# Patient Record
Sex: FEMALE | Race: WHITE | NOT HISPANIC OR LATINO | Employment: OTHER | URBAN - METROPOLITAN AREA
[De-identification: names, ages, dates, MRNs, and addresses within clinical notes are randomized per-mention and may not be internally consistent; named-entity substitution may affect disease eponyms.]

---

## 2017-01-16 ENCOUNTER — GENERIC CONVERSION - ENCOUNTER (OUTPATIENT)
Dept: OTHER | Facility: OTHER | Age: 77
End: 2017-01-16

## 2017-01-30 ENCOUNTER — TRANSCRIBE ORDERS (OUTPATIENT)
Dept: ADMINISTRATIVE | Facility: HOSPITAL | Age: 77
End: 2017-01-30

## 2017-01-30 ENCOUNTER — ALLSCRIPTS OFFICE VISIT (OUTPATIENT)
Dept: OTHER | Facility: OTHER | Age: 77
End: 2017-01-30

## 2017-01-30 DIAGNOSIS — I49.1 SUPRAVENTRICULAR PREMATURE BEATS: Primary | ICD-10-CM

## 2017-01-30 DIAGNOSIS — M25.519 PAIN IN SHOULDER: ICD-10-CM

## 2017-01-30 DIAGNOSIS — I49.1 ATRIAL PREMATURE DEPOLARIZATION: ICD-10-CM

## 2017-02-01 ENCOUNTER — APPOINTMENT (OUTPATIENT)
Dept: LAB | Facility: MEDICAL CENTER | Age: 77
End: 2017-02-01
Payer: MEDICARE

## 2017-02-01 DIAGNOSIS — I49.1 ATRIAL PREMATURE DEPOLARIZATION: ICD-10-CM

## 2017-02-01 LAB
ALBUMIN SERPL BCP-MCNC: 4 G/DL (ref 3.5–5)
ALP SERPL-CCNC: 79 U/L (ref 46–116)
ALT SERPL W P-5'-P-CCNC: 23 U/L (ref 12–78)
ANION GAP SERPL CALCULATED.3IONS-SCNC: 6 MMOL/L (ref 4–13)
AST SERPL W P-5'-P-CCNC: 19 U/L (ref 5–45)
BILIRUB SERPL-MCNC: 0.36 MG/DL (ref 0.2–1)
BUN SERPL-MCNC: 10 MG/DL (ref 5–25)
CALCIUM ALBUM COR SERPL-MCNC: 10.3 MG/DL (ref 8.3–10.1)
CALCIUM SERPL-MCNC: 10.3 MG/DL (ref 8.3–10.1)
CHLORIDE SERPL-SCNC: 105 MMOL/L (ref 100–108)
CO2 SERPL-SCNC: 30 MMOL/L (ref 21–32)
CREAT SERPL-MCNC: 0.66 MG/DL (ref 0.6–1.3)
GFR SERPL CREATININE-BSD FRML MDRD: >60 ML/MIN/1.73SQ M
GLUCOSE SERPL-MCNC: 98 MG/DL (ref 65–140)
POTASSIUM SERPL-SCNC: 4.1 MMOL/L (ref 3.5–5.3)
PROT SERPL-MCNC: 7.4 G/DL (ref 6.4–8.2)
SODIUM SERPL-SCNC: 141 MMOL/L (ref 136–145)
TSH SERPL DL<=0.05 MIU/L-ACNC: 2.81 UIU/ML (ref 0.36–3.74)

## 2017-02-01 PROCEDURE — 84443 ASSAY THYROID STIM HORMONE: CPT

## 2017-02-01 PROCEDURE — 36415 COLL VENOUS BLD VENIPUNCTURE: CPT

## 2017-02-01 PROCEDURE — 80053 COMPREHEN METABOLIC PANEL: CPT

## 2017-02-03 ENCOUNTER — GENERIC CONVERSION - ENCOUNTER (OUTPATIENT)
Dept: OTHER | Facility: OTHER | Age: 77
End: 2017-02-03

## 2017-02-07 ENCOUNTER — ALLSCRIPTS OFFICE VISIT (OUTPATIENT)
Dept: OTHER | Facility: OTHER | Age: 77
End: 2017-02-07

## 2017-02-07 ENCOUNTER — HOSPITAL ENCOUNTER (OUTPATIENT)
Dept: RADIOLOGY | Facility: MEDICAL CENTER | Age: 77
Discharge: HOME | End: 2017-02-07
Payer: MEDICARE

## 2017-02-07 DIAGNOSIS — M25.519 PAIN IN SHOULDER: ICD-10-CM

## 2017-02-07 PROCEDURE — 73030 X-RAY EXAM OF SHOULDER: CPT

## 2017-03-02 ENCOUNTER — APPOINTMENT (OUTPATIENT)
Dept: PHYSICAL THERAPY | Facility: MEDICAL CENTER | Age: 77
End: 2017-03-02
Payer: MEDICARE

## 2017-03-02 ENCOUNTER — GENERIC CONVERSION - ENCOUNTER (OUTPATIENT)
Dept: OTHER | Facility: OTHER | Age: 77
End: 2017-03-02

## 2017-03-02 DIAGNOSIS — M75.81 OTHER SHOULDER LESIONS, RIGHT SHOULDER: ICD-10-CM

## 2017-03-02 PROCEDURE — G8990 OTHER PT/OT CURRENT STATUS: HCPCS

## 2017-03-02 PROCEDURE — 97162 PT EVAL MOD COMPLEX 30 MIN: CPT

## 2017-03-02 PROCEDURE — G8991 OTHER PT/OT GOAL STATUS: HCPCS

## 2017-03-06 ENCOUNTER — APPOINTMENT (OUTPATIENT)
Dept: PHYSICAL THERAPY | Facility: MEDICAL CENTER | Age: 77
End: 2017-03-06
Payer: MEDICARE

## 2017-03-06 ENCOUNTER — HOSPITAL ENCOUNTER (OUTPATIENT)
Dept: NON INVASIVE DIAGNOSTICS | Facility: MEDICAL CENTER | Age: 77
Discharge: HOME | End: 2017-03-06
Payer: MEDICARE

## 2017-03-06 DIAGNOSIS — I49.1 SUPRAVENTRICULAR PREMATURE BEATS: ICD-10-CM

## 2017-03-06 PROCEDURE — 97140 MANUAL THERAPY 1/> REGIONS: CPT

## 2017-03-06 PROCEDURE — 93306 TTE W/DOPPLER COMPLETE: CPT

## 2017-03-06 PROCEDURE — 97110 THERAPEUTIC EXERCISES: CPT

## 2017-03-09 ENCOUNTER — APPOINTMENT (OUTPATIENT)
Dept: PHYSICAL THERAPY | Facility: MEDICAL CENTER | Age: 77
End: 2017-03-09
Payer: MEDICARE

## 2017-03-09 PROCEDURE — 97110 THERAPEUTIC EXERCISES: CPT

## 2017-03-13 ENCOUNTER — APPOINTMENT (OUTPATIENT)
Dept: PHYSICAL THERAPY | Facility: MEDICAL CENTER | Age: 77
End: 2017-03-13
Payer: MEDICARE

## 2017-03-13 PROCEDURE — 97140 MANUAL THERAPY 1/> REGIONS: CPT

## 2017-03-13 PROCEDURE — 97112 NEUROMUSCULAR REEDUCATION: CPT

## 2017-03-16 ENCOUNTER — APPOINTMENT (OUTPATIENT)
Dept: PHYSICAL THERAPY | Facility: MEDICAL CENTER | Age: 77
End: 2017-03-16
Payer: MEDICARE

## 2017-03-16 PROCEDURE — 97140 MANUAL THERAPY 1/> REGIONS: CPT

## 2017-03-16 PROCEDURE — 97150 GROUP THERAPEUTIC PROCEDURES: CPT

## 2017-03-20 ENCOUNTER — APPOINTMENT (OUTPATIENT)
Dept: PHYSICAL THERAPY | Facility: MEDICAL CENTER | Age: 77
End: 2017-03-20
Payer: MEDICARE

## 2017-03-20 PROCEDURE — 97150 GROUP THERAPEUTIC PROCEDURES: CPT

## 2017-03-20 PROCEDURE — 97140 MANUAL THERAPY 1/> REGIONS: CPT

## 2017-03-21 ENCOUNTER — ALLSCRIPTS OFFICE VISIT (OUTPATIENT)
Dept: OTHER | Facility: OTHER | Age: 77
End: 2017-03-21

## 2017-03-21 ENCOUNTER — APPOINTMENT (OUTPATIENT)
Dept: PHYSICAL THERAPY | Facility: MEDICAL CENTER | Age: 77
End: 2017-03-21
Payer: MEDICARE

## 2017-03-23 ENCOUNTER — APPOINTMENT (OUTPATIENT)
Dept: PHYSICAL THERAPY | Facility: MEDICAL CENTER | Age: 77
End: 2017-03-23
Payer: MEDICARE

## 2017-03-23 PROCEDURE — 97110 THERAPEUTIC EXERCISES: CPT

## 2017-03-23 PROCEDURE — 97140 MANUAL THERAPY 1/> REGIONS: CPT

## 2017-03-28 ENCOUNTER — APPOINTMENT (OUTPATIENT)
Dept: PHYSICAL THERAPY | Facility: MEDICAL CENTER | Age: 77
End: 2017-03-28
Payer: MEDICARE

## 2017-03-28 PROCEDURE — 97110 THERAPEUTIC EXERCISES: CPT

## 2017-03-28 PROCEDURE — 97140 MANUAL THERAPY 1/> REGIONS: CPT

## 2017-03-30 ENCOUNTER — APPOINTMENT (OUTPATIENT)
Dept: PHYSICAL THERAPY | Facility: MEDICAL CENTER | Age: 77
End: 2017-03-30
Payer: MEDICARE

## 2017-03-30 ENCOUNTER — GENERIC CONVERSION - ENCOUNTER (OUTPATIENT)
Dept: OTHER | Facility: OTHER | Age: 77
End: 2017-03-30

## 2017-03-30 PROCEDURE — G8990 OTHER PT/OT CURRENT STATUS: HCPCS

## 2017-03-30 PROCEDURE — G8991 OTHER PT/OT GOAL STATUS: HCPCS

## 2017-03-30 PROCEDURE — 97140 MANUAL THERAPY 1/> REGIONS: CPT

## 2017-03-30 PROCEDURE — 97110 THERAPEUTIC EXERCISES: CPT

## 2017-03-30 PROCEDURE — 97112 NEUROMUSCULAR REEDUCATION: CPT

## 2017-04-04 ENCOUNTER — APPOINTMENT (OUTPATIENT)
Dept: PHYSICAL THERAPY | Facility: MEDICAL CENTER | Age: 77
End: 2017-04-04
Payer: MEDICARE

## 2017-04-04 PROCEDURE — 97140 MANUAL THERAPY 1/> REGIONS: CPT

## 2017-04-04 PROCEDURE — 97112 NEUROMUSCULAR REEDUCATION: CPT

## 2017-04-06 ENCOUNTER — APPOINTMENT (OUTPATIENT)
Dept: PHYSICAL THERAPY | Facility: MEDICAL CENTER | Age: 77
End: 2017-04-06
Payer: MEDICARE

## 2017-04-06 PROCEDURE — 97140 MANUAL THERAPY 1/> REGIONS: CPT

## 2017-04-06 PROCEDURE — 97110 THERAPEUTIC EXERCISES: CPT

## 2017-04-10 ENCOUNTER — APPOINTMENT (OUTPATIENT)
Dept: PHYSICAL THERAPY | Facility: MEDICAL CENTER | Age: 77
End: 2017-04-10
Payer: MEDICARE

## 2017-04-10 PROCEDURE — 97150 GROUP THERAPEUTIC PROCEDURES: CPT

## 2017-04-10 PROCEDURE — 97110 THERAPEUTIC EXERCISES: CPT

## 2017-04-10 PROCEDURE — 97140 MANUAL THERAPY 1/> REGIONS: CPT

## 2017-04-13 ENCOUNTER — APPOINTMENT (OUTPATIENT)
Dept: PHYSICAL THERAPY | Facility: MEDICAL CENTER | Age: 77
End: 2017-04-13
Payer: MEDICARE

## 2017-04-13 PROCEDURE — 97140 MANUAL THERAPY 1/> REGIONS: CPT

## 2017-04-13 PROCEDURE — 97110 THERAPEUTIC EXERCISES: CPT

## 2017-04-26 ENCOUNTER — HOSPITAL ENCOUNTER (EMERGENCY)
Facility: HOSPITAL | Age: 77
Discharge: HOME | End: 2017-04-26
Attending: EMERGENCY MEDICINE | Admitting: EMERGENCY MEDICINE
Payer: MEDICARE

## 2017-04-26 ENCOUNTER — APPOINTMENT (EMERGENCY)
Dept: ULTRASOUND IMAGING | Facility: HOSPITAL | Age: 77
End: 2017-04-26
Payer: MEDICARE

## 2017-04-26 VITALS
RESPIRATION RATE: 18 BRPM | OXYGEN SATURATION: 98 % | WEIGHT: 141.09 LBS | HEART RATE: 65 BPM | SYSTOLIC BLOOD PRESSURE: 181 MMHG | DIASTOLIC BLOOD PRESSURE: 89 MMHG

## 2017-04-26 DIAGNOSIS — I80.9 SUPERFICIAL THROMBOPHLEBITIS: ICD-10-CM

## 2017-04-26 DIAGNOSIS — B02.9 SHINGLES: Primary | ICD-10-CM

## 2017-04-26 PROCEDURE — 99283 EMERGENCY DEPT VISIT LOW MDM: CPT

## 2017-04-26 PROCEDURE — 93970 EXTREMITY STUDY: CPT

## 2017-04-26 RX ORDER — FAMCICLOVIR 500 MG/1
500 TABLET, FILM COATED ORAL 3 TIMES DAILY
Qty: 21 TABLET | Refills: 0 | Status: SHIPPED | OUTPATIENT
Start: 2017-04-26 | End: 2018-02-01 | Stop reason: ALTCHOICE

## 2017-04-26 RX ORDER — FOLIC ACID/MULTIVIT,IRON,MINER .4-18-35
1 TABLET,CHEWABLE ORAL DAILY
COMMUNITY
End: 2018-05-04

## 2017-04-26 RX ORDER — MULTIVIT-MIN/IRON/FOLIC ACID/K 18-600-40
CAPSULE ORAL
COMMUNITY
End: 2018-05-04

## 2017-05-06 ENCOUNTER — HOSPITAL ENCOUNTER (EMERGENCY)
Facility: HOSPITAL | Age: 77
Discharge: HOME | End: 2017-05-06
Attending: EMERGENCY MEDICINE | Admitting: EMERGENCY MEDICINE
Payer: MEDICARE

## 2017-05-06 VITALS
TEMPERATURE: 97.6 F | HEIGHT: 63 IN | RESPIRATION RATE: 18 BRPM | WEIGHT: 139.77 LBS | OXYGEN SATURATION: 98 % | DIASTOLIC BLOOD PRESSURE: 101 MMHG | HEART RATE: 90 BPM | SYSTOLIC BLOOD PRESSURE: 140 MMHG | BODY MASS INDEX: 24.77 KG/M2

## 2017-05-06 DIAGNOSIS — B02.9 SHINGLES: Primary | ICD-10-CM

## 2017-05-06 DIAGNOSIS — M79.2 NERVE PAIN: ICD-10-CM

## 2017-05-06 LAB
ANION GAP SERPL CALCULATED.3IONS-SCNC: 8 MMOL/L (ref 4–13)
APTT PPP: 29 SECONDS (ref 23–35)
BASOPHILS # BLD AUTO: 0.02 THOUSANDS/ΜL (ref 0–0.1)
BASOPHILS NFR BLD AUTO: 0 % (ref 0–1)
BUN SERPL-MCNC: 9 MG/DL (ref 5–25)
CALCIUM SERPL-MCNC: 10.2 MG/DL (ref 8.3–10.1)
CHLORIDE SERPL-SCNC: 102 MMOL/L (ref 100–108)
CLARITY, POC: CLEAR
CO2 SERPL-SCNC: 30 MMOL/L (ref 21–32)
COLOR, POC: YELLOW
CREAT SERPL-MCNC: 0.58 MG/DL (ref 0.6–1.3)
CRP SERPL QL: <3 MG/L
EOSINOPHIL # BLD AUTO: 0.03 THOUSAND/ΜL (ref 0–0.61)
EOSINOPHIL NFR BLD AUTO: 1 % (ref 0–6)
ERYTHROCYTE [DISTWIDTH] IN BLOOD BY AUTOMATED COUNT: 12.6 % (ref 11.6–15.1)
ERYTHROCYTE [SEDIMENTATION RATE] IN BLOOD: 5 MM/HOUR (ref 0–20)
EXT BILIRUBIN, UA: NEGATIVE
EXT BLOOD URINE: NEGATIVE
EXT GLUCOSE, UA: NEGATIVE
EXT KETONES: NORMAL
EXT NITRITE, UA: NEGATIVE
EXT PH, UA: 6.5
EXT PROTEIN, UA: NEGATIVE
EXT SPECIFIC GRAVITY, UA: 1
EXT UROBILINOGEN: NEGATIVE
GFR SERPL CREATININE-BSD FRML MDRD: >60 ML/MIN/1.73SQ M
GLUCOSE SERPL-MCNC: 108 MG/DL (ref 65–140)
HCT VFR BLD AUTO: 39.4 % (ref 34.8–46.1)
HGB BLD-MCNC: 13.4 G/DL (ref 11.5–15.4)
INR PPP: 1.04 (ref 0.86–1.16)
LYMPHOCYTES # BLD AUTO: 1.51 THOUSANDS/ΜL (ref 0.6–4.47)
LYMPHOCYTES NFR BLD AUTO: 28 % (ref 14–44)
MCH RBC QN AUTO: 29.4 PG (ref 26.8–34.3)
MCHC RBC AUTO-ENTMCNC: 34 G/DL (ref 31.4–37.4)
MCV RBC AUTO: 86 FL (ref 82–98)
MONOCYTES # BLD AUTO: 0.32 THOUSAND/ΜL (ref 0.17–1.22)
MONOCYTES NFR BLD AUTO: 6 % (ref 4–12)
NEUTROPHILS # BLD AUTO: 3.46 THOUSANDS/ΜL (ref 1.85–7.62)
NEUTS SEG NFR BLD AUTO: 65 % (ref 43–75)
PLATELET # BLD AUTO: 296 THOUSANDS/UL (ref 149–390)
PMV BLD AUTO: 9.9 FL (ref 8.9–12.7)
POTASSIUM SERPL-SCNC: 4.2 MMOL/L (ref 3.5–5.3)
PROTHROMBIN TIME: 13.9 SECONDS (ref 12.1–14.4)
RBC # BLD AUTO: 4.56 MILLION/UL (ref 3.81–5.12)
SODIUM SERPL-SCNC: 140 MMOL/L (ref 136–145)
WBC # BLD AUTO: 5.34 THOUSAND/UL (ref 4.31–10.16)
WBC # BLD EST: NEGATIVE 10*3/UL

## 2017-05-06 PROCEDURE — 87798 DETECT AGENT NOS DNA AMP: CPT | Performed by: PHYSICIAN ASSISTANT

## 2017-05-06 PROCEDURE — 36415 COLL VENOUS BLD VENIPUNCTURE: CPT | Performed by: PHYSICIAN ASSISTANT

## 2017-05-06 PROCEDURE — 80048 BASIC METABOLIC PNL TOTAL CA: CPT | Performed by: PHYSICIAN ASSISTANT

## 2017-05-06 PROCEDURE — 85025 COMPLETE CBC W/AUTO DIFF WBC: CPT | Performed by: PHYSICIAN ASSISTANT

## 2017-05-06 PROCEDURE — 85610 PROTHROMBIN TIME: CPT | Performed by: PHYSICIAN ASSISTANT

## 2017-05-06 PROCEDURE — 99283 EMERGENCY DEPT VISIT LOW MDM: CPT

## 2017-05-06 PROCEDURE — 81002 URINALYSIS NONAUTO W/O SCOPE: CPT | Performed by: PHYSICIAN ASSISTANT

## 2017-05-06 PROCEDURE — 85730 THROMBOPLASTIN TIME PARTIAL: CPT | Performed by: PHYSICIAN ASSISTANT

## 2017-05-06 PROCEDURE — 86140 C-REACTIVE PROTEIN: CPT | Performed by: PHYSICIAN ASSISTANT

## 2017-05-06 PROCEDURE — 85652 RBC SED RATE AUTOMATED: CPT | Performed by: PHYSICIAN ASSISTANT

## 2017-05-06 RX ORDER — GABAPENTIN 300 MG/1
300 CAPSULE ORAL 3 TIMES DAILY
Qty: 15 CAPSULE | Refills: 0 | Status: SHIPPED | OUTPATIENT
Start: 2017-05-06 | End: 2018-02-01 | Stop reason: ALTCHOICE

## 2017-05-06 RX ORDER — TRAMADOL HYDROCHLORIDE 50 MG/1
50 TABLET ORAL EVERY 6 HOURS PRN
COMMUNITY
End: 2018-02-01 | Stop reason: ALTCHOICE

## 2017-05-11 LAB — VZV DNA SPEC QL NAA+PROBE: POSITIVE

## 2017-05-12 ENCOUNTER — GENERIC CONVERSION - ENCOUNTER (OUTPATIENT)
Dept: OTHER | Facility: OTHER | Age: 77
End: 2017-05-12

## 2017-05-25 ENCOUNTER — ALLSCRIPTS OFFICE VISIT (OUTPATIENT)
Dept: OTHER | Facility: OTHER | Age: 77
End: 2017-05-25

## 2017-06-13 ENCOUNTER — TRANSCRIBE ORDERS (OUTPATIENT)
Dept: ADMINISTRATIVE | Facility: HOSPITAL | Age: 77
End: 2017-06-13

## 2017-06-13 ENCOUNTER — ALLSCRIPTS OFFICE VISIT (OUTPATIENT)
Dept: OTHER | Facility: OTHER | Age: 77
End: 2017-06-13

## 2017-06-13 DIAGNOSIS — M21.372 LEFT FOOT DROP: ICD-10-CM

## 2017-06-13 DIAGNOSIS — M21.372 LEFT FOOT DROP: Primary | ICD-10-CM

## 2017-06-14 ENCOUNTER — HOSPITAL ENCOUNTER (OUTPATIENT)
Dept: NEUROLOGY | Facility: CLINIC | Age: 77
Discharge: HOME | End: 2017-06-14
Payer: MEDICARE

## 2017-06-14 ENCOUNTER — HOSPITAL ENCOUNTER (OUTPATIENT)
Dept: RADIOLOGY | Age: 77
Discharge: HOME | End: 2017-06-14
Payer: MEDICARE

## 2017-06-14 DIAGNOSIS — M21.372 LEFT FOOT DROP: ICD-10-CM

## 2017-06-14 PROCEDURE — 72158 MRI LUMBAR SPINE W/O & W/DYE: CPT

## 2017-06-14 PROCEDURE — 95910 NRV CNDJ TEST 7-8 STUDIES: CPT

## 2017-06-14 PROCEDURE — 95886 MUSC TEST DONE W/N TEST COMP: CPT

## 2017-06-14 PROCEDURE — A9585 GADOBUTROL INJECTION: HCPCS | Performed by: PSYCHIATRY & NEUROLOGY

## 2017-06-14 RX ADMIN — GADOBUTROL 6.17 ML: 604.72 INJECTION INTRAVENOUS at 09:32

## 2017-06-16 ENCOUNTER — GENERIC CONVERSION - ENCOUNTER (OUTPATIENT)
Dept: OTHER | Facility: OTHER | Age: 77
End: 2017-06-16

## 2017-06-20 ENCOUNTER — GENERIC CONVERSION - ENCOUNTER (OUTPATIENT)
Dept: OTHER | Facility: OTHER | Age: 77
End: 2017-06-20

## 2017-06-20 ENCOUNTER — APPOINTMENT (OUTPATIENT)
Dept: PHYSICAL THERAPY | Facility: MEDICAL CENTER | Age: 77
End: 2017-06-20
Payer: MEDICARE

## 2017-06-20 PROCEDURE — 97014 ELECTRIC STIMULATION THERAPY: CPT

## 2017-06-20 PROCEDURE — G8979 MOBILITY GOAL STATUS: HCPCS

## 2017-06-20 PROCEDURE — G8978 MOBILITY CURRENT STATUS: HCPCS

## 2017-06-20 PROCEDURE — 97162 PT EVAL MOD COMPLEX 30 MIN: CPT

## 2017-06-22 ENCOUNTER — APPOINTMENT (OUTPATIENT)
Dept: PHYSICAL THERAPY | Facility: MEDICAL CENTER | Age: 77
End: 2017-06-22
Payer: MEDICARE

## 2017-06-22 PROCEDURE — 97014 ELECTRIC STIMULATION THERAPY: CPT

## 2017-06-22 PROCEDURE — 97116 GAIT TRAINING THERAPY: CPT

## 2017-06-22 PROCEDURE — 97112 NEUROMUSCULAR REEDUCATION: CPT

## 2017-06-22 PROCEDURE — 97110 THERAPEUTIC EXERCISES: CPT

## 2017-06-23 ENCOUNTER — ALLSCRIPTS OFFICE VISIT (OUTPATIENT)
Dept: OTHER | Facility: OTHER | Age: 77
End: 2017-06-23

## 2017-06-26 ENCOUNTER — APPOINTMENT (OUTPATIENT)
Dept: PHYSICAL THERAPY | Facility: MEDICAL CENTER | Age: 77
End: 2017-06-26
Payer: MEDICARE

## 2017-06-26 PROCEDURE — 97112 NEUROMUSCULAR REEDUCATION: CPT

## 2017-06-26 PROCEDURE — 97014 ELECTRIC STIMULATION THERAPY: CPT

## 2017-06-26 PROCEDURE — 97110 THERAPEUTIC EXERCISES: CPT

## 2017-06-29 ENCOUNTER — APPOINTMENT (OUTPATIENT)
Dept: PHYSICAL THERAPY | Facility: MEDICAL CENTER | Age: 77
End: 2017-06-29
Payer: MEDICARE

## 2017-06-29 PROCEDURE — 97112 NEUROMUSCULAR REEDUCATION: CPT

## 2017-06-29 PROCEDURE — 97110 THERAPEUTIC EXERCISES: CPT

## 2017-06-29 PROCEDURE — 97014 ELECTRIC STIMULATION THERAPY: CPT

## 2017-06-29 PROCEDURE — 97116 GAIT TRAINING THERAPY: CPT

## 2017-06-30 ENCOUNTER — GENERIC CONVERSION - ENCOUNTER (OUTPATIENT)
Dept: OTHER | Facility: OTHER | Age: 77
End: 2017-06-30

## 2017-07-03 ENCOUNTER — APPOINTMENT (OUTPATIENT)
Dept: PHYSICAL THERAPY | Facility: MEDICAL CENTER | Age: 77
End: 2017-07-03
Payer: MEDICARE

## 2017-07-03 PROCEDURE — 97112 NEUROMUSCULAR REEDUCATION: CPT

## 2017-07-03 PROCEDURE — 97110 THERAPEUTIC EXERCISES: CPT

## 2017-07-03 PROCEDURE — 97014 ELECTRIC STIMULATION THERAPY: CPT

## 2017-07-05 ENCOUNTER — GENERIC CONVERSION - ENCOUNTER (OUTPATIENT)
Dept: OTHER | Facility: OTHER | Age: 77
End: 2017-07-05

## 2017-07-06 ENCOUNTER — APPOINTMENT (OUTPATIENT)
Dept: PHYSICAL THERAPY | Facility: MEDICAL CENTER | Age: 77
End: 2017-07-06
Payer: MEDICARE

## 2017-07-06 PROCEDURE — 97110 THERAPEUTIC EXERCISES: CPT

## 2017-07-06 PROCEDURE — 97014 ELECTRIC STIMULATION THERAPY: CPT

## 2017-07-10 ENCOUNTER — APPOINTMENT (OUTPATIENT)
Dept: PHYSICAL THERAPY | Facility: MEDICAL CENTER | Age: 77
End: 2017-07-10
Payer: MEDICARE

## 2017-07-10 PROCEDURE — 97112 NEUROMUSCULAR REEDUCATION: CPT

## 2017-07-10 PROCEDURE — 97110 THERAPEUTIC EXERCISES: CPT

## 2017-07-10 PROCEDURE — 97014 ELECTRIC STIMULATION THERAPY: CPT

## 2017-07-13 ENCOUNTER — ALLSCRIPTS OFFICE VISIT (OUTPATIENT)
Dept: OTHER | Facility: OTHER | Age: 77
End: 2017-07-13

## 2017-07-14 ENCOUNTER — APPOINTMENT (OUTPATIENT)
Dept: PHYSICAL THERAPY | Facility: MEDICAL CENTER | Age: 77
End: 2017-07-14
Payer: MEDICARE

## 2017-07-14 PROCEDURE — 97110 THERAPEUTIC EXERCISES: CPT

## 2017-07-14 PROCEDURE — 97112 NEUROMUSCULAR REEDUCATION: CPT

## 2017-07-14 PROCEDURE — 97014 ELECTRIC STIMULATION THERAPY: CPT

## 2017-07-18 ENCOUNTER — GENERIC CONVERSION - ENCOUNTER (OUTPATIENT)
Dept: OTHER | Facility: OTHER | Age: 77
End: 2017-07-18

## 2017-07-18 ENCOUNTER — APPOINTMENT (OUTPATIENT)
Dept: PHYSICAL THERAPY | Facility: MEDICAL CENTER | Age: 77
End: 2017-07-18
Payer: MEDICARE

## 2017-07-18 PROCEDURE — G8979 MOBILITY GOAL STATUS: HCPCS

## 2017-07-18 PROCEDURE — 97112 NEUROMUSCULAR REEDUCATION: CPT

## 2017-07-18 PROCEDURE — G8978 MOBILITY CURRENT STATUS: HCPCS

## 2017-07-18 PROCEDURE — 97110 THERAPEUTIC EXERCISES: CPT

## 2017-07-18 PROCEDURE — 97014 ELECTRIC STIMULATION THERAPY: CPT

## 2017-07-20 ENCOUNTER — APPOINTMENT (OUTPATIENT)
Dept: PHYSICAL THERAPY | Facility: MEDICAL CENTER | Age: 77
End: 2017-07-20
Payer: MEDICARE

## 2017-07-20 PROCEDURE — 97110 THERAPEUTIC EXERCISES: CPT

## 2017-07-20 PROCEDURE — 97112 NEUROMUSCULAR REEDUCATION: CPT

## 2017-07-20 PROCEDURE — 97014 ELECTRIC STIMULATION THERAPY: CPT

## 2017-07-24 ENCOUNTER — APPOINTMENT (OUTPATIENT)
Dept: PHYSICAL THERAPY | Facility: MEDICAL CENTER | Age: 77
End: 2017-07-24
Payer: MEDICARE

## 2017-07-24 PROCEDURE — 97112 NEUROMUSCULAR REEDUCATION: CPT

## 2017-07-24 PROCEDURE — 97014 ELECTRIC STIMULATION THERAPY: CPT

## 2017-07-24 PROCEDURE — 97110 THERAPEUTIC EXERCISES: CPT

## 2017-07-27 ENCOUNTER — APPOINTMENT (OUTPATIENT)
Dept: PHYSICAL THERAPY | Facility: MEDICAL CENTER | Age: 77
End: 2017-07-27
Payer: MEDICARE

## 2017-07-27 PROCEDURE — 97112 NEUROMUSCULAR REEDUCATION: CPT

## 2017-07-27 PROCEDURE — 97110 THERAPEUTIC EXERCISES: CPT

## 2017-07-27 PROCEDURE — 97014 ELECTRIC STIMULATION THERAPY: CPT

## 2017-08-01 ENCOUNTER — APPOINTMENT (OUTPATIENT)
Dept: PHYSICAL THERAPY | Facility: MEDICAL CENTER | Age: 77
End: 2017-08-01
Payer: MEDICARE

## 2017-08-01 PROCEDURE — 97110 THERAPEUTIC EXERCISES: CPT

## 2017-08-01 PROCEDURE — 97014 ELECTRIC STIMULATION THERAPY: CPT

## 2017-08-01 PROCEDURE — 97112 NEUROMUSCULAR REEDUCATION: CPT

## 2017-08-03 ENCOUNTER — APPOINTMENT (OUTPATIENT)
Dept: PHYSICAL THERAPY | Facility: MEDICAL CENTER | Age: 77
End: 2017-08-03
Payer: MEDICARE

## 2017-08-03 PROCEDURE — 97014 ELECTRIC STIMULATION THERAPY: CPT

## 2017-08-03 PROCEDURE — 97112 NEUROMUSCULAR REEDUCATION: CPT

## 2017-08-03 PROCEDURE — 97110 THERAPEUTIC EXERCISES: CPT

## 2017-08-08 ENCOUNTER — APPOINTMENT (OUTPATIENT)
Dept: PHYSICAL THERAPY | Facility: MEDICAL CENTER | Age: 77
End: 2017-08-08
Payer: MEDICARE

## 2017-08-08 PROCEDURE — 97014 ELECTRIC STIMULATION THERAPY: CPT

## 2017-08-08 PROCEDURE — 97110 THERAPEUTIC EXERCISES: CPT

## 2017-08-08 PROCEDURE — 97112 NEUROMUSCULAR REEDUCATION: CPT

## 2017-08-10 ENCOUNTER — GENERIC CONVERSION - ENCOUNTER (OUTPATIENT)
Dept: OTHER | Facility: OTHER | Age: 77
End: 2017-08-10

## 2017-08-10 ENCOUNTER — APPOINTMENT (OUTPATIENT)
Dept: PHYSICAL THERAPY | Facility: MEDICAL CENTER | Age: 77
End: 2017-08-10
Payer: MEDICARE

## 2017-08-10 PROCEDURE — G8979 MOBILITY GOAL STATUS: HCPCS

## 2017-08-10 PROCEDURE — 97014 ELECTRIC STIMULATION THERAPY: CPT

## 2017-08-10 PROCEDURE — G8978 MOBILITY CURRENT STATUS: HCPCS

## 2017-08-10 PROCEDURE — 97112 NEUROMUSCULAR REEDUCATION: CPT

## 2017-08-10 PROCEDURE — 97110 THERAPEUTIC EXERCISES: CPT

## 2017-08-15 ENCOUNTER — APPOINTMENT (OUTPATIENT)
Dept: PHYSICAL THERAPY | Facility: MEDICAL CENTER | Age: 77
End: 2017-08-15
Payer: MEDICARE

## 2017-08-15 PROCEDURE — 97014 ELECTRIC STIMULATION THERAPY: CPT

## 2017-08-15 PROCEDURE — 97112 NEUROMUSCULAR REEDUCATION: CPT

## 2017-08-17 ENCOUNTER — APPOINTMENT (OUTPATIENT)
Dept: PHYSICAL THERAPY | Facility: MEDICAL CENTER | Age: 77
End: 2017-08-17
Payer: MEDICARE

## 2017-08-17 PROCEDURE — 97014 ELECTRIC STIMULATION THERAPY: CPT

## 2017-08-17 PROCEDURE — 97110 THERAPEUTIC EXERCISES: CPT

## 2017-08-17 PROCEDURE — 97112 NEUROMUSCULAR REEDUCATION: CPT

## 2017-08-22 ENCOUNTER — APPOINTMENT (OUTPATIENT)
Dept: PHYSICAL THERAPY | Facility: MEDICAL CENTER | Age: 77
End: 2017-08-22
Payer: MEDICARE

## 2017-08-22 PROCEDURE — 97112 NEUROMUSCULAR REEDUCATION: CPT

## 2017-08-22 PROCEDURE — 97150 GROUP THERAPEUTIC PROCEDURES: CPT

## 2017-08-22 PROCEDURE — 97014 ELECTRIC STIMULATION THERAPY: CPT

## 2017-08-24 ENCOUNTER — APPOINTMENT (OUTPATIENT)
Dept: PHYSICAL THERAPY | Facility: MEDICAL CENTER | Age: 77
End: 2017-08-24
Payer: MEDICARE

## 2017-08-24 PROCEDURE — 97014 ELECTRIC STIMULATION THERAPY: CPT

## 2017-08-24 PROCEDURE — 97112 NEUROMUSCULAR REEDUCATION: CPT

## 2017-08-24 PROCEDURE — 97110 THERAPEUTIC EXERCISES: CPT

## 2017-08-28 ENCOUNTER — APPOINTMENT (OUTPATIENT)
Dept: PHYSICAL THERAPY | Facility: MEDICAL CENTER | Age: 77
End: 2017-08-28
Payer: MEDICARE

## 2017-08-28 PROCEDURE — 97014 ELECTRIC STIMULATION THERAPY: CPT

## 2017-08-28 PROCEDURE — 97110 THERAPEUTIC EXERCISES: CPT

## 2017-08-28 PROCEDURE — 97112 NEUROMUSCULAR REEDUCATION: CPT

## 2017-08-29 ENCOUNTER — APPOINTMENT (OUTPATIENT)
Dept: PHYSICAL THERAPY | Facility: MEDICAL CENTER | Age: 77
End: 2017-08-29
Payer: MEDICARE

## 2017-08-31 ENCOUNTER — APPOINTMENT (OUTPATIENT)
Dept: PHYSICAL THERAPY | Facility: MEDICAL CENTER | Age: 77
End: 2017-08-31
Payer: MEDICARE

## 2017-08-31 PROCEDURE — 97014 ELECTRIC STIMULATION THERAPY: CPT

## 2017-08-31 PROCEDURE — 97110 THERAPEUTIC EXERCISES: CPT

## 2017-11-22 ENCOUNTER — OFFICE VISIT (OUTPATIENT)
Dept: URGENT CARE | Facility: MEDICAL CENTER | Age: 77
End: 2017-11-22
Payer: MEDICARE

## 2017-11-22 PROCEDURE — 99213 OFFICE O/P EST LOW 20 MIN: CPT

## 2017-11-22 PROCEDURE — G0463 HOSPITAL OUTPT CLINIC VISIT: HCPCS

## 2017-11-23 NOTE — PROGRESS NOTES
Assessment    1  Skin rash (782 1) (R21)    Plan  Skin rash    · ValACYclovir HCl - 1 GM Oral Tablet (Valtrex); TAKE 1 TABLET 3 TIMES DAILYFOR 7 DAYS   Bump on skin is most likely an insect bite but due to the patient's history of severe herpes zoster and the upcoming holiday we will provide a prescription for Valtrex  Use over the counter hydrocortisone ointment for relief of itching  If you start to have pain, burning, or increased bumps with blistering, please fill the Valtrex prescription and take 1000 mg three times daily for 7 days  Keep the area covered with a band-aid and clothing when around others  Chief Complaint    1  Rash  Chief Complaint Free Text Note Form: yesterday noticed blister that is itchy to left back  spot is red in color  History of Present Illness  HPI: This is a 65year old female with a history of severe shingles 6 months ago on her left foot and lower leg which has left her with a foot drop, presenting for a single bump on her left lateral back  The bump is itching, but not painful  She reports her previous shingles were burning and painful, but she is concerned because she has young children family members coming to visit tomorrow  [facility] Based Practices Required Assessment:  Pain Assessment  the patient states they do not have pain  Abuse And Domestic Violence Screen   Yes, the patient is safe at home  -- The patient states no one is hurting them  Depression And Suicide Screen  No, the patient has not had thoughts of hurting themself  No, the patient has not felt depressed in the past 7 days  Primary Language is  English  Readiness To Learn: Receptive  Barriers To Learning: none  Preferred Learning: verbal  Education Completed: disease/condition-- and-- treatment/procedure  Teaching Method: verbal  Person Taught: patient  Evaluation Of Learning: verbalized/demonstrated understanding      Active Problems  1  Degenerative lumbar spinal stenosis (564 02) (V79 972)   2  Irregular heartbeat (427 9) (I49 9)   3  Left foot drop (736 79) (M21 372)   4  Premature atrial contraction (427 61) (I49 1)   5  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)   6  Skin rash (782 1) (R21)   7  Superficial phlebitis of right leg (451 0) (I80 01)   8  Tendinitis of right rotator cuff (726 10) (M75 81)   9  Varicose veins of bilateral lower extremities with other complications (688 4) (Q11 074)    Past Medical History  1  No pertinent past medical history    Family History  Mother    1  Family history of osteoporosis (V17 81) (Z82 62)    Social History     · Never a smoker    Current Meds   1  Centrum Silver Oral Tablet; TAKE 1 TABLET DAILY; Therapy: (Recorded:28Npt7916) to Recorded   2  [employer] Vitamin D 2000 UNIT CAPS; take 1 capsule daily; Therapy: (Recorded:92Eem2992) to Recorded   3  Motrin TABS; TAKE 1 TABLET 3 TIMES DAILY AS NEEDED; Therapy: (Recorded:28Hia3530) to Recorded   4  Alesia 128 2 % Ophthalmic Solution; APPLY 1 DROP Every 8 hours; Therapy: (Recorded:33Tnp9703) to Recorded    Allergies    1  predniSONE    Vitals  Signs   Recorded: 22Nov2017 11:45AM   Temperature: 97 7 F  Heart Rate: 85  Respiration: 20  Systolic: 310  Diastolic: 80  Height: 5 ft 3 in  Weight: 134 lb 9 6 oz  BMI Calculated: 23 84  BSA Calculated: 1 63  Pain Scale: 0    Physical Exam   Constitutional  General appearance: No acute distress, well appearing and well nourished  Eyes  Conjunctiva and lids: No swelling, erythema or discharge  Ears, Nose, Mouth, and Throat  External inspection of ears and nose: Normal    Otoscopic examination: Tympanic membranes translucent with normal light reflex  Canals patent without erythema  Nasal mucosa, septum, and turbinates: Normal without edema or erythema  Oropharynx: Normal with no erythema, edema, exudate or lesions  Pulmonary  Respiratory effort: No increased work of breathing or signs of respiratory distress  Auscultation of lungs: Clear to auscultation  Cardiovascular  Auscultation of heart: Normal rate and rhythm, normal S1 and S2, without murmurs  Abdomen  Abdomen: Non-tender, no masses  Musculoskeletal  Gait and station: Abnormal  -- Foot drop  Skin  Skin and subcutaneous tissue: Abnormal    Examination of the skin for lesions: Abnormal  -- There is 1 2x2 mm erythematous, non-blistering raised bump on the left lateral aspect of the patients back around mid-thoracic level  There is no surrounding erythema, the bump is non-tender to palpation  No tenderness in the surrounding area  Neurologic  Sensation: No sensory loss     Psychiatric  Orientation to person, place, and time: Normal    Mood and affect: Normal        Future Appointments    Date/Time Provider Specialty Site   01/30/2018 08:30 AM Ricke Boxer, MD Neurology [facility] [address]       Signatures   Electronically signed by : Judith Gosselin, [facility]; Nov 22 2017 12:37PM EST                       (Author)    Electronically signed by : WILVER Cohen ; Nov 22 2017  1:22PM EST                       (Co-author)

## 2017-12-13 ENCOUNTER — ALLSCRIPTS OFFICE VISIT (OUTPATIENT)
Dept: OTHER | Facility: OTHER | Age: 77
End: 2017-12-13

## 2017-12-13 DIAGNOSIS — E83.52 HYPERCALCEMIA: ICD-10-CM

## 2017-12-13 DIAGNOSIS — R03.0 ELEVATED BLOOD PRESSURE READING WITHOUT DIAGNOSIS OF HYPERTENSION: ICD-10-CM

## 2017-12-15 NOTE — PROGRESS NOTES
Assessment  1  Elevated BP without diagnosis of hypertension (796 2) (R03 0)   2  Hypercalcemia (275 42) (E83 52)   3  Left foot drop (736 79) (M21 372)    Plan  Elevated BP without diagnosis of hypertension, Hypercalcemia    · (1) COMPREHENSIVE METABOLIC PANEL; Status:Active; Requested for:98Xuw5084;    · (1) LIPID PANEL, FASTING; Status:Active; Requested for:97Dsv0284;    · (1) PTH N-TERMINAL (INTACT); Status:Active; Requested for:47Zyr7040;    · (1) PTH-RELATED PEPTIDE; Status:Active; Requested for:61Izj0372;    · (1) VITAMIN D 25-HYDROXY; Status:Active; Requested for:67Qmd0159; Health Maintenance    · Follow-up visit in 6 months Evaluation and Treatment  Follow-up  Status: Hold For -Scheduling  Requested for: 05Kmj0919    Discussion/Summary  Discussion Summary:   Elevated blood pressure: Blood pressure elevated at exam today  Patient does not have hypertension  Blood pressure normal on repeat testing  No medication at this time  Check labs  Elevated calcium: Present on previous labs  Patient has a strong family history of hyperparathyroidism  I suspect patient may have this as well  Will check additional labs including parathyroid level as well as vitamin D as vitamin-D deficiency can cause secondary hyperparathyroidism thus leading to the elevated calcium  Left footdrop: It is present on ambulation however patient can lift her foot in tired off the floor  I see no reason why she cannot use the treadmill encouraged her to do so  I did ask that she walks slowly on the treadmill at 1st and  speed as tolerated  Screening tests recommended such as colonoscopy, mammogram and DEXA scan  Patient declined screening at this time  She did state she had the pneumonia vaccine about 4 years ago  Will get records from her previous PCP to see which pneumonia shot she received and then we can give her the other 1  She did declined flu vaccine today  Follow-up in 6 months or sooner if needed     Medication SE Review and Pt Understands Tx: The treatment plan was reviewed with the patient/guardian  The patient/guardian understands and agrees with the treatment plan      History of Present Illness  HPI: Patient presents to establish care  Has left footdrop secondary to shingles outbreak back in May  Has been to physical therapy as well as Neurology for the footdrop  It is getting progressively better and patient was told it will likely resolve 1 year from the date of start  Does have follow-up appointment with Neurology in January  No acute concerns today  Would like to use the treadmill again  Was told by physical therapy not to use the treadmill due to the footdrop  Review of Systems  Complete-Female:  Constitutional: no fever  Cardiovascular: no chest pain  Respiratory: no shortness of breath  Active Problems  1  Degenerative lumbar spinal stenosis (724 02) (M48 061)   2  Irregular heartbeat (427 9) (I49 9)   3  Left foot drop (736 79) (M21 372)   4  Premature atrial contraction (427 61) (I49 1)   5  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)   6  Skin rash (782 1) (R21)   7  Superficial phlebitis of right leg (451 0) (I80 01)   8  Tendinitis of right rotator cuff (726 10) (M75 81)   9  Varicose veins of bilateral lower extremities with other complications (405 1) (V14 509)    Past Medical History  1  No pertinent past medical history  Active Problems And Past Medical History Reviewed: The active problems and past medical history were reviewed and updated today  Surgical History  Surgical History Reviewed: The surgical history was reviewed and updated today  Family History  Mother    1  Family history of osteoporosis (V17 81) (Z82 62)   2  Family history of Parathyroid disease  Father    3  Family history of cardiac disorder (V17 49) (Z82 49)   4  Family history of hypertension (V17 49) (Z82 49)   5  Family history of osteoporosis (V17 81) (Z82 62)   6   Family history of Parathyroid disease  Brother    7  Family history of Parathyroid disease  Family History Reviewed: The family history was reviewed and updated today  Social History   · Denied: History of Alcohol use   · Caffeine use (V49 89) (F15 90)   · Denied: History of Drug use   · Never a smoker    Current Meds   1  Centrum Silver Oral Tablet; TAKE 1 TABLET DAILY; Therapy: (Recorded:87Pbo0079) to Recorded   2  CVS Vitamin D 2000 UNIT CAPS; take 1 capsule daily; Therapy: (Recorded:34Jmj9001) to Recorded   3  Alseia 128 2 % Ophthalmic Solution; APPLY 1 DROP Every 8 hours; Therapy: (Recorded:86Run0544) to Recorded    Allergies  1  predniSONE    Vitals  Vital Signs    Recorded: 80Ltw3943 02:47PM Recorded: 42RAF2600 02:18PM   Heart Rate  74   Respiration  20   Systolic 755, LUE, Sitting 005   Diastolic 86, LUE, Sitting 88   Height  5 ft 3 in   Weight  137 lb 6 oz   BMI Calculated  24 33   BSA Calculated  1 65     Physical Exam   Constitutional  General appearance: No acute distress, well appearing and well nourished  Eyes  Conjunctiva and lids: No swelling, erythema or discharge  Pupils and irises: Equal, round and reactive to light  Ears, Nose, Mouth, and Throat  External inspection of ears and nose: Normal    Otoscopic examination: Tympanic membranes translucent with normal light reflex  Canals patent without erythema  Nasal mucosa, septum, and turbinates: Normal without edema or erythema  Oropharynx: Normal with no erythema, edema, exudate or lesions  Pulmonary  Respiratory effort: No increased work of breathing or signs of respiratory distress  Auscultation of lungs: Clear to auscultation  Cardiovascular  Auscultation of heart: Normal rate and rhythm, normal S1 and S2, without murmurs  Examination of extremities for edema and/or varicosities: Normal    Abdomen  Abdomen: Non-tender, no masses  Liver and spleen: No hepatomegaly or splenomegaly  Lymphatic  Palpation of lymph nodes in neck: No lymphadenopathy  Musculoskeletal  Gait and station: Abnormal  -- Left footdrop otherwise gait is normal  Left foot however does not drag and patient is able to lift it completely off the ground  Neurologic  Cranial nerves: Cranial nerves 2-12 intact     Psychiatric  Orientation to person, place, and time: Normal    Mood and affect: Normal          Future Appointments    Date/Time Provider Specialty Site   01/30/2018 08:30 AM Lobo Orourke MD Neurology [place] [address]     Signatures   Electronically signed by : Riccardo Jansen DO; Dec 13 2017  3:03PM EST                       (Author)

## 2018-01-11 NOTE — RESULT NOTES
Verified Results  (1) COMPREHENSIVE METABOLIC PANEL 16MYX7821 98:29PJ Maranda Ibrahim Order Number: [id number]     Test Name Result Flag Reference   GLUCOSE,RANDM 98 mg/dL     If the patient is fasting, the ADA then defines impaired fasting glucose as > 100 mg/dL and diabetes as > or equal to 123 mg/dL  SODIUM 141 mmol/L  136-145   POTASSIUM 4 1 mmol/L  3 5-5 3   CHLORIDE 105 mmol/L  100-108   CARBON DIOXIDE 30 mmol/L  21-32   ANION GAP (CALC) 6 mmol/L  4-13   BLOOD UREA NITROGEN 10 mg/dL  5-25   CREATININE 0 66 mg/dL  0 60-1 30   Standardized to IDMS reference method   CALCIUM 10 3 mg/dL H 8 3-10 1   BILI, TOTAL 0 36 mg/dL  0 20-1 00   ALK PHOSPHATAS 79 U/L     ALT (SGPT) 23 U/L  12-78   AST(SGOT) 19 U/L  5-45   ALBUMIN 4 0 g/dL  3 5-5 0   TOTAL PROTEIN 7 4 g/dL  6 4-8 2   eGFR Non-African American      >60 0 ml/min/1 73sq m   - Patient Instructions: This bloodwork is non-fasting  Please drink two glasses of water morning of bloodwork  National Kidney Disease Education Program recommendations are as follows:  GFR calculation is accurate only with a steady state creatinine  Chronic Kidney disease less than 60 ml/min/1 73 sq  meters  Kidney failure less than 15 ml/min/1 73 sq  meters     CORRECTED CALCIUM 10 3 mg/dL H 8 3-10 1       Signatures   Electronically signed by : MIGDALIA Dhaliwal ; Feb 7 2017  2:39PM EST                       (Author)

## 2018-01-13 VITALS
WEIGHT: 137 LBS | HEIGHT: 63 IN | HEART RATE: 70 BPM | BODY MASS INDEX: 24.27 KG/M2 | SYSTOLIC BLOOD PRESSURE: 144 MMHG | DIASTOLIC BLOOD PRESSURE: 80 MMHG

## 2018-01-13 VITALS
BODY MASS INDEX: 24.1 KG/M2 | HEIGHT: 63 IN | WEIGHT: 136 LBS | HEART RATE: 96 BPM | DIASTOLIC BLOOD PRESSURE: 72 MMHG | SYSTOLIC BLOOD PRESSURE: 159 MMHG | RESPIRATION RATE: 16 BRPM

## 2018-01-13 VITALS
SYSTOLIC BLOOD PRESSURE: 150 MMHG | BODY MASS INDEX: 25.16 KG/M2 | HEIGHT: 63 IN | DIASTOLIC BLOOD PRESSURE: 80 MMHG | HEART RATE: 64 BPM | WEIGHT: 142 LBS

## 2018-01-14 VITALS
HEART RATE: 77 BPM | DIASTOLIC BLOOD PRESSURE: 78 MMHG | SYSTOLIC BLOOD PRESSURE: 153 MMHG | HEIGHT: 63 IN | RESPIRATION RATE: 16 BRPM | BODY MASS INDEX: 23.92 KG/M2 | WEIGHT: 135 LBS

## 2018-01-14 VITALS
HEART RATE: 98 BPM | BODY MASS INDEX: 24.89 KG/M2 | SYSTOLIC BLOOD PRESSURE: 167 MMHG | DIASTOLIC BLOOD PRESSURE: 93 MMHG | WEIGHT: 140.5 LBS

## 2018-01-15 VITALS
WEIGHT: 136 LBS | BODY MASS INDEX: 24.1 KG/M2 | TEMPERATURE: 98.5 F | DIASTOLIC BLOOD PRESSURE: 78 MMHG | HEIGHT: 63 IN | SYSTOLIC BLOOD PRESSURE: 158 MMHG | HEART RATE: 49 BPM | RESPIRATION RATE: 16 BRPM

## 2018-01-16 NOTE — PROGRESS NOTES
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)    Plan    1  Gradient compression stocking, below knee, 20-30mm Hg, each; Status:Complete;     Done: 08ZZI1877   2  Apply warm moist compresses to the affected area 3 times a day for 5 minutes ;   Status:Complete;   Done: 15JPA8792   3  Keep your leg elevated whenever you can to decrease swelling and increase circulation ;   Status:Complete;   Done: 65OAA6224   4  Support stockings can help keep the blood from pooling in the small veins in your feet   and legs ; Status:Complete;   Done: 75EAN2603   5  Follow-up PRN Evaluation and Treatment  Follow-up  Status: Complete  Done:   72GXQ9796    Discussion/Summary  Discussion Summary:   27-year-old female with long-standing bilateral lower extremity varicosities now with acute thrombophlebitis of the right calf varicose vein  Unfortunately she's dealing with shingles of the left foot and now foot drop on that side    -Pain of the right calf has subsided time  She has been applying warm compresses with relief  She may continue warm compresses  -Recommended compression  Rx 20-30mmHg compression given  Hold off on left leg stocking until all lesions clear up  She can continue with warm compresses for the next week  -She also periodically elevate her legs to help manage lower extremity swelling   -I will see her back in the office on an as-needed basis any concentrical the office  Counseling Documentation With Imm: The patient was counseled regarding diagnostic results, instructions for management, risk factor reductions, patient and family education, impressions, importance of compliance with treatment        Chief Complaint  Chief Complaint Free Text Note Form: "I have a right leg vein " LEVD 4/26/2017 SLT  New patient, here for a follow up for painful varicosities in her right leg  She did have an LEVD done on 4/26/2017 at [facility] ED after she went for an evaluation of pain and swelling in both lower legs  She did recently have shingles to the top of her left foot, no active oozing now but has a lot of pain  She complains of an area to the back of the right calf that is easily felt and more pronounced since April  She did do warm compresses to the area  She reports having bulging varicosities to her legs for several years, has not had pain and is active daily  She denies h/o DVT but did have an episode of phlebitis on the left leg several years ago, wore compression at that time but does not wear currently  History of Present Illness  Varicose Veins Jody Carranza Vascular: The patient is being seen for an initial evaluation of varicose veins  Referred by: Dr Sydney King  Symptoms:  bilateral bulging veins, bilateral discolored veins, bilateral leg swelling and bilateral spider veins, but no muscle cramps, no stasis dermatitis, no cellulitis, no hyperpigmentation, no venous ulcers, no dry skin, no itching, no leg pain and no bleeding  Pertinent medical history:  right superficial venous thrombosis, but no history of DVT, no history of hypercoagulable state and no history of pulmonary embolism  Evaluation and Treatment History: She was previously evaluated by a primary physician  This patient has had no prior surgical treatment of the venous system  This patient is not currently utilizing any conservative management strategies to manage the current symptoms  Free Text HPI: 54-year-old female who comes in for evaluation of varicosities  She has been dealing with shingles of the left foot for the past 2 weeks and with in that timeframe she developed lower extremity swelling along with tenderness of the right calf  A venous duplex was done 4/26/17 which noted acute superficial venous thrombosis of the right calf varicosities   She has been applying warm compresses to the calf and notices her pain has really subsided  She has also long history of bilateral lower extremity varicosities And has worn compression stockings many years ago  She denies any history of deep or superficial venous thrombosis prior to this episode  She thinks she may have bumped the right leg while carrying her groceries in from the car prior to developing phlebitis  Review of Systems  Complete Female - Vasc:   Constitutional: No fever or chills, feels well, no tiredness, no recent weight gain or weight loss  Eyes: No sudden vision loss, no blurred vision, no double vision  ENT: no loss of hearing, no nosebleeds, no hoarseness  Cardiovascular: no chest pain, regular heart rate  Respiratory: No sob, no wheezing, no cough, no sob with exertion, no orthopnea  Gastrointestinal: No nausea, No vomiting, no diarrhea, no blood in stool  Genitourinary: no dysuria, no Hematuria,no urinary incontinence  Musculoskeletal: no limb pain, no limb swelling  Integumentary: no rash, no lesions, no wounds, no ulcer  Neurological: no dementia, limb weakness and difficulty walking, but no headache, no numbness, no confusion, no dizziness, no convulsions and no fainting  Psychiatric: no depression, no mood disorders, no anxiety  Hematologic/Lymphatic: no bleeding disorder, no easy bruising  ROS Reviewed:   ROS reviewed  Active Problems    1  Irregular heartbeat (427 9) (I49 9)   2  Premature atrial contraction (427 61) (I49 1)   3  Tendinitis of right rotator cuff (726 10) (M99 81)    Past Medical History    1  No pertinent past medical history  Active Problems And Past Medical History Reviewed: The active problems and past medical history were reviewed and updated today  Surgical History  Surgical History Reviewed: The surgical history was reviewed and updated today  Family History  Mother    1   Family history of osteoporosis (V17 81) (Z82 62)  Family History Reviewed: The family history was reviewed and updated today  Social History    · Never a smoker  Social History Reviewed: The social history was reviewed and updated today  Current Meds   1  Centrum Silver Oral Tablet; Therapy: (Recorded:30Jan2017) to Recorded   2  CVS Vitamin D 2000 UNIT CAPS; Therapy: (Recorded:30Jan2017) to Recorded   3  Gabapentin 300 MG Oral Capsule; TAKE 1 CAPSULE 3 TIMES DAILY; Therapy: (Recorded:25May2017) to Recorded   4  Motrin TABS; Therapy: (Recorded:25May2017) to Recorded   5  Alesia 128 2 % Ophthalmic Solution; Therapy: (Recorded:25May2017) to Recorded  Medication List Reviewed: The medication list was reviewed and updated today  Allergies    1  predniSONE    Vitals  Vital Signs    Recorded: 46IAF3141 01:20PM   Heart Rate 76, L Radial   Pulse Quality Normal, L Radial   Respiration Quality Normal   Respiration 16   Systolic 865, LUE, Sitting   Diastolic 76, LUE, Sitting   Height 5 ft 3 in   Weight 138 lb    BMI Calculated 24 45   BSA Calculated 1 65     Results/Data  Diagnostic Studies Reviewed Vasc:   Vascular Studies Reviewed: JEFFRY 4/26/17 as note in HPI  Physical Exam    Carotid: no bruit heard on the right and no bruit on the left  Posterior tibialis: right 2+ and left 2+  Dorsalis pedis: right 2+ and left 2+  Distal Pulse Exam: Normal Capillary Refill  Extremities: bilateral ankle 1+ pitting edema  LE Varicose Veins: right leg truncal veins, left leg truncal veins, right leg spider veins and left leg spider veins  Induration right medial calf to the area of varicose vein consistent with phlebitis The heart rate was normal  The rhythm was regular  Heart sounds: normal S1 and normal S2    Murmurs: No murmurs were heard  Pulmonary   Respiratory effort: No increased work of breathing or signs of respiratory distress  Auscultation of lungs: Clear to auscultation  No wheezing, no rales, no rhonchi     Abdomen   Abdomen: Abdomen soft, non-tender, no masses, non distended, no rebound tenderness  Psychiatric   Orientation to person, place and time: Normal    Mood and affect: Normal    Neurologic Sensory exam normal   Motor skills intact  Musculoskeletal   Gait and station: Normal    Skin   Skin and subcutaneous tissue: Normal without rashes or lesions  A large truncal varicosities bilateral lower extremities        Future Appointments    Date/Time Provider Specialty Site   06/13/2017 08:00 AM Eva Hill MD Neurology [place] [address]     Signatures   Electronically signed by : Rosy Cilne; May 25 2017  5:30PM EST                       (Author)    Electronically signed by : Cheryle Cortez DO; May 26 2017  7:05AM EST                       (Author)

## 2018-01-22 VITALS
HEIGHT: 63 IN | SYSTOLIC BLOOD PRESSURE: 136 MMHG | HEART RATE: 74 BPM | RESPIRATION RATE: 20 BRPM | DIASTOLIC BLOOD PRESSURE: 86 MMHG | BODY MASS INDEX: 24.34 KG/M2 | WEIGHT: 137.38 LBS

## 2018-01-22 VITALS
BODY MASS INDEX: 24.45 KG/M2 | SYSTOLIC BLOOD PRESSURE: 122 MMHG | HEIGHT: 63 IN | WEIGHT: 138 LBS | DIASTOLIC BLOOD PRESSURE: 76 MMHG | HEART RATE: 76 BPM | RESPIRATION RATE: 16 BRPM

## 2018-01-25 ENCOUNTER — APPOINTMENT (OUTPATIENT)
Dept: LAB | Facility: MEDICAL CENTER | Age: 78
End: 2018-01-25
Payer: MEDICARE

## 2018-01-25 DIAGNOSIS — R03.0 ELEVATED BLOOD PRESSURE READING WITHOUT DIAGNOSIS OF HYPERTENSION: ICD-10-CM

## 2018-01-25 DIAGNOSIS — E83.52 HYPERCALCEMIA: ICD-10-CM

## 2018-01-25 LAB
25(OH)D3 SERPL-MCNC: 28.9 NG/ML (ref 30–100)
ALBUMIN SERPL BCP-MCNC: 4.3 G/DL (ref 3.5–5)
ALP SERPL-CCNC: 82 U/L (ref 46–116)
ALT SERPL W P-5'-P-CCNC: 22 U/L (ref 12–78)
ANION GAP SERPL CALCULATED.3IONS-SCNC: 5 MMOL/L (ref 4–13)
AST SERPL W P-5'-P-CCNC: 23 U/L (ref 5–45)
BILIRUB SERPL-MCNC: 0.67 MG/DL (ref 0.2–1)
BUN SERPL-MCNC: 15 MG/DL (ref 5–25)
CALCIUM SERPL-MCNC: 9.9 MG/DL (ref 8.3–10.1)
CHLORIDE SERPL-SCNC: 108 MMOL/L (ref 100–108)
CHOLEST SERPL-MCNC: 235 MG/DL (ref 50–200)
CO2 SERPL-SCNC: 28 MMOL/L (ref 21–32)
CREAT SERPL-MCNC: 0.56 MG/DL (ref 0.6–1.3)
GFR SERPL CREATININE-BSD FRML MDRD: 90 ML/MIN/1.73SQ M
GLUCOSE P FAST SERPL-MCNC: 77 MG/DL (ref 65–99)
HDLC SERPL-MCNC: 74 MG/DL (ref 40–60)
LDLC SERPL CALC-MCNC: 148 MG/DL (ref 0–100)
POTASSIUM SERPL-SCNC: 3.8 MMOL/L (ref 3.5–5.3)
PROT SERPL-MCNC: 7.8 G/DL (ref 6.4–8.2)
PTH-INTACT SERPL-MCNC: 88.9 PG/ML (ref 14–72)
SODIUM SERPL-SCNC: 141 MMOL/L (ref 136–145)
TRIGL SERPL-MCNC: 67 MG/DL

## 2018-01-25 PROCEDURE — 36415 COLL VENOUS BLD VENIPUNCTURE: CPT

## 2018-01-25 PROCEDURE — 80061 LIPID PANEL: CPT

## 2018-01-25 PROCEDURE — 80053 COMPREHEN METABOLIC PANEL: CPT

## 2018-01-25 PROCEDURE — 82306 VITAMIN D 25 HYDROXY: CPT

## 2018-01-25 PROCEDURE — 82397 CHEMILUMINESCENT ASSAY: CPT

## 2018-01-25 PROCEDURE — 83970 ASSAY OF PARATHORMONE: CPT

## 2018-02-01 ENCOUNTER — OFFICE VISIT (OUTPATIENT)
Dept: NEUROLOGY | Facility: CLINIC | Age: 78
End: 2018-02-01
Payer: MEDICARE

## 2018-02-01 VITALS
SYSTOLIC BLOOD PRESSURE: 153 MMHG | WEIGHT: 140 LBS | HEIGHT: 63 IN | BODY MASS INDEX: 24.8 KG/M2 | DIASTOLIC BLOOD PRESSURE: 70 MMHG | RESPIRATION RATE: 14 BRPM | HEART RATE: 93 BPM

## 2018-02-01 DIAGNOSIS — Z86.19 HISTORY OF SHINGLES: ICD-10-CM

## 2018-02-01 DIAGNOSIS — M21.372 LEFT FOOT DROP: Primary | ICD-10-CM

## 2018-02-01 PROCEDURE — 99214 OFFICE O/P EST MOD 30 MIN: CPT | Performed by: PSYCHIATRY & NEUROLOGY

## 2018-02-01 RX ORDER — IBUPROFEN 200 MG
1 TABLET ORAL 3 TIMES DAILY PRN
COMMUNITY

## 2018-02-01 NOTE — PATIENT INSTRUCTIONS
Foot Drop   WHAT YOU NEED TO KNOW:   Foot drop is a nerve and muscle problem in your leg or ankle that prevents you from flexing or lifting your foot  Foot drop is most often caused by pressure on the nerve in your lower leg  It can also be caused by other kinds of nerve damage, muscle disease, or damage to the brain or spinal cord  Your foot drop may get better or it may be permanent  DISCHARGE INSTRUCTIONS:   Medicines:   · Pain medicine: You may be given medicine to take away or decrease pain  Do not wait until the pain is severe before you take your medicine  · Take your medicine as directed  Contact your healthcare provider if you think your medicine is not helping or if you have side effects  Tell him or her if you are allergic to any medicine  Keep a list of the medicines, vitamins, and herbs you take  Include the amounts, and when and why you take them  Bring the list or the pill bottles to follow-up visits  Carry your medicine list with you in case of an emergency  Manage foot drop:  Shift your body position often, and stretch your muscles daily  Make sure your seats are not too hard or too soft  Loosen bandages that feel too tight  Ask your healthcare provider about these and other ways to prevent foot drop  Reduce your risk of falling:   · Clear clutter, such as rugs or cords, from areas where you walk in your home  · You may be given an ankle brace to support your foot  This will make it easier to walk and help prevent you from falling  It will also help prevent you from dragging your foot  Use the brace whenever you walk, but take it off when lying or sitting down  Physical therapy:  A physical therapist teaches you exercises to help improve movement and strength, and to decrease pain  Follow up with your healthcare provider in 4 to 5 days:  Write down your questions so you remember to ask them during your visits    Contact your healthcare provider if:   · Your symptoms such as numbness, trouble walking, or dragging your toes get worse or do not go away  · You have more trouble walking, dressing, or playing sports  · You have questions or concerns about your condition or care  Return to the emergency department if:   · You fall and hurt yourself  · The numbness in your legs and feet spreads or worsens  · Your pain becomes severe  · You develop pain, redness, or swelling in your leg or foot  © 2017 [facility] [facility] Information is for End User's use only and may not be sold, redistributed or otherwise used for commercial purposes  All illustrations and images included in CareNotes® are the copyrighted property of A D [employer] M [employer]  or [address]  The above information is an  only  It is not intended as medical advice for individual conditions or treatments  Talk to your doctor, nurse or pharmacist before following any medical regimen to see if it is safe and effective for you

## 2018-02-01 NOTE — PROGRESS NOTES
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  She has some degenerative changes of the lumbar spine however no surgical intervention per Neurosurgery  At this point recommend continuing with home exercises  Will recommend physical therapy as patient noted some benefit from TENs unit in the past   Fall precautions have been recommended  Patient denies any pain at this time and has been off gabapentin  Will follow up the patient in about 5 months  Reason for Follow up:   Left foot drop  HPI: Marcela Proctor is a 66 y/o female who presents with for follow-up of left foot drop  Patient was last seen in July 2017  Today's history, 02/01/2018:  Patient states that her left foot is better and there is some movement but not a lot of movement and about 30 % improvement however she still has significant weakness  She uses AFO and gives her support mostly at home  She is doing home exercises as she was discharged from Pt  She stopped gabapentin in August  She denies any sharp pain  She has weird sensation but it is tolerable  Brief history from prior visits:  Patient states that she had shingles in April on left foot and lateral leg and was very painful and she started getting pain in the left leg all the way till her back and was diagnosed with sciatica and gave prednisone and she had a rash on her face and steroids were stopped as she was felt to be allergic to Prednisone  She then had severe pain and more redness of the left leg and her PCP office felt ? cellulitis and later she went to [facility] ER and was felt to be shingles and antiviral fro about 2 weeks   At some point there was confusion with purpura and she went back to her PCP and ER and finally there was culture done from blister and came back positive for shingles per patient  She was getting left foot pain and Over the counter Motrin didn'tt help and she was given Gabapentin in early May  She is on Gabapentin 300 mg three times daily  She also started having left foot weakness in the left leg and she was ordered to have AFO and is being worked by [employer]  TSH 2 18, random glucose 88  Patient also has some hammertoe of the right foot however it started later in life and her mother had it too   MRI lumbar spine with and without contrast showed no enhancement and showed evidence of diffuse lumbar spondylitic degenerative change most pronounced at L4-L5  Patient was evaluated by neurosurgery, Dr Halie Dean and her left footdrop was not felt to be related to lumbar radiculopathy  EMG performed in our lab on 6/14/17 showed left severe peroneal neuropathy at or about the branch to tibialis anterior with active denervation and of note an underlying left L5 radiculopathy is less likely but due to lap cannot be completely ruled out and clinical correlation was recommended  Review of Systems   Constitutional: Negative  Negative for appetite change and fever  HENT: Negative  Negative for hearing loss, tinnitus, trouble swallowing and voice change  Eyes: Negative  Negative for photophobia and pain  Respiratory: Negative  Negative for shortness of breath  Cardiovascular: Negative  Negative for palpitations  Gastrointestinal: Negative  Negative for nausea and vomiting  Endocrine: Negative  Negative for cold intolerance and heat intolerance  Genitourinary: Negative  Negative for dysuria, frequency and urgency  Musculoskeletal: Negative  Negative for myalgias and neck pain  Skin: Negative  Negative for rash  Neurological: Negative  Negative for dizziness, tremors, seizures, syncope, facial asymmetry, speech difficulty, weakness, light-headedness, numbness and headaches  Tingling   Hematological: Negative    Does not bruise/bleed easily  Psychiatric/Behavioral: Negative  Negative for confusion, hallucinations and sleep disturbance  Historical Information   Past Medical History:   Diagnosis Date    Rotator cuff injury     right    Sciatic pain     Vitamin D deficiency      Past Surgical History:   Procedure Laterality Date    NO PAST SURGERIES       Social History   History   Alcohol Use No     History   Drug Use No     History   Smoking Status    Never Smoker   Smokeless Tobacco    Never Used     Family History: History reviewed  No pertinent family history  Meds/Allergies   current meds:     Current Outpatient Prescriptions:     ibuprofen (MOTRIN IB) 200 mg tablet, Take 1 tablet by mouth 3 (three) times a day as needed, Disp: , Rfl:     multivitamin-iron-minerals-folic acid (CENTRUM) chewable tablet, Chew 1 tablet daily, Disp: , Rfl:     sodium chloride (MARCIN 128) 2 % hypertonic ophthalmic solution, Apply to eye, Disp: , Rfl:     Vitamin D, Cholecalciferol, 1000 units TABS, Take by mouth, Disp: , Rfl:     Allergies   Allergen Reactions    Prednisone Swelling       Objective   Vitals:Blood pressure 153/70, pulse 93, resp  rate 14, height 5' 3" (1 6 m), weight 63 5 kg (140 lb)  ,Body mass index is 24 8 kg/m²  General examination:  Patient is awake and alert  Eyes: Conjunctiva and sclera are clear  HEENT: External examination is normal  Neck: Supple  Lungs: Clear to auscultation bilaterally  CVS: S1, S2 heard  Abdomen: Soft, nontender  Extremities: No clubbing, edema, cyanosis  Skin: No rashes  Neurological examination:   Mental status: Patient is awake, alert, oriented to time place and person  Attention, concentration, fund of knowledge is intact  Language: No evidence of aphasia or dysarthria  Memory: Repetition 3/3 and recall 3/3  Cranial nerves: Pupils equal, reacting to light and accommodation  Extraocular movements intact  Visual fields are full  Fundi are difficult to visualize bilaterally   Facial sensation is intact  No facial weakness is noted  Finger rub test is intact bilaterally  Tongue and uvula are in midline  Gag is intact  Shoulder shrug is 5/5 bilaterally  Motor examination: Tone is normal  No atrophy noted  Strength is 5/5 throughout except left foot DF 0/5 and PF 3/5  However left foot has some side to side movement ( not noticed before)  Sensory examination: Light touch is intact  Pinprick, temperature are intact except decreased sensation in left foot dorsum > left lateral leg  Vibration is decreased in left toes  Proprioception is intact  Deep tendon reflexes: 0 at the left ankle and 2 throughout  Plantars are downgoing bilaterally  Coordination: Finger-nose test and finger tapping intact bilaterally  Heel-to-shin test is intact bilaterally  Gait: Patient has a normal base and stride  Lab Results:   Reviewed results in the chart  Vitamin-D 28 9  Imaging Studies: I have personally reviewed pertinent films in PACS  MRI lumbar spine Diffuse lumbar spondylitic degenerative change most pronounced at L4-5 where there is diffuse annular bulging, facet hypertrophic degenerative change and ligamentum flavum thickening resulting in moderately severe canal stenosis  However, there is only   mild foraminal narrowing    Otherwise mild lumbar degenerative disc disease L3-4 and L5-S1      Counseling / Coordination of Care  I spent 25 minutes with the patient and greater than 50% of the time was spent in coordination of care and counselling

## 2018-02-02 ENCOUNTER — TELEPHONE (OUTPATIENT)
Dept: FAMILY MEDICINE CLINIC | Facility: MEDICAL CENTER | Age: 78
End: 2018-02-02

## 2018-02-02 DIAGNOSIS — E34.9 INCREASED PTH LEVEL: Primary | ICD-10-CM

## 2018-02-02 PROBLEM — R79.89 INCREASED PTH LEVEL: Status: ACTIVE | Noted: 2018-02-02

## 2018-02-02 LAB — PTH RELATED PROT SERPL-SCNC: <1.1 PMOL/L

## 2018-02-09 ENCOUNTER — TELEPHONE (OUTPATIENT)
Dept: FAMILY MEDICINE CLINIC | Facility: MEDICAL CENTER | Age: 78
End: 2018-02-09

## 2018-02-09 DIAGNOSIS — E34.9 INCREASED PTH LEVEL: Primary | ICD-10-CM

## 2018-02-09 NOTE — TELEPHONE ENCOUNTER
The order for the parathyroid scan should be the one with ct loc not the spect  Use the ct loc one  Can you fix and put in chart    Anne Bo, [address] [place], [phone]

## 2018-02-19 ENCOUNTER — HOSPITAL ENCOUNTER (OUTPATIENT)
Dept: NUCLEAR MEDICINE | Facility: HOSPITAL | Age: 78
Discharge: HOME | End: 2018-02-19
Attending: FAMILY MEDICINE
Payer: MEDICARE

## 2018-02-19 DIAGNOSIS — E34.9 INCREASED PTH LEVEL: ICD-10-CM

## 2018-02-19 PROCEDURE — 78072 PARATHYRD PLANAR W/SPECT&CT: CPT

## 2018-02-19 PROCEDURE — A9500 TC99M SESTAMIBI: HCPCS

## 2018-02-20 PROBLEM — E04.1 NODULAR THYROID DISEASE: Status: ACTIVE | Noted: 2018-02-20

## 2018-02-21 ENCOUNTER — TELEPHONE (OUTPATIENT)
Dept: FAMILY MEDICINE CLINIC | Facility: MEDICAL CENTER | Age: 78
End: 2018-02-21

## 2018-02-21 DIAGNOSIS — E04.1 NODULAR THYROID DISEASE: Primary | ICD-10-CM

## 2018-02-21 NOTE — TELEPHONE ENCOUNTER
Pt is aware and agreeable to the Thyroid Us  Please place the order, I gave her the number for central scheduling

## 2018-02-21 NOTE — TELEPHONE ENCOUNTER
----- Message from Irma Joshua DO sent at 2/20/2018  4:53 PM EST -----  Parathyroid test did not show any abnormality in the parathyroid glands  There is however abnormality in the thyroid itself  Thyroid ultrasound recommended  Please let me know if patient is agreeable and I will place the order

## 2018-02-22 ENCOUNTER — HOSPITAL ENCOUNTER (OUTPATIENT)
Dept: RADIOLOGY | Facility: MEDICAL CENTER | Age: 78
Discharge: HOME | End: 2018-02-22
Payer: MEDICARE

## 2018-02-22 DIAGNOSIS — E04.1 NODULAR THYROID DISEASE: ICD-10-CM

## 2018-02-22 PROCEDURE — 76536 US EXAM OF HEAD AND NECK: CPT

## 2018-02-28 ENCOUNTER — OFFICE VISIT (OUTPATIENT)
Dept: FAMILY MEDICINE CLINIC | Facility: MEDICAL CENTER | Age: 78
End: 2018-02-28
Payer: MEDICARE

## 2018-02-28 VITALS
HEART RATE: 82 BPM | WEIGHT: 142.25 LBS | RESPIRATION RATE: 16 BRPM | HEIGHT: 63 IN | DIASTOLIC BLOOD PRESSURE: 80 MMHG | SYSTOLIC BLOOD PRESSURE: 148 MMHG | BODY MASS INDEX: 25.2 KG/M2

## 2018-02-28 DIAGNOSIS — R58 ECCHYMOSIS: Primary | ICD-10-CM

## 2018-02-28 PROBLEM — I49.9 IRREGULAR HEARTBEAT: Status: ACTIVE | Noted: 2017-01-30

## 2018-02-28 PROBLEM — B02.23 SHINGLES (HERPES ZOSTER) POLYNEUROPATHY: Status: RESOLVED | Noted: 2017-05-25 | Resolved: 2018-02-28

## 2018-02-28 PROBLEM — B02.23 SHINGLES (HERPES ZOSTER) POLYNEUROPATHY: Status: ACTIVE | Noted: 2017-05-25

## 2018-02-28 PROBLEM — M48.061 DEGENERATIVE LUMBAR SPINAL STENOSIS: Status: ACTIVE | Noted: 2017-06-23

## 2018-02-28 PROBLEM — I83.893 VARICOSE VEINS OF BILATERAL LOWER EXTREMITIES WITH OTHER COMPLICATIONS: Status: ACTIVE | Noted: 2017-05-25

## 2018-02-28 PROBLEM — E83.52 HYPERCALCEMIA: Status: ACTIVE | Noted: 2017-12-13

## 2018-02-28 PROBLEM — I49.1 PREMATURE ATRIAL CONTRACTION: Status: ACTIVE | Noted: 2017-01-30

## 2018-02-28 PROBLEM — R03.0 ELEVATED BP WITHOUT DIAGNOSIS OF HYPERTENSION: Status: ACTIVE | Noted: 2017-12-13

## 2018-02-28 PROBLEM — M75.81 TENDINITIS OF RIGHT ROTATOR CUFF: Status: ACTIVE | Noted: 2017-02-07

## 2018-02-28 PROCEDURE — 99213 OFFICE O/P EST LOW 20 MIN: CPT | Performed by: FAMILY MEDICINE

## 2018-02-28 NOTE — PROGRESS NOTES
Assessment/Plan:    No problem-specific Assessment & Plan notes found for this encounter  Diagnoses and all orders for this visit:    Ecchymosis  Right leg  Appears to be more of a thrombophlebitis  Recommended warm compresses  Should be self-limited  DVT unlikely  Follow-up in one week if symptoms persist or sooner if needed  Subjective:      Patient ID: Marina Long is a 65 y o  female  Patient presents with right leg swelling  States she was carrying her groceries in three days ago and the grocery bag got stuck and a box dug into her right calf  Has an area of pain and swelling of the right calf  Has not gotten any worse  Patient wanted to make sure it was not something bad  The following portions of the patient's history were reviewed and updated as appropriate: allergies, current medications and problem list     Review of Systems   Constitutional: Negative for fever  Respiratory: Negative for shortness of breath  Cardiovascular: Negative for chest pain  Objective:      /80   Pulse 82   Resp 16   Ht 5' 3" (1 6 m)   Wt 64 5 kg (142 lb 4 oz)   BMI 25 20 kg/m²          Physical Exam   Constitutional: She appears well-developed and well-nourished     Musculoskeletal:   No calf tenderness bilateral    Skin:

## 2018-04-18 ENCOUNTER — OFFICE VISIT (OUTPATIENT)
Dept: FAMILY MEDICINE CLINIC | Facility: MEDICAL CENTER | Age: 78
End: 2018-04-18
Payer: MEDICARE

## 2018-04-18 VITALS
BODY MASS INDEX: 26.05 KG/M2 | HEIGHT: 63 IN | WEIGHT: 147 LBS | SYSTOLIC BLOOD PRESSURE: 130 MMHG | RESPIRATION RATE: 18 BRPM | HEART RATE: 78 BPM | DIASTOLIC BLOOD PRESSURE: 74 MMHG

## 2018-04-18 DIAGNOSIS — M25.562 ACUTE PAIN OF LEFT KNEE: Primary | ICD-10-CM

## 2018-04-18 PROCEDURE — 99213 OFFICE O/P EST LOW 20 MIN: CPT | Performed by: FAMILY MEDICINE

## 2018-04-18 NOTE — PROGRESS NOTES
Assessment/Plan:    No problem-specific Assessment & Plan notes found for this encounter  Diagnoses and all orders for this visit:    Acute pain of left knee  Pain likely due to some edema from patient's contusion  Fracture on likely  Pain did improve with ibuprofen  I recommended patient use ibuprofen as needed and ice the knee in the evening  If pain persists into next week then consider getting an x-ray  Follow-up in one month if symptoms persist or sooner if needed  Subjective:      Patient ID: Sammie Alanis is a 65 y o  female  Patient presents with left knee pain  She states on Sunday she was walking through her garage and accidentally bumped her knee on the metal support pole that helps hold up the garage  Pain is located on the lower and outer aspect of the knee  She did take some ibuprofen this morning with some relief  Patient has the most pain when she tries to fully flex her left leg  No trouble walking otherwise  There is some swelling  No no redness or bruising  The following portions of the patient's history were reviewed and updated as appropriate: allergies, current medications and problem list     Review of Systems   Constitutional: Negative for fever  Respiratory: Negative for shortness of breath  Cardiovascular: Negative for chest pain  Objective:      /74   Pulse 78   Resp 18   Ht 5' 3" (1 6 m)   Wt 66 7 kg (147 lb)   BMI 26 04 kg/m²          Physical Exam   Constitutional: She appears well-developed and well-nourished  Musculoskeletal:        Left knee: She exhibits swelling  She exhibits normal range of motion, no effusion, no ecchymosis, no deformity, no laceration, no erythema, normal alignment, no LCL laxity, normal patellar mobility, no bony tenderness, normal meniscus and no MCL laxity  Tenderness (Anterolateral pain on palpation just adjacent to the patella ) found

## 2018-05-04 RX ORDER — MELATONIN
1000 EVERY MORNING
COMMUNITY

## 2018-05-04 NOTE — PRE-PROCEDURE INSTRUCTIONS
Pre-Surgery Instructions:   Medication Instructions    cholecalciferol (VITAMIN D3) 1,000 units tablet Instructed patient per Anesthesia Guidelines   ibuprofen (MOTRIN IB) 200 mg tablet Instructed patient per Anesthesia Guidelines   Multiple Vitamins-Minerals (CENTRUM SILVER 50+WOMEN PO) Instructed patient per Anesthesia Guidelines   sodium chloride (MARCIN 128) 2 % hypertonic ophthalmic solution Instructed patient per Anesthesia Guidelines  Pre op instructions given   daughter Deven Ruby [phone] Surgical Experience    The following information was developed to assist you to prepare for your operation  What do I need to do before coming to the hospital?   Arrange for a responsible person to drive you to and from the hospital    Arrange care for your children at home  Children are not allowed in the recovery areas of the hospital   Plan to wear clothing that is easy to put on and take off  If you are having shoulder surgery, wear a shirt that buttons or zippers in the front  Bathing  o Shower the evening before and the morning of your surgery with an antibacterial soap  Please refer to the Pre Op Showering Instructions for Surgery Patients Sheet   o Remove nail polish and all body piercing jewelry  o Do not shave any body part for at least 24 hours before surgery-this includes face, arms, legs and upper body  Food  o Nothing to eat or drink after midnight the night before your surgery   This includes candy and chewing gum  o Exception: If your surgery is after 12:00pm (noon), you may have clear liquids such as 7-Up®, ginger ale, apple or cranberry juice, Jell-O®, water, or clear broth until 8:00 am  o Do not drink milk or juice with pulp on the morning before surgery  o Do not drink alcohol 24 hours before surgery  Medicine  o Follow instructions you received from your surgeon about which medicines you may take on the day of surgery  o If instructed to take medicine on the morning of surgery, take pills with just a small sip of water  Call your prescribing doctor for specific infroamtion on what to do if you take insulin    What should I bring to the hospital?    Bring:  Carmen Dean or a walker, if you have them, for foot or knee surgery   A list of the daily medicines, vitamins, minerals, herbals and nutritional supplements you take  Include the dosages of medicines and the time you take them each day   Glasses, dentures or hearing aids   Minimal clothing; you will be wearing hospital sleepwear   Photo ID; required to verify your identity   If you have a Living Will or Power of , bring a copy of the documents   If you have an ostomy, bring an extra pouch and any supplies you use    Do not bring   Medicines or inhalers   Money, valuables or jewelry    What other information should I know about the day of surgery?  Notify your surgeons if you develop a cold, sore throat, cough, fever, rash or any other illness   Report to the Ambulatory Surgical/Same Day Surgery Unit   You will be instructed to stop at Registration only if you have not been pre-registered   Inform your  fi they do not stay that they will be asked by the staff to leave a phone number where they can be reached   Be available to be reached before surgery  In the event the operating room schedule changes, you may be asked to come in earlier or later than expected    *It is important to tell your doctor and others involved in your health care if you are taking or have been taking any non-prescription drugs, vitamins, minerals, herbals or other nutritional supplements   Any of these may interact with some food or medicines and cause a reaction

## 2018-05-14 ENCOUNTER — HOSPITAL ENCOUNTER (OUTPATIENT)
Facility: AMBULARY SURGERY CENTER | Age: 78
Setting detail: OUTPATIENT SURGERY
Discharge: HOME | End: 2018-05-14
Attending: OPHTHALMOLOGY | Admitting: OPHTHALMOLOGY
Payer: MEDICARE

## 2018-05-14 ENCOUNTER — ANESTHESIA (OUTPATIENT)
Dept: PERIOP | Facility: AMBULARY SURGERY CENTER | Age: 78
End: 2018-05-14
Payer: MEDICARE

## 2018-05-14 ENCOUNTER — ANESTHESIA EVENT (OUTPATIENT)
Dept: PERIOP | Facility: AMBULARY SURGERY CENTER | Age: 78
End: 2018-05-14
Payer: MEDICARE

## 2018-05-14 VITALS
SYSTOLIC BLOOD PRESSURE: 140 MMHG | DIASTOLIC BLOOD PRESSURE: 77 MMHG | TEMPERATURE: 98 F | OXYGEN SATURATION: 97 % | RESPIRATION RATE: 18 BRPM | HEART RATE: 72 BPM

## 2018-05-14 DIAGNOSIS — H25.11 AGE-RELATED NUCLEAR CATARACT OF RIGHT EYE: Primary | ICD-10-CM

## 2018-05-14 PROCEDURE — V2632 POST CHMBR INTRAOCULAR LENS: HCPCS | Performed by: OPHTHALMOLOGY

## 2018-05-14 DEVICE — IOL SN60WF 24.5: Type: IMPLANTABLE DEVICE | Site: EYE | Status: FUNCTIONAL

## 2018-05-14 RX ORDER — GATIFLOXACIN 5 MG/ML
SOLUTION/ DROPS OPHTHALMIC AS NEEDED
Status: DISCONTINUED | OUTPATIENT
Start: 2018-05-14 | End: 2018-05-14 | Stop reason: HOSPADM

## 2018-05-14 RX ORDER — PHENYLEPHRINE HCL 2.5 %
1 DROPS OPHTHALMIC (EYE)
Status: COMPLETED | OUTPATIENT
Start: 2018-05-14 | End: 2018-05-14

## 2018-05-14 RX ORDER — MIDAZOLAM HYDROCHLORIDE 1 MG/ML
INJECTION INTRAMUSCULAR; INTRAVENOUS AS NEEDED
Status: DISCONTINUED | OUTPATIENT
Start: 2018-05-14 | End: 2018-05-14 | Stop reason: SURG

## 2018-05-14 RX ORDER — CYCLOPENTOLATE HYDROCHLORIDE 10 MG/ML
1 SOLUTION/ DROPS OPHTHALMIC
Status: COMPLETED | OUTPATIENT
Start: 2018-05-14 | End: 2018-05-14

## 2018-05-14 RX ORDER — KETOROLAC TROMETHAMINE 5 MG/ML
1 SOLUTION OPHTHALMIC
Status: COMPLETED | OUTPATIENT
Start: 2018-05-14 | End: 2018-05-14

## 2018-05-14 RX ORDER — TETRACAINE HYDROCHLORIDE 5 MG/ML
1 SOLUTION OPHTHALMIC ONCE
Status: COMPLETED | OUTPATIENT
Start: 2018-05-14 | End: 2018-05-14

## 2018-05-14 RX ORDER — LIDOCAINE HYDROCHLORIDE 10 MG/ML
INJECTION, SOLUTION EPIDURAL; INFILTRATION; INTRACAUDAL; PERINEURAL AS NEEDED
Status: DISCONTINUED | OUTPATIENT
Start: 2018-05-14 | End: 2018-05-14 | Stop reason: HOSPADM

## 2018-05-14 RX ORDER — TETRACAINE HYDROCHLORIDE 5 MG/ML
SOLUTION OPHTHALMIC AS NEEDED
Status: DISCONTINUED | OUTPATIENT
Start: 2018-05-14 | End: 2018-05-14 | Stop reason: HOSPADM

## 2018-05-14 RX ORDER — LABETALOL HYDROCHLORIDE 5 MG/ML
5 INJECTION, SOLUTION INTRAVENOUS AS NEEDED
Status: DISCONTINUED | OUTPATIENT
Start: 2018-05-14 | End: 2018-05-14 | Stop reason: HOSPADM

## 2018-05-14 RX ORDER — LIDOCAINE HYDROCHLORIDE 20 MG/ML
1 JELLY TOPICAL
Status: COMPLETED | OUTPATIENT
Start: 2018-05-14 | End: 2018-05-14

## 2018-05-14 RX ORDER — BALANCED SALT SOLUTION 6.4; .75; .48; .3; 3.9; 1.7 MG/ML; MG/ML; MG/ML; MG/ML; MG/ML; MG/ML
SOLUTION OPHTHALMIC AS NEEDED
Status: DISCONTINUED | OUTPATIENT
Start: 2018-05-14 | End: 2018-05-14 | Stop reason: HOSPADM

## 2018-05-14 RX ORDER — GATIFLOXACIN 5 MG/ML
1 SOLUTION/ DROPS OPHTHALMIC 2 TIMES DAILY
Qty: 3 ML | Refills: 0
Start: 2018-05-14 | End: 2018-07-02

## 2018-05-14 RX ADMIN — KETOROLAC TROMETHAMINE 1 DROP: 5 SOLUTION OPHTHALMIC at 13:55

## 2018-05-14 RX ADMIN — CYCLOPENTOLATE HYDROCHLORIDE 1 DROP: 10 SOLUTION/ DROPS OPHTHALMIC at 13:10

## 2018-05-14 RX ADMIN — CYCLOPENTOLATE HYDROCHLORIDE 1 DROP: 10 SOLUTION/ DROPS OPHTHALMIC at 13:40

## 2018-05-14 RX ADMIN — CYCLOPENTOLATE HYDROCHLORIDE 1 DROP: 10 SOLUTION/ DROPS OPHTHALMIC at 13:55

## 2018-05-14 RX ADMIN — PHENYLEPHRINE HYDROCHLORIDE 1 DROP: 25 SOLUTION/ DROPS OPHTHALMIC at 13:25

## 2018-05-14 RX ADMIN — TETRACAINE HYDROCHLORIDE 1 DROP: 5 SOLUTION OPHTHALMIC at 13:10

## 2018-05-14 RX ADMIN — KETOROLAC TROMETHAMINE 1 DROP: 5 SOLUTION OPHTHALMIC at 13:10

## 2018-05-14 RX ADMIN — KETOROLAC TROMETHAMINE 1 DROP: 5 SOLUTION OPHTHALMIC at 13:25

## 2018-05-14 RX ADMIN — MIDAZOLAM HYDROCHLORIDE 2 MG: 1 INJECTION, SOLUTION INTRAMUSCULAR; INTRAVENOUS at 14:25

## 2018-05-14 RX ADMIN — LIDOCAINE HYDROCHLORIDE 1 APPLICATION: 20 JELLY TOPICAL at 13:40

## 2018-05-14 RX ADMIN — PHENYLEPHRINE HYDROCHLORIDE 1 DROP: 25 SOLUTION/ DROPS OPHTHALMIC at 13:10

## 2018-05-14 RX ADMIN — LIDOCAINE HYDROCHLORIDE 1 APPLICATION: 20 JELLY TOPICAL at 13:55

## 2018-05-14 RX ADMIN — CYCLOPENTOLATE HYDROCHLORIDE 1 DROP: 10 SOLUTION/ DROPS OPHTHALMIC at 13:25

## 2018-05-14 RX ADMIN — KETOROLAC TROMETHAMINE 1 DROP: 5 SOLUTION OPHTHALMIC at 13:40

## 2018-05-14 RX ADMIN — PHENYLEPHRINE HYDROCHLORIDE 1 DROP: 25 SOLUTION/ DROPS OPHTHALMIC at 13:40

## 2018-05-14 RX ADMIN — PHENYLEPHRINE HYDROCHLORIDE 1 DROP: 25 SOLUTION/ DROPS OPHTHALMIC at 13:55

## 2018-05-14 RX ADMIN — LIDOCAINE HYDROCHLORIDE 1 APPLICATION: 20 JELLY TOPICAL at 13:25

## 2018-05-14 RX ADMIN — LIDOCAINE HYDROCHLORIDE 1 APPLICATION: 20 JELLY TOPICAL at 13:10

## 2018-05-14 NOTE — ANESTHESIA PREPROCEDURE EVALUATION
Review of Systems/Medical History          Cardiovascular  Exercise tolerance: good,  No angina ,    Pulmonary  No shortness of breath, No recent URI ,        GI/Hepatic            Endo/Other  History of thyroid disease , hypothyroidism,      GYN       Hematology   Musculoskeletal    Arthritis     Neurology    Motor deficit ,    Psychology           Physical Exam    Airway    Mallampati score: II  TM Distance: >3 FB  Neck ROM: full     Dental       Cardiovascular      Pulmonary  Breath sounds clear to auscultation,     Other Findings        Anesthesia Plan  ASA Score- 2     Anesthesia Type- IV sedation with anesthesia with ASA Monitors  Additional Monitors:   Airway Plan:         Plan Factors-    Induction- intravenous  Postoperative Plan-     Informed Consent- Anesthetic plan and risks discussed with patient  Medical History     History Comments   Rotator cuff injury right-slight tear-healed with therapy   Vitamin D deficiency    Shingles (herpes zoster) polyneuropathy    Foot drop, left foot S/P shingles that attacked the nerves-wears a brace prn,uses a cane for long distance   Wears glasses    Spinal stenosis    Varicose veins of legs    Arthritis    Irregular heart beat regular, irregular-Dr Garcia-ECHO-negative,no heart disease   Pertinent Negatives Comments   No pertinent negative medical history recorded   Surgical History     TUBAL LIGATION     Substance History     Smoking Status: Never Smoker   Smokeless Tobacco Status: Never Used   Alcohol use: No   Drug use:  No

## 2018-05-14 NOTE — OP NOTE
OPERATIVE REPORT    PATIENT NAME: Elias Allred    :  1940  MRN: [id number]  Pt Location: [place] [address] OR ROOM 01    Surgery Date: 2018    Surgeon(s) and Role:     * Katerina Busch MD - Primary    Age-related nuclear cataract of right eye [H25 11]    Post-Op Diagnosis Codes:     * Age-related nuclear cataract of right eye [H25 11]    Procedure(s):  EXTRACTION EXTRACAPSULAR CATARACT PHACO INTRAOCULAR LENS (IOL)    Anesthesia Type:   IV Sedation with Anesthesia    Operative Indications:  Age-related nuclear cataract of right eye [H25 11]  Decreased vision to 20/400 at near  With problems reading  Pt requested cataract sx the right eye    Procedure and Technique:    Procedure Details     The patient was brought in the OR in stable condition and placed on the operative table  The right eye was prepped and draped in the usual sterile manner  Attention was directed to the right eye where a lid speculum was placed  A 2 4 mm clear corneal incision was made temperally  1/2 cc of 1% MPF Lidocaine was irrigated into the anterior chamber followed by viscoat  The side port incision was placed superiorly  The capsularrhexis was made and the nucleus was hydrodissected with BSS  The nucleus was then removed with the phaco handpiece followed by removal of the cortical material with the I/A handpiece  The capsular bag was then filled with Provisc  The IOL was folded and placed in to the capsular bag and centered well  The remaining Provisc was removed from the eye with the I/A  The wounds were hydrated with BSS and found to be water tight  The lid speculum was removed and 2 drops of Gatifloxicin were placed over the cornea  A protective eye shield was taped over the eye and the patient went to PACU in stable condition  I will see the patient in the office tomorrow and the expected post op period is a few weeks         Complications: None        Disposition: PACU   Condition: Stable    SIGNATURE: Katerina Busch MD  DATE: May 14, 2018  TIME: 2:47 PM

## 2018-05-14 NOTE — DISCHARGE INSTRUCTIONS
Dr Jeronimo Gill Cataract Instructions    Activity:     1  No Driving until instructed   2  Keep shield on until seen tomorrow except when administering drops   3  No heavy lifting   4  No water in eye     Diet:     1  Resume normal diet    Normal Symptoms:     1  Mild Headache   2  Scratchy or picky feeling around eye    Call the office if:     1  You have any questions or concerns   2  If eye pain is not relieved by extra strength tylenol    Office phone number:  [phone]      Next appointment:     1  See Dr Jeronimo Gill at his office tomorrow as scheduled   __1115am________________________________________________________   2  Bring blue eye kit with you and eyedrops to the office    A new set of comprehensive instructions will be given and reviewed with you during your office visit tomorrow

## 2018-06-19 ENCOUNTER — OFFICE VISIT (OUTPATIENT)
Dept: FAMILY MEDICINE CLINIC | Facility: MEDICAL CENTER | Age: 78
End: 2018-06-19
Payer: MEDICARE

## 2018-06-19 VITALS
DIASTOLIC BLOOD PRESSURE: 86 MMHG | WEIGHT: 147.4 LBS | SYSTOLIC BLOOD PRESSURE: 140 MMHG | HEART RATE: 80 BPM | RESPIRATION RATE: 18 BRPM | BODY MASS INDEX: 26.11 KG/M2

## 2018-06-19 DIAGNOSIS — Z12.39 SCREENING FOR BREAST CANCER: ICD-10-CM

## 2018-06-19 DIAGNOSIS — Z23 NEED FOR PNEUMOCOCCAL VACCINE: ICD-10-CM

## 2018-06-19 DIAGNOSIS — Z12.11 SCREENING FOR COLON CANCER: Primary | ICD-10-CM

## 2018-06-19 DIAGNOSIS — R01.1 MURMUR: ICD-10-CM

## 2018-06-19 DIAGNOSIS — E21.3 HYPERPARATHYROIDISM (HCC): ICD-10-CM

## 2018-06-19 PROCEDURE — 90670 PCV13 VACCINE IM: CPT

## 2018-06-19 PROCEDURE — G0009 ADMIN PNEUMOCOCCAL VACCINE: HCPCS

## 2018-06-19 PROCEDURE — G0439 PPPS, SUBSEQ VISIT: HCPCS | Performed by: FAMILY MEDICINE

## 2018-06-19 NOTE — PROGRESS NOTES
Diagnoses and all orders for this visit:    Screening for colon cancer  -     Occult Bloood,Fecal Immunochemical; Future  Risks and benefits of colon cancer screen discussed  I recommended a screening colonoscopy but patient declined  I recommended we at least do home stool testing  If the stool test does show blood then patient will need a colonoscopy  Patient agreed to this  Screening for breast cancer  -     Mammo screening bilateral w cad; Future  Risks and benefits of mammography discussed to screen for breast cancer  Pt agreeable to the test and order for mammogram placed  Pt instructed to call the number on the order to schedule the appointment for the test     Need for pneumococcal vaccine  -     PNEUMOCOCCAL CONJUGATE VACCINE 13-VALENT GREATER THAN 6 MONTHS  Pneumonia vaccine given and tolerated well  Murmur  -     Echo complete with contrast if indicated; Future  Murmur auscultated on exam   Will get echo for further evaluation  Hyperparathyroidism ([address] )  -     DXA bone density spine hip and pelvis; Future  Patient has hyperparathyroidism based on lab results  I recommended checking a DEXA scan to assess for osteoporosis  Patient was agreeable and the order was placed  Follow-up in six months or sooner if needed            Assessment and Plan:  Problem List Items Addressed This Visit     None      Visit Diagnoses     Screening for colon cancer    -  Primary    Relevant Orders    Occult Bloood,Fecal Immunochemical    Screening for breast cancer        Relevant Orders    Mammo screening bilateral w cad    Need for pneumococcal vaccine        Relevant Orders    PNEUMOCOCCAL CONJUGATE VACCINE 13-VALENT GREATER THAN 6 MONTHS (Completed)    Murmur        Relevant Orders    Echo complete with contrast if indicated    Hyperparathyroidism ([address] )        Relevant Orders    DXA bone density spine hip and pelvis        Health Maintenance Due   Topic Date Due    Depression Screening PHQ-9 1940    Urinary Incontinence Screening  08/04/2004    GLAUCOMA SCREENING 67+ YR  08/04/2006    DTaP,Tdap,and Td Vaccines (1 - Tdap) 07/31/2013         HPI:  Shlomo Hernández is a 65 y o  female here for her Subsequent Wellness Visit  Patient Active Problem List   Diagnosis    Left foot drop    History of shingles    Increased PTH level    Nodular thyroid disease    Degenerative lumbar spinal stenosis    Elevated BP without diagnosis of hypertension    Hypercalcemia    Irregular heartbeat    Premature atrial contraction    Tendinitis of right rotator cuff    Varicose veins of bilateral lower extremities with other complications     Past Medical History:   Diagnosis Date    Arthritis     Foot drop, left foot 05/2017    S/P shingles that attacked the nerves-wears a brace prn,uses a cane for long distance    Irregular heart beat     regular, irregular-Dr Garcia-ECHO-negative,no heart disease    Rotator cuff injury     right-slight tear-healed with therapy    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    Spinal stenosis     Varicose veins of legs     Vitamin D deficiency     Wears glasses      Past Surgical History:   Procedure Laterality Date    MA REMV CATARACT EXTRACAP,INSERT LENS Right 5/14/2018    Procedure: EXTRACTION EXTRACAPSULAR CATARACT PHACO INTRAOCULAR LENS (IOL);   Surgeon: Carly Bonner MD;  Location: [facility] [place] [facility];  Service: Ophthalmology    TUBAL LIGATION       Family History   Problem Relation Age of Onset    Other Mother         corneal transplants    Stroke Father     Hypertension Father     Asthma Sister     Hiatal hernia Sister     Other Brother         parathyroid-removed    Heart disease Brother         " maker"     History   Smoking Status    Never Smoker   Smokeless Tobacco    Never Used     History   Alcohol Use No      History   Drug Use No         Current Outpatient Prescriptions   Medication Sig Dispense Refill    cholecalciferol (VITAMIN D3) 1,000 units tablet Take 1,000 Units by mouth every morning      ibuprofen (MOTRIN IB) 200 mg tablet Take 1 tablet by mouth 3 (three) times a day as needed      Multiple Vitamins-Minerals (CENTRUM SILVER 50+WOMEN PO) Take by mouth every morning      sodium chloride (ALESIA 128) 2 % hypertonic ophthalmic solution Administer 1 drop to both eyes 3 (three) times a day Alesia 128 gel at HS       Bromfenac Sodium (BROMSITE) 0 075 % SOLN Administer 1 drop to the right eye 2 (two) times a day  0    gatifloxacin (ZYMAXID) 0 5 % Administer 1 drop to the right eye 2 (two) times a day 3 mL 0     No current facility-administered medications for this visit        Allergies   Allergen Reactions    Prednisone Swelling     Facial swelling, redness-no dyspnea     Immunization History   Administered Date(s) Administered    Influenza 11/11/2011, 10/12/2012, 10/14/2013    Pneumococcal Conjugate 13-Valent 06/19/2018    Pneumococcal Polysaccharide PPV23 01/08/2013    Td (adult), adsorbed 07/30/2013       Patient Care Team:  Mariah Henao DO as PCP - General (Family Medicine)  Octavio Catalan, MD Kee Babinski, MD Michiel Garden, MD    Medicare Screening Tests and Risk Assessments:  AWV Clinical     ISAR:   Previous hospitalizations?:  No       Once in a Lifetime Medicare Screening:   EKG performed?:  No    AAA screening performed? (if performed, please add date to Health Maintenance):  No       Medicare Screening Tests and Risk Assessment:   AAA Risk Assessment    None Indicated:  Yes    Osteoporosis Risk Assessment     Female:  Yes   :  Yes :  No   Age over 48:  Yes Low body weight (<127lbs):  No   Tobacco use:  No Alcohol use:  No   Low calcium diet:  No PMHX of fractures:  Yes   FHX of fractures:  No    HIV Risk Assessment    None indicated:  Yes        Drug and Alcohol Use:   Tobacco use    Cigarettes:  never smoker    Smokeless:  never used smokeless tobacco    Tobacco use duration    Tobacco Cessation Readiness    Alcohol use    Alcohol use:  never    Alcohol Treatment Readiness   Illicit Drug Use    Drug use:  never        Diet & Exercise:   Diet   What is your diet?:  Regular   How many servings a day of the following:   Exercise    Do you currently exercise?:  yes    Frequency:  daily    Type of exercise:  walking       Cognitive Impairment Screening:   Depression screening preformed:  Yes Depression screen score:  0   Cognitive Impairment Screening        Functional Ability/Level of Safety:   Hearing    Hearing difficulties:  No    Hearing aid:  No    Hearing Impairment Assessment    Hearing status:  No impairment   Current Activities    Status:  unlimited ADL's, unlimited driving, unlimited social activities, unlimited IADL's   Help needed with the folllowing:    ADL    Fall Risk   Have you fallen in the last 12 months?:  No    Injury History       Home Safety:   Are there hazards in your environment?:  No   Home Safety Risk Factors       Advanced Directives:   Advanced Directives    Living Will:  Yes    Patient's End of Life Decisions        Urinary Incontinence:   Do you have urinary incontinence?:  No        Glaucoma:            Provider Screening     Preventative Screening/Counseling:   Cardiovascular Screening/Counseling:   (Labs Q5 years, EKG optional one-time)   General:  Screening Current Counseling:  Healthy Diet, Healthy Weight          Diabetes Screening/Counseling:   (2 tests/year if Pre-Diabetes or 1 test/year if no Diabetes)   General:  Screening Current Counseling:  Healthy Diet, Healthy Weight          Colorectal Cancer Screening/Counseling:   (FOBT Q1 yr; Flex Sig Q4 yrs or Q10 yrs after Screening Colonoscopy; Screening Colonoscpy Q2 yrs High Risk or Q10 yrs Low Risk; Barium Enema Q2 yrs High Risk or Q4 yrs Low Risk)   General:  Risks and Benefits Discussed  Due for: Studies:  Fecal Occult Blood         Prostate Cancer Screening/Counseling:   (Annual) Breast Cancer Screening/Counseling:   (Baseline Age 28 - 43; Annual Age 36+)   General:  Risks and Benefits Discussed Due for: Studies:  mammogram         Cervical Cancer Screening/Counseling:   (Annual for High Risk or Childbearing Age with Abnormal Pap in Last 3 yrs; Every 2 all others)   General:  Screening Not Indicated           Osteoporosis Screening/Counseling:   (Every 2 Yrs if at risk or more if medically necessary)   General:  Risks and Benefits Discussed Counseling:  Calcium and Vitamin D Intake Due for: Studies:  DXA Axial         AAA Screening/Counseling:   (Once per Lifetime with risk factors)    General:  Screening Not Indicated           Glaucoma Screening/Counseling:   (Annual)   General:  Screening Current          HIV Screening/Counseling:   (Voluntary; Once annually for high risk OR 3 times for Pregnancy at diagnosis of IUP; 3rd trimester; and at Labor   General:  Screening Not Indicated           Hepatitis C Screening:             Immunizations:   Influenza (annual): Influenza Recommended Annually   Pneumococcal (Once in a Lifetime):  Pneumococcal Due Today   Hepatitis B Series (low risk patients):  Series Not Indicated   Zostavax (Medicare D Coverage, Pt >70 yo):  Patient Declines   TD (Non-Medicare Wellness  Visit required): Td Vaccine UTD       Other Preventative Couseling (Non-Medicare Wellness Visit Required):       Referrals (Non-Medicare Wellness Visit Required):       Medical Equipment/Suppliers:           No exam data present    Physical Exam :    Physical Exam   Constitutional: She is oriented to person, place, and time  She appears well-developed and well-nourished  HENT:   Head: Normocephalic and atraumatic  Right Ear: External ear normal    Left Ear: External ear normal    Nose: Nose normal    Mouth/Throat: Oropharynx is clear and moist    Eyes: Conjunctivae and EOM are normal  Pupils are equal, round, and reactive to light  Neck: Normal range of motion  Neck supple  Cardiovascular: Normal rate and regular rhythm  Murmur heard  Pulmonary/Chest: Effort normal and breath sounds normal    Abdominal: Soft  Bowel sounds are normal    Neurological: She is alert and oriented to person, place, and time  Skin: Skin is warm and dry  Capillary refill takes less than 2 seconds  Psychiatric: She has a normal mood and affect   Her behavior is normal  Judgment and thought content normal

## 2018-06-28 ENCOUNTER — HOSPITAL ENCOUNTER (OUTPATIENT)
Dept: NON INVASIVE DIAGNOSTICS | Facility: MEDICAL CENTER | Age: 78
Discharge: HOME | End: 2018-06-28
Payer: MEDICARE

## 2018-06-28 DIAGNOSIS — R01.1 MURMUR: ICD-10-CM

## 2018-06-28 PROCEDURE — 93306 TTE W/DOPPLER COMPLETE: CPT | Performed by: INTERNAL MEDICINE

## 2018-06-28 PROCEDURE — 93306 TTE W/DOPPLER COMPLETE: CPT

## 2018-07-02 ENCOUNTER — TELEPHONE (OUTPATIENT)
Dept: FAMILY MEDICINE CLINIC | Facility: MEDICAL CENTER | Age: 78
End: 2018-07-02

## 2018-07-02 DIAGNOSIS — I35.1 AORTIC VALVE INSUFFICIENCY, ETIOLOGY OF CARDIAC VALVE DISEASE UNSPECIFIED: Primary | ICD-10-CM

## 2018-07-02 DIAGNOSIS — I77.810 DILATED AORTIC ROOT (HCC): ICD-10-CM

## 2018-07-02 RX ORDER — SODIUM CHLORIDE 5 %
1 OINTMENT (GRAM) OPHTHALMIC (EYE)
COMMUNITY
End: 2019-01-02 | Stop reason: ALTCHOICE

## 2018-07-02 NOTE — PRE-PROCEDURE INSTRUCTIONS
Pre-Surgery Instructions:   Medication Instructions    cholecalciferol (VITAMIN D3) 1,000 units tablet Instructed patient per Anesthesia Guidelines   ibuprofen (MOTRIN IB) 200 mg tablet Instructed patient per Anesthesia Guidelines   Multiple Vitamins-Minerals (CENTRUM SILVER 50+WOMEN PO) Instructed patient per Anesthesia Guidelines   sodium chloride (MARCIN 128) 2 % hypertonic ophthalmic solution Instructed patient per Anesthesia Guidelines   sodium chloride (MARCIN 128) 5 % hypertonic ophthalmic ointment Instructed patient per Anesthesia Guidelines  Pre op instructions given   daughter Martha Davies [phone] Surgical Experience    The following information was developed to assist you to prepare for your operation  What do I need to do before coming to the hospital?   Arrange for a responsible person to drive you to and from the hospital    Arrange care for your children at home  Children are not allowed in the recovery areas of the hospital   Plan to wear clothing that is easy to put on and take off  If you are having shoulder surgery, wear a shirt that buttons or zippers in the front  Bathing  o Shower the evening before and the morning of your surgery with an antibacterial soap  Please refer to the Pre Op Showering Instructions for Surgery Patients Sheet   o Remove nail polish and all body piercing jewelry  o Do not shave any body part for at least 24 hours before surgery-this includes face, arms, legs and upper body  Food  o Nothing to eat or drink after midnight the night before your surgery   This includes candy and chewing gum  o Exception: If your surgery is after 12:00pm (noon), you may have clear liquids such as 7-Up®, ginger ale, apple or cranberry juice, Jell-O®, water, or clear broth until 8:00 am  o Do not drink milk or juice with pulp on the morning before surgery  o Do not drink alcohol 24 hours before surgery  Medicine  o Follow instructions you received from your surgeon about which medicines you may take on the day of surgery  o If instructed to take medicine on the morning of surgery, take pills with just a small sip of water  Call your prescribing doctor for specific infroamtion on what to do if you take insulin    What should I bring to the hospital?    Bring:  Celine Patricia or a walker, if you have them, for foot or knee surgery   A list of the daily medicines, vitamins, minerals, herbals and nutritional supplements you take  Include the dosages of medicines and the time you take them each day   Glasses, dentures or hearing aids   Minimal clothing; you will be wearing hospital sleepwear   Photo ID; required to verify your identity   If you have a Living Will or Power of , bring a copy of the documents   If you have an ostomy, bring an extra pouch and any supplies you use    Do not bring   Medicines or inhalers   Money, valuables or jewelry    What other information should I know about the day of surgery?  Notify your surgeons if you develop a cold, sore throat, cough, fever, rash or any other illness   Report to the Ambulatory Surgical/Same Day Surgery Unit   You will be instructed to stop at Registration only if you have not been pre-registered   Inform your  fi they do not stay that they will be asked by the staff to leave a phone number where they can be reached   Be available to be reached before surgery  In the event the operating room schedule changes, you may be asked to come in earlier or later than expected    *It is important to tell your doctor and others involved in your health care if you are taking or have been taking any non-prescription drugs, vitamins, minerals, herbals or other nutritional supplements   Any of these may interact with some food or medicines and cause a reaction

## 2018-07-02 NOTE — TELEPHONE ENCOUNTER
----- Message from Jason Curran DO sent at 7/2/2018 12:21 PM EDT -----  Echocardiogram report reviewed  The aortic valve which is the valve that separates the heart from the main artery of the body does have some back flow blood through it  The start of the aorta which is the main artery of the body is enlarged  These two abnormalities may be causing patient's murmur  The these two abnormalities are likely age related however I do recommend she see Cardiology for further evaluation  Please let me know if she is agreeable and we can place the referral   I see that patient has seen Dr Renato Lucas previously for a different heart problem and we could have patient see him again as well

## 2018-07-05 NOTE — TELEPHONE ENCOUNTER
Yoseph Saunders is aware and agreeable to see Dr Sophia Tinajero, please place referral into her chart

## 2018-07-10 ENCOUNTER — ANESTHESIA EVENT (OUTPATIENT)
Dept: PERIOP | Facility: AMBULARY SURGERY CENTER | Age: 78
End: 2018-07-10
Payer: MEDICARE

## 2018-07-10 ENCOUNTER — ANESTHESIA (OUTPATIENT)
Dept: PERIOP | Facility: AMBULARY SURGERY CENTER | Age: 78
End: 2018-07-10
Payer: MEDICARE

## 2018-07-10 ENCOUNTER — HOSPITAL ENCOUNTER (OUTPATIENT)
Facility: AMBULARY SURGERY CENTER | Age: 78
Setting detail: OUTPATIENT SURGERY
Discharge: HOME | End: 2018-07-10
Attending: OPHTHALMOLOGY | Admitting: OPHTHALMOLOGY
Payer: MEDICARE

## 2018-07-10 VITALS
DIASTOLIC BLOOD PRESSURE: 80 MMHG | TEMPERATURE: 98.5 F | OXYGEN SATURATION: 98 % | BODY MASS INDEX: 26.04 KG/M2 | WEIGHT: 147 LBS | HEART RATE: 76 BPM | RESPIRATION RATE: 16 BRPM | SYSTOLIC BLOOD PRESSURE: 121 MMHG

## 2018-07-10 DIAGNOSIS — H25.12 AGE-RELATED NUCLEAR CATARACT OF LEFT EYE: Primary | ICD-10-CM

## 2018-07-10 PROCEDURE — V2632 POST CHMBR INTRAOCULAR LENS: HCPCS | Performed by: OPHTHALMOLOGY

## 2018-07-10 DEVICE — IOL SN60WF 24.0: Type: IMPLANTABLE DEVICE | Status: FUNCTIONAL

## 2018-07-10 RX ORDER — MIDAZOLAM HYDROCHLORIDE 1 MG/ML
INJECTION INTRAMUSCULAR; INTRAVENOUS AS NEEDED
Status: DISCONTINUED | OUTPATIENT
Start: 2018-07-10 | End: 2018-07-10 | Stop reason: SURG

## 2018-07-10 RX ORDER — GATIFLOXACIN 5 MG/ML
1 SOLUTION/ DROPS OPHTHALMIC 2 TIMES DAILY
Qty: 3 ML | Refills: 0
Start: 2018-07-10 | End: 2019-01-02 | Stop reason: ALTCHOICE

## 2018-07-10 RX ORDER — PHENYLEPHRINE HCL 2.5 %
1 DROPS OPHTHALMIC (EYE)
Status: COMPLETED | OUTPATIENT
Start: 2018-07-10 | End: 2018-07-10

## 2018-07-10 RX ORDER — GATIFLOXACIN 5 MG/ML
SOLUTION/ DROPS OPHTHALMIC AS NEEDED
Status: DISCONTINUED | OUTPATIENT
Start: 2018-07-10 | End: 2018-07-10 | Stop reason: HOSPADM

## 2018-07-10 RX ORDER — TETRACAINE HYDROCHLORIDE 5 MG/ML
SOLUTION OPHTHALMIC AS NEEDED
Status: DISCONTINUED | OUTPATIENT
Start: 2018-07-10 | End: 2018-07-10 | Stop reason: HOSPADM

## 2018-07-10 RX ORDER — CYCLOPENTOLATE HYDROCHLORIDE 10 MG/ML
1 SOLUTION/ DROPS OPHTHALMIC
Status: COMPLETED | OUTPATIENT
Start: 2018-07-10 | End: 2018-07-10

## 2018-07-10 RX ORDER — BALANCED SALT SOLUTION 6.4; .75; .48; .3; 3.9; 1.7 MG/ML; MG/ML; MG/ML; MG/ML; MG/ML; MG/ML
SOLUTION OPHTHALMIC AS NEEDED
Status: DISCONTINUED | OUTPATIENT
Start: 2018-07-10 | End: 2018-07-10 | Stop reason: HOSPADM

## 2018-07-10 RX ORDER — LIDOCAINE HYDROCHLORIDE 10 MG/ML
INJECTION, SOLUTION EPIDURAL; INFILTRATION; INTRACAUDAL; PERINEURAL AS NEEDED
Status: DISCONTINUED | OUTPATIENT
Start: 2018-07-10 | End: 2018-07-10 | Stop reason: HOSPADM

## 2018-07-10 RX ORDER — KETOROLAC TROMETHAMINE 5 MG/ML
1 SOLUTION OPHTHALMIC
Status: COMPLETED | OUTPATIENT
Start: 2018-07-10 | End: 2018-07-10

## 2018-07-10 RX ORDER — TETRACAINE HYDROCHLORIDE 5 MG/ML
1 SOLUTION OPHTHALMIC ONCE
Status: COMPLETED | OUTPATIENT
Start: 2018-07-10 | End: 2018-07-10

## 2018-07-10 RX ORDER — LIDOCAINE HYDROCHLORIDE 20 MG/ML
1 JELLY TOPICAL
Status: COMPLETED | OUTPATIENT
Start: 2018-07-10 | End: 2018-07-10

## 2018-07-10 RX ADMIN — CYCLOPENTOLATE HYDROCHLORIDE 1 DROP: 10 SOLUTION/ DROPS OPHTHALMIC at 08:36

## 2018-07-10 RX ADMIN — TETRACAINE HYDROCHLORIDE 1 DROP: 5 SOLUTION OPHTHALMIC at 07:46

## 2018-07-10 RX ADMIN — PHENYLEPHRINE HYDROCHLORIDE 1 DROP: 25 SOLUTION/ DROPS OPHTHALMIC at 08:36

## 2018-07-10 RX ADMIN — LIDOCAINE HYDROCHLORIDE 1 APPLICATION: 20 JELLY TOPICAL at 08:36

## 2018-07-10 RX ADMIN — PHENYLEPHRINE HYDROCHLORIDE 1 DROP: 25 SOLUTION/ DROPS OPHTHALMIC at 08:18

## 2018-07-10 RX ADMIN — KETOROLAC TROMETHAMINE 1 DROP: 5 SOLUTION OPHTHALMIC at 08:18

## 2018-07-10 RX ADMIN — KETOROLAC TROMETHAMINE 1 DROP: 5 SOLUTION OPHTHALMIC at 08:03

## 2018-07-10 RX ADMIN — PHENYLEPHRINE HYDROCHLORIDE 1 DROP: 25 SOLUTION/ DROPS OPHTHALMIC at 07:46

## 2018-07-10 RX ADMIN — CYCLOPENTOLATE HYDROCHLORIDE 1 DROP: 10 SOLUTION/ DROPS OPHTHALMIC at 08:18

## 2018-07-10 RX ADMIN — MIDAZOLAM HYDROCHLORIDE 2 MG: 1 INJECTION, SOLUTION INTRAMUSCULAR; INTRAVENOUS at 08:54

## 2018-07-10 RX ADMIN — LIDOCAINE HYDROCHLORIDE 1 APPLICATION: 20 JELLY TOPICAL at 08:18

## 2018-07-10 RX ADMIN — CYCLOPENTOLATE HYDROCHLORIDE 1 DROP: 10 SOLUTION/ DROPS OPHTHALMIC at 08:03

## 2018-07-10 RX ADMIN — PHENYLEPHRINE HYDROCHLORIDE 1 DROP: 25 SOLUTION/ DROPS OPHTHALMIC at 08:03

## 2018-07-10 RX ADMIN — KETOROLAC TROMETHAMINE 1 DROP: 5 SOLUTION OPHTHALMIC at 08:36

## 2018-07-10 RX ADMIN — CYCLOPENTOLATE HYDROCHLORIDE 1 DROP: 10 SOLUTION/ DROPS OPHTHALMIC at 07:45

## 2018-07-10 RX ADMIN — LIDOCAINE HYDROCHLORIDE 1 APPLICATION: 20 JELLY TOPICAL at 08:03

## 2018-07-10 RX ADMIN — KETOROLAC TROMETHAMINE 1 DROP: 5 SOLUTION OPHTHALMIC at 07:45

## 2018-07-10 RX ADMIN — LIDOCAINE HYDROCHLORIDE 1 APPLICATION: 20 JELLY TOPICAL at 07:46

## 2018-07-10 NOTE — DISCHARGE INSTRUCTIONS
Dr Kirit Ames Cataract Instructions    Activity:     1  No Driving until instructed   2  Keep shield on until seen tomorrow except when administering drops   3  No heavy lifting   4  No water in eye     Diet:     1  Resume normal diet    Normal Symptoms:     1  Mild Headache   2  Scratchy or picky feeling around eye    Call the office if:     1  You have any questions or concerns   2  If eye pain is not relieved by extra strength tylenol    Office phone number:  [phone]      Next appointment:     1  See Dr Kirit Ames at his office tomorrow as scheduled       1:30 PM   2  Bring blue eye kit with you and eyedrops to the office    A new set of comprehensive instructions will be given and reviewed with you during your office visit tomorrow

## 2018-07-10 NOTE — ANESTHESIA PREPROCEDURE EVALUATION
Review of Systems/Medical History          Cardiovascular  Exercise tolerance (METS): good,  No angina ,   Comment:  [address] [place], [address] [place] [address]  [phone]     Transthoracic Echocardiogram  2D, M-mode, Doppler, and Color Doppler     Study date:  2018     Patient: Tere Blackwell  MR number: [id number]  Account number: [de-identified]  : 1940  Age: 65 years  Gender: Female  Status: Outpatient  Location: [place] [address]   Height: 63 in  Weight: 147 lb  BP: 130/ 74 mmHg     Indications: Cardiac Murmur     Diagnoses: R01 1 - Cardiac murmur, unspecified     Sonographer:  Jose Gillespie BS,RDCS  Primary Physician:  Veronique Lucas  Referring Physician:  Jennifer Rea:  [facility]  Interpreting Physician:  Dean Peraza MD     SUMMARY     LEFT VENTRICLE:  Systolic function was normal  Ejection fraction was estimated to be 60 %  There were no regional wall motion abnormalities      AORTIC VALVE:  There was mild to moderate regurgitation      AORTA:  The root exhibited dilatation, with a maximum measured diameter of 3 7 cm      HISTORY: PRIOR HISTORY: History of Shingles, Premature Atrial Contractions, Murmur,  Pulmonary  No shortness of breath, No recent URI ,        GI/Hepatic            Endo/Other  History of thyroid disease , hypothyroidism,      GYN       Hematology   Musculoskeletal    Arthritis     Neurology    Motor deficit ,    Psychology           Physical Exam    Airway    Mallampati score: II  TM Distance: >3 FB  Neck ROM: full     Dental       Cardiovascular  Murmur,     Pulmonary  Breath sounds clear to auscultation,     Other Findings        Anesthesia Plan  ASA Score- 2     Anesthesia Type- IV sedation with anesthesia with ASA Monitors  Additional Monitors:   Airway Plan:         Plan Factors-    Induction- intravenous      Postoperative Plan-     Informed Consent- Anesthetic plan and risks discussed with patient  Medical History     History Comments   Rotator cuff injury right-slight tear-healed with therapy   Vitamin D deficiency    Shingles (herpes zoster) polyneuropathy    Foot drop, left foot S/P shingles that attacked the nerves-wears a brace prn,uses a cane for long distance   Wears glasses    Spinal stenosis    Varicose veins of legs    Arthritis    Irregular heart beat regular, irregular-Dr Garcia-ECHO-negative,no heart disease   Pertinent Negatives Comments   No pertinent negative medical history recorded   Surgical History     TUBAL LIGATION     Substance History     Smoking Status: Never Smoker   Smokeless Tobacco Status: Never Used   Alcohol use: No   Drug use:  No

## 2018-07-10 NOTE — OP NOTE
OPERATIVE REPORT    PATIENT NAME: Rome Liao    :  1940  MRN: [id number]  Pt Location: [facility] OR ROOM 01    Surgery Date: 7/10/2018    Surgeon(s) and Role:     * Myles Cannon MD - Primary    Age-related nuclear cataract of left eye [H25 12]    Post-Op Diagnosis Codes:     * Age-related nuclear cataract of left eye [H25 12]    Procedure(s):  EXTRACTION EXTRACAPSULAR CATARACT PHACO INTRAOCULAR LENS (IOL)    Anesthesia Type:   IV Sedation with Anesthesia    Operative Indications:  Age-related nuclear cataract of left eye [H25 12]  Decreased vision to 20/50  With problems reading  Pt requested cataract sx the left eye    Procedure and Technique:    Procedure Details     The patient was brought in the OR in stable condition and placed on the operative table  The left eye was prepped and draped in the usual sterile manner  Attention was directed to the left eye where a lid speculum was placed  A 2 4 mm clear corneal incision was made temperally  1/2 cc of 1% MPF Lidocaine was irrigated into the anterior chamber followed by viscoat  The side port incision was placed superiorly  The capsularrhexis was made and the nucleus was hydrodissected with BSS  The nucleus was then removed with the phaco handpiece followed by removal of the cortical material with the I/A handpiece  The capsular bag was then filled with Provisc  The IOL was folded and placed in to the capsular bag and centered well  The remaining Provisc was removed from the eye with the I/A  The wounds were hydrated with BSS and found to be water tight  The lid speculum was removed and 2 drops of Gatifloxicin were placed over the cornea  A protective eye shield was taped over the eye and the patient went to PACU in stable condition  I will see the patient in the office tomorrow and the expected post op period is a few weeks         Complications: None        Disposition: PACU   Condition: Stable    SIGNATURE: Myles Cannon MD  DATE: July 10, 2018  TIME: 9:11 AM

## 2018-07-11 ENCOUNTER — TELEPHONE (OUTPATIENT)
Dept: NEUROLOGY | Facility: CLINIC | Age: 78
End: 2018-07-11

## 2018-07-11 NOTE — TELEPHONE ENCOUNTER
Moved pt from [place] 7/17 to Dr Jerrell marsh in [facility] on 7/24  Sent patient directions and reminder card

## 2018-07-24 ENCOUNTER — OFFICE VISIT (OUTPATIENT)
Dept: NEUROLOGY | Facility: CLINIC | Age: 78
End: 2018-07-24
Payer: MEDICARE

## 2018-07-24 VITALS
WEIGHT: 147 LBS | DIASTOLIC BLOOD PRESSURE: 70 MMHG | RESPIRATION RATE: 14 BRPM | BODY MASS INDEX: 26.05 KG/M2 | HEART RATE: 81 BPM | SYSTOLIC BLOOD PRESSURE: 110 MMHG | HEIGHT: 63 IN

## 2018-07-24 DIAGNOSIS — Z86.19 HISTORY OF SHINGLES: ICD-10-CM

## 2018-07-24 DIAGNOSIS — G57.02 COMMON PERONEAL NEUROPATHY OF LEFT LOWER EXTREMITY: ICD-10-CM

## 2018-07-24 DIAGNOSIS — M21.372 LEFT FOOT DROP: Primary | ICD-10-CM

## 2018-07-24 PROCEDURE — 99214 OFFICE O/P EST MOD 30 MIN: CPT | Performed by: PSYCHIATRY & NEUROLOGY

## 2018-07-24 NOTE — PROGRESS NOTES
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  Continue physical therapy as you are doing  She will check with PT if she is safe to ambulate on a treadmill  Continue stretching the left ankle  Discussed the possibility of repeating EMG, agree it would not change her management  She understands the recovery can be slow considering her age  F/u 6 months  H/o Shingles  Left foot drop     Thank you very much for allowing me to  participate in your patient's care  Please feel free to contact me for any questions or concerns  Subjective:  HPI     I had the pleasure of seeing your patient for follow up for her left foot drop, secondary to left peroneal neuropathy secondary to shingles  She was last seen by Dr Mayo Patel in February 2018 and now return for follow up  Symptoms started in April 2017, she had shingles rash in the left lower extremity, the first three toes, and to the lateral aspect of the left lower leg  It was initially thought to be cellulitis, but later was diagnosed with zoster  She noted left foot drop a few weeks after the rash  She was started on antiviral treatment for shingles  She was on neurontin which she discontinued in August 2017  She started PT, has a left AFO  She did not see much therapy initially, was sent to PT at [facility] this past February, she feels it has been helpful  She feels she has more motion in her left foot  She does have some paresthesias in the first 3 toes and the top of the foot and some patchy sensations in the lower leg  She does not think this is painful  She has AFO, which she only uses for long distances, uneven grounds  As a part of her workup, she had blood work for TSH, CBC, basic metabolic profile, sed rate, CRP which were all normal   Varicella zoster quantitative DNA was noted to be abnormal from her skin lesion   Vitamin-D level was minimally reduced at 28 9     I personally reviewed her electrodiagnostic studies which were performed in June 2017  These were performed on both lower extremities  Peroneal motor responses in the left lower extremity recorded both from EDB and tibialis anterior muscles were absent  Tibial motor responses bilaterally were symmetrical and showed slightly reduced amplitude with normal latencies  The peroneal motor study from the right tibialis anterior muscle was normal  Left sural and superficial peroneal responses were absent and right superficial peroneal sensory study was normal  Needle examination was performed on left lower extremity and showed fibrillation potentials and positive sharp waves in tibialis anterior, extensor hallucis longus, and peroneus longus muscles  No motor units were seen in tibialis anterior, EHL and peroneus longus muscle, And chronic neurogenic units were seen in the left gastrocnemius muscle and needle examination from short and long head of biceps femoris muscles was normal  Lumbar paraspinals were normal  These are best interpreted as acute, severe, left common peroneal neuropathy distal to the innervation of short head of biceps femoris  Objective:    Blood pressure 110/70, pulse 81, resp  rate 14, height 5' 3" (1 6 m), weight 66 7 kg (147 lb)  Physical Exam   General exam: Pt was awake, alert and oriented  HEENT: atraumatic, normocephalic  Normal oral mucosa, neck was supple, no lymphadenopathy  Normal peripheral pulses, heart sounds were normal  Chest was clear  Extremities did not show any edema or cyanosis, does have hammertoes on the right foot, and a foot drop on left  Neurological Exam  Neurologically, pt was awake and alert  Speech was normal, no dysarthria or aphasia  Cranial nerve exam showed normal extraocular movements, no nystagmus or diplopia  There was no ptosis at baseline or with sustained upward gaze     Strength of eye closure muscles was normal   Facial sensations were normal bilaterally  No facial weakness, able to blow out the cheeks and push the tongue in the cheeks well  No tongue atrophy or fasciculations  Motor exam revealed normal tone and muscle bulk  There was no atrophy, scapular winging, high arches  She has hammertoes on the right, foot drop on left  No shortening of achilles tendons or any other features of neuromuscular disease  Muscle strength was normal in neck flexors and extensors and all muscle groups in both upper extremities and the right lower extremity  Strength in the left lower extremity was as follows : Hip flexors 5, Knee extensors 5, knee flexors 5, ankle dorsiflexors 1, ankle plantar flexors 3+, ankle invertors 2, evertors 1  Reflexes were graded as 2+ in upper extremities, 2 at knees, 1 at right ankle and absent on left  Toes were downgoing  There was no exaggerated jaw jerk or serrano's sign  No ankle clonus  Sensory exam revealed dysesthesias on the left dorsum of the foot and left lower leg  Mildly decreased vibration was noted on the right toe and vibration was absent on left  She walked with a steppage gait with left foot, with the left lower extremity slightly externally rotated  ROS:    Review of Systems   Constitutional: Negative  Negative for appetite change and fever  HENT: Negative  Negative for hearing loss, tinnitus, trouble swallowing and voice change  Eyes: Negative  Negative for photophobia and pain  Respiratory: Negative  Negative for shortness of breath  Cardiovascular: Negative  Negative for palpitations  Gastrointestinal: Negative  Negative for nausea and vomiting  Endocrine: Negative  Negative for cold intolerance and heat intolerance  Genitourinary: Negative  Negative for dysuria, frequency and urgency  Musculoskeletal: Negative  Negative for myalgias and neck pain  Skin: Negative  Negative for rash  Neurological: Negative    Negative for dizziness, tremors, seizures, syncope, facial asymmetry, speech difficulty, weakness, light-headedness, numbness and headaches  Hematological: Negative  Does not bruise/bleed easily  Psychiatric/Behavioral: Negative  Negative for confusion, hallucinations and sleep disturbance

## 2018-07-24 NOTE — PATIENT INSTRUCTIONS
Continue physical therapy as you are doing  You can walk on a tread mill, but wear your AFO while doing that to help the balance     F/u 6 months

## 2018-07-24 NOTE — LETTER
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  Continue physical therapy as you are doing  She will check with PT if she is safe to ambulate on a treadmill  Continue stretching the left ankle  Discussed the possibility of repeating EMG, agree it would not change her management  She understands the recovery can be slow considering her age  F/u 6 months  H/o Shingles  Left foot drop     Thank you very much for allowing me to  participate in your patient's care  Please feel free to contact me for any questions or concerns  Subjective:  HPI     I had the pleasure of seeing your patient for follow up for her left foot drop, secondary to left peroneal neuropathy secondary to shingles  She was last seen by Dr George Urrutia in February 2018 and now return for follow up  Symptoms started in April 2017, she had shingles rash in the left lower extremity, the first three toes, and to the lateral aspect of the left lower leg  It was initially thought to be cellulitis, but later was diagnosed with zoster  She noted left foot drop a few weeks after the rash  She was started on antiviral treatment for shingles  She was on neurontin which she discontinued in August 2017  She started PT, has a left AFO   She did not see much therapy initially, was sent to PT at [facility] this past February, she feels it has been helpful  She feels she has more motion in her left foot  She does have some paresthesias in the first 3 toes and the top of the foot and some patchy sensations in the lower leg  She does not think this is painful  She has AFO, which she only uses for long distances, uneven grounds  As a part of her workup, she had blood work for TSH, CBC, basic metabolic profile, sed rate, CRP which were all normal   Varicella zoster quantitative DNA was noted to be abnormal from her skin lesion  Vitamin-D level was minimally reduced at 28 9  I personally reviewed her electrodiagnostic studies which were performed in June 2017  These were performed on both lower extremities  Peroneal motor responses in the left lower extremity recorded both from EDB and tibialis anterior muscles were absent  Tibial motor responses bilaterally were symmetrical and showed slightly reduced amplitude with normal latencies  The peroneal motor study from the right tibialis anterior muscle was normal  Left sural and superficial peroneal responses were absent and right superficial peroneal sensory study was normal  Needle examination was performed on left lower extremity and showed fibrillation potentials and positive sharp waves in tibialis anterior, extensor hallucis longus, and peroneus longus muscles  No motor units were seen in tibialis anterior, EHL and peroneus longus muscle, And chronic neurogenic units were seen in the left gastrocnemius muscle and needle examination from short and long head of biceps femoris muscles was normal  Lumbar paraspinals were normal  These are best interpreted as acute, severe, left common peroneal neuropathy distal to the innervation of short head of biceps femoris  Objective:    Blood pressure 110/70, pulse 81, resp  rate 14, height 5' 3" (1 6 m), weight 66 7 kg (147 lb)  Physical Exam   General exam: Pt was awake, alert and oriented  HEENT: atraumatic, normocephalic    Normal oral mucosa, neck was supple, no lymphadenopathy  Normal peripheral pulses, heart sounds were normal  Chest was clear  Extremities did not show any edema or cyanosis, does have hammertoes on the right foot, and a foot drop on left  Neurological Exam  Neurologically, pt was awake and alert  Speech was normal, no dysarthria or aphasia  Cranial nerve exam showed normal extraocular movements, no nystagmus or diplopia  There was no ptosis at baseline or with sustained upward gaze  Strength of eye closure muscles was normal   Facial sensations were normal bilaterally  No facial weakness, able to blow out the cheeks and push the tongue in the cheeks well  No tongue atrophy or fasciculations  Motor exam revealed normal tone and muscle bulk  There was no atrophy, scapular winging, high arches  She has hammertoes on the right, foot drop on left  No shortening of achilles tendons or any other features of neuromuscular disease  Muscle strength was normal in neck flexors and extensors and all muscle groups in both upper extremities and the right lower extremity  Strength in the left lower extremity was as follows : Hip flexors 5, Knee extensors 5, knee flexors 5, ankle dorsiflexors 1, ankle plantar flexors 3+, ankle invertors 2, evertors 1  Reflexes were graded as 2+ in upper extremities, 2 at knees, 1 at right ankle and absent on left  Toes were downgoing  There was no exaggerated jaw jerk or serrano's sign  No ankle clonus  Sensory exam revealed dysesthesias on the left dorsum of the foot and left lower leg  Mildly decreased vibration was noted on the right toe and vibration was absent on left  She walked with a steppage gait with left foot, with the left lower extremity slightly externally rotated  ROS:    Review of Systems   Constitutional: Negative  Negative for appetite change and fever  HENT: Negative  Negative for hearing loss, tinnitus, trouble swallowing and voice change      Eyes: Negative  Negative for photophobia and pain  Respiratory: Negative  Negative for shortness of breath  Cardiovascular: Negative  Negative for palpitations  Gastrointestinal: Negative  Negative for nausea and vomiting  Endocrine: Negative  Negative for cold intolerance and heat intolerance  Genitourinary: Negative  Negative for dysuria, frequency and urgency  Musculoskeletal: Negative  Negative for myalgias and neck pain  Skin: Negative  Negative for rash  Neurological: Negative  Negative for dizziness, tremors, seizures, syncope, facial asymmetry, speech difficulty, weakness, light-headedness, numbness and headaches  Hematological: Negative  Does not bruise/bleed easily  Psychiatric/Behavioral: Negative  Negative for confusion, hallucinations and sleep disturbance

## 2018-07-24 NOTE — ASSESSMENT & PLAN NOTE
Continue physical therapy as you are doing  She will check with PT if she is safe to ambulate on a treadmill  Continue stretching the left ankle  Discussed the possibility of repeating EMG, agree it would not change her management  She understands the recovery can be slow considering her age  F/u 6 months

## 2018-11-26 ENCOUNTER — TRANSCRIBE ORDERS (OUTPATIENT)
Dept: ADMINISTRATIVE | Facility: HOSPITAL | Age: 78
End: 2018-11-26

## 2018-11-26 DIAGNOSIS — M77.51 TENDINITIS OF RIGHT FOOT: Primary | ICD-10-CM

## 2019-01-02 ENCOUNTER — OFFICE VISIT (OUTPATIENT)
Dept: FAMILY MEDICINE CLINIC | Facility: MEDICAL CENTER | Age: 79
End: 2019-01-02
Payer: MEDICARE

## 2019-01-02 VITALS
DIASTOLIC BLOOD PRESSURE: 90 MMHG | WEIGHT: 150 LBS | HEART RATE: 92 BPM | SYSTOLIC BLOOD PRESSURE: 150 MMHG | BODY MASS INDEX: 26.57 KG/M2

## 2019-01-02 DIAGNOSIS — Z23 ENCOUNTER FOR IMMUNIZATION: ICD-10-CM

## 2019-01-02 DIAGNOSIS — I77.810 DILATED AORTIC ROOT (HCC): ICD-10-CM

## 2019-01-02 DIAGNOSIS — E04.1 NODULAR THYROID DISEASE: ICD-10-CM

## 2019-01-02 DIAGNOSIS — I35.1 AORTIC VALVE INSUFFICIENCY, ETIOLOGY OF CARDIAC VALVE DISEASE UNSPECIFIED: ICD-10-CM

## 2019-01-02 DIAGNOSIS — R93.7 ABNORMAL MRI, MUSCULOSKELETAL: Primary | ICD-10-CM

## 2019-01-02 PROBLEM — M84.376A STRESS FRACTURE OF CALCANEUS: Status: ACTIVE | Noted: 2019-01-02

## 2019-01-02 PROCEDURE — G0008 ADMIN INFLUENZA VIRUS VAC: HCPCS

## 2019-01-02 PROCEDURE — 90662 IIV NO PRSV INCREASED AG IM: CPT

## 2019-01-02 PROCEDURE — 99213 OFFICE O/P EST LOW 20 MIN: CPT | Performed by: FAMILY MEDICINE

## 2019-01-02 NOTE — PROGRESS NOTES
Assessment/Plan:    No problem-specific Assessment & Plan notes found for this encounter  Diagnoses and all orders for this visit:    Abnormal MRI, musculoskeletal  I did review patient's right ankle MRI and there is concern for possible osteopenia  DEXA scan was previously ordered and has been scheduled  Will await results and go from there  Aortic valve insufficiency, etiology of cardiac valve disease unspecified  Dilated aortic root ([facility] [address] )  Both found on previous echocardiogram   Patient has not yet scheduled with Cardiology  No chest pain or trouble breathing  She was encouraged to schedule appointment with her cardiologist   She will call their office today and does have their number  Nodular thyroid disease  -     US thyroid; Future  Patient will be due for repeat thyroid ultrasound in February  Order placed for that  Encounter for immunization  -     PREFERRED: influenza vaccine, [id number], high-dose, PF 0 5 mL, for patients 65 yr+ (FLUZONE HIGH-DOSE)  On exam there is no findings to suggest an acute infection that would contraindicate administration of the flu vaccine  Flu vaccine given and tolerated well  Follow-up in six months as previously scheduled or sooner if needed  Subjective:      Patient ID: Fadi Granda is a 77 y o  female  Patient presents for follow-up  She tells me she sustained a right heel stress fracture and has been seeing Podiatry for this  They informed patient she may have osteoporosis based on the MRI findings and to follow up with her PCP for this  Does have a copy of the MRI report from [facility]  Patient states that since finding out this information she did finally schedule her mammogram and DEXA scan that were previously ordered  Patient also tells me she is due for her repeat thyroid ultrasound for her thyroid nodules  She also has not yet seen Cardiology for her abnormal echocardiogram and is eager to see them now  Would also like to know if she can have flu vaccine despite having had nasal congestion for the past few days  She does not feel sick  No fevers  No chest pain or trouble breathing  The following portions of the patient's history were reviewed and updated as appropriate:   She   Patient Active Problem List    Diagnosis Date Noted    Stress fracture of calcaneus 01/02/2019    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 07/24/2018    Dilated aortic root ([address] ) 07/02/2018    Aortic valve regurgitation 07/02/2018    Murmur 06/19/2018    Nodular thyroid disease 02/20/2018    Increased PTH level 02/02/2018    Left foot drop 02/01/2018    History of shingles 02/01/2018    Elevated BP without diagnosis of hypertension 12/13/2017    Hypercalcemia 12/13/2017    Degenerative lumbar spinal stenosis 06/23/2017    Varicose veins of bilateral lower extremities with other complications 40/72/1959    Tendinitis of right rotator cuff 02/07/2017    Irregular heartbeat 01/30/2017    Premature atrial contraction 01/30/2017     Current Outpatient Prescriptions   Medication Sig Dispense Refill    cholecalciferol (VITAMIN D3) 1,000 units tablet Take 1,000 Units by mouth every morning      ibuprofen (MOTRIN IB) 200 mg tablet Take 1 tablet by mouth 3 (three) times a day as needed      Multiple Vitamins-Minerals (CENTRUM SILVER 50+WOMEN PO) Take by mouth every morning      sodium chloride (ALESIA 128) 2 % hypertonic ophthalmic solution Administer 1 drop to both eyes 3 (three) times a day Alesia 128 gel at HS        No current facility-administered medications for this visit  She is allergic to prednisone       Review of Systems   Constitutional: Negative for fever  Respiratory: Negative for shortness of breath  Cardiovascular: Negative for chest pain           Objective:      /90 (BP Location: Left arm, Patient Position: Sitting, Cuff Size: Adult)   Pulse 92   Wt 68 kg (150 lb)   BMI 26 57 kg/m²          Physical Exam   Constitutional: Vital signs are normal  She appears well-developed and well-nourished  HENT:   Head: Normocephalic and atraumatic  Right Ear: Tympanic membrane, external ear and ear canal normal    Left Ear: Tympanic membrane, external ear and ear canal normal    Nose: Nose normal    Mouth/Throat: Uvula is midline, oropharynx is clear and moist and mucous membranes are normal    Eyes: Pupils are equal, round, and reactive to light  Conjunctivae, EOM and lids are normal    Neck: Trachea normal  Neck supple  No thyromegaly present  Cardiovascular: Normal rate, regular rhythm, S1 normal and S2 normal     No murmur heard  Pulmonary/Chest: Effort normal and breath sounds normal    Abdominal: There is no tenderness  Lymphadenopathy:     She has no cervical adenopathy  Skin: Skin is intact     Psychiatric: Her speech is normal

## 2019-01-18 ENCOUNTER — HOSPITAL ENCOUNTER (OUTPATIENT)
Dept: RADIOLOGY | Facility: MEDICAL CENTER | Age: 79
Discharge: HOME | End: 2019-01-18
Payer: MEDICARE

## 2019-01-18 VITALS — WEIGHT: 150 LBS | HEIGHT: 63 IN | BODY MASS INDEX: 26.58 KG/M2

## 2019-01-18 DIAGNOSIS — Z12.39 SCREENING FOR BREAST CANCER: ICD-10-CM

## 2019-01-18 DIAGNOSIS — E21.3 HYPERPARATHYROIDISM (HCC): ICD-10-CM

## 2019-01-18 PROCEDURE — 77080 DXA BONE DENSITY AXIAL: CPT

## 2019-01-18 PROCEDURE — 77067 SCR MAMMO BI INCL CAD: CPT

## 2019-01-22 PROBLEM — M81.0 OSTEOPOROSIS: Status: ACTIVE | Noted: 2019-01-22

## 2019-02-04 ENCOUNTER — HOSPITAL ENCOUNTER (OUTPATIENT)
Dept: RADIOLOGY | Facility: MEDICAL CENTER | Age: 79
Discharge: HOME | End: 2019-02-04
Payer: MEDICARE

## 2019-02-04 DIAGNOSIS — E04.1 NODULAR THYROID DISEASE: ICD-10-CM

## 2019-02-04 PROCEDURE — 76536 US EXAM OF HEAD AND NECK: CPT

## 2019-02-06 ENCOUNTER — APPOINTMENT (OUTPATIENT)
Dept: LAB | Facility: MEDICAL CENTER | Age: 79
End: 2019-02-06
Payer: MEDICARE

## 2019-02-06 ENCOUNTER — OFFICE VISIT (OUTPATIENT)
Dept: FAMILY MEDICINE CLINIC | Facility: MEDICAL CENTER | Age: 79
End: 2019-02-06
Payer: MEDICARE

## 2019-02-06 VITALS
SYSTOLIC BLOOD PRESSURE: 122 MMHG | BODY MASS INDEX: 27.03 KG/M2 | RESPIRATION RATE: 16 BRPM | HEART RATE: 68 BPM | WEIGHT: 152.6 LBS | DIASTOLIC BLOOD PRESSURE: 70 MMHG

## 2019-02-06 DIAGNOSIS — M81.0 AGE-RELATED OSTEOPOROSIS WITHOUT CURRENT PATHOLOGICAL FRACTURE: Primary | ICD-10-CM

## 2019-02-06 DIAGNOSIS — E34.9 INCREASED PTH LEVEL: ICD-10-CM

## 2019-02-06 DIAGNOSIS — E78.00 ELEVATED CHOLESTEROL: ICD-10-CM

## 2019-02-06 DIAGNOSIS — M81.0 AGE-RELATED OSTEOPOROSIS WITHOUT CURRENT PATHOLOGICAL FRACTURE: ICD-10-CM

## 2019-02-06 DIAGNOSIS — E04.1 NODULAR THYROID DISEASE: ICD-10-CM

## 2019-02-06 DIAGNOSIS — E83.52 HYPERCALCEMIA: ICD-10-CM

## 2019-02-06 LAB
25(OH)D3 SERPL-MCNC: 29.1 NG/ML (ref 30–100)
ALBUMIN SERPL BCP-MCNC: 4.3 G/DL (ref 3.5–5)
ALP SERPL-CCNC: 106 U/L (ref 46–116)
ALT SERPL W P-5'-P-CCNC: 22 U/L (ref 12–78)
ANION GAP SERPL CALCULATED.3IONS-SCNC: 6 MMOL/L (ref 4–13)
AST SERPL W P-5'-P-CCNC: 27 U/L (ref 5–45)
BILIRUB SERPL-MCNC: 0.42 MG/DL (ref 0.2–1)
BUN SERPL-MCNC: 13 MG/DL (ref 5–25)
CALCIUM ALBUM COR SERPL-MCNC: 10.3 MG/DL (ref 8.3–10.1)
CALCIUM SERPL-MCNC: 10.5 MG/DL (ref 8.3–10.1)
CHLORIDE SERPL-SCNC: 106 MMOL/L (ref 100–108)
CHOLEST SERPL-MCNC: 266 MG/DL (ref 50–200)
CO2 SERPL-SCNC: 26 MMOL/L (ref 21–32)
CREAT SERPL-MCNC: 0.67 MG/DL (ref 0.6–1.3)
GFR SERPL CREATININE-BSD FRML MDRD: 84 ML/MIN/1.73SQ M
GLUCOSE P FAST SERPL-MCNC: 85 MG/DL (ref 65–99)
HDLC SERPL-MCNC: 71 MG/DL (ref 40–60)
LDLC SERPL CALC-MCNC: 169 MG/DL (ref 0–100)
POTASSIUM SERPL-SCNC: 4.5 MMOL/L (ref 3.5–5.3)
PROT SERPL-MCNC: 8.2 G/DL (ref 6.4–8.2)
PTH-INTACT SERPL-MCNC: 85.5 PG/ML (ref 18.4–80.1)
SODIUM SERPL-SCNC: 138 MMOL/L (ref 136–145)
TRIGL SERPL-MCNC: 132 MG/DL

## 2019-02-06 PROCEDURE — 82306 VITAMIN D 25 HYDROXY: CPT

## 2019-02-06 PROCEDURE — 99213 OFFICE O/P EST LOW 20 MIN: CPT | Performed by: FAMILY MEDICINE

## 2019-02-06 PROCEDURE — 80053 COMPREHEN METABOLIC PANEL: CPT

## 2019-02-06 PROCEDURE — 36415 COLL VENOUS BLD VENIPUNCTURE: CPT

## 2019-02-06 PROCEDURE — 83970 ASSAY OF PARATHORMONE: CPT

## 2019-02-06 PROCEDURE — 80061 LIPID PANEL: CPT

## 2019-02-06 NOTE — PROGRESS NOTES
Assessment/Plan:    No problem-specific Assessment & Plan notes found for this encounter  Diagnoses and all orders for this visit:    Age-related osteoporosis without current pathological fracture  -     Vitamin D 25 hydroxy; Future  Patient has osteoporosis based on recent DEXA scan  I recommended starting medication in the form of a bisphosphonate  Potential side effects of bisphosphonates discussed  I recommended Fosamax 70 mg weekly  Patient wants to think about it before starting any medication at this time  She would therefore declines medication currently  She will discuss this further with her daughter and get back to us  In the meantime I recommend she continue her vitamin-D supplementation and add calcium as well  Check a vitamin-D level  Nodular thyroid disease  Patient has nodules in her thyroid  Ultrasound done earlier this week shows persistence of the nodules but none of them meet biopsy criteria  They do meet criteria for repeat ultrasound in one year which will be ordered at a later date  Hypercalcemia  -     Comprehensive metabolic panel; Future  Increased PTH level  -     PTH, intact; Future  -     Vitamin D 25 hydroxy; Future  Patient has known hypercalcemia and increased PTH level  She did have a sestamibi scan which did not show a parathyroid adenoma  Perhaps the elevated PTH and elevated calcium was secondary to vitamin-D deficiency  Patient is on a vitamin-D supplementation  Repeat labs to check calcium levels and parathyroid hormone levels as well as vitamin-D levels  Elevated cholesterol  -     Comprehensive metabolic panel; Future  -     Lipid Panel with Direct LDL reflex; Future  Cholesterol elevated on previous labs  Repeat a lipid profile and CMP to assess if lipid lowering medication needs to be initiated and if it does to determine if hepatic function is normal     Follow-up in 4 months as previously scheduled or sooner if needed      Subjective: Patient ID: Ana Marie is a 77 y o  female  Patient presents for follow-up  She has osteoporosis  Diagnosed via recent DEXA scan  Not currently on any osteoporosis medications  Here to discuss initiating medication  She has thyroid nodules  No symptoms of hypo or hyperthyroidism such as weight gain, weight loss, fatigue, hair loss or palpitations  Did have a recent thyroid ultrasound performed to follow up on the nodules  The following portions of the patient's history were reviewed and updated as appropriate:   She   Patient Active Problem List    Diagnosis Date Noted    Elevated cholesterol 02/06/2019    Osteoporosis 01/22/2019    Stress fracture of calcaneus 01/02/2019    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 07/24/2018    Dilated aortic root ([address] ) 07/02/2018    Aortic valve regurgitation 07/02/2018    Murmur 06/19/2018    Nodular thyroid disease 02/20/2018    Increased PTH level 02/02/2018    Left foot drop 02/01/2018    History of shingles 02/01/2018    Elevated BP without diagnosis of hypertension 12/13/2017    Hypercalcemia 12/13/2017    Degenerative lumbar spinal stenosis 06/23/2017    Varicose veins of bilateral lower extremities with other complications 31/27/8089    Tendinitis of right rotator cuff 02/07/2017    Irregular heartbeat 01/30/2017    Premature atrial contraction 01/30/2017     Current Outpatient Prescriptions   Medication Sig Dispense Refill    cholecalciferol (VITAMIN D3) 1,000 units tablet Take 1,000 Units by mouth every morning      ibuprofen (MOTRIN IB) 200 mg tablet Take 1 tablet by mouth 3 (three) times a day as needed      Multiple Vitamins-Minerals (CENTRUM SILVER 50+WOMEN PO) Take by mouth every morning      sodium chloride (ALESIA 128) 2 % hypertonic ophthalmic solution Administer 1 drop to both eyes 3 (three) times a day Alesia 128 gel at HS        No current facility-administered medications for this visit        She is allergic to prednisone       Review of Systems   Constitutional: Negative for fever  Respiratory: Negative for shortness of breath  Cardiovascular: Negative for chest pain  Objective:      /70 (Cuff Size: Standard)   Pulse 68   Resp 16   Wt 69 2 kg (152 lb 9 6 oz)   BMI 27 03 kg/m²          Physical Exam   Constitutional: She appears well-developed and well-nourished  Psychiatric: She has a normal mood and affect   Her behavior is normal  Judgment and thought content normal

## 2019-02-08 ENCOUNTER — TELEPHONE (OUTPATIENT)
Dept: FAMILY MEDICINE CLINIC | Facility: MEDICAL CENTER | Age: 79
End: 2019-02-08

## 2019-02-08 DIAGNOSIS — E55.9 VITAMIN D DEFICIENCY: Primary | ICD-10-CM

## 2019-02-08 RX ORDER — ERGOCALCIFEROL 1.25 MG/1
50000 CAPSULE ORAL WEEKLY
Qty: 12 CAPSULE | Refills: 0 | Status: SHIPPED | OUTPATIENT
Start: 2019-02-08 | End: 2019-04-04 | Stop reason: ALTCHOICE

## 2019-02-08 NOTE — TELEPHONE ENCOUNTER
Vitamin-D faxed to patient's pharmacy  Unclear if insurance will cover it  Repeat labs in three months generated  Weight loss will definitely be helpful overall however patient has other risk factors that put her at increased risk for cardiovascular disease and therefore medication would be highly beneficial   I recommend patient reconsider  I recommend she reconsider starting a bisphosphonate as well for osteoporosis as she is high risk for fracture

## 2019-02-08 NOTE — TELEPHONE ENCOUNTER
Pt aware- in agreement with  Vitamin D  States she gained 17 lbs and would like to try dieting and exercise first   Wants to know if you are ok with that  Uses [facility]  Does she take the calcium supplement as well that you suggested? Wants to still think about the Osteoporosis treatment   Has heard about too many side effects with it

## 2019-02-08 NOTE — TELEPHONE ENCOUNTER
----- Message from Kristian Matthew DO sent at 2/8/2019  8:21 AM EST -----  Labs reviewed  Cholesterol is elevated  Patient at increased risk for cardiovascular events such as heart attack or stroke  I highly recommend she start a cholesterol-lowering medication  Calcium remains elevated as does parathyroid hormone level  Vitamin-D is still low  I suspect the calcium is elevated along with the parathyroid hormone level due to low vitamin-D  I recommend prescription strength vitamin D for three months  Rest of labs unremarkable  At patient's recent visit I recommended starting a bisphosphonate  She declined at that time  I highly recommend she start one  Please let me know if patient is agreeable to the above treatment

## 2019-02-08 NOTE — TELEPHONE ENCOUNTER
fyi- she still wants to try with weight loss   Has appt in June   Will discuss it then   Mailed order

## 2019-03-04 ENCOUNTER — TELEPHONE (OUTPATIENT)
Dept: FAMILY MEDICINE CLINIC | Facility: MEDICAL CENTER | Age: 79
End: 2019-03-04

## 2019-03-04 NOTE — TELEPHONE ENCOUNTER
Is on high dose Vit D  Taking so much makes her nervous and she would like to do a lower dose  Says she doesn't feel right on it  Would like to start low and check #'s in 3 mos

## 2019-03-06 NOTE — TELEPHONE ENCOUNTER
Can purchase OTC Vit D and take 3000 units daily  Repeat Vit D already ordered to be drawn May 2019

## 2019-04-04 ENCOUNTER — OFFICE VISIT (OUTPATIENT)
Dept: FAMILY MEDICINE CLINIC | Facility: MEDICAL CENTER | Age: 79
End: 2019-04-04
Payer: MEDICARE

## 2019-04-04 VITALS
TEMPERATURE: 98 F | WEIGHT: 146.25 LBS | BODY MASS INDEX: 25.91 KG/M2 | SYSTOLIC BLOOD PRESSURE: 138 MMHG | HEART RATE: 88 BPM | RESPIRATION RATE: 18 BRPM | DIASTOLIC BLOOD PRESSURE: 84 MMHG

## 2019-04-04 DIAGNOSIS — R21 FACIAL RASH: Primary | ICD-10-CM

## 2019-04-04 DIAGNOSIS — L30.4 INTERTRIGO: ICD-10-CM

## 2019-04-04 PROCEDURE — 99213 OFFICE O/P EST LOW 20 MIN: CPT | Performed by: FAMILY MEDICINE

## 2019-04-04 RX ORDER — PHENOL 1.4 %
600 AEROSOL, SPRAY (ML) MUCOUS MEMBRANE 2 TIMES DAILY WITH MEALS
COMMUNITY
End: 2020-07-10 | Stop reason: ALTCHOICE

## 2019-04-04 RX ORDER — NYSTATIN 100000 [USP'U]/G
POWDER TOPICAL 3 TIMES DAILY
Qty: 60 G | Refills: 0 | Status: SHIPPED | OUTPATIENT
Start: 2019-04-04 | End: 2019-06-20 | Stop reason: ALTCHOICE

## 2019-04-08 ENCOUNTER — TRANSCRIBE ORDERS (OUTPATIENT)
Dept: ADMINISTRATIVE | Facility: HOSPITAL | Age: 79
End: 2019-04-08

## 2019-04-09 ENCOUNTER — OFFICE VISIT (OUTPATIENT)
Dept: NEUROLOGY | Facility: CLINIC | Age: 79
End: 2019-04-09
Payer: MEDICARE

## 2019-04-09 VITALS
BODY MASS INDEX: 26.13 KG/M2 | HEART RATE: 93 BPM | WEIGHT: 147.5 LBS | HEIGHT: 63 IN | DIASTOLIC BLOOD PRESSURE: 83 MMHG | SYSTOLIC BLOOD PRESSURE: 149 MMHG

## 2019-04-09 DIAGNOSIS — Z86.19 HISTORY OF SHINGLES: ICD-10-CM

## 2019-04-09 DIAGNOSIS — M21.372 LEFT FOOT DROP: ICD-10-CM

## 2019-04-09 DIAGNOSIS — G57.02 COMMON PERONEAL NEUROPATHY OF LEFT LOWER EXTREMITY: Primary | ICD-10-CM

## 2019-04-09 PROCEDURE — 99214 OFFICE O/P EST MOD 30 MIN: CPT | Performed by: PSYCHIATRY & NEUROLOGY

## 2019-05-01 DIAGNOSIS — E55.9 VITAMIN D DEFICIENCY: ICD-10-CM

## 2019-05-01 RX ORDER — ERGOCALCIFEROL 1.25 MG/1
50000 CAPSULE ORAL WEEKLY
Qty: 12 CAPSULE | Refills: 0 | OUTPATIENT
Start: 2019-05-01 | End: 2019-07-30

## 2019-05-26 DIAGNOSIS — E55.9 VITAMIN D DEFICIENCY: ICD-10-CM

## 2019-05-28 RX ORDER — ERGOCALCIFEROL 1.25 MG/1
50000 CAPSULE ORAL WEEKLY
Qty: 12 CAPSULE | Refills: 0 | OUTPATIENT
Start: 2019-05-28 | End: 2019-08-26

## 2019-06-18 ENCOUNTER — APPOINTMENT (OUTPATIENT)
Dept: LAB | Facility: MEDICAL CENTER | Age: 79
End: 2019-06-18
Payer: MEDICARE

## 2019-06-18 DIAGNOSIS — E55.9 VITAMIN D DEFICIENCY: ICD-10-CM

## 2019-06-18 LAB
25(OH)D3 SERPL-MCNC: 34.6 NG/ML (ref 30–100)
ALBUMIN SERPL BCP-MCNC: 4.2 G/DL (ref 3.5–5)
ANION GAP SERPL CALCULATED.3IONS-SCNC: 3 MMOL/L (ref 4–13)
BUN SERPL-MCNC: 12 MG/DL (ref 5–25)
CALCIUM ALBUM COR SERPL-MCNC: 10 MG/DL (ref 8.3–10.1)
CALCIUM SERPL-MCNC: 10.2 MG/DL (ref 8.3–10.1)
CALCIUM SERPL-MCNC: 10.2 MG/DL (ref 8.3–10.1)
CHLORIDE SERPL-SCNC: 108 MMOL/L (ref 100–108)
CO2 SERPL-SCNC: 29 MMOL/L (ref 21–32)
CREAT SERPL-MCNC: 0.64 MG/DL (ref 0.6–1.3)
GFR SERPL CREATININE-BSD FRML MDRD: 85 ML/MIN/1.73SQ M
GLUCOSE P FAST SERPL-MCNC: 71 MG/DL (ref 65–99)
POTASSIUM SERPL-SCNC: 3.7 MMOL/L (ref 3.5–5.3)
PTH-INTACT SERPL-MCNC: 73.7 PG/ML (ref 18.4–80.1)
SODIUM SERPL-SCNC: 140 MMOL/L (ref 136–145)

## 2019-06-18 PROCEDURE — 80048 BASIC METABOLIC PNL TOTAL CA: CPT

## 2019-06-18 PROCEDURE — 83970 ASSAY OF PARATHORMONE: CPT

## 2019-06-18 PROCEDURE — 82040 ASSAY OF SERUM ALBUMIN: CPT

## 2019-06-18 PROCEDURE — 36415 COLL VENOUS BLD VENIPUNCTURE: CPT

## 2019-06-18 PROCEDURE — 82306 VITAMIN D 25 HYDROXY: CPT

## 2019-06-20 ENCOUNTER — OFFICE VISIT (OUTPATIENT)
Dept: FAMILY MEDICINE CLINIC | Facility: MEDICAL CENTER | Age: 79
End: 2019-06-20
Payer: MEDICARE

## 2019-06-20 VITALS
BODY MASS INDEX: 25.86 KG/M2 | WEIGHT: 146 LBS | HEART RATE: 86 BPM | DIASTOLIC BLOOD PRESSURE: 80 MMHG | SYSTOLIC BLOOD PRESSURE: 136 MMHG | RESPIRATION RATE: 16 BRPM

## 2019-06-20 DIAGNOSIS — M81.0 AGE-RELATED OSTEOPOROSIS WITHOUT CURRENT PATHOLOGICAL FRACTURE: ICD-10-CM

## 2019-06-20 DIAGNOSIS — Z23 ENCOUNTER FOR IMMUNIZATION: ICD-10-CM

## 2019-06-20 DIAGNOSIS — I77.810 DILATED AORTIC ROOT (HCC): ICD-10-CM

## 2019-06-20 DIAGNOSIS — E78.00 ELEVATED CHOLESTEROL: ICD-10-CM

## 2019-06-20 DIAGNOSIS — Z00.00 MEDICARE ANNUAL WELLNESS VISIT, SUBSEQUENT: Primary | ICD-10-CM

## 2019-06-20 DIAGNOSIS — E55.9 VITAMIN D DEFICIENCY: ICD-10-CM

## 2019-06-20 PROCEDURE — G0439 PPPS, SUBSEQ VISIT: HCPCS | Performed by: FAMILY MEDICINE

## 2019-06-20 RX ORDER — LOVASTATIN 10 MG/1
10 TABLET ORAL
Qty: 90 TABLET | Refills: 0 | Status: SHIPPED | OUTPATIENT
Start: 2019-06-20 | End: 2019-11-25

## 2019-08-08 ENCOUNTER — APPOINTMENT (OUTPATIENT)
Dept: LAB | Facility: MEDICAL CENTER | Age: 79
End: 2019-08-08
Payer: MEDICARE

## 2019-08-08 ENCOUNTER — HOSPITAL ENCOUNTER (OUTPATIENT)
Dept: NON INVASIVE DIAGNOSTICS | Facility: MEDICAL CENTER | Age: 79
Discharge: HOME | End: 2019-08-08
Payer: MEDICARE

## 2019-08-08 DIAGNOSIS — E78.00 ELEVATED CHOLESTEROL: ICD-10-CM

## 2019-08-08 DIAGNOSIS — I77.810 DILATED AORTIC ROOT (HCC): ICD-10-CM

## 2019-08-08 LAB
ALBUMIN SERPL BCP-MCNC: 3.9 G/DL (ref 3.5–5)
ALP SERPL-CCNC: 79 U/L (ref 46–116)
ALT SERPL W P-5'-P-CCNC: 24 U/L (ref 12–78)
AST SERPL W P-5'-P-CCNC: 22 U/L (ref 5–45)
BILIRUB DIRECT SERPL-MCNC: 0.1 MG/DL (ref 0–0.2)
BILIRUB SERPL-MCNC: 0.37 MG/DL (ref 0.2–1)
CHOLEST SERPL-MCNC: 219 MG/DL (ref 50–200)
HDLC SERPL-MCNC: 68 MG/DL (ref 40–60)
LDLC SERPL CALC-MCNC: 135 MG/DL (ref 0–100)
PROT SERPL-MCNC: 7.4 G/DL (ref 6.4–8.2)
TRIGL SERPL-MCNC: 81 MG/DL

## 2019-08-08 PROCEDURE — 36415 COLL VENOUS BLD VENIPUNCTURE: CPT

## 2019-08-08 PROCEDURE — 93306 TTE W/DOPPLER COMPLETE: CPT

## 2019-08-08 PROCEDURE — 80061 LIPID PANEL: CPT

## 2019-08-08 PROCEDURE — 80076 HEPATIC FUNCTION PANEL: CPT

## 2019-08-09 PROCEDURE — 93306 TTE W/DOPPLER COMPLETE: CPT | Performed by: INTERNAL MEDICINE

## 2019-08-13 ENCOUNTER — TELEPHONE (OUTPATIENT)
Dept: NEUROLOGY | Facility: CLINIC | Age: 79
End: 2019-08-13

## 2019-08-13 ENCOUNTER — TELEPHONE (OUTPATIENT)
Dept: FAMILY MEDICINE CLINIC | Facility: MEDICAL CENTER | Age: 79
End: 2019-08-13

## 2019-08-13 NOTE — TELEPHONE ENCOUNTER
BOUCHRAI: Aware levels are not within the normal range for Lipids but have improved  She is not taking the medication, just watching her diet more closely  She prefers to proceed this route for now

## 2019-08-13 NOTE — TELEPHONE ENCOUNTER
Pls tell the pt that the recommendations are to get the vaccine and she can go ahead and get the vaccine       Thanks

## 2019-08-13 NOTE — TELEPHONE ENCOUNTER
----- Message from Naomi Snell DO sent at 8/13/2019 12:13 PM EDT -----  Labs show improve cholesterol and stable liver function  Continue statin

## 2019-08-13 NOTE — TELEPHONE ENCOUNTER
Pt called in to state that her PCP, Dr Beatriz Peña, is recommending she gets the the shingles vaccination  She is questioning if you also recommend this since it was shingles that caused her drop foot  She wants to make sure you are also agreeable  Okay to leave detailed message

## 2019-08-15 ENCOUNTER — TELEPHONE (OUTPATIENT)
Dept: FAMILY MEDICINE CLINIC | Facility: MEDICAL CENTER | Age: 79
End: 2019-08-15

## 2019-08-15 NOTE — TELEPHONE ENCOUNTER
----- Message from Betsey Dorman DO sent at 8/15/2019  8:07 AM EDT -----  Echocardiogram with mild abnormalities  Likely age related  No additional intervention at this time

## 2019-09-15 DIAGNOSIS — E78.00 ELEVATED CHOLESTEROL: ICD-10-CM

## 2019-09-16 RX ORDER — LOVASTATIN 10 MG/1
10 TABLET ORAL
Qty: 90 TABLET | Refills: 0 | OUTPATIENT
Start: 2019-09-16

## 2019-09-23 DIAGNOSIS — E78.00 ELEVATED CHOLESTEROL: ICD-10-CM

## 2019-09-23 RX ORDER — LOVASTATIN 10 MG/1
10 TABLET ORAL
Qty: 90 TABLET | Refills: 1 | OUTPATIENT
Start: 2019-09-23

## 2019-09-23 NOTE — TELEPHONE ENCOUNTER
Telephone message from 8/13/2019 states that patient is not taking her cholesterol-lowering medication  Can we clarify this?

## 2019-09-23 NOTE — TELEPHONE ENCOUNTER
Pt states she is not taking it  She will call [facility] and have them remove it from her profile   Please submit as a referral

## 2019-11-07 ENCOUNTER — TELEPHONE (OUTPATIENT)
Dept: FAMILY MEDICINE CLINIC | Facility: MEDICAL CENTER | Age: 79
End: 2019-11-07

## 2019-11-07 NOTE — TELEPHONE ENCOUNTER
Triaged- she doesn't have cold symptoms , just nasal congestion   Told her she should talk to the dentist if she is not well at the time of the appt, it would up to them , even if viral

## 2019-11-07 NOTE — TELEPHONE ENCOUNTER
Pt called to ask if she should have an antibiotic prior to her dental procedure next week (extraction)  She is having a green discharge from her nose now and thought she should have something ahead of the procedure      Routed to [facility] - please triage

## 2019-11-23 ENCOUNTER — OFFICE VISIT (OUTPATIENT)
Dept: URGENT CARE | Facility: MEDICAL CENTER | Age: 79
End: 2019-11-23
Payer: MEDICARE

## 2019-11-23 VITALS
HEART RATE: 88 BPM | DIASTOLIC BLOOD PRESSURE: 84 MMHG | HEIGHT: 61 IN | RESPIRATION RATE: 18 BRPM | SYSTOLIC BLOOD PRESSURE: 150 MMHG | OXYGEN SATURATION: 97 % | WEIGHT: 145 LBS | TEMPERATURE: 98.9 F | BODY MASS INDEX: 27.38 KG/M2

## 2019-11-23 DIAGNOSIS — J06.9 ACUTE URI: Primary | ICD-10-CM

## 2019-11-23 PROCEDURE — 99213 OFFICE O/P EST LOW 20 MIN: CPT | Performed by: PHYSICIAN ASSISTANT

## 2019-11-23 PROCEDURE — G0463 HOSPITAL OUTPT CLINIC VISIT: HCPCS | Performed by: PHYSICIAN ASSISTANT

## 2019-11-23 RX ORDER — ALBUTEROL SULFATE 90 UG/1
2 AEROSOL, METERED RESPIRATORY (INHALATION) EVERY 6 HOURS PRN
Qty: 8.5 G | Refills: 0 | Status: SHIPPED | OUTPATIENT
Start: 2019-11-23 | End: 2020-06-22 | Stop reason: ALTCHOICE

## 2019-11-23 RX ORDER — BENZONATATE 100 MG/1
100 CAPSULE ORAL 3 TIMES DAILY PRN
Qty: 20 CAPSULE | Refills: 0 | Status: SHIPPED | OUTPATIENT
Start: 2019-11-23 | End: 2019-12-16 | Stop reason: ALTCHOICE

## 2019-11-23 NOTE — PROGRESS NOTES
[id number]        NAME: True Brochure is a [de-identified] y o  female  : 1940    MRN: [id number]  DATE: 2019  TIME: 1:22 PM    Assessment and Plan   Acute URI [J06 9]  1  Acute URI  benzonatate (TESSALON PERLES) 100 mg capsule    albuterol (PROAIR HFA) 90 mcg/act inhaler         Patient Instructions     Upper respiratory infection  Tessalon pearls as directed  proventil 2 puffs every 6 hours as needed  Follow up with PCP in 3-5 days  Proceed to  ER if symptoms worsen  Chief Complaint     Chief Complaint   Patient presents with    Cold Like Symptoms     Pt  with cough, congestion, and sore throat for the past 4 days  Has been on Amoxicillin for the past 5 days post a tooth extraction  History of Present Illness       [de-identified] y/o female presents c/o non productive cough, sore throat and ear pain x 3 days  States she is on amoxicillin due to a dental procedure  Denies chest pain, SOB, diaphoresis      Review of Systems   Review of Systems   Constitutional: Negative for activity change, appetite change, chills, diaphoresis, fatigue and fever  HENT: Positive for congestion, ear pain, rhinorrhea and sore throat  Negative for ear discharge, facial swelling, hearing loss, mouth sores, nosebleeds, postnasal drip, sinus pressure, sinus pain, sneezing and voice change  Respiratory: Positive for cough  Negative for apnea, choking, chest tightness, shortness of breath, wheezing and stridor  Cardiovascular: Negative            Current Medications       Current Outpatient Medications:     calcium carbonate (OS-ZENA) 600 MG tablet, Take 600 mg by mouth 2 (two) times a day with meals, Disp: , Rfl:     cholecalciferol (VITAMIN D3) 1,000 units tablet, Take 1,000 Units by mouth every morning, Disp: , Rfl:     ibuprofen (MOTRIN IB) 200 mg tablet, Take 1 tablet by mouth 3 (three) times a day as needed, Disp: , Rfl:     Multiple Vitamins-Minerals (CENTRUM SILVER 50+WOMEN PO), Take by mouth every morning, Disp: , Rfl:     sodium chloride (ALESIA 128) 2 % hypertonic ophthalmic solution, Administer 1 drop to both eyes 3 (three) times a day Alesia 128 gel at HS , Disp: , Rfl:     albuterol (PROAIR HFA) 90 mcg/act inhaler, Inhale 2 puffs every 6 (six) hours as needed for wheezing, Disp: 8 5 g, Rfl: 0    benzonatate (TESSALON PERLES) 100 mg capsule, Take 1 capsule (100 mg total) by mouth 3 (three) times a day as needed for cough, Disp: 20 capsule, Rfl: 0    lovastatin (MEVACOR) 10 MG tablet, Take 1 tablet (10 mg total) by mouth daily at bedtime (Patient not taking: Reported on 11/23/2019), Disp: 90 tablet, Rfl: 0    Current Allergies     Allergies as of 11/23/2019 - Reviewed 11/23/2019   Allergen Reaction Noted    Prednisone Swelling 04/26/2017            The following portions of the patient's history were reviewed and updated as appropriate: allergies, current medications, past family history, past medical history, past social history, past surgical history and problem list      Past Medical History:   Diagnosis Date    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-mother also has had the corneal disease    Arthritis     Foot drop, left foot 05/2017    S/P shingles that attacked the nerves-wears a brace prn,uses a cane for long distance    Irregular heart beat     regular, irregular-Dr Garcia-ECHO-negative,no heart disease    Murmur     had ECHO-on 6/28/18    Rotator cuff injury     right-slight tear-healed with therapy    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    Spinal stenosis     Varicose veins of legs     Vitamin D deficiency     Wears glasses        Past Surgical History:   Procedure Laterality Date    CATARACT EXTRACTION      NH REMV CATARACT EXTRACAP,INSERT LENS Right 5/14/2018    Procedure: EXTRACTION EXTRACAPSULAR CATARACT PHACO INTRAOCULAR LENS (IOL);   Surgeon: Sonia Harvey MD;  Location: [facility] MAIN OR;  Service: Ophthalmology     [facility] CATARACT EXTRACAP,INSERT LENS Left 7/10/2018    Procedure: EXTRACTION EXTRACAPSULAR CATARACT PHACO INTRAOCULAR LENS (IOL); Surgeon: Heena Perez MD;  Location: [facility] MAIN OR;  Service: Ophthalmology    TUBAL LIGATION         Family History   Problem Relation Age of Onset    Other Mother         corneal transplants, Parathyroid disease    Osteoporosis Mother     Stroke Father     Hypertension Father     Heart disease Father         cardiac disorder    Osteoporosis Father     Other Father         Parathyroid disease    Asthma Sister     Hiatal hernia Sister     Other Brother         parathyroid-removed, Parathyroid disease    Heart disease Brother         " maker"         Medications have been verified  Objective   /84   Pulse 88   Temp 98 9 °F (37 2 °C) (Temporal)   Resp 18   Ht 5' 1" (1 549 m)   Wt 65 8 kg (145 lb)   SpO2 97%   BMI 27 40 kg/m²        Physical Exam     Physical Exam   Constitutional: She appears well-developed and well-nourished  No distress  HENT:   Head: Normocephalic and atraumatic  Right Ear: Hearing, tympanic membrane, external ear and ear canal normal    Left Ear: Hearing, tympanic membrane, external ear and ear canal normal    Nose: Rhinorrhea present  Mouth/Throat: Uvula is midline, oropharynx is clear and moist and mucous membranes are normal    Neck: Normal range of motion  Neck supple  Cardiovascular: Normal rate, regular rhythm, normal heart sounds and intact distal pulses  Pulmonary/Chest: Effort normal and breath sounds normal  No stridor  No respiratory distress  She has no wheezes  She has no rales  She exhibits no tenderness  Lymphadenopathy:     She has cervical adenopathy  Skin: She is not diaphoretic

## 2019-11-23 NOTE — PATIENT INSTRUCTIONS
Upper respiratory infection  Tessalon pearls as directed  proventil 2 puffs every 6 hours as needed  Follow up with PCP in 3-5 days  Proceed to  ER if symptoms worsen  Pharyngitis   WHAT YOU NEED TO KNOW:   Pharyngitis, or sore throat, is inflammation of the tissues and structures in your pharynx (throat)  Pharyngitis is most often caused by bacteria  It may also be caused by a cold or flu virus  Other causes include smoking, allergies, or acid reflux  DISCHARGE INSTRUCTIONS:   Call 911 for any of the following:   · You have trouble breathing or swallowing because your throat is swollen or sore  Return to the emergency department if:   · You are drooling because it hurts too much to swallow  · Your fever is higher than 102? F (39?C) or lasts longer than 3 days  · You are confused  · You taste blood in your throat  Contact your healthcare provider if:   · Your throat pain gets worse  · You have a painful lump in your throat that does not go away after 5 days  · Your symptoms do not improve after 5 days  · You have questions or concerns about your condition or care  Medicines:  Viral pharyngitis will go away on its own without treatment  Your sore throat should start to feel better in 3 to 5 days for both viral and bacterial infections  You may need any of the following:  · Antibiotics  treat a bacterial infection  · NSAIDs , such as ibuprofen, help decrease swelling, pain, and fever  NSAIDs can cause stomach bleeding or kidney problems in certain people  If you take blood thinner medicine, always ask your healthcare provider if NSAIDs are safe for you  Always read the medicine label and follow directions  · Acetaminophen  decreases pain and fever  It is available without a doctor's order  Ask how much to take and how often to take it  Follow directions  Acetaminophen can cause liver damage if not taken correctly  · Take your medicine as directed    Contact your healthcare provider if you think your medicine is not helping or if you have side effects  Tell him or her if you are allergic to any medicine  Keep a list of the medicines, vitamins, and herbs you take  Include the amounts, and when and why you take them  Bring the list or the pill bottles to follow-up visits  Carry your medicine list with you in case of an emergency  Manage your symptoms:   · Gargle salt water  Mix ¼ teaspoon salt in an 8 ounce glass of warm water and gargle  This may help decrease swelling in your throat  · Drink liquids as directed  You may need to drink more liquids than usual  Liquids may help soothe your throat and prevent dehydration  Ask how much liquid to drink each day and which liquids are best for you  · Use a cool-steam humidifier  to help moisten the air in your room and calm your cough  · Soothe your throat  with cough drops, ice, soft foods, or popsicles  Prevent the spread of pharyngitis:  Cover your mouth and nose when you cough or sneeze  Do not share food or drinks  Wash your hands often  Use soap and water  If soap and water are unavailable, use an alcohol based hand   Follow up with your healthcare provider as directed:  Write down your questions so you remember to ask them during your visits  © 2017 [employer] Information is for End User's use only and may not be sold, redistributed or otherwise used for commercial purposes  All illustrations and images included in CareNotes® are the copyrighted property of A [employer]  or [employer]  The above information is an  only  It is not intended as medical advice for individual conditions or treatments  Talk to your doctor, nurse or pharmacist before following any medical regimen to see if it is safe and effective for you

## 2019-11-25 ENCOUNTER — OFFICE VISIT (OUTPATIENT)
Dept: FAMILY MEDICINE CLINIC | Facility: MEDICAL CENTER | Age: 79
End: 2019-11-25
Payer: MEDICARE

## 2019-11-25 ENCOUNTER — TELEPHONE (OUTPATIENT)
Dept: FAMILY MEDICINE CLINIC | Facility: MEDICAL CENTER | Age: 79
End: 2019-11-25

## 2019-11-25 ENCOUNTER — APPOINTMENT (OUTPATIENT)
Dept: RADIOLOGY | Facility: MEDICAL CENTER | Age: 79
End: 2019-11-25
Payer: MEDICARE

## 2019-11-25 VITALS
WEIGHT: 145 LBS | RESPIRATION RATE: 16 BRPM | SYSTOLIC BLOOD PRESSURE: 142 MMHG | DIASTOLIC BLOOD PRESSURE: 84 MMHG | HEIGHT: 61 IN | HEART RATE: 90 BPM | TEMPERATURE: 97.6 F | OXYGEN SATURATION: 97 % | BODY MASS INDEX: 27.38 KG/M2

## 2019-11-25 DIAGNOSIS — R06.2 WHEEZING: ICD-10-CM

## 2019-11-25 DIAGNOSIS — R05.9 COUGH: ICD-10-CM

## 2019-11-25 DIAGNOSIS — Z23 ENCOUNTER FOR IMMUNIZATION: ICD-10-CM

## 2019-11-25 DIAGNOSIS — R05.9 COUGH: Primary | ICD-10-CM

## 2019-11-25 PROCEDURE — 90662 IIV NO PRSV INCREASED AG IM: CPT

## 2019-11-25 PROCEDURE — 99214 OFFICE O/P EST MOD 30 MIN: CPT | Performed by: FAMILY MEDICINE

## 2019-11-25 PROCEDURE — G0008 ADMIN INFLUENZA VIRUS VAC: HCPCS

## 2019-11-25 PROCEDURE — 71046 X-RAY EXAM CHEST 2 VIEWS: CPT

## 2019-11-25 RX ORDER — PREDNISONE 10 MG/1
40 TABLET ORAL DAILY
Qty: 20 TABLET | Refills: 0 | Status: SHIPPED | OUTPATIENT
Start: 2019-11-25 | End: 2019-11-30

## 2019-11-25 NOTE — PROGRESS NOTES
Assessment/Plan:    No problem-specific Assessment & Plan notes found for this encounter  Diagnoses and all orders for this visit:    Cough  -     XR chest pa & lateral; Future  Wheezing  -     XR chest pa & lateral; Future  -     predniSONE 10 mg tablet; Take 4 tablets (40 mg total) by mouth daily for 5 days  Cough and wheezing are likely due to reactive airway disease which is likely a sequela of an upper respiratory infection based on symptoms and exam findings  Chest x-ray was negative for pneumonia  I am going to have patient start a course of prednisone five days  Encounter for immunization  -     FLUZONE HIGH-DOSE: influenza vaccine, high-dose, preservative-free 0 5 mL  Flu vaccine given tolerated well  Follow-up in one week if symptoms persist or sooner if needed  Subjective:      Patient ID: Arturo Saldana is a [de-identified] y o  female  Patient presents with a cough that started about five days ago and is described as nonproductive  Has associated sore throat and nasal congestion  Symptoms have been getting progressively worse  Cough in particular is exacerbated with physical exertion  Cough is improved with use of over-the-counter cough medication  Patient was recently on a course of amoxicillin secondary to having a tooth pulled  She did finish that amoxicillin on 11/23/2019  At that time she went to the urgent care due to her symptoms and was given benzonatate and albuterol  Benzonatate made her dizzy so she stop taking it  She is having trouble actuating the albuterol due to weakness in her hands        The following portions of the patient's history were reviewed and updated as appropriate:   She  has a past medical history of Abnormality of cornea, Arthritis, Foot drop, left foot (05/2017), Irregular heart beat, Murmur, Rotator cuff injury, 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), Spinal stenosis, Varicose veins of legs, Vitamin D deficiency, and Wears glasses  She   Patient Active Problem List    Diagnosis Date Noted    Vitamin D deficiency 02/08/2019    Elevated cholesterol 02/06/2019    Osteoporosis 01/22/2019    Stress fracture of calcaneus 01/02/2019    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 07/24/2018    Dilated aortic root ([address] ) 07/02/2018    Aortic valve regurgitation 07/02/2018    Murmur 06/19/2018    Nodular thyroid disease 02/20/2018    Increased PTH level 02/02/2018    Left foot drop 02/01/2018    History of shingles 02/01/2018    Elevated BP without diagnosis of hypertension 12/13/2017    Hypercalcemia 12/13/2017    Degenerative lumbar spinal stenosis 06/23/2017    Varicose veins of bilateral lower extremities with other complications 16/36/7342    Tendinitis of right rotator cuff 02/07/2017    Irregular heartbeat 01/30/2017    Premature atrial contraction 01/30/2017     She  has a past surgical history that includes Tubal ligation; pr remv cataract extracap,insert lens (Right, 5/14/2018); Cataract extraction; and pr remv cataract extracap,insert lens (Left, 7/10/2018)  Her family history includes Asthma in her sister; Heart disease in her brother and father; Hiatal hernia in her sister; Hypertension in her father; Osteoporosis in her father and mother; Other in her brother, father, and mother; Stroke in her father  She  reports that she has never smoked  She has never used smokeless tobacco  She reports that she does not drink alcohol or use drugs    Current Outpatient Medications   Medication Sig Dispense Refill    albuterol (PROAIR HFA) 90 mcg/act inhaler Inhale 2 puffs every 6 (six) hours as needed for wheezing 8 5 g 0    benzonatate (TESSALON PERLES) 100 mg capsule Take 1 capsule (100 mg total) by mouth 3 (three) times a day as needed for cough 20 capsule 0    calcium carbonate (OS-ZENA) 600 MG tablet Take 600 mg by mouth 2 (two) times a day with meals      cholecalciferol (VITAMIN D3) 1,000 units tablet Take 1,000 Units by mouth every morning      ibuprofen (MOTRIN IB) 200 mg tablet Take 1 tablet by mouth 3 (three) times a day as needed      Multiple Vitamins-Minerals (CENTRUM SILVER 50+WOMEN PO) Take by mouth every morning      sodium chloride (ALESIA 128) 2 % hypertonic ophthalmic solution Administer 1 drop to both eyes 3 (three) times a day Alesia 128 gel at HS       predniSONE 10 mg tablet Take 4 tablets (40 mg total) by mouth daily for 5 days 20 tablet 0     No current facility-administered medications for this visit  Current Outpatient Medications on File Prior to Visit   Medication Sig    albuterol (PROAIR HFA) 90 mcg/act inhaler Inhale 2 puffs every 6 (six) hours as needed for wheezing    benzonatate (TESSALON PERLES) 100 mg capsule Take 1 capsule (100 mg total) by mouth 3 (three) times a day as needed for cough    calcium carbonate (OS-ZENA) 600 MG tablet Take 600 mg by mouth 2 (two) times a day with meals    cholecalciferol (VITAMIN D3) 1,000 units tablet Take 1,000 Units by mouth every morning    ibuprofen (MOTRIN IB) 200 mg tablet Take 1 tablet by mouth 3 (three) times a day as needed    Multiple Vitamins-Minerals (CENTRUM SILVER 50+WOMEN PO) Take by mouth every morning    sodium chloride (ALESIA 128) 2 % hypertonic ophthalmic solution Administer 1 drop to both eyes 3 (three) times a day Alesia 128 gel at HS     [DISCONTINUED] lovastatin (MEVACOR) 10 MG tablet Take 1 tablet (10 mg total) by mouth daily at bedtime (Patient not taking: Reported on 11/25/2019)     No current facility-administered medications on file prior to visit  She is allergic to prednisone       Review of Systems   Constitutional: Negative for fever  Respiratory: Positive for cough  Negative for chest tightness, shortness of breath and wheezing  Cardiovascular: Negative for chest pain           Objective:      /84 (BP Location: Left arm, Patient Position: Sitting, Cuff Size: Adult)   Pulse 90   Temp 97 6 °F (36 4 °C) (Oral)   Resp 16   Ht 5' 1" (1 549 m)   Wt 65 8 kg (145 lb)   SpO2 97%   BMI 27 40 kg/m²          Physical Exam   Constitutional: Vital signs are normal  She appears well-developed and well-nourished  HENT:   Head: Normocephalic and atraumatic  Right Ear: Tympanic membrane, external ear and ear canal normal  Right ear middle ear effusion: Serous fluid of the middle ear  Left Ear: Tympanic membrane, external ear and ear canal normal  Left ear middle ear effusion: Serous fluid of the middle ear  Nose: Mucosal edema and rhinorrhea present  Mouth/Throat: Uvula is midline and mucous membranes are normal  No oropharyngeal exudate (Postnasal drainage is however present  )  Eyes: Pupils are equal, round, and reactive to light  Conjunctivae, EOM and lids are normal    Neck: Trachea normal    Cardiovascular: Normal rate, regular rhythm and normal heart sounds  No murmur heard  Pulmonary/Chest: Effort normal  She has wheezes (Mild yet diffuse wheezing) in the right upper field, the right middle field, the right lower field, the left upper field, the left middle field and the left lower field  Lymphadenopathy:     She has cervical adenopathy  Right cervical: Superficial cervical adenopathy present  Left cervical: Superficial cervical adenopathy present

## 2019-11-25 NOTE — TELEPHONE ENCOUNTER
I recommend she then get a spacer from [facility] and use her albuterol inhaler with a spacer around the clock every 6 hours for the next 3-5 days

## 2019-11-27 ENCOUNTER — TELEPHONE (OUTPATIENT)
Dept: FAMILY MEDICINE CLINIC | Facility: MEDICAL CENTER | Age: 79
End: 2019-11-27

## 2019-11-27 NOTE — TELEPHONE ENCOUNTER
Yes, shaky feeling, dry mouth and palpitations are all side effects of albuterol  Occurs with many patients  As long as she is not having chest pain or shortness of breath there is no concern

## 2019-12-02 ENCOUNTER — APPOINTMENT (EMERGENCY)
Dept: RADIOLOGY | Facility: HOSPITAL | Age: 79
DRG: 871 | End: 2019-12-02
Payer: MEDICARE

## 2019-12-02 ENCOUNTER — HOSPITAL ENCOUNTER (INPATIENT)
Facility: HOSPITAL | Age: 79
LOS: 2 days | Discharge: HOME | DRG: 871 | End: 2019-12-04
Attending: EMERGENCY MEDICINE | Admitting: INTERNAL MEDICINE
Payer: MEDICARE

## 2019-12-02 DIAGNOSIS — J18.9 PNEUMONIA: ICD-10-CM

## 2019-12-02 DIAGNOSIS — J18.9 PNA (PNEUMONIA): Primary | ICD-10-CM

## 2019-12-02 PROBLEM — A41.9 SEPSIS (HCC): Status: ACTIVE | Noted: 2019-12-02

## 2019-12-02 PROBLEM — D72.829 LEUKOCYTOSIS: Status: ACTIVE | Noted: 2019-12-02

## 2019-12-02 LAB
ANION GAP SERPL CALCULATED.3IONS-SCNC: 11 MMOL/L (ref 4–13)
APTT PPP: 28 SECONDS (ref 23–37)
BASOPHILS # BLD MANUAL: 0 THOUSAND/UL (ref 0–0.1)
BASOPHILS NFR MAR MANUAL: 0 % (ref 0–1)
BUN SERPL-MCNC: 18 MG/DL (ref 5–25)
CALCIUM SERPL-MCNC: 10 MG/DL (ref 8.3–10.1)
CHLORIDE SERPL-SCNC: 103 MMOL/L (ref 100–108)
CO2 SERPL-SCNC: 24 MMOL/L (ref 21–32)
CREAT SERPL-MCNC: 0.87 MG/DL (ref 0.6–1.3)
D DIMER PPP FEU-MCNC: 2.35 UG/ML FEU
EOSINOPHIL # BLD MANUAL: 0 THOUSAND/UL (ref 0–0.4)
EOSINOPHIL NFR BLD MANUAL: 0 % (ref 0–6)
ERYTHROCYTE [DISTWIDTH] IN BLOOD BY AUTOMATED COUNT: 12.4 % (ref 11.6–15.1)
GFR SERPL CREATININE-BSD FRML MDRD: 63 ML/MIN/1.73SQ M
GLUCOSE SERPL-MCNC: 107 MG/DL (ref 65–140)
HCT VFR BLD AUTO: 36.9 % (ref 34.8–46.1)
HGB BLD-MCNC: 12.3 G/DL (ref 11.5–15.4)
INR PPP: 1.33 (ref 0.84–1.19)
LACTATE SERPL-SCNC: 1.1 MMOL/L (ref 0.5–2)
LACTATE SERPL-SCNC: 1.2 MMOL/L (ref 0.5–2)
LYMPHOCYTES # BLD AUTO: 0.57 THOUSAND/UL (ref 0.6–4.47)
LYMPHOCYTES # BLD AUTO: 3 % (ref 14–44)
MCH RBC QN AUTO: 29.6 PG (ref 26.8–34.3)
MCHC RBC AUTO-ENTMCNC: 33.3 G/DL (ref 31.4–37.4)
MCV RBC AUTO: 89 FL (ref 82–98)
MONOCYTES # BLD AUTO: 1.14 THOUSAND/UL (ref 0–1.22)
MONOCYTES NFR BLD: 6 % (ref 4–12)
NEUTROPHILS # BLD MANUAL: 17.35 THOUSAND/UL (ref 1.85–7.62)
NEUTS BAND NFR BLD MANUAL: 10 % (ref 0–8)
NEUTS SEG NFR BLD AUTO: 81 % (ref 43–75)
NRBC BLD AUTO-RTO: 0 /100 WBCS
PLATELET # BLD AUTO: 273 THOUSANDS/UL (ref 149–390)
PLATELET BLD QL SMEAR: ADEQUATE
PMV BLD AUTO: 10.1 FL (ref 8.9–12.7)
POTASSIUM SERPL-SCNC: 3.7 MMOL/L (ref 3.5–5.3)
PROTHROMBIN TIME: 15.8 SECONDS (ref 11.6–14.5)
RBC # BLD AUTO: 4.16 MILLION/UL (ref 3.81–5.12)
RBC MORPH BLD: NORMAL
SODIUM SERPL-SCNC: 138 MMOL/L (ref 136–145)
TOTAL CELLS COUNTED SPEC: 100
TROPONIN I SERPL-MCNC: 0 NG/ML
WBC # BLD AUTO: 19.07 THOUSAND/UL (ref 4.31–10.16)

## 2019-12-02 PROCEDURE — 85730 THROMBOPLASTIN TIME PARTIAL: CPT | Performed by: EMERGENCY MEDICINE

## 2019-12-02 PROCEDURE — 87631 RESP VIRUS 3-5 TARGETS: CPT | Performed by: NURSE PRACTITIONER

## 2019-12-02 PROCEDURE — 80048 BASIC METABOLIC PNL TOTAL CA: CPT | Performed by: EMERGENCY MEDICINE

## 2019-12-02 PROCEDURE — 36415 COLL VENOUS BLD VENIPUNCTURE: CPT | Performed by: EMERGENCY MEDICINE

## 2019-12-02 PROCEDURE — 99223 1ST HOSP IP/OBS HIGH 75: CPT | Performed by: NURSE PRACTITIONER

## 2019-12-02 PROCEDURE — 93005 ELECTROCARDIOGRAM TRACING: CPT

## 2019-12-02 PROCEDURE — 71046 X-RAY EXAM CHEST 2 VIEWS: CPT

## 2019-12-02 PROCEDURE — 87040 BLOOD CULTURE FOR BACTERIA: CPT | Performed by: EMERGENCY MEDICINE

## 2019-12-02 PROCEDURE — 84484 ASSAY OF TROPONIN QUANT: CPT | Performed by: EMERGENCY MEDICINE

## 2019-12-02 PROCEDURE — 1123F ACP DISCUSS/DSCN MKR DOCD: CPT | Performed by: INTERNAL MEDICINE

## 2019-12-02 PROCEDURE — 85610 PROTHROMBIN TIME: CPT | Performed by: EMERGENCY MEDICINE

## 2019-12-02 PROCEDURE — 84145 PROCALCITONIN (PCT): CPT | Performed by: NURSE PRACTITIONER

## 2019-12-02 PROCEDURE — 94760 N-INVAS EAR/PLS OXIMETRY 1: CPT

## 2019-12-02 PROCEDURE — 83605 ASSAY OF LACTIC ACID: CPT | Performed by: EMERGENCY MEDICINE

## 2019-12-02 PROCEDURE — 99285 EMERGENCY DEPT VISIT HI MDM: CPT

## 2019-12-02 PROCEDURE — 83605 ASSAY OF LACTIC ACID: CPT | Performed by: NURSE PRACTITIONER

## 2019-12-02 PROCEDURE — 85027 COMPLETE CBC AUTOMATED: CPT | Performed by: EMERGENCY MEDICINE

## 2019-12-02 PROCEDURE — 94664 DEMO&/EVAL PT USE INHALER: CPT

## 2019-12-02 PROCEDURE — 85379 FIBRIN DEGRADATION QUANT: CPT | Performed by: EMERGENCY MEDICINE

## 2019-12-02 PROCEDURE — 99285 EMERGENCY DEPT VISIT HI MDM: CPT | Performed by: EMERGENCY MEDICINE

## 2019-12-02 PROCEDURE — 85007 BL SMEAR W/DIFF WBC COUNT: CPT | Performed by: EMERGENCY MEDICINE

## 2019-12-02 RX ORDER — ACETAMINOPHEN 325 MG/1
650 TABLET ORAL EVERY 6 HOURS PRN
Status: DISCONTINUED | OUTPATIENT
Start: 2019-12-02 | End: 2019-12-04 | Stop reason: HOSPADM

## 2019-12-02 RX ORDER — ALBUTEROL SULFATE 90 UG/1
2 AEROSOL, METERED RESPIRATORY (INHALATION) EVERY 6 HOURS PRN
Status: DISCONTINUED | OUTPATIENT
Start: 2019-12-02 | End: 2019-12-04 | Stop reason: HOSPADM

## 2019-12-02 RX ORDER — SODIUM CHLORIDE 9 MG/ML
75 INJECTION, SOLUTION INTRAVENOUS CONTINUOUS
Status: DISCONTINUED | OUTPATIENT
Start: 2019-12-02 | End: 2019-12-04 | Stop reason: HOSPADM

## 2019-12-02 RX ORDER — AZITHROMYCIN 250 MG/1
500 TABLET, FILM COATED ORAL EVERY 24 HOURS
Status: DISCONTINUED | OUTPATIENT
Start: 2019-12-03 | End: 2019-12-04 | Stop reason: HOSPADM

## 2019-12-02 RX ORDER — GUAIFENESIN 600 MG
600 TABLET, EXTENDED RELEASE 12 HR ORAL 2 TIMES DAILY
Status: DISCONTINUED | OUTPATIENT
Start: 2019-12-02 | End: 2019-12-04 | Stop reason: HOSPADM

## 2019-12-02 RX ORDER — MELATONIN
1000 EVERY MORNING
Status: DISCONTINUED | OUTPATIENT
Start: 2019-12-03 | End: 2019-12-04 | Stop reason: HOSPADM

## 2019-12-02 RX ORDER — ONDANSETRON 2 MG/ML
4 INJECTION INTRAMUSCULAR; INTRAVENOUS EVERY 6 HOURS PRN
Status: DISCONTINUED | OUTPATIENT
Start: 2019-12-02 | End: 2019-12-04 | Stop reason: HOSPADM

## 2019-12-02 RX ORDER — CALCIUM CARBONATE 500(1250)
1 TABLET ORAL 2 TIMES DAILY WITH MEALS
Status: DISCONTINUED | OUTPATIENT
Start: 2019-12-03 | End: 2019-12-04 | Stop reason: HOSPADM

## 2019-12-02 RX ORDER — HEPARIN SODIUM 5000 [USP'U]/ML
5000 INJECTION, SOLUTION INTRAVENOUS; SUBCUTANEOUS EVERY 8 HOURS SCHEDULED
Status: DISCONTINUED | OUTPATIENT
Start: 2019-12-02 | End: 2019-12-04 | Stop reason: HOSPADM

## 2019-12-02 RX ADMIN — SODIUM CHLORIDE 75 ML/HR: 0.9 INJECTION, SOLUTION INTRAVENOUS at 21:59

## 2019-12-02 RX ADMIN — AZITHROMYCIN MONOHYDRATE 500 MG: 500 INJECTION, POWDER, LYOPHILIZED, FOR SOLUTION INTRAVENOUS at 22:58

## 2019-12-02 RX ADMIN — HEPARIN SODIUM 5000 UNITS: 5000 INJECTION INTRAVENOUS; SUBCUTANEOUS at 23:05

## 2019-12-02 RX ADMIN — CEFTRIAXONE SODIUM 1000 MG: 10 INJECTION, POWDER, FOR SOLUTION INTRAVENOUS at 20:28

## 2019-12-02 RX ADMIN — GUAIFENESIN 600 MG: 600 TABLET, EXTENDED RELEASE ORAL at 23:05

## 2019-12-03 LAB
ANION GAP SERPL CALCULATED.3IONS-SCNC: 10 MMOL/L (ref 4–13)
BASOPHILS # BLD AUTO: 0.05 THOUSANDS/ΜL (ref 0–0.1)
BASOPHILS NFR BLD AUTO: 0 % (ref 0–1)
BUN SERPL-MCNC: 14 MG/DL (ref 5–25)
CALCIUM SERPL-MCNC: 9.2 MG/DL (ref 8.3–10.1)
CHLORIDE SERPL-SCNC: 104 MMOL/L (ref 100–108)
CO2 SERPL-SCNC: 23 MMOL/L (ref 21–32)
CREAT SERPL-MCNC: 0.75 MG/DL (ref 0.6–1.3)
EOSINOPHIL # BLD AUTO: 0 THOUSAND/ΜL (ref 0–0.61)
EOSINOPHIL NFR BLD AUTO: 0 % (ref 0–6)
ERYTHROCYTE [DISTWIDTH] IN BLOOD BY AUTOMATED COUNT: 12.4 % (ref 11.6–15.1)
FLUAV RNA NPH QL NAA+PROBE: NORMAL
FLUBV RNA NPH QL NAA+PROBE: NORMAL
GFR SERPL CREATININE-BSD FRML MDRD: 76 ML/MIN/1.73SQ M
GLUCOSE SERPL-MCNC: 100 MG/DL (ref 65–140)
HCT VFR BLD AUTO: 33.3 % (ref 34.8–46.1)
HGB BLD-MCNC: 11 G/DL (ref 11.5–15.4)
IMM GRANULOCYTES # BLD AUTO: 0.23 THOUSAND/UL (ref 0–0.2)
IMM GRANULOCYTES NFR BLD AUTO: 1 % (ref 0–2)
L PNEUMO1 AG UR QL IA.RAPID: NEGATIVE
LYMPHOCYTES # BLD AUTO: 1.1 THOUSANDS/ΜL (ref 0.6–4.47)
LYMPHOCYTES NFR BLD AUTO: 6 % (ref 14–44)
MCH RBC QN AUTO: 29.5 PG (ref 26.8–34.3)
MCHC RBC AUTO-ENTMCNC: 33 G/DL (ref 31.4–37.4)
MCV RBC AUTO: 89 FL (ref 82–98)
MONOCYTES # BLD AUTO: 0.96 THOUSAND/ΜL (ref 0.17–1.22)
MONOCYTES NFR BLD AUTO: 5 % (ref 4–12)
NEUTROPHILS # BLD AUTO: 16.24 THOUSANDS/ΜL (ref 1.85–7.62)
NEUTS SEG NFR BLD AUTO: 88 % (ref 43–75)
NRBC BLD AUTO-RTO: 0 /100 WBCS
PLATELET # BLD AUTO: 244 THOUSANDS/UL (ref 149–390)
PMV BLD AUTO: 10 FL (ref 8.9–12.7)
POTASSIUM SERPL-SCNC: 3.6 MMOL/L (ref 3.5–5.3)
PROCALCITONIN SERPL-MCNC: 5.06 NG/ML
PROCALCITONIN SERPL-MCNC: 5.29 NG/ML
RBC # BLD AUTO: 3.73 MILLION/UL (ref 3.81–5.12)
RSV RNA NPH QL NAA+PROBE: NORMAL
S PNEUM AG UR QL: NEGATIVE
SODIUM SERPL-SCNC: 137 MMOL/L (ref 136–145)
WBC # BLD AUTO: 18.58 THOUSAND/UL (ref 4.31–10.16)

## 2019-12-03 PROCEDURE — 85025 COMPLETE CBC W/AUTO DIFF WBC: CPT | Performed by: NURSE PRACTITIONER

## 2019-12-03 PROCEDURE — 84145 PROCALCITONIN (PCT): CPT | Performed by: NURSE PRACTITIONER

## 2019-12-03 PROCEDURE — 80048 BASIC METABOLIC PNL TOTAL CA: CPT | Performed by: NURSE PRACTITIONER

## 2019-12-03 PROCEDURE — 87449 NOS EACH ORGANISM AG IA: CPT | Performed by: NURSE PRACTITIONER

## 2019-12-03 PROCEDURE — 99232 SBSQ HOSP IP/OBS MODERATE 35: CPT | Performed by: HOSPITALIST

## 2019-12-03 RX ORDER — SODIUM CHLORIDE FOR INHALATION 0.9 %
3 VIAL, NEBULIZER (ML) INHALATION EVERY 6 HOURS PRN
Status: DISCONTINUED | OUTPATIENT
Start: 2019-12-03 | End: 2019-12-04 | Stop reason: HOSPADM

## 2019-12-03 RX ORDER — LEVALBUTEROL 1.25 MG/.5ML
1.25 SOLUTION, CONCENTRATE RESPIRATORY (INHALATION) EVERY 6 HOURS PRN
Status: DISCONTINUED | OUTPATIENT
Start: 2019-12-03 | End: 2019-12-04 | Stop reason: HOSPADM

## 2019-12-03 RX ADMIN — Medication 1 TABLET: at 08:31

## 2019-12-03 RX ADMIN — CEFTRIAXONE SODIUM 1000 MG: 10 INJECTION, POWDER, FOR SOLUTION INTRAVENOUS at 20:18

## 2019-12-03 RX ADMIN — MELATONIN 1000 UNITS: at 08:31

## 2019-12-03 RX ADMIN — HEPARIN SODIUM 5000 UNITS: 5000 INJECTION INTRAVENOUS; SUBCUTANEOUS at 05:23

## 2019-12-03 RX ADMIN — Medication 1 TABLET: at 17:03

## 2019-12-03 RX ADMIN — GUAIFENESIN 600 MG: 600 TABLET, EXTENDED RELEASE ORAL at 08:31

## 2019-12-03 RX ADMIN — HEPARIN SODIUM 5000 UNITS: 5000 INJECTION INTRAVENOUS; SUBCUTANEOUS at 15:01

## 2019-12-03 RX ADMIN — HEPARIN SODIUM 5000 UNITS: 5000 INJECTION INTRAVENOUS; SUBCUTANEOUS at 23:06

## 2019-12-03 RX ADMIN — AZITHROMYCIN 500 MG: 250 TABLET, FILM COATED ORAL at 20:18

## 2019-12-03 RX ADMIN — GUAIFENESIN 600 MG: 600 TABLET, EXTENDED RELEASE ORAL at 17:03

## 2019-12-03 NOTE — PLAN OF CARE
Problem: PAIN - ADULT  Goal: Verbalizes/displays adequate comfort level or baseline comfort level  Description  Interventions:  - Encourage patient to monitor pain and request assistance  - Assess pain using appropriate pain scale  - Administer analgesics based on type and severity of pain and evaluate response  - Implement non-pharmacological measures as appropriate and evaluate response  - Consider cultural and social influences on pain and pain management  - Notify physician/advanced practitioner if interventions unsuccessful or patient reports new pain  Outcome: Progressing     Problem: INFECTION - ADULT  Goal: Absence or prevention of progression during hospitalization  Description  INTERVENTIONS:  - Assess and monitor for signs and symptoms of infection  - Monitor lab/diagnostic results  - Monitor all insertion sites, i e  indwelling lines, tubes, and drains  - Monitor endotracheal if appropriate and nasal secretions for changes in amount and color  - Sylvester appropriate cooling/warming therapies per order  - Administer medications as ordered  - Instruct and encourage patient and family to use good hand hygiene technique  - Identify and instruct in appropriate isolation precautions for identified infection/condition  Outcome: Progressing  Goal: Absence of fever/infection during neutropenic period  Description  INTERVENTIONS:  - Monitor WBC    Outcome: Progressing     Problem: SAFETY ADULT  Goal: Patient will remain free of falls  Description  INTERVENTIONS:  - Assess patient frequently for physical needs  -  Identify cognitive and physical deficits and behaviors that affect risk of falls    -  Sylvester fall precautions as indicated by assessment   - Educate patient/family on patient safety including physical limitations  - Instruct patient to call for assistance with activity based on assessment  - Modify environment to reduce risk of injury  - Consider OT/PT consult to assist with strengthening/mobility  Outcome: Progressing  Goal: Maintain or return to baseline ADL function  Description  INTERVENTIONS:  -  Assess patient's ability to carry out ADLs; assess patient's baseline for ADL function and identify physical deficits which impact ability to perform ADLs (bathing, care of mouth/teeth, toileting, grooming, dressing, etc )  - Assess/evaluate cause of self-care deficits   - Assess range of motion  - Assess patient's mobility; develop plan if impaired  - Assess patient's need for assistive devices and provide as appropriate  - Encourage maximum independence but intervene and supervise when necessary  - Involve family in performance of ADLs  - Assess for home care needs following discharge   - Consider OT consult to assist with ADL evaluation and planning for discharge  - Provide patient education as appropriate  Outcome: Progressing  Goal: Maintain or return mobility status to optimal level  Description  INTERVENTIONS:  - Assess patient's baseline mobility status (ambulation, transfers, stairs, etc )    - Identify cognitive and physical deficits and behaviors that affect mobility  - Identify mobility aids required to assist with transfers and/or ambulation (gait belt, sit-to-stand, lift, walker, cane, etc )  - Sigel fall precautions as indicated by assessment  - Record patient progress and toleration of activity level on Mobility SBAR; progress patient to next Phase/Stage  - Instruct patient to call for assistance with activity based on assessment  - Consider rehabilitation consult to assist with strengthening/weightbearing, etc   Outcome: Progressing     Problem: DISCHARGE PLANNING  Goal: Discharge to home or other facility with appropriate resources  Description  INTERVENTIONS:  - Identify barriers to discharge w/patient and caregiver  - Arrange for needed discharge resources and transportation as appropriate  - Identify discharge learning needs (meds, wound care, etc )  - Arrange for interpretive services to assist at discharge as needed  - Refer to Case Management Department for coordinating discharge planning if the patient needs post-hospital services based on physician/advanced practitioner order or complex needs related to functional status, cognitive ability, or social support system  Outcome: Progressing     Problem: Knowledge Deficit  Goal: Patient/family/caregiver demonstrates understanding of disease process, treatment plan, medications, and discharge instructions  Description  Complete learning assessment and assess knowledge base    Interventions:  - Provide teaching at level of understanding  - Provide teaching via preferred learning methods  Outcome: Progressing

## 2019-12-03 NOTE — ASSESSMENT & PLAN NOTE
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

## 2019-12-03 NOTE — SEPSIS NOTE
Sepsis Note   Lena Cota [de-identified] y o  female MRN: [id number]  Unit/Bed#: ED 12 Encounter: [id number]      qSOFA     Row Name 12/02/19 1930 12/02/19 1924             Altered mental status GCS < 15           Respiratory Rate > / =22    0       Systolic BP < / =356  0  0       Q Sofa Score  0  0           Initial Sepsis Screening     Row Name 12/02/19 2024                Is the patient's history suggestive of a new or worsening infection? (!) Yes (Proceed)  -AT        Suspected source of infection  pneumonia  -AT        Are two or more of the following signs & symptoms of infection both present and new to the patient? (!) Yes (Proceed)  -AT        Indicate SIRS criteria  Tachycardia > 90 bpm;Leukocytosis (WBC > 25798 IJL)  -AT        If the answer is yes to both questions, suspicion of sepsis is present          If severe sepsis is present AND tissue hypoperfusion perists in the hour after fluid resuscitation or lactate > 4, the patient meets criteria for SEPTIC SHOCK          Are any of the following organ dysfunction criteria present within 6 hours of suspected infection and SIRS criteria that are NOT considered to be chronic conditions? No  -AT        Organ dysfunction          Date of presentation of severe sepsis          Time of presentation of severe sepsis          Tissue hypoperfusion persists in the hour after crystalloid fluid administration, evidenced, by either:          Was hypotension present within one hour of the conclusion of crystalloid fluid administration?           Date of presentation of septic shock          Time of presentation of septic shock            User Key  (r) = Recorded By, (t) = Taken By, (c) = Cosigned By    Initials Name Provider Type    AT Fredo Pederson MD Physician

## 2019-12-03 NOTE — ASSESSMENT & PLAN NOTE
 SIRS criteria: Tachycardia, Leukocytosis; Suspected source: Pneumonia   IV antibiotics: PO Zithromax and IV Rocephin    Follow up on culture results   Monitor vital signs, laboratory studies     Supportive care

## 2019-12-03 NOTE — ASSESSMENT & PLAN NOTE
· Present on admission, WBC of 19 07  · In the setting of pneumonia  · Continue to monitor    · Repeat CBC in AM

## 2019-12-03 NOTE — PLAN OF CARE
Problem: PAIN - ADULT  Goal: Verbalizes/displays adequate comfort level or baseline comfort level  Description  Interventions:  - Encourage patient to monitor pain and request assistance  - Assess pain using appropriate pain scale  - Administer analgesics based on type and severity of pain and evaluate response  - Implement non-pharmacological measures as appropriate and evaluate response  - Consider cultural and social influences on pain and pain management  - Notify physician/advanced practitioner if interventions unsuccessful or patient reports new pain  Outcome: Progressing     Problem: INFECTION - ADULT  Goal: Absence or prevention of progression during hospitalization  Description  INTERVENTIONS:  - Assess and monitor for signs and symptoms of infection  - Monitor lab/diagnostic results  - Monitor all insertion sites, i e  indwelling lines, tubes, and drains  - Monitor endotracheal if appropriate and nasal secretions for changes in amount and color  - [place] appropriate cooling/warming therapies per order  - Administer medications as ordered  - Instruct and encourage patient and family to use good hand hygiene technique  - Identify and instruct in appropriate isolation precautions for identified infection/condition  Outcome: Progressing  Goal: Absence of fever/infection during neutropenic period  Description  INTERVENTIONS:  - Monitor WBC    Outcome: Progressing     Problem: SAFETY ADULT  Goal: Patient will remain free of falls  Description  INTERVENTIONS:  - Assess patient frequently for physical needs  -  Identify cognitive and physical deficits and behaviors that affect risk of falls    -  [place] fall precautions as indicated by assessment   - Educate patient/family on patient safety including physical limitations  - Instruct patient to call for assistance with activity based on assessment  - Modify environment to reduce risk of injury  - Consider OT/PT consult to assist with strengthening/mobility  Outcome: Progressing  Goal: Maintain or return to baseline ADL function  Description  INTERVENTIONS:  -  Assess patient's ability to carry out ADLs; assess patient's baseline for ADL function and identify physical deficits which impact ability to perform ADLs (bathing, care of mouth/teeth, toileting, grooming, dressing, etc )  - Assess/evaluate cause of self-care deficits   - Assess range of motion  - Assess patient's mobility; develop plan if impaired  - Assess patient's need for assistive devices and provide as appropriate  - Encourage maximum independence but intervene and supervise when necessary  - Involve family in performance of ADLs  - Assess for home care needs following discharge   - Consider OT consult to assist with ADL evaluation and planning for discharge  - Provide patient education as appropriate  Outcome: Progressing  Goal: Maintain or return mobility status to optimal level  Description  INTERVENTIONS:  - Assess patient's baseline mobility status (ambulation, transfers, stairs, etc )    - Identify cognitive and physical deficits and behaviors that affect mobility  - Identify mobility aids required to assist with transfers and/or ambulation (gait belt, sit-to-stand, lift, walker, cane, etc )  - [place] fall precautions as indicated by assessment  - Record patient progress and toleration of activity level on Mobility SBAR; progress patient to next Phase/Stage  - Instruct patient to call for assistance with activity based on assessment  - Consider rehabilitation consult to assist with strengthening/weightbearing, etc   Outcome: Progressing     Problem: DISCHARGE PLANNING  Goal: Discharge to home or other facility with appropriate resources  Description  INTERVENTIONS:  - Identify barriers to discharge w/patient and caregiver  - Arrange for needed discharge resources and transportation as appropriate  - Identify discharge learning needs (meds, wound care, etc )  - Arrange for interpretive services to assist at discharge as needed  - Refer to Case Management Department for coordinating discharge planning if the patient needs post-hospital services based on physician/advanced practitioner order or complex needs related to functional status, cognitive ability, or social support system  Outcome: Progressing     Problem: Knowledge Deficit  Goal: Patient/family/caregiver demonstrates understanding of disease process, treatment plan, medications, and discharge instructions  Description  Complete learning assessment and assess knowledge base    Interventions:  - Provide teaching at level of understanding  - Provide teaching via preferred learning methods  Outcome: Progressing

## 2019-12-03 NOTE — ASSESSMENT & PLAN NOTE
 SIRS criteria: Tachycardia, Leukocytosis   Suspected source: Pneumonia   Lactic acid: 1 1   End organ damage: None   D-dimer elevated at 2 35 likely in the setting of sepsis   IV Fluids: NSS at 75 mL/hr   IV antibiotics: PO Zithromax and IV Rocephin    Follow up on culture results   Monitor vital signs, laboratory studies

## 2019-12-03 NOTE — ASSESSMENT & PLAN NOTE
 Patient reports 1 5 week history of coughing  [facility], at which point a provided Melina Prateek for her cough  She reports that the Tessalon Perles cause her to be extremely drowsy   Chest x-ray: Right lower lobe infiltrate  Official read pending   Treating as CAP  o IV antibiotics: Rocephin and PO Zithro   Respiratory protocol, nebs PRN   Obtain RSV and Influenza PCR  Follow Droplet precautions   Obtain sputum culture and Gram stain, strep and Legionella antigens   Obtain procalcitonin   Monitor respiratory status

## 2019-12-03 NOTE — H&P
[facility]    H&P- [facility] 1940, [de-identified] y o  female MRN: [id number]    Unit/Bed#: S -01 Encounter: [id number]    Primary Care Provider: Marleen Chang DO   Date and time admitted to hospital: 12/2/2019  7:19 PM        Sepsis [facility])  Assessment & Plan   SIRS criteria: Tachycardia, Leukocytosis   Suspected source: Pneumonia   Lactic acid: 1 1   End organ damage: None   D-dimer elevated at 2 35 likely in the setting of sepsis   IV Fluids: NSS at 75 mL/hr   IV antibiotics: PO Zithromax and IV Rocephin    Follow up on culture results   Monitor vital signs, laboratory studies  Pneumonia  Assessment & Plan   Patient reports 1 5 week history of coughing  She reports being seen at [facility], at which point a provided Timo Ortiz for her cough  She reports that the Tessalon Perles cause her to be extremely drowsy   Chest x-ray: Right lower lobe infiltrate  Official read pending   Treating as CAP  o IV antibiotics: Rocephin and PO Zithro   Respiratory protocol, nebs PRN   Obtain RSV and Influenza PCR  Follow Droplet precautions   Obtain sputum culture and Gram stain, strep and Legionella antigens   Obtain procalcitonin   Monitor respiratory status  Leukocytosis  Assessment & Plan  · Present on admission, WBC of 19 07  · In the setting of pneumonia  · Continue to monitor  · Repeat CBC in AM     Left foot drop  Assessment & Plan  · Patient known to Dr Martha Craft of Neurology  · Per last neurology progress note, 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 versus L5 radiculopathy since her shingles 2 years ago  · Continue annual followups with Neurology outpatient  Vitamin D deficiency  Assessment & Plan  · Continue Vitamin D3 daily  VTE Prophylaxis: Heparin  / sequential compression device   Code Status: Full  POLST: POLST form is not discussed and not completed at this time  Discussion with family: Son at bedside      Anticipated Length of Stay:  Patient will be admitted on an Inpatient basis with an anticipated length of stay of  > 2 midnights  Justification for Hospital Stay: IV antibiotics  Total Time for Visit, including Counseling / Coordination of Care: 1 hour  Greater than 50% of this total time spent on direct patient counseling and coordination of care  Chief Complaint:   Cough    History of Present Illness:    Adebayo Lujan is a [de-identified] y o  female with a past medical history of Vitamin D deficiency, shingles, spinal stenosis and left foot drop who presents with cough  Patient reports a 1 5 week history of cough  [facility] to [facility] with complaints of upper respiratory symptoms including cough and nasal congestion  At [facility], she was prescribed an albuterol inhaler and Tessalon Perles for her symptoms  She reports little improvement with the albuterol inhaler  She reports the [employer] made her extremely tired, so she stopped them  She presents to the ER today with complaints of continued cough, chills, and generalized fatigue  She reports cough is productive with yellow sputum  She reports sinus pressure with a headache previously but not at the current time  She denies shortness of breath, chest pain, chest tightness, palpitations, nausea, or vomiting  Review of Systems:    Review of Systems   Constitutional: Positive for appetite change, chills and fatigue  Negative for activity change, diaphoresis and fever  HENT: Positive for sinus pressure  Respiratory: Positive for cough  Negative for chest tightness, shortness of breath and wheezing  Cardiovascular: Negative for chest pain and palpitations  Gastrointestinal: Negative for abdominal pain, constipation, diarrhea, nausea and vomiting  Genitourinary: Negative for difficulty urinating, dysuria, flank pain, frequency, pelvic pain and urgency  Musculoskeletal: Negative for arthralgias, back pain and myalgias  Neurological: Positive for weakness and headaches  Negative for dizziness, syncope and light-headedness  All other systems reviewed and are negative  Past Medical and Surgical History:     Past Medical History:   Diagnosis Date    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-mother also has had the corneal disease    Arthritis     Foot drop, left foot 05/2017    S/P shingles that attacked the nerves-wears a brace prn,uses a cane for long distance    Irregular heart beat     regular, irregular-Dr Garcia-ECHO-negative,no heart disease    Murmur     had ECHO-on 6/28/18    Rotator cuff injury     right-slight tear-healed with therapy    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    Spinal stenosis     Varicose veins of legs     Vitamin D deficiency     Wears glasses        Past Surgical History:   Procedure Laterality Date    CATARACT EXTRACTION      [place] REMV CATARACT EXTRACAP,INSERT LENS Right 5/14/2018    Procedure: EXTRACTION EXTRACAPSULAR CATARACT PHACO INTRAOCULAR LENS (IOL); Surgeon: Holly Peterson MD;  Location: [facility] MAIN OR;  Service: Ophthalmology     [facility] CATARACT EXTRACAP,INSERT LENS Left 7/10/2018    Procedure: EXTRACTION EXTRACAPSULAR CATARACT PHACO INTRAOCULAR LENS (IOL); Surgeon: Holly Peterson MD;  Location: [facility] OR;  Service: Ophthalmology    TUBAL LIGATION         Meds/Allergies:    Prior to Admission medications    Medication Sig Start Date End Date Taking?  Authorizing Provider   albuterol (PROAIR HFA) 90 mcg/act inhaler Inhale 2 puffs every 6 (six) hours as needed for wheezing 11/23/19   Joseluis Trevino PA-C   benzonatate (TESSALON PERLES) 100 mg capsule Take 1 capsule (100 mg total) by mouth 3 (three) times a day as needed for cough 11/23/19   Joseluis Trevino PA-C   calcium carbonate (OS-ZENA) 600 MG tablet Take 600 mg by mouth 2 (two) times a day with meals    Historical Provider, MD   cholecalciferol (VITAMIN D3) 1,000 units tablet Take 1,000 Units by mouth every morning    Historical Provider, MD   ibuprofen (MOTRIN IB) 200 mg tablet Take 1 tablet by mouth 3 (three) times a day as needed    Historical Provider, MD   Multiple Vitamins-Minerals (CENTRUM SILVER 50+WOMEN PO) Take by mouth every morning    Historical Provider, MD   sodium chloride (ALESIA 128) 2 % hypertonic ophthalmic solution Administer 1 drop to both eyes 3 (three) times a day Alesia 128 gel at [facility] Provider, MD     I have reviewed home medications with patient personally  Allergies: Allergies   Allergen Reactions    Prednisone Swelling     Facial swelling, redness-no dyspnea       Social History:     Marital Status:    Occupation: Retired  Patient Pre-hospital Living Situation: Private Residence  Patient Pre-hospital Level of Mobility: Independent with cane  Patient Pre-hospital Diet Restrictions: None  Substance Use History:   Social History     Substance and Sexual Activity   Alcohol Use No     Social History     Tobacco Use   Smoking Status Never Smoker   Smokeless Tobacco Never Used     Social History     Substance and Sexual Activity   Drug Use No       Family History:    non-contributory    Physical Exam:     Vitals:   Blood Pressure: 99/63 (12/02/19 2133)  Pulse: 87 (12/02/19 2133)  Temperature: 98 2 °F (36 8 °C) (12/02/19 2133)  Temp Source: Oral (12/02/19 2133)  Respirations: 18 (12/02/19 2133)  Height: 5' 1" (154 9 cm) (12/02/19 2133)  Weight - Scale: 66 kg (145 lb 6 4 oz) (12/02/19 2133)  SpO2: 90 % (12/02/19 2133)    Physical Exam   Constitutional: She is oriented to person, place, and time  She appears well-developed and well-nourished  HENT:   Head: Normocephalic and atraumatic  Mouth/Throat: Mucous membranes are dry  Eyes: Pupils are equal, round, and reactive to light  Conjunctivae and EOM are normal    Neck: Normal range of motion  Neck supple  Cardiovascular: Normal rate, regular rhythm and intact distal pulses   Exam reveals no gallop  Murmur heard  Pulmonary/Chest: Effort normal  No accessory muscle usage  No respiratory distress  She has decreased breath sounds in the right lower field and the left lower field  She has no wheezes  She has no rhonchi  She has no rales  Abdominal: Soft  Normal appearance and bowel sounds are normal  She exhibits no distension  There is tenderness in the epigastric area  Musculoskeletal:   Left foot drop (Chronic)  Neurological: She is alert and oriented to person, place, and time  She is not disoriented  Skin: Skin is warm, dry and intact  Capillary refill takes less than 2 seconds  No rash noted  She is not diaphoretic  No pallor  Psychiatric: She has a normal mood and affect  Her speech is normal and behavior is normal  Judgment and thought content normal  Cognition and memory are normal    Nursing note and vitals reviewed  Additional Data:     Lab Results: I have personally reviewed pertinent reports  Results from last 7 days   Lab Units 12/02/19 1952   WBC Thousand/uL 19 07*   HEMOGLOBIN g/dL 12 3   HEMATOCRIT % 36 9   PLATELETS Thousands/uL 273   BANDS PCT % 10*   LYMPHO PCT % 3*   MONO PCT % 6   EOS PCT % 0     Results from last 7 days   Lab Units 12/02/19 1952   SODIUM mmol/L 138   POTASSIUM mmol/L 3 7   CHLORIDE mmol/L 103   CO2 mmol/L 24   BUN mg/dL 18   CREATININE mg/dL 0 87   ANION GAP mmol/L 11   CALCIUM mg/dL 10 0   GLUCOSE RANDOM mg/dL 107     Results from last 7 days   Lab Units 12/02/19 1952   INR  1 33*             Results from last 7 days   Lab Units 12/02/19 1952   LACTIC ACID mmol/L 1 1       Imaging: I have personally reviewed pertinent reports  XR chest 2 views    (Results Pending)       EKG, Pathology, and Other Studies Reviewed on Admission:   · EKG: Sinus tachycardia with PVCs, heart rate 111  Allscripts / Epic Records Reviewed: Yes     ** Please Note: This note has been constructed using a voice recognition system   **

## 2019-12-03 NOTE — ASSESSMENT & PLAN NOTE
 Patient reports 1 5 week history of coughing  [facility], at which point a provided Jarred Gonzalez for her cough   Chest x-ray: Right lower lobe infiltrate   Treating as CAP  o IV antibiotics: Rocephin and PO Zithro   Respiratory protocol, nebs PRN     Procalcitonin: 5    Wean O2 as able

## 2019-12-03 NOTE — ED PROVIDER NOTES
History  Chief Complaint   Patient presents with    Cough     Pt reports getting a tooth extracted approx 2 weeks ago, was told it could cause a sore throat, but then developed a cough  Was seen at PCP and was started on an inhaler last week dx with pharyngitis, now has a low grade fever/wheezes and just keeps getting worse  -SOB        History provided by:  Patient   used: No    Cough   Associated symptoms: fever and shortness of breath    Associated symptoms: no chest pain, no chills, no diaphoresis, no headaches, no rash and no wheezing      Patient is 67-year-old female presenting to emergency department with cough shortness of breath and aches for the last week  Reports temp of 100 2° earlier today  No chest pain  No nausea vomiting  No lightheadedness  No trauma  No calf pain or swelling  No recent travel  No recent surgeries  No history of blood clots  MDM bronchitis versus pneumonia versus PE, chest x-ray, blood work, D-dimer, re-evaluate    With a bandemia, white count, and a chest x-ray showing an infiltrate will treat as pneumonia and admit    Prior to Admission Medications   Prescriptions Last Dose Informant Patient Reported? Taking?    Multiple Vitamins-Minerals (CENTRUM SILVER 50+WOMEN PO)   Yes No   Sig: Take by mouth every morning   albuterol (PROAIR HFA) 90 mcg/act inhaler   No No   Sig: Inhale 2 puffs every 6 (six) hours as needed for wheezing   benzonatate (TESSALON PERLES) 100 mg capsule   No No   Sig: Take 1 capsule (100 mg total) by mouth 3 (three) times a day as needed for cough   calcium carbonate (OS-ZENA) 600 MG tablet   Yes No   Sig: Take 600 mg by mouth 2 (two) times a day with meals   cholecalciferol (VITAMIN D3) 1,000 units tablet   Yes No   Sig: Take 1,000 Units by mouth every morning   ibuprofen (MOTRIN IB) 200 mg tablet   Yes No   Sig: Take 1 tablet by mouth 3 (three) times a day as needed   sodium chloride (MARCIN 128) 2 % hypertonic ophthalmic solution Yes No   Sig: Administer 1 drop to both eyes 3 (three) times a day Alesai 128 gel at HS       Facility-Administered Medications: None       Past Medical History:   Diagnosis Date    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-mother also has had the corneal disease    Arthritis     Foot drop, left foot 05/2017    S/P shingles that attacked the nerves-wears a brace prn,uses a cane for long distance    Irregular heart beat     regular, irregular-Dr Garcia-ECHO-negative,no heart disease    Murmur     had ECHO-on 6/28/18    Rotator cuff injury     right-slight tear-healed with therapy    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    Spinal stenosis     Varicose veins of legs     Vitamin D deficiency     Wears glasses        Past Surgical History:   Procedure Laterality Date    CATARACT EXTRACTION      LA REMV CATARACT EXTRACAP,INSERT LENS Right 5/14/2018    Procedure: EXTRACTION EXTRACAPSULAR CATARACT PHACO INTRAOCULAR LENS (IOL); Surgeon: Tiffany Urrutia MD;  Location: [facility] MAIN OR;  Service: Ophthalmology     [facility] CATARACT EXTRACAP,INSERT LENS Left 7/10/2018    Procedure: EXTRACTION EXTRACAPSULAR CATARACT PHACO INTRAOCULAR LENS (IOL); Surgeon: Tiffany Urrutia MD;  Location: [facility] MAIN OR;  Service: Ophthalmology    TUBAL LIGATION         Family History   Problem Relation Age of Onset    Other Mother         corneal transplants, Parathyroid disease    Osteoporosis Mother     Stroke Father     Hypertension Father     Heart disease Father         cardiac disorder    Osteoporosis Father     Other Father         Parathyroid disease    Asthma Sister     Hiatal hernia Sister     Other Brother         parathyroid-removed, Parathyroid disease    Heart disease Brother         " maker"     I have reviewed and agree with the history as documented      Social History     Tobacco Use    Smoking status: Never Smoker    Smokeless tobacco: Never Used   Substance Use Topics  Alcohol use: No    Drug use: No        Review of Systems   Constitutional: Positive for fatigue and fever  Negative for chills and diaphoresis  Respiratory: Positive for cough and shortness of breath  Negative for choking, wheezing and stridor  Cardiovascular: Negative for chest pain, palpitations and leg swelling  Gastrointestinal: Negative for abdominal pain, blood in stool, diarrhea, nausea and vomiting  Genitourinary: Negative for dysuria, frequency and urgency  Musculoskeletal: Negative for neck stiffness  Skin: Negative for pallor and rash  Neurological: Negative for dizziness, syncope, weakness, light-headedness and headaches  All other systems reviewed and are negative  Physical Exam  Physical Exam   Constitutional: She is oriented to person, place, and time  She appears well-developed and well-nourished  HENT:   Head: Normocephalic and atraumatic  Eyes: Pupils are equal, round, and reactive to light  Neck: Neck supple  Cardiovascular: Normal rate, regular rhythm, normal heart sounds and intact distal pulses  Pulmonary/Chest: Effort normal and breath sounds normal    Rhonchi   Abdominal: Soft  Bowel sounds are normal  There is no tenderness  Musculoskeletal: Normal range of motion  She exhibits no edema, tenderness or deformity  Neurological: She is alert and oriented to person, place, and time  Skin: Skin is warm and dry  Capillary refill takes less than 2 seconds  Vitals reviewed        Vital Signs  ED Triage Vitals   Temperature Pulse Respirations Blood Pressure SpO2   12/02/19 1924 12/02/19 1924 12/02/19 1924 12/02/19 1924 12/02/19 1924   98 8 °F (37 1 °C) (!) 114 20 130/69 91 %      Temp Source Heart Rate Source Patient Position - Orthostatic VS BP Location FiO2 (%)   12/02/19 1924 12/02/19 1924 12/02/19 1924 12/02/19 1924 12/02/19 2214   Oral Monitor Sitting Right arm 1      Pain Score       12/02/19 1924       No Pain           Vitals:    12/02/19 2133 12/02/19 2214 12/03/19 0700 12/03/19 1532   BP: 99/63 90/50 102/65 98/63   Pulse: 87 100 89 93   Patient Position - Orthostatic VS: Lying Lying Lying Lying         Visual Acuity      ED Medications  Medications   albuterol (PROVENTIL HFA,VENTOLIN HFA) inhaler 2 puff (has no administration in time range)   calcium carbonate (OYSTER SHELL,OSCAL) 500 mg tablet 1 tablet (1 tablet Oral Given 12/3/19 1703)   cholecalciferol (VITAMIN D3) tablet 1,000 Units (1,000 Units Oral Given 12/3/19 0831)   multivitamin-minerals (CENTRUM) tablet 1 tablet (1 tablet Oral Given 12/3/19 0831)   sodium chloride 0 9 % infusion (75 mL/hr Intravenous New Bag 12/2/19 2159)   acetaminophen (TYLENOL) tablet 650 mg (has no administration in time range)   guaiFENesin (MUCINEX) 12 hr tablet 600 mg (600 mg Oral Given 12/3/19 1703)   ondansetron (ZOFRAN) injection 4 mg (has no administration in time range)   heparin (porcine) subcutaneous injection 5,000 Units (5,000 Units Subcutaneous Given 12/3/19 1501)   cefTRIAXone (ROCEPHIN) 1,000 mg in dextrose 5 % 50 mL IVPB (1,000 mg Intravenous New Bag 12/3/19 2018)     And   azithromycin (ZITHROMAX) tablet 500 mg (500 mg Oral Given 12/3/19 2018)   levalbuterol (XOPENEX) inhalation solution 1 25 mg (has no administration in time range)   sodium chloride 0 9 % inhalation solution 3 mL (has no administration in time range)   ceftriaxone (ROCEPHIN) 1 g/50 mL in dextrose IVPB (1,000 mg Intravenous New Bag 12/2/19 2028)   azithromycin (ZITHROMAX) 500 mg in sodium chloride 0 9% 250mL IVPB 500 mg (500 mg Intravenous New Bag 12/2/19 2258)       Diagnostic Studies  Results Reviewed     Procedure Component Value Units Date/Time    Blood culture #1 [[id number]] Collected:  12/02/19 2027    Lab Status:  Preliminary result Specimen:  Blood from Arm, Right Updated:  12/03/19 0101     Blood Culture Received in Microbiology Lab  Culture in Progress      Blood culture #2 [[id number]] Collected:  12/02/19 1953    Lab Status: Preliminary result Specimen:  Blood from Arm, Left Updated:  12/03/19 0101     Blood Culture Received in Microbiology Lab  Culture in Progress  CBC and differential [[id number]]  (Abnormal) Collected:  12/02/19 1952    Lab Status:  Final result Specimen:  Blood from Arm, Left Updated:  12/02/19 2023     WBC 19 07 Thousand/uL      RBC 4 16 Million/uL      Hemoglobin 12 3 g/dL      Hematocrit 36 9 %      MCV 89 fL      MCH 29 6 pg      MCHC 33 3 g/dL      RDW 12 4 %      MPV 10 1 fL      Platelets 993 Thousands/uL      nRBC 0 /100 WBCs     Narrative: This is an appended report  These results have been appended to a previously verified report  Lactic acid, plasma x2 [[id number]]  (Normal) Collected:  12/02/19 1952    Lab Status:  Final result Specimen:  Blood from Arm, Left Updated:  12/02/19 2020     LACTIC ACID 1 1 mmol/L     Narrative:       Result may be elevated if tourniquet was used during collection      Troponin I [[id number]] Collected:  12/02/19 1952    Lab Status:  Final result Specimen:  Blood from Arm, Left Updated:  12/02/19 2019     Troponin I 0 00 ng/mL     D-Dimer [[id number]]  (Abnormal) Collected:  12/02/19 1952    Lab Status:  Final result Specimen:  Blood from Arm, Left Updated:  12/02/19 2016     D-Dimer, Quant 2 35 ug/ml FEU     Protime-INR [[id number]]  (Abnormal) Collected:  12/02/19 1952    Lab Status:  Final result Specimen:  Blood from Arm, Left Updated:  12/02/19 2016     Protime 15 8 seconds      INR 1 33    APTT [[id number]]  (Normal) Collected:  12/02/19 1952    Lab Status:  Final result Specimen:  Blood from Arm, Left Updated:  12/02/19 2016     PTT 28 seconds     Basic metabolic panel [[id number]] Collected:  12/02/19 1952    Lab Status:  Final result Specimen:  Blood from Arm, Left Updated:  12/02/19 2011     Sodium 138 mmol/L      Potassium 3 7 mmol/L      Chloride 103 mmol/L      CO2 24 mmol/L      ANION GAP 11 mmol/L      BUN 18 mg/dL      Creatinine 0 87 mg/dL      Glucose 107 mg/dL      Calcium 10 0 mg/dL      eGFR 63 ml/min/1 73sq m     Narrative:       [place] guidelines for Chronic Kidney Disease (CKD):     Stage 1 with normal or high GFR (GFR > 90 mL/min/1 73 square meters)    Stage 2 Mild CKD (GFR = 60-89 mL/min/1 73 square meters)    Stage 3A Moderate CKD (GFR = 45-59 mL/min/1 73 square meters)    Stage 3B Moderate CKD (GFR = 30-44 mL/min/1 73 square meters)    Stage 4 Severe CKD (GFR = 15-29 mL/min/1 73 square meters)    Stage 5 End Stage CKD (GFR <15 mL/min/1 73 square meters)  Note: GFR calculation is accurate only with a steady state creatinine                 XR chest 2 views   Final Result by Birdie Gurrola MD (12/03 9499)   Right middle and lower lobe patchy airspace opacities concerning for atelectasis versus infiltrates  Workstation performed: [id number]                    Procedures  Procedures         ED Course  ED Course as of Dec 03 2206   Mon Dec 02, 2019   1945 ECG shows rate of 111, sinus, normal axis, low voltage QRS is, nonspecific ST and T-waves, independently interpreted by me                      Initial Sepsis Screening     Row Name 12/02/19 2024                Is the patient's history suggestive of a new or worsening infection? (!) Yes (Proceed)  -AT        Suspected source of infection  pneumonia  -AT        Are two or more of the following signs & symptoms of infection both present and new to the patient?   (!) Yes (Proceed)  -AT        Indicate SIRS criteria  Tachycardia > 90 bpm;Leukocytosis (WBC > 20238 IJL)  -AT        If the answer is yes to both questions, suspicion of sepsis is present          If severe sepsis is present AND tissue hypoperfusion perists in the hour after fluid resuscitation or lactate > 4, the patient meets criteria for SEPTIC SHOCK          Are any of the following organ dysfunction criteria present within 6 hours of suspected infection and SIRS criteria that are NOT considered to be chronic conditions? No  -AT        Organ dysfunction          Date of presentation of severe sepsis          Time of presentation of severe sepsis          Tissue hypoperfusion persists in the hour after crystalloid fluid administration, evidenced, by either:          Was hypotension present within one hour of the conclusion of crystalloid fluid administration?         Date of presentation of septic shock          Time of presentation of septic shock            User Key  (r) = Recorded By, (t) = Taken By, (c) = Cosigned By    Initials Name Provider Type    AT Beryl Arizmendi MD Physician                  MDM      Disposition  Final diagnoses:   PNA (pneumonia)     Time reflects when diagnosis was documented in both MDM as applicable and the Disposition within this note     Time User Action Codes Description Comment    12/2/2019  8:27 PM Priyanka Doran [J18 [id number]] PNA (pneumonia)       ED Disposition     ED Disposition Condition Date/Time Comment    Admit Stable Mon Dec 2, 2019  8:27 PM Case was discussed with medicine and the patient's admission status was agreed to be Admission Status: inpatient status to the service of Dr Fermin Herrera   Follow-up Information    None         Current Discharge Medication List      CONTINUE these medications which have NOT CHANGED    Details   albuterol (PROAIR HFA) 90 mcg/act inhaler Inhale 2 puffs every 6 (six) hours as needed for wheezing  Qty: 8 5 g, Refills: 0    Comments: Substitution to a formulary equivalent within the same pharmaceutical class is authorized    Associated Diagnoses: Acute URI      benzonatate (TESSALON PERLES) 100 mg capsule Take 1 capsule (100 mg total) by mouth 3 (three) times a day as needed for cough  Qty: 20 capsule, Refills: 0    Associated Diagnoses: Acute URI      calcium carbonate (OS-ZENA) 600 MG tablet Take 600 mg by mouth 2 (two) times a day with meals      cholecalciferol (VITAMIN D3) 1,000 units tablet Take 1,000 Units by mouth every morning      ibuprofen (MOTRIN IB) 200 mg tablet Take 1 tablet by mouth 3 (three) times a day as needed      Multiple Vitamins-Minerals (CENTRUM SILVER 50+WOMEN PO) Take by mouth every morning      sodium chloride (ALESIA 128) 2 % hypertonic ophthalmic solution Administer 1 drop to both eyes 3 (three) times a day Alesia 128 gel at HS            No discharge procedures on file      ED Provider  Electronically Signed by           Addison King MD  12/03/19 8117

## 2019-12-03 NOTE — ASSESSMENT & PLAN NOTE
· Patient known to Dr Sarah Anderson of Neurology  · Per last neurology progress note, 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 versus L5 radiculopathy since her shingles 2 years ago  · Continue annual followups with Neurology outpatient

## 2019-12-03 NOTE — RESPIRATORY THERAPY NOTE
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-mother also has had the corneal disease    Arthritis     Foot drop, left foot 05/2017    S/P shingles that attacked the nerves-wears a brace prn,uses a cane for long distance    Irregular heart beat     regular, irregular-Dr Garcia-ECHO-negative,no heart disease    Murmur     had ECHO-on 6/28/18    Rotator cuff injury     right-slight tear-healed with therapy    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    Spinal stenosis     Varicose veins of legs     Vitamin D deficiency     Wears glasses      Social History     Socioeconomic History    Marital status:       Spouse name: None    Number of children: None    Years of education: None    Highest education level: None   Occupational History    None   Social Needs    Financial resource strain: None    Food insecurity:     Worry: None     Inability: None    Transportation needs:     Medical: None     Non-medical: None   Tobacco Use    Smoking status: Never Smoker    Smokeless tobacco: Never Used   Substance and Sexual Activity    Alcohol use: No    Drug use: No    Sexual activity: None   Lifestyle    Physical activity:     Days per week: None     Minutes per session: None    Stress: None   Relationships    Social connections:     Talks on phone: None     Gets together: None     Attends Nondenominational service: None     Active member of club or organization: None     Attends meetings of clubs or organizations: None     Relationship status: None    Intimate partner violence:     Fear of current or ex partner: None     Emotionally abused: None     Physically abused: None     Forced sexual activity: None   Other Topics Concern    None   Social History Narrative    Caffeine use, active       Subjective         Objective    Physical Exam:   Assessment Type: Assess only  General Appearance: Alert, Awake  Respiratory Pattern: Normal  Chest Assessment: Chest expansion symmetrical  Bilateral Breath Sounds: Diminished, Clear  Cough: Non-productive, Congested  O2 Device: 1L    Vitals:  Blood pressure 99/63, pulse 100, temperature 98 2 °F (36 8 °C), temperature source Oral, resp  rate 18, height 5' 1" (1 549 m), weight 66 kg (145 lb 6 4 oz), SpO2 93 %  Imaging and other studies: I have personally reviewed pertinent reports  O2 Device: 1L     Plan    Respiratory Plan: Mild Distress pathway        Resp Comments: Pt assessed for Respiratory Protocol  BS diminshed, clear  SPO2 93% on1L  Pt with cough x 12 days, was taking Albuteral MDI Q6 as ordered by MD without any relief of her cough symptoms  Pt denies SOB or distress  Will add Ruano Hammer for wheeze or SOB

## 2019-12-03 NOTE — PROGRESS NOTES
Progress Note - Raul Bravo 1940, [de-identified] y o  female MRN: [id number]    Unit/Bed#: S -01 Encounter: [id number]    Primary Care Provider: Roman Garces DO   Date and time admitted to hospital: 12/2/2019  7:19 PM    * Sepsis [facility])  Assessment & Plan   SIRS criteria: Tachycardia, Leukocytosis; Suspected source: Pneumonia   IV antibiotics: PO Zithromax and IV Rocephin    Follow up on culture results   Monitor vital signs, laboratory studies   Supportive care     Pneumonia  Assessment & Plan   Patient reports 1 5 week history of coughing  She reports being seen at care now, at which point a provided Jimmy Trinidad for her cough   Chest x-ray: Right lower lobe infiltrate   Treating as CAP  o IV antibiotics: Rocephin and PO Zithro   Respiratory protocol, nebs PRN   Procalcitonin: 5    Wean O2 as able     Left foot drop  Assessment & Plan  · Patient known to Dr Stacey Rodriguez of Neurology  · Per last neurology progress note, 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 versus L5 radiculopathy since her shingles 2 years ago  · Continue annual followups with Neurology outpatient  Vitamin D deficiency  Assessment & Plan  · Continue Vitamin D3 daily  VTE Pharmacologic Prophylaxis:   Pharmacologic: Heparin  Mechanical VTE Prophylaxis in Place: Yes    Patient Centered Rounds: I have performed bedside rounds with nursing staff today  Discussions with Specialists or Other Care Team Provider:     Education and Discussions with Family / Patient: patient     Time Spent for Care: 30 minutes  More than 50% of total time spent on counseling and coordination of care as described above  Current Length of Stay: 1 day(s)    Current Patient Status: Inpatient   Certification Statement: The patient will continue to require additional inpatient hospital stay due to CAP on IV abx       Discharge Plan / Estimated Discharge Date: TBD based on clinical course    Code Status: Level 1 - Full Code    Subjective:   Feels a little better than on admission  No f/c  Cough is still present; unchanged  Objective:     Vitals:   Temp (24hrs), Av 3 °F (36 8 °C), Min:97 7 °F (36 5 °C), Max:98 8 °F (37 1 °C)    Temp:  [97 7 °F (36 5 °C)-98 8 °F (37 1 °C)] 97 7 °F (36 5 °C)  HR:  [] 89  Resp:  [18-20] 18  BP: ()/(50-69) 102/65  SpO2:  [90 %-94 %] 94 %  Body mass index is 27 47 kg/m²  Input and Output Summary (last 24 hours): Intake/Output Summary (Last 24 hours) at 12/3/2019 0916  Last data filed at 12/3/2019 0830  Gross per 24 hour   Intake 660 ml   Output    Net 660 ml       Physical Exam:     Physical Exam   Constitutional: She is oriented to person, place, and time  No distress  HENT:   Head: Normocephalic and atraumatic  Cardiovascular: Normal rate and regular rhythm  Pulmonary/Chest: Effort normal  No stridor  She has no wheezes  Scattered crackles; worse on R    Abdominal: Soft  Bowel sounds are normal  She exhibits no distension  Musculoskeletal: Normal range of motion  Neurological: She is alert and oriented to person, place, and time  Skin: Skin is warm and dry  She is not diaphoretic  Psychiatric: She has a normal mood and affect  Her behavior is normal    Nursing note and vitals reviewed  Additional Data:     Labs:    Results from last 7 days   Lab Units 19  0552   WBC Thousand/uL 18 58*   HEMOGLOBIN g/dL 11 0*   HEMATOCRIT % 33 3*   PLATELETS Thousands/uL 244   NEUTROS PCT % 88*   LYMPHS PCT % 6*   MONOS PCT % 5   EOS PCT % 0     Results from last 7 days   Lab Units 19  0552   POTASSIUM mmol/L 3 6   CHLORIDE mmol/L 104   CO2 mmol/L 23   BUN mg/dL 14   CREATININE mg/dL 0 75   CALCIUM mg/dL 9 2     Results from last 7 days   Lab Units 19  1952   INR  1 33*       * I Have Reviewed All Lab Data Listed Above  * Additional Pertinent Lab Tests Reviewed:  All Labs Within Last 24 Hours Reviewed    Imaging:    Imaging Reports Reviewed Today Include:   Imaging Personally Reviewed by Myself Includes:      Recent Cultures (last 7 days):     Results from last 7 days   Lab Units 12/02/19 2027 12/02/19 1953   BLOOD CULTURE  Received in Microbiology Lab  Culture in Progress  Received in Microbiology Lab  Culture in Progress  Last 24 Hours Medication List:     Current Facility-Administered Medications:  acetaminophen 650 mg Oral Q6H PRN ROGELIO Steele    albuterol 2 puff Inhalation Q6H PRN ROGELIO Steele    cefTRIAXone 1,000 mg Intravenous Q24H ROGELIO Steele    And        azithromycin 500 mg Oral Q24H ROGELIO Steele    calcium carbonate 1 tablet Oral BID With Meals ROGELIO Steele    cholecalciferol 1,000 Units Oral QAM ROGELIO Steele    guaiFENesin 600 mg Oral BID ROGELIO Steele    heparin (porcine) 5,000 Units Subcutaneous [facility] ROGELIO Morillo    levalbuterol 1 25 mg Nebulization Q6H PRN Kadeem Middleton MD    multivitamin-minerals 1 tablet Oral Daily ROGELIO Morillo    ondansetron 4 mg Intravenous Q6H PRN ROGELIO Steele    sodium chloride 75 mL/hr Intravenous Continuous ROGELIO Steele Last Rate: 75 mL/hr (12/02/19 2159)   sodium chloride 3 mL Nebulization Q6H PRN Kadeem Middleton MD         Today, Patient Was Seen By: Pauletta Hatchet, MD    ** Please Note: This note has been constructed using a voice recognition system   **

## 2019-12-04 VITALS
DIASTOLIC BLOOD PRESSURE: 69 MMHG | BODY MASS INDEX: 27.45 KG/M2 | OXYGEN SATURATION: 93 % | HEIGHT: 61 IN | RESPIRATION RATE: 18 BRPM | SYSTOLIC BLOOD PRESSURE: 161 MMHG | WEIGHT: 145.4 LBS | HEART RATE: 88 BPM | TEMPERATURE: 97.5 F

## 2019-12-04 LAB
ATRIAL RATE: 112 BPM
P AXIS: 83 DEGREES
PR INTERVAL: 166 MS
QRS AXIS: -13 DEGREES
QRSD INTERVAL: 82 MS
QT INTERVAL: 350 MS
QTC INTERVAL: 476 MS
T WAVE AXIS: 43 DEGREES
VENTRICULAR RATE: 111 BPM

## 2019-12-04 PROCEDURE — 99239 HOSP IP/OBS DSCHRG MGMT >30: CPT | Performed by: HOSPITALIST

## 2019-12-04 PROCEDURE — 93010 ELECTROCARDIOGRAM REPORT: CPT | Performed by: INTERNAL MEDICINE

## 2019-12-04 RX ORDER — GUAIFENESIN 600 MG
1200 TABLET, EXTENDED RELEASE 12 HR ORAL EVERY 12 HOURS SCHEDULED
Qty: 40 TABLET | Refills: 0 | Status: SHIPPED | OUTPATIENT
Start: 2019-12-04 | End: 2019-12-14

## 2019-12-04 RX ORDER — AZITHROMYCIN 250 MG/1
250 TABLET, FILM COATED ORAL EVERY 24 HOURS
Qty: 3 TABLET | Refills: 0 | Status: SHIPPED | OUTPATIENT
Start: 2019-12-04 | End: 2019-12-07

## 2019-12-04 RX ORDER — CEFDINIR 300 MG/1
300 CAPSULE ORAL EVERY 12 HOURS SCHEDULED
Qty: 9 CAPSULE | Refills: 0 | Status: SHIPPED | OUTPATIENT
Start: 2019-12-04 | End: 2019-12-08

## 2019-12-04 RX ADMIN — HEPARIN SODIUM 5000 UNITS: 5000 INJECTION INTRAVENOUS; SUBCUTANEOUS at 06:00

## 2019-12-04 RX ADMIN — Medication 1 TABLET: at 08:15

## 2019-12-04 RX ADMIN — MELATONIN 1000 UNITS: at 08:15

## 2019-12-04 RX ADMIN — GUAIFENESIN 600 MG: 600 TABLET, EXTENDED RELEASE ORAL at 08:15

## 2019-12-04 NOTE — ASSESSMENT & PLAN NOTE
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

## 2019-12-04 NOTE — PLAN OF CARE
Problem: PAIN - ADULT  Goal: Verbalizes/displays adequate comfort level or baseline comfort level  Description  Interventions:  - Encourage patient to monitor pain and request assistance  - Assess pain using appropriate pain scale  - Administer analgesics based on type and severity of pain and evaluate response  - Implement non-pharmacological measures as appropriate and evaluate response  - Consider cultural and social influences on pain and pain management  - Notify physician/advanced practitioner if interventions unsuccessful or patient reports new pain  Outcome: Progressing     Problem: INFECTION - ADULT  Goal: Absence or prevention of progression during hospitalization  Description  INTERVENTIONS:  - Assess and monitor for signs and symptoms of infection  - Monitor lab/diagnostic results  - Monitor all insertion sites, i e  indwelling lines, tubes, and drains  - Monitor endotracheal if appropriate and nasal secretions for changes in amount and color  - [place] appropriate cooling/warming therapies per order  - Administer medications as ordered  - Instruct and encourage patient and family to use good hand hygiene technique  - Identify and instruct in appropriate isolation precautions for identified infection/condition  Outcome: Progressing  Goal: Absence of fever/infection during neutropenic period  Description  INTERVENTIONS:  - Monitor WBC    Outcome: Progressing     Problem: SAFETY ADULT  Goal: Patient will remain free of falls  Description  INTERVENTIONS:  - Assess patient frequently for physical needs  -  Identify cognitive and physical deficits and behaviors that affect risk of falls    -  [place] fall precautions as indicated by assessment   - Educate patient/family on patient safety including physical limitations  - Instruct patient to call for assistance with activity based on assessment  - Modify environment to reduce risk of injury  - Consider OT/PT consult to assist with strengthening/mobility  Outcome: Progressing  Goal: Maintain or return to baseline ADL function  Description  INTERVENTIONS:  -  Assess patient's ability to carry out ADLs; assess patient's baseline for ADL function and identify physical deficits which impact ability to perform ADLs (bathing, care of mouth/teeth, toileting, grooming, dressing, etc )  - Assess/evaluate cause of self-care deficits   - Assess range of motion  - Assess patient's mobility; develop plan if impaired  - Assess patient's need for assistive devices and provide as appropriate  - Encourage maximum independence but intervene and supervise when necessary  - Involve family in performance of ADLs  - Assess for home care needs following discharge   - Consider OT consult to assist with ADL evaluation and planning for discharge  - Provide patient education as appropriate  Outcome: Progressing  Goal: Maintain or return mobility status to optimal level  Description  INTERVENTIONS:  - Assess patient's baseline mobility status (ambulation, transfers, stairs, etc )    - Identify cognitive and physical deficits and behaviors that affect mobility  - Identify mobility aids required to assist with transfers and/or ambulation (gait belt, sit-to-stand, lift, walker, cane, etc )  - [place] fall precautions as indicated by assessment  - Record patient progress and toleration of activity level on Mobility SBAR; progress patient to next Phase/Stage  - Instruct patient to call for assistance with activity based on assessment  - Consider rehabilitation consult to assist with strengthening/weightbearing, etc   Outcome: Progressing     Problem: DISCHARGE PLANNING  Goal: Discharge to home or other facility with appropriate resources  Description  INTERVENTIONS:  - Identify barriers to discharge w/patient and caregiver  - Arrange for needed discharge resources and transportation as appropriate  - Identify discharge learning needs (meds, wound care, etc )  - Arrange for interpretive services to assist at discharge as needed  - Refer to Case Management Department for coordinating discharge planning if the patient needs post-hospital services based on physician/advanced practitioner order or complex needs related to functional status, cognitive ability, or social support system  Outcome: Progressing     Problem: Knowledge Deficit  Goal: Patient/family/caregiver demonstrates understanding of disease process, treatment plan, medications, and discharge instructions  Description  Complete learning assessment and assess knowledge base    Interventions:  - Provide teaching at level of understanding  - Provide teaching via preferred learning methods  Outcome: Progressing

## 2019-12-04 NOTE — ASSESSMENT & PLAN NOTE
 SIRS criteria: Tachycardia, Leukocytosis;  Suspected source: Pneumonia   IV antibiotics: PO Zithromax and IV Rocephin --> transition to oral equivalent for 5 day course of azithro and 7 day course of omnicef

## 2019-12-04 NOTE — DISCHARGE SUMMARY
Discharge- Rachel Rape 1940, [de-identified] y o  female MRN: [id number]    Unit/Bed#: S -01 Encounter: [id number]    Primary Care Provider: Imelda Guerra DO   Date and time admitted to hospital: 12/2/2019  7:19 PM    * Sepsis [facility])  Assessment & Plan   SIRS criteria: Tachycardia, Leukocytosis; Suspected source: Pneumonia   IV antibiotics: PO Zithromax and IV Rocephin --> transition to oral equivalent for 5 day course of azithro and 7 day course of omnicef      Pneumonia  Assessment & Plan   Patient reports 1 5 week history of coughing  She reports being seen at care now, at which point a provided [employer] for her cough   Chest x-ray: Right lower lobe infiltrate   Started on treatment for CAP with good improvement   o Continue PO azithro and omnicef    Procalcitonin: 5     Left foot drop  Assessment & Plan  · Patient known to Dr Delaney Pinon of Neurology  · Per last neurology progress note, 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 versus L5 radiculopathy since her shingles 2 years ago  · Continue annual followups with Neurology outpatient  Vitamin D deficiency  Assessment & Plan  · Continue Vitamin D3 daily  Discharging Physician / Practitioner: Keron Hillman MD  PCP: Imelda Guerra DO  Admission Date:   Admission Orders (From admission, onward)     Ordered        12/02/19 2027  Inpatient Admission  Once                   Discharge Date: 12/04/19    Resolved Problems  Date Reviewed: 11/25/2019    None        Consultations During Hospital Stay:  · None     Procedures Performed:   · None     Significant Findings / Test Results:   · PCT: 5 2  CXR:   IMPRESSION:  Right middle and lower lobe patchy airspace opacities concerning for atelectasis versus infiltrates  Incidental Findings:   · None      Test Results Pending at Discharge (will require follow up):    · None      Outpatient Tests Requested:  · None     Complications:  None     Reason for Admission: sepsis due to pneumonia     Hospital Course:     Yoshi Meyer is a [de-identified] y o  female patient who originally presented to the hospital on 12/2/2019 due to increased cough, shortness of breath and generalized malaise  In the ED patient had a chest x-ray which showed right middle and right lower lobe infiltrates  Patient was started on CAP treatment with ceftriaxone and azithromycin  She had good clinical improvement over 48 hours and was discharged home on oral antibiotics (azithro and omnicef)  Patient should follow up with PCP to ensure resolution of her symptoms  Please see above list of diagnoses and related plan for additional information  Condition at Discharge: good     Discharge Day Visit / Exam:     Subjective:  Feels well  Cough is more productive but she feels like her congestion is breaking up  Anxious to get up and start moving  Vitals: Blood Pressure: 161/69 (12/04/19 0700)  Pulse: 88 (12/04/19 0700)  Temperature: 97 5 °F (36 4 °C) (12/04/19 0700)  Temp Source: Temporal (12/04/19 0700)  Respirations: 18 (12/04/19 0700)  Height: 5' 1" (154 9 cm) (12/02/19 2133)  Weight - Scale: 66 kg (145 lb 6 4 oz) (12/02/19 2133)  SpO2: 93 % (12/04/19 0700)  Exam:   Physical Exam   Constitutional: She is oriented to person, place, and time  No distress  HENT:   Head: Normocephalic and atraumatic  Cardiovascular: Normal rate and regular rhythm  Exam reveals no friction rub  No murmur heard  Pulmonary/Chest: Effort normal and breath sounds normal  She has no wheezes  RLL crackles    Abdominal: She exhibits no distension  Musculoskeletal: Normal range of motion  She exhibits no edema  Neurological: She is alert and oriented to person, place, and time  Skin: Skin is warm and dry  She is not diaphoretic  Psychiatric: She has a normal mood and affect  Her behavior is normal    Nursing note and vitals reviewed          Discharge instructions/Information to patient and family:   See after visit summary for information provided to patient and family  Provisions for Follow-Up Care:  See after visit summary for information related to follow-up care and any pertinent home health orders  Disposition:     Home    Planned Readmission: none      Discharge Statement:  I spent 40 minutes discharging the patient  This time was spent on the day of discharge  I had direct contact with the patient on the day of discharge  Greater than 50% of the total time was spent examining patient, answering all patient questions, arranging and discussing plan of care with patient as well as directly providing post-discharge instructions  Additional time then spent on discharge activities  Discharge Medications:  See after visit summary for reconciled discharge medications provided to patient and family        ** Please Note: This note has been constructed using a voice recognition system **

## 2019-12-04 NOTE — ASSESSMENT & PLAN NOTE
 Patient reports 1 5 week history of coughing  She reports being seen at care now, at which point a provided Kari Cali for her cough   Chest x-ray: Right lower lobe infiltrate      Started on treatment for CAP with good improvement   o Continue PO azithro and omnicef    Procalcitonin: 5

## 2019-12-05 ENCOUNTER — TRANSITIONAL CARE MANAGEMENT (OUTPATIENT)
Dept: FAMILY MEDICINE CLINIC | Facility: MEDICAL CENTER | Age: 79
End: 2019-12-05

## 2019-12-08 LAB
BACTERIA BLD CULT: NORMAL
BACTERIA BLD CULT: NORMAL

## 2019-12-10 ENCOUNTER — TELEPHONE (OUTPATIENT)
Dept: FAMILY MEDICINE CLINIC | Facility: MEDICAL CENTER | Age: 79
End: 2019-12-10

## 2019-12-10 NOTE — TELEPHONE ENCOUNTER
fyi-  Pt  Asking if she Should she continue the Mucinex   After reviewing the AVS , the hospital did recommend taking it for 10 days  she has a slight cough still  Adived her to take it til the 10 days  She has a SUZANNA appt Monday

## 2019-12-16 ENCOUNTER — OFFICE VISIT (OUTPATIENT)
Dept: FAMILY MEDICINE CLINIC | Facility: MEDICAL CENTER | Age: 79
End: 2019-12-16
Payer: MEDICARE

## 2019-12-16 VITALS
BODY MASS INDEX: 27.19 KG/M2 | HEART RATE: 76 BPM | RESPIRATION RATE: 18 BRPM | SYSTOLIC BLOOD PRESSURE: 136 MMHG | DIASTOLIC BLOOD PRESSURE: 82 MMHG | HEIGHT: 61 IN | WEIGHT: 144 LBS

## 2019-12-16 DIAGNOSIS — J18.9 PNEUMONIA DUE TO INFECTIOUS ORGANISM, UNSPECIFIED LATERALITY, UNSPECIFIED PART OF LUNG: ICD-10-CM

## 2019-12-16 DIAGNOSIS — A41.9 SEPSIS, DUE TO UNSPECIFIED ORGANISM, UNSPECIFIED WHETHER ACUTE ORGAN DYSFUNCTION PRESENT (HCC): Primary | ICD-10-CM

## 2019-12-16 DIAGNOSIS — M81.0 AGE-RELATED OSTEOPOROSIS WITHOUT CURRENT PATHOLOGICAL FRACTURE: ICD-10-CM

## 2019-12-16 DIAGNOSIS — E78.00 ELEVATED CHOLESTEROL: ICD-10-CM

## 2019-12-16 PROCEDURE — 1111F DSCHRG MED/CURRENT MED MERGE: CPT | Performed by: FAMILY MEDICINE

## 2019-12-16 PROCEDURE — 99495 TRANSJ CARE MGMT MOD F2F 14D: CPT | Performed by: FAMILY MEDICINE

## 2019-12-16 NOTE — PROGRESS NOTES
Assessment/Plan:     No problem-specific Assessment & Plan notes found for this encounter  Diagnoses and all orders for this visit:    Sepsis, due to unspecified organism, unspecified whether acute organ dysfunction present [facility])  Hospital records reviewed  Likely secondary to pneumonia  Patient was on antibiotics  Greatly improved  Pneumonia due to infectious organism, unspecified laterality, unspecified part of lung  Symptoms much improved  Antibiotic course has been completed  No additional intervention at this time  Age-related osteoporosis without current pathological fracture  Patient refuses medication  Elevated cholesterol  Patient refuses medication  Follow-up in six months for a Medicare wellness exam or sooner if needed  Subjective:     Patient ID: Sudha Costa is a [de-identified] y o  female  Patient presents for hospital follow-up  She present to the emergency department on 12/2/2019 for worsening cough, shortness of breath and fatigue  Imaging was performed and she was found to have a pneumonia  She was admitted for antibiotics  Upon discharge she was given azithromycin and Omnicef both of which she completed  Patient states she is feeling much better  Her cough is nearly resolved and she is no longer short of breath or fatigued  She does have osteoporosis but is not on a bisphosphonate  She has hyperlipidemia but is not on a statin  Review of Systems   Constitutional: Negative for fever  Respiratory: Negative for cough and shortness of breath  Cardiovascular: Negative for chest pain  Objective:     Physical Exam   Constitutional: She appears well-developed and well-nourished  Cardiovascular: Normal rate, regular rhythm and normal heart sounds  Pulmonary/Chest: Effort normal and breath sounds normal  She has no wheezes  She has no rales           Vitals:    12/16/19 0910   BP: 136/82   BP Location: Left arm   Patient Position: Sitting   Cuff Size: Adult   Pulse: 76   Resp: 18   Weight: 65 3 kg (144 lb)   Height: 5' 1" (1 549 m)       Transitional Care Management Review:  Rachel Rucker is a [de-identified] y o  female here for TCM follow up  During the TCM phone call patient stated:    TCM Call (since 11/15/2019)     Date and time call was made  12/5/2019  1:25 PM    Patient was hospitialized at  [facility]    Date of Admission  12/02/19    Date of discharge  12/04/19    Diagnosis  Pneumonia    Disposition  Home    Were the patients medications reviewed and updated  No    Current Symptoms  None      TCM Call (since 11/15/2019)     Post hospital issues  None    Should patient be enrolled in anticoag monitoring? No    Scheduled for follow up?   Yes    Did you obtain your prescribed medications  Yes    Do you need help managing your prescriptions or medications  No    Is transportation to your appointment needed  No    I have advised the patient to call PCP with any new or worsening symptoms  [address]  Family    The type of support provided  Physical; Emotional    Are you recieving any outpatient services  No    Are you recieving home care services  No    Have you fallen in the last 12 months  Laurel Guerra, DO

## 2020-06-22 ENCOUNTER — OFFICE VISIT (OUTPATIENT)
Dept: FAMILY MEDICINE CLINIC | Facility: MEDICAL CENTER | Age: 80
End: 2020-06-22
Payer: MEDICARE

## 2020-06-22 VITALS
SYSTOLIC BLOOD PRESSURE: 130 MMHG | WEIGHT: 135 LBS | HEART RATE: 98 BPM | DIASTOLIC BLOOD PRESSURE: 80 MMHG | TEMPERATURE: 97.2 F | HEIGHT: 61 IN | BODY MASS INDEX: 25.49 KG/M2

## 2020-06-22 DIAGNOSIS — Z20.828 EXPOSURE TO SARS-ASSOCIATED CORONAVIRUS: ICD-10-CM

## 2020-06-22 DIAGNOSIS — E21.3 HYPERPARATHYROIDISM (HCC): ICD-10-CM

## 2020-06-22 DIAGNOSIS — E55.9 VITAMIN D DEFICIENCY: ICD-10-CM

## 2020-06-22 DIAGNOSIS — E78.5 HYPERLIPIDEMIA, UNSPECIFIED HYPERLIPIDEMIA TYPE: ICD-10-CM

## 2020-06-22 DIAGNOSIS — M81.0 AGE-RELATED OSTEOPOROSIS WITHOUT CURRENT PATHOLOGICAL FRACTURE: ICD-10-CM

## 2020-06-22 DIAGNOSIS — Z00.00 MEDICARE ANNUAL WELLNESS VISIT, SUBSEQUENT: Primary | ICD-10-CM

## 2020-06-22 PROBLEM — A41.9 SEPSIS (HCC): Status: RESOLVED | Noted: 2019-12-02 | Resolved: 2020-06-22

## 2020-06-22 PROBLEM — R01.1 MURMUR: Status: RESOLVED | Noted: 2018-06-19 | Resolved: 2020-06-22

## 2020-06-22 PROBLEM — R03.0 ELEVATED BP WITHOUT DIAGNOSIS OF HYPERTENSION: Status: RESOLVED | Noted: 2017-12-13 | Resolved: 2020-06-22

## 2020-06-22 PROBLEM — I77.810 DILATED AORTIC ROOT (HCC): Status: RESOLVED | Noted: 2018-07-02 | Resolved: 2020-06-22

## 2020-06-22 PROCEDURE — 4040F PNEUMOC VAC/ADMIN/RCVD: CPT | Performed by: FAMILY MEDICINE

## 2020-06-22 PROCEDURE — G0438 PPPS, INITIAL VISIT: HCPCS | Performed by: FAMILY MEDICINE

## 2020-06-22 PROCEDURE — 3008F BODY MASS INDEX DOCD: CPT | Performed by: FAMILY MEDICINE

## 2020-06-22 PROCEDURE — 1160F RVW MEDS BY RX/DR IN RCRD: CPT | Performed by: FAMILY MEDICINE

## 2020-06-22 PROCEDURE — 1036F TOBACCO NON-USER: CPT | Performed by: FAMILY MEDICINE

## 2020-06-22 PROCEDURE — 1170F FXNL STATUS ASSESSED: CPT | Performed by: FAMILY MEDICINE

## 2020-06-22 PROCEDURE — 1125F AMNT PAIN NOTED PAIN PRSNT: CPT | Performed by: FAMILY MEDICINE

## 2020-06-25 ENCOUNTER — APPOINTMENT (OUTPATIENT)
Dept: LAB | Facility: MEDICAL CENTER | Age: 80
End: 2020-06-25
Payer: MEDICARE

## 2020-06-25 DIAGNOSIS — E78.5 HYPERLIPIDEMIA, UNSPECIFIED HYPERLIPIDEMIA TYPE: ICD-10-CM

## 2020-06-25 DIAGNOSIS — E55.9 VITAMIN D DEFICIENCY: ICD-10-CM

## 2020-06-25 DIAGNOSIS — E83.52 HYPERCALCEMIA: Primary | ICD-10-CM

## 2020-06-25 DIAGNOSIS — Z20.828 EXPOSURE TO SARS-ASSOCIATED CORONAVIRUS: ICD-10-CM

## 2020-06-25 LAB
25(OH)D3 SERPL-MCNC: 38.4 NG/ML (ref 30–100)
ALBUMIN SERPL BCP-MCNC: 4 G/DL (ref 3.5–5)
ALP SERPL-CCNC: 83 U/L (ref 46–116)
ALT SERPL W P-5'-P-CCNC: 24 U/L (ref 12–78)
ANION GAP SERPL CALCULATED.3IONS-SCNC: 3 MMOL/L (ref 4–13)
AST SERPL W P-5'-P-CCNC: 21 U/L (ref 5–45)
BILIRUB SERPL-MCNC: 0.4 MG/DL (ref 0.2–1)
BUN SERPL-MCNC: 16 MG/DL (ref 5–25)
CALCIUM ALBUM COR SERPL-MCNC: 11.3 MG/DL (ref 8.3–10.1)
CALCIUM SERPL-MCNC: 11.3 MG/DL (ref 8.3–10.1)
CHLORIDE SERPL-SCNC: 110 MMOL/L (ref 100–108)
CHOLEST SERPL-MCNC: 263 MG/DL (ref 50–200)
CO2 SERPL-SCNC: 28 MMOL/L (ref 21–32)
CREAT SERPL-MCNC: 0.7 MG/DL (ref 0.6–1.3)
GFR SERPL CREATININE-BSD FRML MDRD: 82 ML/MIN/1.73SQ M
GLUCOSE P FAST SERPL-MCNC: 97 MG/DL (ref 65–99)
HDLC SERPL-MCNC: 75 MG/DL
LDLC SERPL CALC-MCNC: 172 MG/DL (ref 0–100)
POTASSIUM SERPL-SCNC: 4 MMOL/L (ref 3.5–5.3)
PROT SERPL-MCNC: 7.8 G/DL (ref 6.4–8.2)
PTH-INTACT SERPL-MCNC: 59.6 PG/ML (ref 18.4–80.1)
SARS-COV-2 IGG+IGM SERPL QL IA: NORMAL
SODIUM SERPL-SCNC: 141 MMOL/L (ref 136–145)
TRIGL SERPL-MCNC: 82 MG/DL

## 2020-06-25 PROCEDURE — 80061 LIPID PANEL: CPT

## 2020-06-25 PROCEDURE — 80053 COMPREHEN METABOLIC PANEL: CPT

## 2020-06-25 PROCEDURE — 82306 VITAMIN D 25 HYDROXY: CPT

## 2020-06-25 PROCEDURE — 86769 SARS-COV-2 COVID-19 ANTIBODY: CPT

## 2020-06-25 PROCEDURE — 83970 ASSAY OF PARATHORMONE: CPT

## 2020-06-25 PROCEDURE — 36415 COLL VENOUS BLD VENIPUNCTURE: CPT

## 2020-06-26 ENCOUNTER — TRANSCRIBE ORDERS (OUTPATIENT)
Dept: LAB | Facility: MEDICAL CENTER | Age: 80
End: 2020-06-26

## 2020-06-26 ENCOUNTER — TELEPHONE (OUTPATIENT)
Dept: FAMILY MEDICINE CLINIC | Facility: MEDICAL CENTER | Age: 80
End: 2020-06-26

## 2020-07-01 ENCOUNTER — APPOINTMENT (OUTPATIENT)
Dept: LAB | Facility: MEDICAL CENTER | Age: 80
End: 2020-07-01
Payer: MEDICARE

## 2020-07-01 PROCEDURE — 82340 ASSAY OF CALCIUM IN URINE: CPT

## 2020-07-06 ENCOUNTER — TELEPHONE (OUTPATIENT)
Dept: FAMILY MEDICINE CLINIC | Facility: MEDICAL CENTER | Age: 80
End: 2020-07-06

## 2020-07-06 DIAGNOSIS — E83.52 HYPERCALCEMIA: Primary | ICD-10-CM

## 2020-07-06 DIAGNOSIS — E21.3 HYPERPARATHYROIDISM (HCC): ICD-10-CM

## 2020-07-06 NOTE — TELEPHONE ENCOUNTER
----- Message from Gerardo Underwood DO sent at 7/6/2020  9:35 AM EDT -----  Urine calcium levels normal   I would like patient to see endocrinology for further evaluation of her elevated blood calcium

## 2020-07-09 ENCOUNTER — TELEPHONE (OUTPATIENT)
Dept: FAMILY MEDICINE CLINIC | Facility: MEDICAL CENTER | Age: 80
End: 2020-07-09

## 2020-07-09 NOTE — TELEPHONE ENCOUNTER
Patient called, she missed the last step and fell sideways lastnight  She woke up this morning and her left side below the side hip seems like swelling, soreness level 3, no bruising and she can walk, but sore to touch  She put ice on lastnight, and took two Motrin  Asking what else she should do ?

## 2020-07-09 NOTE — TELEPHONE ENCOUNTER
Please get more information  Can go to the urgent care today for evaluation or I can see her tomorrow

## 2020-07-09 NOTE — TELEPHONE ENCOUNTER
Triaged- c/o mild discomfort  Can bear weight on the effected leg  Left side of hip there is a lump   Would like Dr Winifred Almeida to look at it tomorrow    Please call patient back for appt tomorrow

## 2020-07-10 ENCOUNTER — TELEPHONE (OUTPATIENT)
Dept: FAMILY MEDICINE CLINIC | Facility: MEDICAL CENTER | Age: 80
End: 2020-07-10

## 2020-07-10 ENCOUNTER — APPOINTMENT (OUTPATIENT)
Dept: RADIOLOGY | Facility: MEDICAL CENTER | Age: 80
End: 2020-07-10
Payer: MEDICARE

## 2020-07-10 ENCOUNTER — OFFICE VISIT (OUTPATIENT)
Dept: FAMILY MEDICINE CLINIC | Facility: MEDICAL CENTER | Age: 80
End: 2020-07-10
Payer: MEDICARE

## 2020-07-10 VITALS
TEMPERATURE: 97.5 F | HEIGHT: 61 IN | SYSTOLIC BLOOD PRESSURE: 138 MMHG | BODY MASS INDEX: 25.86 KG/M2 | OXYGEN SATURATION: 99 % | DIASTOLIC BLOOD PRESSURE: 84 MMHG | WEIGHT: 137 LBS | HEART RATE: 97 BPM

## 2020-07-10 DIAGNOSIS — E83.52 HYPERCALCEMIA: ICD-10-CM

## 2020-07-10 DIAGNOSIS — M25.552 HIP PAIN, ACUTE, LEFT: ICD-10-CM

## 2020-07-10 DIAGNOSIS — T14.8XXA HEMATOMA: ICD-10-CM

## 2020-07-10 DIAGNOSIS — M25.552 HIP PAIN, ACUTE, LEFT: Primary | ICD-10-CM

## 2020-07-10 DIAGNOSIS — E34.9 INCREASED PTH LEVEL: ICD-10-CM

## 2020-07-10 PROCEDURE — 1160F RVW MEDS BY RX/DR IN RCRD: CPT | Performed by: FAMILY MEDICINE

## 2020-07-10 PROCEDURE — 73502 X-RAY EXAM HIP UNI 2-3 VIEWS: CPT

## 2020-07-10 PROCEDURE — 99213 OFFICE O/P EST LOW 20 MIN: CPT | Performed by: FAMILY MEDICINE

## 2020-07-10 PROCEDURE — 3008F BODY MASS INDEX DOCD: CPT | Performed by: FAMILY MEDICINE

## 2020-07-10 PROCEDURE — 4040F PNEUMOC VAC/ADMIN/RCVD: CPT | Performed by: FAMILY MEDICINE

## 2020-07-10 PROCEDURE — 1036F TOBACCO NON-USER: CPT | Performed by: FAMILY MEDICINE

## 2020-07-10 PROCEDURE — 1170F FXNL STATUS ASSESSED: CPT | Performed by: FAMILY MEDICINE

## 2020-07-10 NOTE — PROGRESS NOTES
Assessment/Plan:       Diagnoses and all orders for this visit:    Hip pain, acute, left  -     XR hip/pelv 2-3 vws left if performed; Future  Secondary to recent trauma  Check x-ray to assess for occult fracture  Hematoma  Left hip  Secondary to recent trauma  Should be self-limited  No additional intervention at this time  Increased PTH level  -     Calcium; Future  -     Calcium, ionized; Future  -     Vitamin D 25 hydroxy; Future  -     PTH, intact; Future  Hypercalcemia  -     Calcium; Future  -     Calcium, ionized; Future  -     Vitamin D 25 hydroxy; Future  -     PTH, intact; Future  Patient encouraged to schedule appointment with endocrinology and the referral was previously placed  Will plan on repeating labs in early August   Continue to avoid additional calcium  Follow-up in one month if symptoms persist or sooner if needed  Subjective:      Patient ID: Parag Klein is a [de-identified] y o  female  Patient presents with chief complaint of left hip pain  She suffered a fall two days ago while at home  Has a large swollen area near her right hip  Does have some pain  Otherwise she is doing well  No head trauma  She is also questioning whether she needs to see endocrinology or not for her calcium  She did stop all calcium supplementation  The following portions of the patient's history were reviewed and updated as appropriate: She  has a past medical history of Abnormality of cornea, Arthritis, Foot drop, left foot (05/2017), Irregular heart beat, Murmur, Rotator cuff injury, 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), Spinal stenosis, Varicose veins of legs, Vitamin D deficiency, and Wears glasses    She   Patient Active Problem List    Diagnosis Date Noted    Hyperpar[facility]roidism ([facility] [address] ) 06/22/2020    Pneumonia 12/02/2019    Leukocytosis 12/02/2019    Vitamin D deficiency 02/08/2019    Hyperlipidemia 02/06/2019    Osteoporosis 01/22/2019    Stress fracture of calcaneus 01/02/2019    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 07/24/2018    Aortic valve regurgitation 07/02/2018    Nodular thyroid disease 02/20/2018    Increased PTH level 02/02/2018    Left foot drop 02/01/2018    History of shingles 02/01/2018    Hypercalcemia 12/13/2017    Degenerative lumbar spinal stenosis 06/23/2017    Varicose veins of bilateral lower extremities with other complications 18/91/3159    Tendinitis of right rotator cuff 02/07/2017    Irregular heartbeat 01/30/2017    Premature atrial contraction 01/30/2017     She  has a past surgical history that includes Tubal ligation; pr xcapsl ctrc rmvl insj io lens prosth w/o ecp (Right, 5/14/2018); Cataract extraction; and pr xcapsl ctrc rmvl insj io lens prosth w/o ecp (Left, 7/10/2018)  Her family history includes Asthma in her sister; Heart disease in her brother and father; Hiatal hernia in her sister; Hypertension in her father; Osteoporosis in her father and mother; Other in her brother, father, and mother; Stroke in her father  She  reports that she has never smoked  She has never used smokeless tobacco  She reports that she does not drink alcohol or use drugs  Current Outpatient Medications   Medication Sig Dispense Refill    cholecalciferol (VITAMIN D3) 1,000 units tablet Take 1,000 Units by mouth every morning      ibuprofen (MOTRIN IB) 200 mg tablet Take 1 tablet by mouth 3 (three) times a day as needed      Multiple Vitamins-Minerals (CENTRUM SILVER 50+WOMEN PO) Take by mouth every morning      sodium chloride (ALESIA 128) 2 % hypertonic ophthalmic solution Administer 1 drop to both eyes 3 (three) times a day Alesia 128 gel at HS        No current facility-administered medications for this visit        Current Outpatient Medications on File Prior to Visit   Medication Sig    cholecalciferol (VITAMIN D3) 1,000 units tablet Take 1,000 Units by mouth every morning    ibuprofen (MOTRIN IB) 200 mg tablet Take 1 tablet by mouth 3 (three) times a day as needed    Multiple Vitamins-Minerals (CENTRUM SILVER 50+WOMEN PO) Take by mouth every morning    sodium chloride (ALESIA 128) 2 % hypertonic ophthalmic solution Administer 1 drop to both eyes 3 (three) times a day Alesia 128 gel at HS     [DISCONTINUED] calcium carbonate (OS-ZENA) 600 MG tablet Take 600 mg by mouth 2 (two) times a day with meals     No current facility-administered medications on file prior to visit  She is allergic to prednisone       Review of Systems   Constitutional: Negative for fever  Respiratory: Negative for shortness of breath  Cardiovascular: Negative for chest pain  Objective:      /84 (BP Location: Left arm, Patient Position: Sitting, Cuff Size: Adult)   Pulse 97   Temp 97 5 °F (36 4 °C)   Ht 5' 1" (1 549 m)   Wt 62 1 kg (137 lb)   SpO2 99%   BMI 25 89 kg/m²          Physical Exam   Constitutional: She appears well-developed and well-nourished     Musculoskeletal:        Legs:

## 2020-07-27 ENCOUNTER — OFFICE VISIT (OUTPATIENT)
Dept: ENDOCRINOLOGY | Facility: CLINIC | Age: 80
End: 2020-07-27
Payer: MEDICARE

## 2020-07-27 VITALS
HEIGHT: 61 IN | HEART RATE: 84 BPM | BODY MASS INDEX: 26.87 KG/M2 | DIASTOLIC BLOOD PRESSURE: 76 MMHG | SYSTOLIC BLOOD PRESSURE: 130 MMHG | WEIGHT: 142.3 LBS | TEMPERATURE: 97.9 F

## 2020-07-27 DIAGNOSIS — E55.9 VITAMIN D DEFICIENCY: ICD-10-CM

## 2020-07-27 DIAGNOSIS — E83.52 HYPERCALCEMIA: Primary | ICD-10-CM

## 2020-07-27 DIAGNOSIS — E21.3 HYPERPARATHYROIDISM (HCC): ICD-10-CM

## 2020-07-27 DIAGNOSIS — E04.2 MULTIPLE THYROID NODULES: ICD-10-CM

## 2020-07-27 DIAGNOSIS — M81.0 AGE-RELATED OSTEOPOROSIS WITHOUT CURRENT PATHOLOGICAL FRACTURE: ICD-10-CM

## 2020-07-27 PROCEDURE — 99204 OFFICE O/P NEW MOD 45 MIN: CPT | Performed by: INTERNAL MEDICINE

## 2020-07-27 NOTE — PROGRESS NOTES
Giancarlo Akers [de-identified] y o  female MRN: [id number]    Encounter: [id number]      Assessment/Plan     Assessment: This is a [de-identified]y o -year-old female with past medical history of thyroid nodules, vitamin-D deficiency, hypercalcemia, hyperparathyroidism, osteoporosis    Plan:    1  Hypercalcemia  2  Hyperparathyroidism  3  Vitamin-D deficiency-most recent levels within normal limits  4  Osteoporosis  5  Thyroid nodules  Distinguishing between PTH-mediated versus non-PTH mediated hypercalcemia is required and has been done  Patient with hypercalcemia, inappropriately normal parathyroid levels  Most recent vitamin-D level within normal limits  Primary hyperparathyroidism until proven otherwise  Patient was taking calcium supplementation at the time that labs were drawn 6/25  Patient has been advised to keep well hydrated, ensure calcium intake to 800-1000 mg orally daily, continue vitamin-D supplementation  -repeat calcium, BMP, PTH, vitamin-D  -will check 24 hour urinary calcium, urinary creatinine to calculate calcium creatinine clearance    If labs support a diagnosis of primary hyperparathyroidism, will proceed with sestamibi scan, ultrasound parathyroids  Patient already meets criteria for surgery based on presence of osteoporosis  Patient also has a history of thyroid nodules for which she is due for a repeat thyroid ultrasound  Will get done at the same time  -- Reviewed signs/symptoms of hypercalcemia   --Reviewed differential diagnosis  --Reviewed calcium goals, minimum 800-1000 mg daily to avoid excess stimulation of normally functioning parathyroid glands  --Reviewed need for adequate hydration, preventing dehydration and prolonged immobilzation  --Discussed the implications of primary hyperparathyroidism at length with patient, including implications on bone health/kidney function/neurocognitive symptoms    --Discussed the indications for surgery at length, in addition to possible complications/risks/benefits  Patient needs completed workup to evaluate for surgical indication which are as follows:   1) Calcium level >than 1mg/dL above upper limit normal   2) Osteoporosis : check DXA evaluate bone loss (eval radius also)  3) Hypercalciuria: 24 hr urinary calcium > 400  will check 24hr urine for CrCl and Ca+ excretion to rule out familial hypocalcuric hypercalcemia and eval for hypercalcuira  4) Renal dysfunction  5) Nephrolithiasis   6) Age < 48           CC:  Hypercalcemia    History of Present Illness     HPI:Kay Warren is a [de-identified] y o  female who is here for new patient visit for evaluation of hypercalcemia  This was discovered on routine labs  Has a family history of high PTH levels; Brother has high PTH ands calcium levels - has had parthryidectomy     Niece with h/o thyroid cancer ( sister's daughter) - details not known but sounds like she had MCCARTHY     Calcium intake in diet :  Skim ,milk with coffee and cereal 1 cup milk,   On and  off yogurt and cheese,  Ice cream 1-2 times a week   Loves broccoli, spinach , no OJ  Calcium supplements (including multivitamin) 1200 mg /day but stopped approx 1 month ago     H/o shingles and foot drop since 2018, at times finds it difficult to walk    Has already been to physical therapy       Associated signs/symptoms: (symptoms related to elevated calcium level)  Fatigue                       has some wonders if it is age related   Anxiety                         No   Depression                  no   Memory loss/disturbance        no   Constipation                no   Nausea                       no   Poor appetite              no  Increased thirst           no  Increased urination    no  Kidney stones             no  Muscle weakness       no   Bone pain                  no   H/o osteoporosis         yes  DXA:  01/2019-osteoporosis  Does not want to be on any mediations for osteoporosis   Takes Vitamin D3 2000 IU daily + MVI   Admits not keeping well-hydrated     Family history of kidney stones no     No swelling in the neck   Neck ultrasound 02/2019-has thyroid nodules which did not meet criteria for biopsy but recommend ultrasound in 1 year  No mention of parathyroid adenomas    All other systems were reviewed and are negative  Review of Systems    Historical Information   Past Medical History:   Diagnosis Date    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-mother also has had the corneal disease    Arthritis     Foot drop, left foot 05/2017    S/P shingles that attacked the nerves-wears a brace prn,uses a cane for long distance    Irregular heart beat     regular, irregular-Dr Garcia-ECHO-negative,no heart disease    Murmur     had ECHO-on 6/28/18    Rotator cuff injury     right-slight tear-healed with therapy    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    Spinal stenosis     Varicose veins of legs     Vitamin D deficiency     Wears glasses      Past Surgical History:   Procedure Laterality Date    CATARACT EXTRACTION      AR XCAPSL CTRC RMVL INSJ IO LENS PROSTH W/O ECP Right 5/14/2018    Procedure: EXTRACTION EXTRACAPSULAR CATARACT PHACO INTRAOCULAR LENS (IOL); Surgeon: Khushbu Moralez MD;  Location: [facility] MAIN OR;  Service: Ophthalmology    AR XCAPSL CTRC RMVL INSJ IO LENS PROSTH W/O ECP Left 7/10/2018    Procedure: EXTRACTION EXTRACAPSULAR CATARACT PHACO INTRAOCULAR LENS (IOL);   Surgeon: Khushbu Moralez MD;  Location: [facility] MAIN OR;  Service: Ophthalmology    TUBAL LIGATION       Social History   Social History     Substance and Sexual Activity   Alcohol Use No     Social History     Substance and Sexual Activity   Drug Use No     Social History     Tobacco Use   Smoking Status Never Smoker   Smokeless Tobacco Never Used     Family History:   Family History   Problem Relation Age of Onset    Other Mother         corneal transplants, Parathyroid disease    Osteoporosis Mother     Stroke Father     Hypertension Father     Heart disease Father         cardiac disorder    Osteoporosis Father     Other Father         Parathyroid disease    Asthma Sister     Hiatal hernia Sister     Other Brother         parathyroid-removed, Parathyroid disease    Heart disease Brother         " maker"       Meds/Allergies   Current Outpatient Medications   Medication Sig Dispense Refill    cholecalciferol (VITAMIN D3) 1,000 units tablet Take 1,000 Units by mouth every morning      ibuprofen (MOTRIN IB) 200 mg tablet Take 1 tablet by mouth 3 (three) times a day as needed      Multiple Vitamins-Minerals (CENTRUM SILVER 50+WOMEN PO) Take by mouth every morning      sodium chloride (ALESIA 128) 2 % hypertonic ophthalmic solution Administer 1 drop to both eyes 3 (three) times a day Alesia 128 gel at HS        No current facility-administered medications for this visit  Allergies   Allergen Reactions    Prednisone Swelling     Facial swelling, redness-no dyspnea       Objective   Vitals: Blood pressure 130/76, pulse 84, temperature 97 9 °F (36 6 °C), height 5' 1" (1 549 m), weight 64 5 kg (142 lb 4 8 oz)  Physical Exam   Constitutional: She is oriented to person, place, and time  She appears well-developed and well-nourished  HENT:   Head: Normocephalic and atraumatic  Eyes: Pupils are equal, round, and reactive to light  Conjunctivae and EOM are normal    Neck: Normal range of motion  Neck supple  No thyromegaly present  Nontender  No nodules appreciated on palpation     Cardiovascular: Normal rate, regular rhythm and normal heart sounds  No murmur heard  Pulmonary/Chest: Effort normal and breath sounds normal  She has no wheezes  Abdominal: Soft  She exhibits no distension  There is no tenderness  Musculoskeletal: Normal range of motion  She exhibits no edema  Mild tenderness to palpation lumbar spine   Neurological: She is alert and oriented to person, place, and time     Skin: Skin is warm and dry  Psychiatric: She has a normal mood and affect  Her behavior is normal    Vitals reviewed  The history was obtained from the review of the chart, patient  Lab Results:      Lab Results   Component Value Date    WBC 18 58 (H) 12/03/2019    HGB 11 0 (L) 12/03/2019    HCT 33 3 (L) 12/03/2019    MCV 89 12/03/2019     12/03/2019     Lab Results   Component Value Date    CREATININE 0 70 06/25/2020    BUN 16 06/25/2020    K 4 0 06/25/2020     (H) 06/25/2020    CO2 28 06/25/2020     No results found for: HGBA1C    Lab Results   Component Value Date    CALCIUM 11 3 (H) 06/25/2020        Component      Latest Ref Rng & Units 6/18/2019 6/18/2019 12/2/2019 12/3/2019           8:26 AM  8:26 AM  7:52 PM  5:52 AM   Vit D, 25-Hydroxy      30 0 - 100 0 ng/mL  34 6     PARATHYROID HORMONE      18 4 - 80 1 pg/mL 73 7        Component      Latest Ref Rng & Units 6/25/2020 6/25/2020 6/25/2020          11:16 AM 11:16 AM 11:16 AM   Vit D, 25-Hydroxy      30 0 - 100 0 ng/mL   38 4   PARATHYROID HORMONE      18 4 - 80 1 pg/mL 59 6     24 hour urinary calcium:  168 mg/24 hours     TSH 2017-within  Imaging Studies:   Results for orders placed during the hospital encounter of 02/04/19   US thyroid    Impression No nodule meets current ACR criteria for requiring biopsy but followup ultrasound is recommended in 1 year  Reference: ACR Thyroid Imaging, Reporting and Data System (TI-RADS): White Paper of the [employer]  J AM [facility] [facility] 6510;05:637-749  (additional recommendations based on American Thyroid Association 2015 guidelines )      Workstation performed: [id number]         I have personally reviewed pertinent reports  Portions of the record may have been created with voice recognition software  Occasional wrong word or "sound a like" substitutions may have occurred due to the inherent limitations of voice recognition software   Read the chart carefully and recognize, using context, where substitutions have occurred

## 2020-07-27 NOTE — PATIENT INSTRUCTIONS
Please keep well hydrated  Please ensure that you get a minimum of 800-1000 mg of calcium daily in your diet   Continue vitamin-D supplementation   Please get labs done as ordered  Have blood work done the day you drop of the 24 hour urine sample     Follow up in 4-6 weeks    24 Hour urine collection instructions     Please  a specimen container from the lab  On day 1, urinate into the toilet when you wake up in the morning  Collect all of your urine in a special container for the next 24 hours  On day 2, urinate into the container in the morning when you wake up  Cap the container  Keep it in the refrigerator or a cool place during the collection period  Label the container with your name, the date, and the time you complete the sample, and return it as instructed        A Guide to Calcium-Rich Foods  We all know that milk is a great source of calcium, but you may be surprised by all the different foods you can work into your diet to reach your daily recommended amount of calcium   Use the guide below to get ideas of additional calcium-rich foods to add to your weekly shopping list   Produce Serving Size Estimated Calcium*   Jen greens, frozen 8 oz 360 mg   Broccoli korey 8 oz 200 mg   Kale, frozen 8 oz 180 mg   Soy Beans, green, boiled 8 oz 175 mg   Bok Manny, cooked, boiled 8 oz 160 mg   Figs, dried 2 figs 65 mg   Broccoli, fresh, cooked 8 oz 60 mg   Oranges 1 whole 55 mg   Seafood Serving Size Estimated Calcium*   Sardines, canned with bones 3 oz 325 mg   [place], canned with bones 3 oz 180 mg   Shrimp, canned 3 oz 125 mg   Dairy Serving Size Estimated Calcium*   Ricotta, part-skim 4 oz 335 mg   Yogurt, plain, low-fat 6 oz 310 mg   Milk, skim, low-fat, whole 8 oz 300 mg   Yogurt with fruit, low-fat 6 oz 260 mg   Mozzarella, part-skim 1 oz 210 mg   Cheddar 1 oz 205 mg   Yogurt, Greek 6 oz 200 mg   American Cheese 1 oz 195 mg   Feta Cheese 4 oz 140 mg   Cottage Cheese, 2% 4 oz 105 mg   Frozen yogurt, vanilla 8 oz 105 mg   Ice Cream, vanilla 8 oz 85 mg   Parmesan 1 tbsp 55 mg   Fortified Food Serving Size Estimated Calcium*   [place] milk, rice milk or soy milk, fortified 8 oz 300 mg   Orange juice and other fruit juices, fortified 8 oz 300 mg   Tofu, prepared with calcium 4 oz 205 mg   Waffle, frozen, fortified 2 pieces 200 mg   Oatmeal, fortified 1 packet 140 mg   English muffin, fortified 1 muffin 100 mg   Cereal, fortified 8 oz 100-1,000 mg   Other Serving Size Estimated Calcium*   Mac & cheese, frozen 1 package 325 mg   Pizza, cheese, frozen 1 serving 115 mg   Pudding, chocolate, prepared with 2% milk 4 oz 160 mg   Beans, baked, canned 4 oz 160 mg   *The calcium content listed for most foods is estimated and can vary due to multiple factors  Check the food label to determine how much calcium is in a particular product

## 2020-08-12 ENCOUNTER — APPOINTMENT (OUTPATIENT)
Dept: LAB | Facility: MEDICAL CENTER | Age: 80
End: 2020-08-12
Payer: MEDICARE

## 2020-08-12 DIAGNOSIS — E55.9 VITAMIN D DEFICIENCY: ICD-10-CM

## 2020-08-12 DIAGNOSIS — E83.52 HYPERCALCEMIA: ICD-10-CM

## 2020-08-12 DIAGNOSIS — E34.9 INCREASED PTH LEVEL: ICD-10-CM

## 2020-08-12 DIAGNOSIS — E21.3 HYPERPARATHYROIDISM (HCC): ICD-10-CM

## 2020-08-12 LAB
25(OH)D3 SERPL-MCNC: 38.2 NG/ML (ref 30–100)
ALBUMIN SERPL BCP-MCNC: 4.1 G/DL (ref 3.5–5)
ALP SERPL-CCNC: 96 U/L (ref 46–116)
ALT SERPL W P-5'-P-CCNC: 24 U/L (ref 12–78)
ANION GAP SERPL CALCULATED.3IONS-SCNC: 5 MMOL/L (ref 4–13)
AST SERPL W P-5'-P-CCNC: 20 U/L (ref 5–45)
BILIRUB SERPL-MCNC: 0.46 MG/DL (ref 0.2–1)
BUN SERPL-MCNC: 16 MG/DL (ref 5–25)
CA-I BLD-SCNC: 1.36 MMOL/L (ref 1.12–1.32)
CALCIUM 24H UR-MCNC: 191.7 MG/24 HRS (ref 42–353)
CALCIUM ALBUM COR SERPL-MCNC: 10.5 MG/DL (ref 8.3–10.1)
CALCIUM SERPL-MCNC: 10.6 MG/DL (ref 8.3–10.1)
CHLORIDE SERPL-SCNC: 109 MMOL/L (ref 100–108)
CO2 SERPL-SCNC: 28 MMOL/L (ref 21–32)
CREAT 24H UR-MRATE: 0.7 G/24HR (ref 0.6–1.8)
CREAT SERPL-MCNC: 0.68 MG/DL (ref 0.6–1.3)
GFR SERPL CREATININE-BSD FRML MDRD: 82 ML/MIN/1.73SQ M
GLUCOSE P FAST SERPL-MCNC: 96 MG/DL (ref 65–99)
POTASSIUM SERPL-SCNC: 4.1 MMOL/L (ref 3.5–5.3)
PROT SERPL-MCNC: 8.1 G/DL (ref 6.4–8.2)
PTH-INTACT SERPL-MCNC: 65.6 PG/ML (ref 18.4–80.1)
SODIUM SERPL-SCNC: 142 MMOL/L (ref 136–145)
SPECIMEN VOL UR: 2700 ML
SPECIMEN VOL UR: 2700 ML

## 2020-08-12 PROCEDURE — 82306 VITAMIN D 25 HYDROXY: CPT

## 2020-08-12 PROCEDURE — 82330 ASSAY OF CALCIUM: CPT

## 2020-08-12 PROCEDURE — 82340 ASSAY OF CALCIUM IN URINE: CPT

## 2020-08-12 PROCEDURE — 82570 ASSAY OF URINE CREATININE: CPT

## 2020-08-12 PROCEDURE — 80053 COMPREHEN METABOLIC PANEL: CPT

## 2020-08-12 PROCEDURE — 83970 ASSAY OF PARATHORMONE: CPT

## 2020-08-12 PROCEDURE — 36415 COLL VENOUS BLD VENIPUNCTURE: CPT

## 2020-08-14 ENCOUNTER — TELEPHONE (OUTPATIENT)
Dept: ENDOCRINOLOGY | Facility: CLINIC | Age: 80
End: 2020-08-14

## 2020-08-14 DIAGNOSIS — E21.3 HYPERPARATHYROIDISM (HCC): Primary | ICD-10-CM

## 2020-08-14 NOTE — TELEPHONE ENCOUNTER
----- Message from Quinn Monge MD sent at 8/13/2020 10:56 AM EDT -----  Please call inform patient of results  Calculated Calcium Creatinine Clearance for the patient is [phone] which suggest primary hyperparathyroidism   Recommend getting ultrasound parathyroids to evaluate for parathyroid adenoma, sestamibi scan  - Please advise patient to keep well hydrated     Ratio > 0 01, it is suggestive of Primary Hyperparathyroidism (PHPT)     Ratio < 0 01 suggestive of [address] (may also be seen in PHPT with Vitamin D deficiency)

## 2020-08-14 NOTE — TELEPHONE ENCOUNTER
Spoke with patient and reviewed lab results  Sent in mail script for us parathyroid imaging    She understood

## 2020-09-01 ENCOUNTER — HOSPITAL ENCOUNTER (OUTPATIENT)
Dept: RADIOLOGY | Facility: MEDICAL CENTER | Age: 80
Discharge: HOME | End: 2020-09-01
Payer: MEDICARE

## 2020-09-01 DIAGNOSIS — E21.3 HYPERPARATHYROIDISM (HCC): ICD-10-CM

## 2020-09-01 PROCEDURE — 76536 US EXAM OF HEAD AND NECK: CPT

## 2020-09-21 ENCOUNTER — OFFICE VISIT (OUTPATIENT)
Dept: ENDOCRINOLOGY | Facility: CLINIC | Age: 80
End: 2020-09-21
Payer: MEDICARE

## 2020-09-21 VITALS
WEIGHT: 145 LBS | TEMPERATURE: 95.6 F | SYSTOLIC BLOOD PRESSURE: 140 MMHG | HEART RATE: 76 BPM | DIASTOLIC BLOOD PRESSURE: 82 MMHG | BODY MASS INDEX: 27.38 KG/M2 | HEIGHT: 61 IN

## 2020-09-21 DIAGNOSIS — M81.0 AGE-RELATED OSTEOPOROSIS WITHOUT CURRENT PATHOLOGICAL FRACTURE: ICD-10-CM

## 2020-09-21 DIAGNOSIS — E21.3 HYPERPARATHYROIDISM (HCC): Primary | ICD-10-CM

## 2020-09-21 DIAGNOSIS — E55.9 VITAMIN D DEFICIENCY: ICD-10-CM

## 2020-09-21 DIAGNOSIS — E04.2 MULTIPLE THYROID NODULES: ICD-10-CM

## 2020-09-21 DIAGNOSIS — E83.52 HYPERCALCEMIA: ICD-10-CM

## 2020-09-21 PROCEDURE — 99214 OFFICE O/P EST MOD 30 MIN: CPT | Performed by: INTERNAL MEDICINE

## 2020-09-21 NOTE — PATIENT INSTRUCTIONS
Have labs done in 3 months    Have DEXA bone density scan done in January 2020     Follow up in 3 months

## 2020-09-21 NOTE — PROGRESS NOTES
ENDOCRINOLOGY  FOLLOW UP VISIT      Reason for Endocrine Consult/Chief Complaint: calcium disorder    Referring Provider: Noelle Carrero, DO        ? Medical Decision Making:     Impression  1  Primary hyperparathyroidism  2  Hypercalcemia  3  Vitamin D def  4  Osteoporosis   5  Thyroid nodules      Recommendations:  ? Primary hyperparathyroidism, hypercalcemia- corrected calcium improved to 10 5 Aug 2020, PTH still inappropriately elevated at 65, I discussed at length with patient that at the present time she meets one surgical criteria which is known osteoporosis  I discussed risks and benefits of surgical resection for hyperparathyroidism vs  Risks and benefits of medical management with cinacalcet  She opted for monitoring her calcium levels for now and if starts to increase >11 again will consider cinacalcet therapy for medical management of hyperparathyroidism  Encouraged to stay well hydrated  Repeat calcium labs in 3 months    Vitamin D def- level replete at 38 Aug 2020, continue 2000 IU D3 daily replacement    Osteoporosis- repeat DEXA 1/2021, discussed bisphosphonate therapy vs  prolia therapy, she will see dentis at end of Oct 2020 and will consider therapy for osteoporosis after repeat DEXA scan- explained that cinacalcet will not decrease rate of BMD loss    Thyroid nodules- 2 right sided nodules, stable in size/characteristics repeat US thyroid 9/2021    RTC 3 months     Tara PETTIT  Endocrinology        History of Present Illness:   Mrs Savanna Davidson is a 79year old female who presents for calcium disorder follow up  Last seen by Dr Zoie Branch in July 2020  At that time repeat calcium labs were ordered including 24hr urine calcium levels and a thyroid ultrasound  ? Events since last visit:    Had US thyroid but not the sestamibi scan     Presently on 2000 IU D3 daily replacement   ?   Review of Systems:     Review of Systems   Constitutional: Negative for appetite change, chills, diaphoresis, fatigue, fever and unexpected weight change  HENT: Negative for congestion, ear pain, hearing loss, rhinorrhea, sinus pressure, sinus pain, sore throat, trouble swallowing and voice change  Eyes: Negative for photophobia, redness and visual disturbance  Respiratory: Negative for apnea, cough, chest tightness, shortness of breath, wheezing and stridor  Cardiovascular: Negative for chest pain, palpitations and leg swelling  Gastrointestinal: Negative for abdominal distention, abdominal pain, constipation, diarrhea, nausea and vomiting  Endocrine: Negative for cold intolerance, heat intolerance, polydipsia, polyphagia and polyuria  Genitourinary: Negative for difficulty urinating, dysuria, flank pain, frequency, hematuria and urgency  Musculoskeletal: Negative for arthralgias, back pain, gait problem, joint swelling and myalgias  Skin: Negative for color change, pallor, rash and wound  Allergic/Immunologic: Negative for immunocompromised state  Neurological: Negative for dizziness, tremors, syncope, weakness, light-headedness and headaches  Hematological: Negative for adenopathy  Does not bruise/bleed easily  Psychiatric/Behavioral: Negative for confusion and sleep disturbance  The patient is not nervous/anxious  ?   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    Spinal stenosis     Varicose veins of legs     Vitamin D deficiency     Wears glasses      Past Surgical History: Procedure Laterality Date    CATARACT EXTRACTION      KS XCAPSL CTRC RMVL INSJ IO LENS PROSTH W/O ECP Right 5/14/2018    Procedure: EXTRACTION EXTRACAPSULAR CATARACT PHACO INTRAOCULAR LENS (IOL); Surgeon: Germain Nicholas MD;  Location: [facility] MAIN OR;  Service: Ophthalmology    KS XCAPSL CTRC RMVL INSJ IO LENS PROSTH W/O ECP Left 7/10/2018    Procedure: EXTRACTION EXTRACAPSULAR CATARACT PHACO INTRAOCULAR LENS (IOL); Surgeon: Germain Nicholas MD;  Location: [facility] MAIN OR;  Service: Ophthalmology    TUBAL LIGATION       Social History     Socioeconomic History    Marital status:       Spouse name: Not on file    Number of children: Not on file    Years of education: Not on file    Highest education level: Not on file   Occupational History    Not on file   Social Needs    Financial resource strain: Not on file    Food insecurity     Worry: Not on file     Inability: Not on file    Transportation needs     Medical: Not on file     Non-medical: Not on file   Tobacco Use    Smoking status: Never Smoker    Smokeless tobacco: Never Used   Substance and Sexual Activity    Alcohol use: No    Drug use: No    Sexual activity: Not on file   Lifestyle    Physical activity     Days per week: Not on file     Minutes per session: Not on file    Stress: Not on file   Relationships    Social connections     Talks on phone: Not on file     Gets together: Not on file     Attends Sabianism service: Not on file     Active member of club or organization: Not on file     Attends meetings of clubs or organizations: Not on file     Relationship status: Not on file    Intimate partner violence     Fear of current or ex partner: Not on file     Emotionally abused: Not on file     Physically abused: Not on file     Forced sexual activity: Not on file   Other Topics Concern    Not on file   Social History Narrative    Caffeine use, active     Family History   Problem Relation Age of Onset   [employer] Other Mother corneal transplants, Parathyroid disease    Osteoporosis Mother     Stroke Father     Hypertension Father     Heart disease Father         cardiac disorder    Osteoporosis Father     Other Father         Parathyroid disease    Asthma Sister     Hiatal hernia Sister     Other Brother         parathyroid-removed, Parathyroid disease    Heart disease Brother         " maker"       Current Medications: At the time this note was written these were the medications the patient was on  Current Outpatient Medications   Medication Sig Dispense Refill    cholecalciferol (VITAMIN D3) 1,000 units tablet Take 1,000 Units by mouth every morning      ibuprofen (MOTRIN IB) 200 mg tablet Take 1 tablet by mouth 3 (three) times a day as needed      Multiple Vitamins-Minerals (CENTRUM SILVER 50+WOMEN PO) Take by mouth every morning      sodium chloride (ALESIA 128) 2 % hypertonic ophthalmic solution Administer 1 drop to both eyes 3 (three) times a day Alesia 128 gel at HS        No current facility-administered medications for this visit  Allergies: Prednisone    Physical Exam:   Vital Signs:   /82   Pulse 76   Temp (!) 95 6 °F (35 3 °C)   Ht 5' 1" (1 549 m)   Wt 65 8 kg (145 lb)   BMI 27 40 kg/m²     Physical Exam  Vitals signs reviewed  Constitutional:       General: She is not in acute distress  Appearance: Normal appearance  She is not ill-appearing, toxic-appearing or diaphoretic  HENT:      Head: Normocephalic and atraumatic  Right Ear: External ear normal       Left Ear: External ear normal       Nose: Nose normal    Eyes:      General: No scleral icterus  Extraocular Movements: Extraocular movements intact  Conjunctiva/sclera: Conjunctivae normal    Neck:      Musculoskeletal: Normal range of motion and neck supple  No muscular tenderness  Comments: No thyromegaly  Cardiovascular:      Rate and Rhythm: Normal rate and regular rhythm        Heart sounds: Normal heart sounds  No murmur  No friction rub  No gallop  Pulmonary:      Effort: Pulmonary effort is normal  No respiratory distress  Breath sounds: Normal breath sounds  No stridor  No wheezing, rhonchi or rales  Abdominal:      General: Bowel sounds are normal  There is no distension  Palpations: Abdomen is soft  There is no mass  Tenderness: There is no abdominal tenderness  There is no guarding or rebound  Hernia: No hernia is present  Musculoskeletal: Normal range of motion  General: No swelling  Lymphadenopathy:      Cervical: No cervical adenopathy  Skin:     General: Skin is warm and dry  Coloration: Skin is not pale  Findings: No erythema or rash  Neurological:      General: No focal deficit present  Mental Status: She is alert and oriented to person, place, and time  Psychiatric:         Mood and Affect: Mood normal          Behavior: Behavior normal          Thought Content: Thought content normal          Judgment: Judgment normal           Labs and Imaging:    THYROID ULTRASOUND     INDICATION:    E21 3: Hyperparathyroidism, unspecified  Follow-up nodules        COMPARISON:  2/4/2019     TECHNIQUE:   Ultrasound of the thyroid was performed with a high frequency linear transducer in transverse and sagittal planes including volumetric imaging sweeps as well as traditional still imaging technique      FINDINGS:  Normal homogeneous smooth echotexture      Right lobe:  3 9 x 1 7 x 1 6 cm  Left lobe:  3 7 x 1 7 x 1 3 cm  Isthmus:  0 5 cm      Nodule #1  Image 6  RIGHT upper pole nodule measuring 0 7 x 0 6 x 0 6 cm   COMPOSITION:  2 points, solid or almost completely solid   ECHOGENICITY:  2 points, hypoechoic  SHAPE:  0 points, wider-than-tall  MARGIN: 0 points, smooth  ECHOGENIC FOCI:  0 points, none or large comet-tail artifacts  TI-RADS Classification: TR 4 (4-6 points), FNA if > 1 5 cm  Follow if > 1cm      Nodule #2  Image 21    Right isthmus measuring 1 4 x 0 9 x 1 1 cm  COMPOSITION:  2 points, solid or almost completely solid   ECHOGENICITY:  1 point, hyperechoic or isoechoic  SHAPE:  0 points, wider-than-tall  MARGIN: 0 points, smooth  ECHOGENIC FOCI:  0 points, none or large comet-tail artifacts  TI-RADS Classification: TR 3 (3 points), FNA if >2 5 cm  Follow if >1 5 cm            IMPRESSION:     Thyroid nodules as detailed above             Reference: ACR Thyroid Imaging, Reporting and Data System (TI-RADS): White Paper of the [employer]   J [employer] 9892;18:873-220  (additional recommendations based on American Thyroid Association 2015 guidelines )        Workstation performed: [id number]    Component      Latest Ref Rng & Units 8/12/2020   Sodium      136 - 145 mmol/L 142   Potassium      3 5 - 5 3 mmol/L 4 1   Chloride      100 - 108 mmol/L 109 (H)   CO2      21 - 32 mmol/L 28   Anion Gap      4 - 13 mmol/L 5   BUN      5 - 25 mg/dL 16   Creatinine      0 60 - 1 30 mg/dL 0 68   GLUCOSE FASTING      65 - 99 mg/dL 96   Calcium      8 3 - 10 1 mg/dL 10 6 (H)   CORRECTED CALCIUM      8 3 - 10 1 mg/dL 10 5 (H)   AST      5 - 45 U/L 20   ALT      12 - 78 U/L 24   Alkaline Phosphatase      46 - 116 U/L 96   Total Protein      6 4 - 8 2 g/dL 8 1   Albumin      3 5 - 5 0 g/dL 4 1   TOTAL BILIRUBIN      0 20 - 1 00 mg/dL 0 46   eGFR      ml/min/1 73sq m 82   CREATININE 24 HOUR UR      0 6 - 1 8 g/24Hr 0 7   TOTAL URINE VOLUME      ml 2,700   24 HR URINE VOLUME      mL 2,700   CALCIUM 24 HOUR URINE      42 - 353 mg/24 hrs 191 7   Calcium, Ionized      1 12 - 1 32 mmol/L 1 36 (H)   Vit D, 25-Hydroxy      30 0 - 100 0 ng/mL 38 2   PARATHYROID HORMONE      18 4 - 80 1 pg/mL 65 6

## 2021-01-21 ENCOUNTER — IMMUNIZATIONS (OUTPATIENT)
Dept: FAMILY MEDICINE CLINIC | Facility: HOSPITAL | Age: 81
End: 2021-01-21

## 2021-01-21 DIAGNOSIS — Z23 ENCOUNTER FOR IMMUNIZATION: Primary | ICD-10-CM

## 2021-01-21 PROCEDURE — 91300 SARS-COV-2 / COVID-19 MRNA VACCINE (PFIZER-BIONTECH) 30 MCG: CPT

## 2021-01-21 PROCEDURE — 0001A SARS-COV-2 / COVID-19 MRNA VACCINE (PFIZER-BIONTECH) 30 MCG: CPT

## 2021-02-02 ENCOUNTER — TELEMEDICINE (OUTPATIENT)
Dept: ENDOCRINOLOGY | Facility: CLINIC | Age: 81
End: 2021-02-02
Payer: MEDICARE

## 2021-02-02 DIAGNOSIS — E55.9 VITAMIN D DEFICIENCY: ICD-10-CM

## 2021-02-02 DIAGNOSIS — E04.2 MULTIPLE THYROID NODULES: ICD-10-CM

## 2021-02-02 DIAGNOSIS — E83.52 HYPERCALCEMIA: ICD-10-CM

## 2021-02-02 DIAGNOSIS — E21.3 HYPERPARATHYROIDISM (HCC): Primary | ICD-10-CM

## 2021-02-02 DIAGNOSIS — M81.0 AGE-RELATED OSTEOPOROSIS WITHOUT CURRENT PATHOLOGICAL FRACTURE: ICD-10-CM

## 2021-02-02 PROCEDURE — 99213 OFFICE O/P EST LOW 20 MIN: CPT | Performed by: INTERNAL MEDICINE

## 2021-02-02 NOTE — PROGRESS NOTES
Virtual Brief Visit    Assessment/Plan:    Problem List Items Addressed This Visit     None                Reason for visit is hyperparathyroidism     Chief Complaint   Patient presents with    Virtual Brief Visit        Encounter provider Sam Walls MD    Provider located at [address] [id number] [phone]    Recent Visits  No visits were found meeting these conditions  Showing recent visits within past 7 days and meeting all other requirements     Today's Visits  Date Type Provider Dept   02/02/21 Telemedicine Sam Walls MD [facility] today's visits and meeting all other requirements     Future Appointments  No visits were found meeting these conditions  Showing future appointments within next 150 days and meeting all other requirements        After connecting through [employer] and patient was informed that this is a secure, HIPAA-compliant platform  She agrees to proceed  , the patient was identified by name and date of birth  Raul Bravo was informed that this is a telemedicine visit and that the visit is being conducted through [facility] and patient was informed that this is a secure, HIPAA-compliant platform  She agrees to proceed     My office door was closed  No one else was in the room  She acknowledged consent and understanding of privacy and security of the platform  The patient has agreed to participate and understands she can discontinue the visit at any time  Patient is aware this is a billable service  Forde Ganser is a 79 y o  female with past medical history of primary hyperparathyroidism, hypercalcemia, osteoporosis, thyroid nodules       Met Dr Lady Sims in 9/2020   Has not had repeat labs done as yet   Last DXA 1/2019   Last USG thyroid 9/2020    Had not been keeping well hydrated per history since the [address], recently started again    Overall feeling well  Some hair thinning    Taking Vitamin D3 2000 IU daily     Had questions regarding the parathyroid, thyroid nodules which was discussed  Mention that she has not had a DEXA scan done  S she is not sure what information it would provide if she chose not to start Fosamax  She is worried about the potential side effects of Fosamax  All other systems were reviewed and are negative  HPI     Past Medical History:   Diagnosis Date    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-mother also has had the corneal disease    Arthritis     Foot drop, left foot 05/2017    S/P shingles that attacked the nerves-wears a brace prn,uses a cane for long distance    Irregular heart beat     regular, irregular-Dr Garcia-ECHO-negative,no heart disease    Murmur     had ECHO-on 6/28/18    Rotator cuff injury     right-slight tear-healed with therapy    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    Spinal stenosis     Varicose veins of legs     Vitamin D deficiency     Wears glasses        Past Surgical History:   Procedure Laterality Date    CATARACT EXTRACTION      [place] XCAPSL CTRC RMVL INSJ IO LENS PROSTH W/O ECP Right 5/14/2018    Procedure: EXTRACTION EXTRACAPSULAR CATARACT PHACO INTRAOCULAR LENS (IOL); Surgeon: Zakiya Castellanos MD;  Location: [facility] MAIN OR;  Service: Ophthalmology    [place] XCAPSL CTRC RMVL INSJ IO LENS PROSTH W/O ECP Left 7/10/2018    Procedure: EXTRACTION EXTRACAPSULAR CATARACT PHACO INTRAOCULAR LENS (IOL);   Surgeon: Zakiya Castellanos MD;  Location: [facility] MAIN OR;  Service: Ophthalmology    TUBAL LIGATION         Current Outpatient Medications   Medication Sig Dispense Refill    cholecalciferol (VITAMIN D3) 1,000 units tablet Take 1,000 Units by mouth every morning      ibuprofen (MOTRIN IB) 200 mg tablet Take 1 tablet by mouth 3 (three) times a day as needed      Multiple Vitamins-Minerals (CENTRUM SILVER 50+WOMEN PO) Take by mouth every morning      sodium chloride (ALESIA 128) 2 % hypertonic ophthalmic solution Administer 1 drop to both eyes 3 (three) times a day Alesia 128 gel at HS        No current facility-administered medications for this visit  Allergies   Allergen Reactions    Prednisone Swelling     Facial swelling, redness-no dyspnea       Review of Systems -as above    There were no vitals filed for this visit  Assessment/Plan     1   primary Hyperparathyroidism  2   hypercalcemia  3  History of vitamin-D deficiency  4  Osteoporosis  -  Advised patient to keep well hydrated   -continue vitamin-D supplementation   - repeat labs- PTH, vitamin-D, CMP, phosphorus, magnesium   - Advised patient to consider getting repeat DEXA scan  Discussed that there are alternatives to using Fosamax for osteoporosis  5  Thyroid nodules - repeat ultrasound in 1 year     I spent 25 minutes directly with the patient during this visit    VIRTUAL VISIT [address] acknowledges that she has consented to an online visit or consultation  She understands that the online visit is based solely on information provided by her, and that, in the absence of a face-to-face physical evaluation by the physician, the diagnosis she receives is both limited and provisional in terms of accuracy and completeness  This is not intended to replace a full medical face-to-face evaluation by the physician  Ministerio Talbot understands and accepts these terms

## 2021-02-11 ENCOUNTER — IMMUNIZATIONS (OUTPATIENT)
Dept: FAMILY MEDICINE CLINIC | Facility: HOSPITAL | Age: 81
End: 2021-02-11

## 2021-02-11 DIAGNOSIS — Z23 ENCOUNTER FOR IMMUNIZATION: Primary | ICD-10-CM

## 2021-02-11 PROCEDURE — 0002A SARS-COV-2 / COVID-19 MRNA VACCINE (PFIZER-BIONTECH) 30 MCG: CPT

## 2021-02-11 PROCEDURE — 91300 SARS-COV-2 / COVID-19 MRNA VACCINE (PFIZER-BIONTECH) 30 MCG: CPT

## 2021-06-17 ENCOUNTER — APPOINTMENT (OUTPATIENT)
Dept: LAB | Facility: MEDICAL CENTER | Age: 81
End: 2021-06-17
Payer: MEDICARE

## 2021-06-17 DIAGNOSIS — E21.3 HYPERPARATHYROIDISM (HCC): ICD-10-CM

## 2021-06-17 DIAGNOSIS — E55.9 VITAMIN D DEFICIENCY: ICD-10-CM

## 2021-06-17 DIAGNOSIS — E83.52 HYPERCALCEMIA: ICD-10-CM

## 2021-06-17 DIAGNOSIS — M81.0 AGE-RELATED OSTEOPOROSIS WITHOUT CURRENT PATHOLOGICAL FRACTURE: ICD-10-CM

## 2021-06-17 DIAGNOSIS — E04.2 MULTIPLE THYROID NODULES: ICD-10-CM

## 2021-06-17 LAB
25(OH)D3 SERPL-MCNC: 36.2 NG/ML (ref 30–100)
ALBUMIN SERPL BCP-MCNC: 4.2 G/DL (ref 3.5–5)
ALP SERPL-CCNC: 92 U/L (ref 46–116)
ALT SERPL W P-5'-P-CCNC: 24 U/L (ref 12–78)
ANION GAP SERPL CALCULATED.3IONS-SCNC: 3 MMOL/L (ref 4–13)
AST SERPL W P-5'-P-CCNC: 25 U/L (ref 5–45)
BILIRUB SERPL-MCNC: 0.55 MG/DL (ref 0.2–1)
BUN SERPL-MCNC: 16 MG/DL (ref 5–25)
CALCIUM SERPL-MCNC: 10.5 MG/DL (ref 8.3–10.1)
CHLORIDE SERPL-SCNC: 111 MMOL/L (ref 100–108)
CO2 SERPL-SCNC: 27 MMOL/L (ref 21–32)
CREAT SERPL-MCNC: 0.58 MG/DL (ref 0.6–1.3)
GFR SERPL CREATININE-BSD FRML MDRD: 87 ML/MIN/1.73SQ M
GLUCOSE P FAST SERPL-MCNC: 89 MG/DL (ref 65–99)
MAGNESIUM SERPL-MCNC: 2.6 MG/DL (ref 1.6–2.6)
PHOSPHATE SERPL-MCNC: 3.2 MG/DL (ref 2.3–4.1)
POTASSIUM SERPL-SCNC: 4.2 MMOL/L (ref 3.5–5.3)
PROT SERPL-MCNC: 7.9 G/DL (ref 6.4–8.2)
PTH-INTACT SERPL-MCNC: 80.5 PG/ML (ref 18.4–80.1)
SODIUM SERPL-SCNC: 141 MMOL/L (ref 136–145)

## 2021-06-17 PROCEDURE — 83735 ASSAY OF MAGNESIUM: CPT

## 2021-06-17 PROCEDURE — 83970 ASSAY OF PARATHORMONE: CPT

## 2021-06-17 PROCEDURE — 80053 COMPREHEN METABOLIC PANEL: CPT

## 2021-06-17 PROCEDURE — 36415 COLL VENOUS BLD VENIPUNCTURE: CPT

## 2021-06-17 PROCEDURE — 84100 ASSAY OF PHOSPHORUS: CPT

## 2021-06-17 PROCEDURE — 82306 VITAMIN D 25 HYDROXY: CPT

## 2021-06-18 DIAGNOSIS — E55.9 VITAMIN D DEFICIENCY: ICD-10-CM

## 2021-06-18 DIAGNOSIS — E83.52 HYPERCALCEMIA: Primary | ICD-10-CM

## 2021-06-23 ENCOUNTER — OFFICE VISIT (OUTPATIENT)
Dept: FAMILY MEDICINE CLINIC | Facility: MEDICAL CENTER | Age: 81
End: 2021-06-23
Payer: MEDICARE

## 2021-06-23 VITALS
DIASTOLIC BLOOD PRESSURE: 78 MMHG | HEART RATE: 64 BPM | WEIGHT: 146 LBS | RESPIRATION RATE: 16 BRPM | SYSTOLIC BLOOD PRESSURE: 136 MMHG | BODY MASS INDEX: 27.56 KG/M2 | TEMPERATURE: 97.8 F | HEIGHT: 61 IN

## 2021-06-23 DIAGNOSIS — E21.3 HYPERPARATHYROIDISM (HCC): ICD-10-CM

## 2021-06-23 DIAGNOSIS — Z00.00 MEDICARE ANNUAL WELLNESS VISIT, SUBSEQUENT: Primary | ICD-10-CM

## 2021-06-23 DIAGNOSIS — Z23 ENCOUNTER FOR IMMUNIZATION: ICD-10-CM

## 2021-06-23 PROCEDURE — 1123F ACP DISCUSS/DSCN MKR DOCD: CPT | Performed by: FAMILY MEDICINE

## 2021-06-23 PROCEDURE — G0439 PPPS, SUBSEQ VISIT: HCPCS | Performed by: FAMILY MEDICINE

## 2021-06-23 NOTE — PROGRESS NOTES
Assessment and Plan:     Problem List Items Addressed This Visit        Endocrine    Hyperparathyroidism [facility])      Other Visit Diagnoses     Medicare annual wellness visit, subsequent    -  Primary    Encounter for immunization        Relevant Medications    tetanus-diphtheria-acellular pertussis (ADACEL) 5-2-15 5 LF-mcg/0 5 injection        Sonya was seen today for medicare wellness visit  Diagnoses and all orders for this visit:    Medicare annual wellness visit, subsequent    Hyperparathyroidism ([address] )  Followed by endocrinology  Encounter for immunization  -     tetanus-diphtheria-acellular pertussis (ADACEL) 5-2-15 5 LF-mcg/0 5 injection; Inject 0 5 mL into a muscle once for 1 dose  Patient given a prescription for the Tdap vaccine  She was encouraged to receive the vaccine at her local pharmacy  BMI Counseling: Body mass index is 27 59 kg/m²  The BMI is above normal  Nutrition recommendations include encouraging healthy choices of fruits and vegetables  Exercise recommendations include moderate physical activity 150 minutes/week  Preventive health issues were discussed with patient, and age appropriate screening tests were ordered as noted in patient's After Visit Summary  Personalized health advice and appropriate referrals for health education or preventive services given if needed, as noted in patient's After Visit Summary  Follow-up in one year or sooner if needed           History of Present Illness:     Patient presents for Medicare Annual Wellness visit    Patient Care Team:  Shelbi Miller DO as PCP - General (Family Medicine)  Isobel Glassing, Napoleon Paget, MD Clark Norlander, MD Vale Adie, MD     Problem List:     Patient Active Problem List   Diagnosis    Left foot drop    History of shingles    Increased PTH level    Nodular thyroid disease    Degenerative lumbar spinal stenosis    Hypercalcemia    Irregular heartbeat    Premature atrial contraction  Tendinitis of right rotator cuff    Varicose veins of bilateral lower extremities with other complications    Aortic valve regurgitation    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    Stress fracture of calcaneus    Osteoporosis    Hyperlipidemia    Vitamin D deficiency    Pneumonia    Leukocytosis    Hyperparathyroidism ([address] )      Past Medical and Surgical History:     Past Medical History:   Diagnosis Date    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-mother also has had the corneal disease    Arthritis     Foot drop, left foot 05/2017    S/P shingles that attacked the nerves-wears a brace prn,uses a cane for long distance    Irregular heart beat     regular, irregular-Dr Garcia-ECHO-negative,no heart disease    Murmur     had ECHO-on 6/28/18    Rotator cuff injury     right-slight tear-healed with therapy    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    Spinal stenosis     Varicose veins of legs     Vitamin D deficiency     Wears glasses      Past Surgical History:   Procedure Laterality Date    CATARACT EXTRACTION      MT XCAPSL CTRC RMVL INSJ IO LENS PROSTH W/O ECP Right 5/14/2018    Procedure: EXTRACTION EXTRACAPSULAR CATARACT PHACO INTRAOCULAR LENS (IOL); Surgeon: Rebecca Bay MD;  Location: [facility] MAIN OR;  Service: Ophthalmology    MT XCAPSL CTRC RMVL INSJ IO LENS PROSTH W/O ECP Left 7/10/2018    Procedure: EXTRACTION EXTRACAPSULAR CATARACT PHACO INTRAOCULAR LENS (IOL);   Surgeon: Rebecca Bay MD;  Location: [facility] MAIN OR;  Service: Ophthalmology    TUBAL LIGATION        Family History:     Family History   Problem Relation Age of Onset    Other Mother         corneal transplants, Parathyroid disease    Osteoporosis Mother     Stroke Father     Hypertension Father     Heart disease Father         cardiac disorder    Osteoporosis Father     Other Father         Parathyroid disease    Asthma Sister     Hiatal hernia Sister    [facility] Other Brother         parathyroid-removed, Parathyroid disease    Heart disease Brother         " maker"      Social History:     Social History     Socioeconomic History    Marital status:      Spouse name: None    Number of children: None    Years of education: None    Highest education level: None   Occupational History    None   Tobacco Use    Smoking status: Never Smoker    Smokeless tobacco: Never Used   Vaping Use    Vaping Use: Never used   Substance and Sexual Activity    Alcohol use: No    Drug use: No    Sexual activity: None   Other Topics Concern    None   Social History Narrative    Caffeine use, active     Social Determinants of Health     Financial Resource Strain:     Difficulty of Paying Living Expenses:    Food Insecurity:     Worried About Running Out of Food in the Last Year:     Ran Out of Food in the Last Year:    Transportation Needs:     Lack of Transportation (Medical):      Lack of Transportation (Non-Medical):    Physical Activity:     Days of Exercise per Week:     Minutes of Exercise per Session:    Stress:     Feeling of Stress :    Social Connections:     Frequency of Communication with Friends and Family:     Frequency of Social Gatherings with Friends and Family:     Attends Rastafari Services:     Active Member of Clubs or Organizations:     Attends Club or Organization Meetings:     Marital Status:    Intimate Partner Violence:     Fear of Current or Ex-Partner:     Emotionally Abused:     Physically Abused:     Sexually Abused:       Medications and Allergies:     Current Outpatient Medications   Medication Sig Dispense Refill    cholecalciferol (VITAMIN D3) 1,000 units tablet Take 1,000 Units by mouth every morning      ibuprofen (MOTRIN IB) 200 mg tablet Take 1 tablet by mouth 3 (three) times a day as needed      Multiple Vitamins-Minerals (CENTRUM SILVER 50+WOMEN PO) Take by mouth every morning      sodium chloride (MARCIN 128) 2 % hypertonic ophthalmic solution Administer 1 drop to both eyes 3 (three) times a day Alesia 128 gel at HS       tetanus-diphtheria-acellular pertussis (ADACEL) 5-2-15 5 LF-mcg/0 5 injection Inject 0 5 mL into a muscle once for 1 dose 0 5 mL 0     No current facility-administered medications for this visit  Allergies   Allergen Reactions    Prednisone Swelling     Facial swelling, redness-no dyspnea      Immunizations:     Immunization History   Administered Date(s) Administered    INFLUENZA 11/11/2011, 10/12/2012, 10/14/2013    Influenza, high dose seasonal 0 7 mL 01/02/2019, 11/25/2019    Pneumococcal Conjugate 13-Valent 06/19/2018    Pneumococcal Polysaccharide PPV23 01/08/2013    SARS-CoV-2 / COVID-19 mRNA IM (Pfizer-BioNTech) 01/21/2021, 02/11/2021    Td (adult), adsorbed 07/30/2013    influenza, injectable, quadrivalent 11/03/2020      Health Maintenance: There are no preventive care reminders to display for this patient  Topic Date Due    DTaP,Tdap,and Td Vaccines (1 - Tdap) 08/04/1961      Medicare Health Risk Assessment:     Review of Systems   Constitutional: Negative for fever  Respiratory: Negative for shortness of breath  Cardiovascular: Negative for chest pain  /78 (BP Location: Left arm, Patient Position: Sitting, Cuff Size: Adult)   Pulse 64   Temp 97 8 °F (36 6 °C)   Resp 16   Ht 5' 1" (1 549 m)   Wt 66 2 kg (146 lb)   BMI 27 59 kg/m²      Physical Exam  Constitutional:       General: She is not in acute distress  Appearance: She is not ill-appearing  Cardiovascular:      Rate and Rhythm: Normal rate and regular rhythm  Heart sounds: Normal heart sounds  Pulmonary:      Effort: Pulmonary effort is normal       Breath sounds: Normal breath sounds  Cristofer Hong is here for her Subsequent Wellness visit  Health Risk Assessment:   Patient rates overall health as good  Patient feels that their physical health rating is same   Patient is very satisfied with their life  Eyesight was rated as same  Hearing was rated as same  Patient feels that their emotional and mental health rating is same  Patients states they are never, rarely angry  Patient states they are never, rarely unusually tired/fatigued  Pain experienced in the last 7 days has been none  Patient states that she has experienced no weight loss or gain in last 6 months  Depression Screening:   PHQ-2 Score: 0      Fall Risk Screening: In the past year, patient has experienced: no history of falling in past year      Urinary Incontinence Screening:   Patient has not leaked urine accidently in the last six months  Home Safety:  Patient does not have trouble with stairs inside or outside of their home  Patient has working smoke alarms and has working carbon monoxide detector  Home safety hazards include: none  Nutrition:   Current diet is Regular  Medications:   Patient is currently taking over-the-counter supplements  OTC medications include: see medication list  Patient is able to manage medications  Activities of Daily Living (ADLs)/Instrumental Activities of Daily Living (IADLs):   Walk and transfer into and out of bed and chair?: Yes  Dress and groom yourself?: Yes    Bathe or shower yourself?: Yes    Feed yourself? Yes  Do your laundry/housekeeping?: Yes  Manage your money, pay your bills and track your expenses?: Yes  Make your own meals?: Yes    Do your own shopping?: Yes    Previous Hospitalizations:   Any hospitalizations or ED visits within the last 12 months?: No      Advance Care Planning:   Living will: Yes    Durable POA for healthcare:  Yes    Advanced directive: Yes      Cognitive Screening:   Provider or family/friend/caregiver concerned regarding cognition?: No    PREVENTIVE SCREENINGS      Cardiovascular Screening:    General: Screening Not Indicated and History Lipid Disorder      Diabetes Screening:     General: Screening Current      Colorectal Cancer Screening:     General: Screening Not Indicated      Breast Cancer Screening:     General: Patient Declines      Cervical Cancer Screening:    General: Screening Not Indicated      Osteoporosis Screening:    General: Screening Not Indicated and History Osteoporosis      Abdominal Aortic Aneurysm (AAA) Screening:        General: Screening Not Indicated      Lung Cancer Screening:     General: Screening Not Indicated      Hepatitis C Screening:    General: Screening Not Indicated    Screening, Brief Intervention, and Referral to Treatment (SBIRT)    Screening  Typical number of drinks in a day: 0  Typical number of drinks in a week: 0  Interpretation: Low risk drinking behavior      AUDIT-C Screenin) How often did you have a drink containing alcohol in the past year? never  2) How many drinks did you have on a typical day when you were drinking in the past year? 0  3) How often did you have 6 or more drinks on one occasion in the past year? never    AUDIT-C Score: 0  Interpretation: Score 0-2 (female): Negative screen for alcohol misuse      Chelsea Sam, DO

## 2021-09-21 ENCOUNTER — APPOINTMENT (OUTPATIENT)
Dept: LAB | Facility: MEDICAL CENTER | Age: 81
End: 2021-09-21
Payer: MEDICARE

## 2021-09-21 DIAGNOSIS — E83.52 HYPERCALCEMIA: ICD-10-CM

## 2021-09-21 DIAGNOSIS — E55.9 VITAMIN D DEFICIENCY: ICD-10-CM

## 2021-09-21 LAB
25(OH)D3 SERPL-MCNC: 37.3 NG/ML (ref 30–100)
ANION GAP SERPL CALCULATED.3IONS-SCNC: 6 MMOL/L (ref 4–13)
BUN SERPL-MCNC: 13 MG/DL (ref 5–25)
CALCIUM SERPL-MCNC: 10.3 MG/DL (ref 8.3–10.1)
CHLORIDE SERPL-SCNC: 107 MMOL/L (ref 100–108)
CO2 SERPL-SCNC: 26 MMOL/L (ref 21–32)
CREAT SERPL-MCNC: 0.56 MG/DL (ref 0.6–1.3)
GFR SERPL CREATININE-BSD FRML MDRD: 87 ML/MIN/1.73SQ M
GLUCOSE P FAST SERPL-MCNC: 91 MG/DL (ref 65–99)
POTASSIUM SERPL-SCNC: 3.7 MMOL/L (ref 3.5–5.3)
PTH-INTACT SERPL-MCNC: 89.5 PG/ML (ref 18.4–80.1)
SODIUM SERPL-SCNC: 139 MMOL/L (ref 136–145)

## 2021-09-21 PROCEDURE — 82306 VITAMIN D 25 HYDROXY: CPT

## 2021-09-21 PROCEDURE — 83970 ASSAY OF PARATHORMONE: CPT

## 2021-09-21 PROCEDURE — 36415 COLL VENOUS BLD VENIPUNCTURE: CPT

## 2021-09-21 PROCEDURE — 80048 BASIC METABOLIC PNL TOTAL CA: CPT

## 2021-09-23 ENCOUNTER — TELEPHONE (OUTPATIENT)
Dept: ENDOCRINOLOGY | Facility: CLINIC | Age: 81
End: 2021-09-23

## 2021-09-23 NOTE — TELEPHONE ENCOUNTER
Calcium 10 3; parathyroid hormone elevated at 89   Renal function, vitamin-D within normal limits   Recommend keeping well-hydrated  Repeat CMP in 3-4 months

## 2022-03-01 ENCOUNTER — TELEPHONE (OUTPATIENT)
Dept: FAMILY MEDICINE CLINIC | Facility: MEDICAL CENTER | Age: 82
End: 2022-03-01

## 2022-03-01 ENCOUNTER — CLINICAL SUPPORT (OUTPATIENT)
Dept: FAMILY MEDICINE CLINIC | Facility: MEDICAL CENTER | Age: 82
End: 2022-03-01

## 2022-03-01 DIAGNOSIS — J02.9 SORE THROAT: Primary | ICD-10-CM

## 2022-03-01 PROCEDURE — U0003 INFECTIOUS AGENT DETECTION BY NUCLEIC ACID (DNA OR RNA); SEVERE ACUTE RESPIRATORY SYNDROME CORONAVIRUS 2 (SARS-COV-2) (CORONAVIRUS DISEASE [COVID-19]), AMPLIFIED PROBE TECHNIQUE, MAKING USE OF HIGH THROUGHPUT TECHNOLOGIES AS DESCRIBED BY CMS-2020-01-R: HCPCS | Performed by: FAMILY MEDICINE

## 2022-03-01 PROCEDURE — U0005 INFEC AGEN DETEC AMPLI PROBE: HCPCS | Performed by: FAMILY MEDICINE

## 2022-03-01 NOTE — TELEPHONE ENCOUNTER
Pt called wanting reccomendations, she is on her 3rd day of laryngitis, and a runny nose, no other sx, no temp  Pt took an allergy pill yesterday, but was wondering about gargling also

## 2022-03-01 NOTE — TELEPHONE ENCOUNTER
Watch for results  S/w patient  Patient was able to speak, not consistent with Laryngitis however she does have a sore throat most likely from PND  Advise to continue her allergy medication, stay Hydrated  Should covid testing in case her symptoms worsen or change and we need to see her   NV set up for covid swab today

## 2022-03-02 LAB — SARS-COV-2 RNA RESP QL NAA+PROBE: NEGATIVE

## 2022-03-02 NOTE — TELEPHONE ENCOUNTER
Pt called to ask if her covid test results were back  Advised that they were negative  Pt said she is feeling a little better  Her throat is better  She is just having some greenish/yellow nasal discharge    If she doesn't continue to feel better tomorrow, she will call for an appt    Routed to Dr Sindhu Mishra

## 2022-03-03 ENCOUNTER — OFFICE VISIT (OUTPATIENT)
Dept: FAMILY MEDICINE CLINIC | Facility: MEDICAL CENTER | Age: 82
End: 2022-03-03
Payer: MEDICARE

## 2022-03-03 VITALS
HEIGHT: 61 IN | OXYGEN SATURATION: 96 % | TEMPERATURE: 98.5 F | BODY MASS INDEX: 25.9 KG/M2 | SYSTOLIC BLOOD PRESSURE: 136 MMHG | WEIGHT: 137.2 LBS | DIASTOLIC BLOOD PRESSURE: 90 MMHG | HEART RATE: 90 BPM

## 2022-03-03 DIAGNOSIS — J30.9 ALLERGIC RHINITIS, UNSPECIFIED SEASONALITY, UNSPECIFIED TRIGGER: Primary | ICD-10-CM

## 2022-03-03 PROCEDURE — 99213 OFFICE O/P EST LOW 20 MIN: CPT

## 2022-03-03 NOTE — PROGRESS NOTES
Assessment/Plan:    No problem-specific Assessment & Plan notes found for this encounter  1  Allergic rhinitis, unspecified seasonality, unspecified trigger  Advised patient she may take OTC allergy medication without decongestant such as claritin or zyrtec as well as OTC saline nasal spray without steroid  Advised patient to call office if she develops cp, sob, fever  Patient understood  Follow up if symptoms worsen or persist        Subjective:      Patient ID: Edwar Urias is a 79 y o  female  26-year-old female presents with complaints of dry throat and postnasal drip x 4 days  Runny nose and sneezing x 3 days  Her symptoms have been improving  No new symptoms reported  She did try an otc generic allergy medication for a few days which she reports helped her symptoms however she stopped taking it because she wanted to be seen to make sure there was nothing else going on  Denies cough, ear pain, congestion, chills, fever, body aches, fatigue, chest pain, sob  COVID negative on 3/1/22  Denies sick contacts  The following portions of the patient's history were reviewed and updated as appropriate: allergies, current medications, past medical history, past social history, past surgical history and problem list     Review of Systems   Constitutional: Negative for chills, fatigue and fever  HENT: Positive for postnasal drip, rhinorrhea and sneezing  Negative for congestion, ear discharge, ear pain, sinus pressure, sinus pain, sore throat, tinnitus and trouble swallowing  Eyes: Negative for pain, redness, itching and visual disturbance  Respiratory: Negative for cough, chest tightness and shortness of breath  Cardiovascular: Negative for chest pain, palpitations and leg swelling  Gastrointestinal: Negative for abdominal pain, constipation, diarrhea, nausea and vomiting  Genitourinary: Negative for dysuria and hematuria  Musculoskeletal: Negative for arthralgias and back pain  Skin: Negative for color change and rash  Neurological: Negative for dizziness, seizures, syncope and headaches  Hematological: Negative for adenopathy  Psychiatric/Behavioral: Negative for agitation, behavioral problems and confusion  All other systems reviewed and are negative  Objective:      /90 (BP Location: Left arm, Patient Position: Sitting, Cuff Size: Adult)   Pulse 90   Temp 98 5 °F (36 9 °C)   Ht 5' 1" (1 549 m)   Wt 62 2 kg (137 lb 3 2 oz)   SpO2 96%   BMI 25 92 kg/m²          Physical Exam  Vitals reviewed  Constitutional:       General: She is not in acute distress  Appearance: Normal appearance  She is well-developed  She is not ill-appearing  HENT:      Head: Normocephalic and atraumatic  Right Ear: Tympanic membrane, ear canal and external ear normal  There is no impacted cerumen  Left Ear: Tympanic membrane, ear canal and external ear normal  There is no impacted cerumen  Nose: Rhinorrhea present  Right Sinus: No maxillary sinus tenderness or frontal sinus tenderness  Left Sinus: No maxillary sinus tenderness or frontal sinus tenderness  Mouth/Throat:      Lips: Pink  No lesions  Mouth: Mucous membranes are moist       Pharynx: Oropharynx is clear  No oropharyngeal exudate or posterior oropharyngeal erythema  Tonsils: No tonsillar exudate or tonsillar abscesses  Eyes:      Extraocular Movements: Extraocular movements intact  Pupils: Pupils are equal, round, and reactive to light  Cardiovascular:      Rate and Rhythm: Normal rate and regular rhythm  Pulses: Normal pulses  Heart sounds: Normal heart sounds  No murmur heard  Pulmonary:      Effort: Pulmonary effort is normal  No respiratory distress  Breath sounds: Normal breath sounds  No stridor  No wheezing or rhonchi  Musculoskeletal:         General: Normal range of motion  Cervical back: Normal range of motion and neck supple  Lymphadenopathy:      Cervical: No cervical adenopathy  Skin:     General: Skin is warm and dry  Neurological:      General: No focal deficit present  Mental Status: She is alert and oriented to person, place, and time  Psychiatric:         Mood and Affect: Mood normal          Behavior: Behavior normal          Thought Content:  Thought content normal                     Ettie Dawley

## 2022-03-03 NOTE — PATIENT INSTRUCTIONS
Allergic Rhinitis   WHAT YOU NEED TO KNOW:   Allergic rhinitis, or hay fever, is swelling of the inside of your nose  The swelling is a reaction to allergens in the air  An allergen can be anything that causes an allergic reaction  Allergies to weeds, grass, trees, or mold often cause seasonal allergic rhinitis  Indoor dust mites, cockroaches, pet dander, or mold can also cause allergic rhinitis  DISCHARGE INSTRUCTIONS:   Call 911 for the following:   · You have chest pain or shortness of breath  Return to the emergency department if:   · You have severe pain  · You cough up blood  Contact your healthcare provider if:   · You have a fever  · You have ear or sinus pain, or a headache  · Your symptoms get worse, even after treatment  · You have yellow, green, brown, or bloody mucus coming from your nose  · Your nose is bleeding or you have pain inside your nose  · You have trouble sleeping because of your symptoms  · You have questions or concerns about your condition or care  Medicines:   · Medicines  help decrease your symptoms and clear your stuffy nose  · Take your medicine as directed  Contact your healthcare provider if you think your medicine is not helping or if you have side effects  Tell him of her if you are allergic to any medicine  Keep a list of the medicines, vitamins, and herbs you take  Include the amounts, and when and why you take them  Bring the list or the pill bottles to follow-up visits  Carry your medicine list with you in case of an emergency  How to manage allergic rhinitis:  The best way to manage allergic rhinitis is to avoid allergens that can trigger your symptoms  Any of the following may help decrease your symptoms:  · Rinse your nose and sinuses  with a salt water solution or use a salt water nasal spray  This will help thin the mucus in your nose and rinse away pollen and dirt  It will also help reduce swelling so you can breathe normally  Ask your healthcare provider how often to rinse your nose  · Reduce exposure to dust mites  Wash sheets and towels in hot water every week  Cover your pillows and mattresses with allergen-free covers  Limit the number of stuffed animals and soft toys your child has  Wash your child's toys in hot water regularly  Vacuum weekly and use a vacuum  with an air filter  If possible, get rid of carpets and curtains  These collect dust and dust mites  · Reduce exposure to pollen  Keep windows and doors closed in your house and car  Stay inside when air pollution or the pollen count is high  Run your air conditioner on recycle, and change air filters often  Shower and wash your hair before bed every night to rinse away pollen  · Reduce exposure to pet dander  If possible, do not keep cats, dogs, birds, or other pets  If you do keep pets in your home, keep them out of bedrooms and carpeted rooms  Bathe them often  · Reduce exposure to mold  Do not spend time in basements  Choose artificial plants instead of live plants  Keep your home's humidity at less than 45%  Do not have ponds or standing water in your home or yard  · Do not smoke  Avoid others who smoke  Ask your healthcare provider for information if you currently smoke and need help to quit  Follow up with your healthcare provider as directed: You may need to see an allergist often to control your symptoms  Write down your questions so you remember to ask them during your visits  © [employer] 2022 Information is for End User's use only and may not be sold, redistributed or otherwise used for commercial purposes  All illustrations and images included in CareNotes® are the copyrighted property of A D A M , Inc  or [facility] [employer]   The above information is an  only  It is not intended as medical advice for individual conditions or treatments   Talk to your doctor, nurse or pharmacist before following any medical regimen to see if it is safe and effective for you     -You can take an over the counter allergy medication such as Claritin or Zyrtec without a Decongestant      -You can also use an over the counter saline nasal spray      -Call if symptoms worsen or persist

## 2022-05-31 ENCOUNTER — APPOINTMENT (OUTPATIENT)
Dept: LAB | Facility: MEDICAL CENTER | Age: 82
End: 2022-05-31
Payer: MEDICARE

## 2022-05-31 DIAGNOSIS — E83.52 HYPERCALCEMIA: ICD-10-CM

## 2022-05-31 LAB
ALBUMIN SERPL BCP-MCNC: 4 G/DL (ref 3.5–5)
ALP SERPL-CCNC: 91 U/L (ref 46–116)
ALT SERPL W P-5'-P-CCNC: 24 U/L (ref 12–78)
ANION GAP SERPL CALCULATED.3IONS-SCNC: 4 MMOL/L (ref 4–13)
AST SERPL W P-5'-P-CCNC: 23 U/L (ref 5–45)
BILIRUB SERPL-MCNC: 0.56 MG/DL (ref 0.2–1)
BUN SERPL-MCNC: 12 MG/DL (ref 5–25)
CALCIUM SERPL-MCNC: 10.8 MG/DL (ref 8.3–10.1)
CHLORIDE SERPL-SCNC: 111 MMOL/L (ref 100–108)
CO2 SERPL-SCNC: 27 MMOL/L (ref 21–32)
CREAT SERPL-MCNC: 0.62 MG/DL (ref 0.6–1.3)
GFR SERPL CREATININE-BSD FRML MDRD: 84 ML/MIN/1.73SQ M
GLUCOSE P FAST SERPL-MCNC: 101 MG/DL (ref 65–99)
POTASSIUM SERPL-SCNC: 3.9 MMOL/L (ref 3.5–5.3)
PROT SERPL-MCNC: 7.4 G/DL (ref 6.4–8.2)
SODIUM SERPL-SCNC: 142 MMOL/L (ref 136–145)

## 2022-05-31 PROCEDURE — 80053 COMPREHEN METABOLIC PANEL: CPT

## 2022-05-31 PROCEDURE — 36415 COLL VENOUS BLD VENIPUNCTURE: CPT

## 2022-06-07 DIAGNOSIS — M81.0 AGE-RELATED OSTEOPOROSIS WITHOUT CURRENT PATHOLOGICAL FRACTURE: ICD-10-CM

## 2022-06-07 DIAGNOSIS — E21.3 HYPERPARATHYROIDISM (HCC): ICD-10-CM

## 2022-06-07 DIAGNOSIS — E83.52 HYPERCALCEMIA: Primary | ICD-10-CM

## 2022-06-07 DIAGNOSIS — E55.9 VITAMIN D DEFICIENCY: ICD-10-CM

## 2022-09-17 ENCOUNTER — APPOINTMENT (OUTPATIENT)
Dept: LAB | Facility: MEDICAL CENTER | Age: 82
End: 2022-09-17
Payer: MEDICARE

## 2022-09-17 DIAGNOSIS — M81.0 AGE-RELATED OSTEOPOROSIS WITHOUT CURRENT PATHOLOGICAL FRACTURE: ICD-10-CM

## 2022-09-17 DIAGNOSIS — E55.9 VITAMIN D DEFICIENCY: ICD-10-CM

## 2022-09-17 DIAGNOSIS — E83.52 HYPERCALCEMIA: ICD-10-CM

## 2022-09-17 DIAGNOSIS — E21.3 HYPERPARATHYROIDISM (HCC): ICD-10-CM

## 2022-09-17 DIAGNOSIS — E04.2 MULTIPLE THYROID NODULES: ICD-10-CM

## 2022-09-17 LAB
25(OH)D3 SERPL-MCNC: 36.1 NG/ML (ref 30–100)
ALBUMIN SERPL BCP-MCNC: 4.2 G/DL (ref 3.5–5)
ALP SERPL-CCNC: 86 U/L (ref 46–116)
ALT SERPL W P-5'-P-CCNC: 22 U/L (ref 12–78)
ANION GAP SERPL CALCULATED.3IONS-SCNC: 3 MMOL/L (ref 4–13)
AST SERPL W P-5'-P-CCNC: 23 U/L (ref 5–45)
BILIRUB SERPL-MCNC: 0.52 MG/DL (ref 0.2–1)
BUN SERPL-MCNC: 14 MG/DL (ref 5–25)
CALCIUM SERPL-MCNC: 10.6 MG/DL (ref 8.3–10.1)
CHLORIDE SERPL-SCNC: 109 MMOL/L (ref 96–108)
CO2 SERPL-SCNC: 27 MMOL/L (ref 21–32)
CREAT SERPL-MCNC: 0.62 MG/DL (ref 0.6–1.3)
GFR SERPL CREATININE-BSD FRML MDRD: 83 ML/MIN/1.73SQ M
GLUCOSE P FAST SERPL-MCNC: 94 MG/DL (ref 65–99)
POTASSIUM SERPL-SCNC: 3.9 MMOL/L (ref 3.5–5.3)
PROT SERPL-MCNC: 7.9 G/DL (ref 6.4–8.4)
PTH-INTACT SERPL-MCNC: 76.5 PG/ML (ref 18.4–80.1)
SODIUM SERPL-SCNC: 139 MMOL/L (ref 135–147)

## 2022-09-17 PROCEDURE — 82306 VITAMIN D 25 HYDROXY: CPT

## 2022-09-17 PROCEDURE — 80053 COMPREHEN METABOLIC PANEL: CPT

## 2022-09-17 PROCEDURE — 36415 COLL VENOUS BLD VENIPUNCTURE: CPT

## 2022-09-17 PROCEDURE — 83970 ASSAY OF PARATHORMONE: CPT

## 2022-09-19 ENCOUNTER — OFFICE VISIT (OUTPATIENT)
Dept: ENDOCRINOLOGY | Facility: CLINIC | Age: 82
End: 2022-09-19
Payer: MEDICARE

## 2022-09-19 VITALS
DIASTOLIC BLOOD PRESSURE: 80 MMHG | HEIGHT: 61 IN | HEART RATE: 79 BPM | WEIGHT: 141 LBS | SYSTOLIC BLOOD PRESSURE: 122 MMHG | BODY MASS INDEX: 26.62 KG/M2

## 2022-09-19 DIAGNOSIS — E04.2 MULTIPLE THYROID NODULES: ICD-10-CM

## 2022-09-19 DIAGNOSIS — E55.9 VITAMIN D DEFICIENCY: ICD-10-CM

## 2022-09-19 DIAGNOSIS — E83.52 HYPERCALCEMIA: ICD-10-CM

## 2022-09-19 DIAGNOSIS — E21.3 HYPERPARATHYROIDISM (HCC): Primary | ICD-10-CM

## 2022-09-19 DIAGNOSIS — M81.0 AGE-RELATED OSTEOPOROSIS WITHOUT CURRENT PATHOLOGICAL FRACTURE: ICD-10-CM

## 2022-09-19 PROCEDURE — 99214 OFFICE O/P EST MOD 30 MIN: CPT | Performed by: INTERNAL MEDICINE

## 2022-09-19 NOTE — PROGRESS NOTES
Ana Marie 79 y o  female MRN: [id number]    Encounter: [id number]      Assessment/Plan     1  Primary hyperparathyroidism   2  Hypercalcemia   3  History of vitamin-D deficiency-most recent vitamin-D levels within normal limits   4  Osteoporosis  Calcium levels remains stable, and no concerning symptoms   - continue vitamin-D supplementation, advised to ensure calcium intake of at least 800 mg orally daily  -keep well hydrated   -repeat DEXA scan reordered include distal 1/3 of the radius  -weight-bearing exercise as tolerated    5  Multiple thyroid nodules   -added on TSH, free T4 to recent labs   -Repeat ultrasound to assess interval change in thyroid nodules      Follow-up in 4-6 months with repeat labs    CC:  Primary hyperparathyroidism, thyroid nodules    History of Present Illness     HPI:  Ana Marie is a 79 y o  female who is here for a follow-up of primary hyperparathyroidism, hypercalcemia, vitamin-D deficiency, osteoporosis   Also has history of thyroid nodules   Last seen in 02/2021-telemedicine    DXA:  01/2019-osteoporosis  Has never been on osteoporosis medication and has in the past verbalized not wanting to either  Has a h/o hammer toes and toe drop - has a brace an uses a cane to ambulate if going far     vitamin D3 1000 IU daily +MVI   No separate calcium supplements   Diet - 1 cup of milk per day  Does not have cheese/ yogurt often     No fractures   No falls/ balance issues   No bone pain   Gets joint pain which is likely related osteoarthritis   Does not always keep well hydrated   Tries to walk     History of thyroid nodules-last ultrasound 09/2020  Denies any swelling in the neck   No obstructive symptoms       All other systems were reviewed and are negative         Review of Systems    Historical Information   Past Medical History:   Diagnosis Date    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-mother also has had the corneal disease    Arthritis     Foot drop, left foot 05/2017    S/P shingles that attacked the nerves-wears a brace prn,uses a cane for long distance    Irregular heart beat     regular, irregular-Dr Garcia-ECHO-negative,no heart disease    Murmur     had ECHO-on 6/28/18    Rotator cuff injury     right-slight tear-healed with therapy    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    Spinal stenosis     Varicose veins of legs     Vitamin D deficiency     Wears glasses      Past Surgical History:   Procedure Laterality Date    CATARACT EXTRACTION      HI XCAPSL CTRC RMVL INSJ IO LENS PROSTH W/O ECP Right 5/14/2018    Procedure: EXTRACTION EXTRACAPSULAR CATARACT PHACO INTRAOCULAR LENS (IOL); Surgeon: Vj Silva MD;  Location: [facility] MAIN OR;  Service: Ophthalmology    HI XCAPSL CTRC RMVL INSJ IO LENS PROSTH W/O ECP Left 7/10/2018    Procedure: EXTRACTION EXTRACAPSULAR CATARACT PHACO INTRAOCULAR LENS (IOL);   Surgeon: Vj Silva MD;  Location: [facility] MAIN OR;  Service: Ophthalmology    TUBAL LIGATION       Social History   Social History     Substance and Sexual Activity   Alcohol Use No     Social History     Substance and Sexual Activity   Drug Use No     Social History     Tobacco Use   Smoking Status Never Smoker   Smokeless Tobacco Never Used     Family History:   Family History   Problem Relation Age of Onset    Other Mother         corneal transplants, Parathyroid disease    Osteoporosis Mother     Stroke Father     Hypertension Father     Heart disease Father         cardiac disorder    Osteoporosis Father     Other Father         Parathyroid disease    Asthma Sister     Hiatal hernia Sister     Other Brother         parathyroid-removed, Parathyroid disease    Heart disease Brother         " maker"       Meds/Allergies   Current Outpatient Medications   Medication Sig Dispense Refill    cholecalciferol (VITAMIN D3) 1,000 units tablet Take 1,000 Units by mouth every morning      ibuprofen (MOTRIN) 200 mg tablet Take 1 tablet by mouth 3 (three) times a day as needed      Multiple Vitamins-Minerals (CENTRUM SILVER 50+WOMEN PO) Take by mouth every morning      sodium chloride (ALESIA 128) 2 % hypertonic ophthalmic solution Administer 1 drop to both eyes 3 (three) times a day Alesia 128 gel at HS        No current facility-administered medications for this visit  Allergies   Allergen Reactions    Prednisone Swelling     Facial swelling, redness-no dyspnea       Objective   Vitals: Blood pressure 122/80, pulse 79, height 5' 1" (1 549 m), weight 64 kg (141 lb)  Physical Exam  Constitutional:       Appearance: She is well-developed  HENT:      Head: Normocephalic and atraumatic  Eyes:      Conjunctiva/sclera: Conjunctivae normal       Pupils: Pupils are equal, round, and reactive to light  Neck:      Thyroid: No thyromegaly  Comments: No thyromegaly   Nontender, no nodules appreciated on palpation       Cardiovascular:      Rate and Rhythm: Normal rate and regular rhythm  Heart sounds: Normal heart sounds  No murmur heard  Pulmonary:      Effort: Pulmonary effort is normal       Breath sounds: Normal breath sounds  No wheezing  Abdominal:      General: There is no distension  Palpations: Abdomen is soft  Tenderness: There is no abdominal tenderness  Musculoskeletal:         General: Tenderness present  Normal range of motion  Cervical back: Normal range of motion and neck supple  Comments: Lower lumbar tenderness   Skin:     General: Skin is warm and dry  Neurological:      Mental Status: She is alert and oriented to person, place, and time  Coordination: Coordination normal    Psychiatric:         Behavior: Behavior normal          The history was obtained from the review of the chart, patient      Lab Results:      Lab Results   Component Value Date    WBC 18 58 (H) 12/03/2019    HGB 11 0 (L) 12/03/2019    HCT 33 3 (L) 12/03/2019    MCV 89 12/03/2019     12/03/2019     Lab Results   Component Value Date    CREATININE 0 62 09/17/2022    BUN 14 09/17/2022    K 3 9 09/17/2022     (H) 09/17/2022    CO2 27 09/17/2022     No results found for: HGBA1C      Imaging Studies:   Results for orders placed during the hospital encounter of 09/01/20    US thyroid    Impression  Thyroid nodules as detailed above  Reference: ACR Thyroid Imaging, Reporting and Data System (TI-RADS): White Paper of the [employer]   J AM [place] [facility] 3099;[id number]  (additional recommendations based on American Thyroid Association 2015 guidelines )      Workstation performed: [id number]    Component      Latest Ref Rng & Units 6/17/2021 9/21/2021 3/1/2022 5/31/2022   Sodium      135 - 147 mmol/L 141 139  142   Potassium      3 5 - 5 3 mmol/L 4 2 3 7  3 9   Chloride      96 - 108 mmol/L 111 (H) 107  111 (H)   CO2      21 - 32 mmol/L 27 26  27   Anion Gap      4 - 13 mmol/L 3 (L) 6  4   BUN      5 - 25 mg/dL 16 13  12   Creatinine      0 60 - 1 30 mg/dL 0 58 (L) 0 56 (L)  0 62   GLUCOSE FASTING      65 - 99 mg/dL 89 91  101 (H)   Calcium      8 3 - 10 1 mg/dL 10 5 (H) 10 3 (H)  10 8 (H)   CORRECTED CALCIUM      8 3 - 10 1 mg/dL       AST      5 - 45 U/L 25   23   ALT      12 - 78 U/L 24   24   Alkaline Phosphatase      46 - 116 U/L 92   91   Total Protein      6 4 - 8 4 g/dL 7 9   7 4   Albumin      3 5 - 5 0 g/dL 4 2   4 0   TOTAL BILIRUBIN      0 20 - 1 00 mg/dL 0 55   0 56   eGFR      ml/min/1 73sq m 87 87  84   CREATININE 24 HOUR UR      0 6 - 1 8 g/24Hr       TOTAL URINE VOLUME      ml       24 HR URINE VOLUME      mL       CALCIUM 24 HOUR URINE      42 - 353 mg/24 hrs       Vit D, 25-Hydroxy      30 0 - 100 0 ng/mL 36 2 37 3     Phosphorus      2 3 - 4 1 mg/dL 3 2      Magnesium      1 6 - 2 6 mg/dL 2 6      PARATHYROID HORMONE      18 4 - 80 1 pg/mL 80 5 (H) 89 5 (H)     SARS-COV-2      Negative   Negative      Component      Latest Ref Rng & Units 9/17/2022   Sodium      135 - 147 mmol/L 139   Potassium      3 5 - 5 3 mmol/L 3 9   Chloride      96 - 108 mmol/L 109 (H)   CO2      21 - 32 mmol/L 27   Anion Gap      4 - 13 mmol/L 3 (L)   BUN      5 - 25 mg/dL 14   Creatinine      0 60 - 1 30 mg/dL 0 62   GLUCOSE FASTING      65 - 99 mg/dL 94   Calcium      8 3 - 10 1 mg/dL 10 6 (H)   CORRECTED CALCIUM      8 3 - 10 1 mg/dL    AST      5 - 45 U/L 23   ALT      12 - 78 U/L 22   Alkaline Phosphatase      46 - 116 U/L 86   Total Protein      6 4 - 8 4 g/dL 7 9   Albumin      3 5 - 5 0 g/dL 4 2   TOTAL BILIRUBIN      0 20 - 1 00 mg/dL 0 52   eGFR      ml/min/1 73sq m 83   CREATININE 24 HOUR UR      0 6 - 1 8 g/24Hr    TOTAL URINE VOLUME      ml    24 HR URINE VOLUME      mL    CALCIUM 24 HOUR URINE      42 - 353 mg/24 hrs    Vit D, 25-Hydroxy      30 0 - 100 0 ng/mL 36 1   Phosphorus      2 3 - 4 1 mg/dL    Magnesium      1 6 - 2 6 mg/dL    PARATHYROID HORMONE      18 4 - 80 1 pg/mL 76 5   SARS-COV-2      Negative      I have personally reviewed pertinent reports  Portions of the record may have been created with voice recognition software  Occasional wrong word or "sound a like" substitutions may have occurred due to the inherent limitations of voice recognition software  Read the chart carefully and recognize, using context, where substitutions have occurred

## 2022-09-19 NOTE — PATIENT INSTRUCTIONS
Continue vitamin-D supplementation   Keep well hydrated  Recommend ensuring calcium intake of at least 800 mg daily   Please take a look at your multivitamin to see how much calcium you are getting through it, if not getting an adequate amount through diet/supplementation, please try to increase your dietary intake  Weight-bearing exercises as tolerated     You are being referred for an ultrasound of the thyroid and bone density scan   Follow-up in 4-6 months with repeat labs      A Guide to Calcium-Rich Foods  We all know that milk is a great source of calcium, but you may be surprised by all the different foods you can work into your diet to reach your daily recommended amount of calcium   Use the guide below to get ideas of additional calcium-rich foods to add to your weekly shopping list   Produce Serving Size Estimated Calcium*   Jen greens, frozen 8 oz 360 mg   Broccoli korey 8 oz 200 mg   Kale, frozen 8 oz 180 mg   Soy Beans, green, boiled 8 oz 175 mg   Bok Manny, cooked, boiled 8 oz 160 mg   Figs, dried 2 figs 65 mg   Broccoli, fresh, cooked 8 oz 60 mg   Oranges 1 whole 55 mg   Seafood Serving Size Estimated Calcium*   Sardines, canned with bones 3 oz 325 mg   Mascot, canned with bones 3 oz 180 mg   Shrimp, canned 3 oz 125 mg   Dairy Serving Size Estimated Calcium*   Ricotta, part-skim 4 oz 335 mg   Yogurt, plain, low-fat 6 oz 310 mg   Milk, skim, low-fat, whole 8 oz 300 mg   Yogurt with fruit, low-fat 6 oz 260 mg   Mozzarella, part-skim 1 oz 210 mg   Cheddar 1 oz 205 mg   Yogurt, Greek 6 oz 200 mg   American Cheese 1 oz 195 mg   Feta Cheese 4 oz 140 mg   Cottage Cheese, 2% 4 oz 105 mg   Frozen yogurt, vanilla 8 oz 105 mg   Ice Cream, vanilla 8 oz 85 mg   Parmesan 1 tbsp 55 mg   Fortified Food Serving Size Estimated Calcium*   [place] milk, rice milk or soy milk, fortified 8 oz 300 mg   Orange juice and other fruit juices, fortified 8 oz 300 mg   Tofu, prepared with calcium 4 oz 205 mg   Waffle, frozen, fortified 2 pieces 200 mg   Oatmeal, fortified 1 packet 140 mg   English muffin, fortified 1 muffin 100 mg   Cereal, fortified 8 oz 100-1,000 mg   Other Serving Size Estimated Calcium*   Mac & cheese, frozen 1 package 325 mg   Pizza, cheese, frozen 1 serving 115 mg   Pudding, chocolate, prepared with 2% milk 4 oz 160 mg   Beans, baked, canned 4 oz 160 mg   *The calcium content listed for most foods is estimated and can vary due to multiple factors  Check the food label to determine how much calcium is in a particular product

## 2022-09-26 ENCOUNTER — OFFICE VISIT (OUTPATIENT)
Dept: FAMILY MEDICINE CLINIC | Facility: MEDICAL CENTER | Age: 82
End: 2022-09-26
Payer: MEDICARE

## 2022-09-26 VITALS
BODY MASS INDEX: 26.32 KG/M2 | HEIGHT: 61 IN | WEIGHT: 139.4 LBS | SYSTOLIC BLOOD PRESSURE: 144 MMHG | DIASTOLIC BLOOD PRESSURE: 84 MMHG | TEMPERATURE: 98.6 F | HEART RATE: 94 BPM | RESPIRATION RATE: 16 BRPM

## 2022-09-26 DIAGNOSIS — Z00.00 MEDICARE ANNUAL WELLNESS VISIT, SUBSEQUENT: Primary | ICD-10-CM

## 2022-09-26 DIAGNOSIS — Z23 IMMUNIZATION DUE: ICD-10-CM

## 2022-09-26 DIAGNOSIS — E78.5 HYPERLIPIDEMIA, UNSPECIFIED HYPERLIPIDEMIA TYPE: ICD-10-CM

## 2022-09-26 PROCEDURE — G0009 ADMIN PNEUMOCOCCAL VACCINE: HCPCS

## 2022-09-26 PROCEDURE — 90677 PCV20 VACCINE IM: CPT

## 2022-09-26 PROCEDURE — G0439 PPPS, SUBSEQ VISIT: HCPCS | Performed by: STUDENT IN AN ORGANIZED HEALTH CARE EDUCATION/TRAINING PROGRAM

## 2022-09-26 NOTE — ASSESSMENT & PLAN NOTE
· Discussed shingles vaccine  · Recommended 2nd COVID-19 booster 6 months after 1st booster (patient states she completed COVID-19 vaccination series and 1 booster)  · Called about PCV 20 vaccine, patient agreeable to receiving today  · She will receive influenza vaccine at pharmacy she states

## 2022-09-26 NOTE — ASSESSMENT & PLAN NOTE
· Discussed with patient at this age, benefit of statin is discussed based on shared decision making

## 2022-09-26 NOTE — PROGRESS NOTES
Assessment and Plan:     Problem List Items Addressed This Visit        Other    Hyperlipidemia     · Discussed with patient at this age, benefit of statin is discussed based on shared decision making  Relevant Orders    Lipid panel    Medicare annual wellness visit, subsequent - Primary     · Discussed shingles vaccine  · Recommended 2nd COVID-19 booster 6 months after 1st booster (patient states she completed COVID-19 vaccination series and 1 booster)  · Called about PCV 20 vaccine, patient agreeable to receiving today  · She will receive influenza vaccine at pharmacy she states  Other Visit Diagnoses     BMI 26 0-26 9,adult        Immunization due        Relevant Orders    Pneumococcal Conjugate Vaccine 20-valent (Pcv20)        BMI Counseling: Body mass index is 26 34 kg/m²  The BMI is above normal  Nutrition recommendations include encouraging healthy choices of fruits and vegetables  Exercise recommendations include strength training exercises  Rationale for BMI follow-up plan is due to patient being overweight or obese  Depression Screening and Follow-up Plan: Patient was screened for depression during today's encounter  They screened negative with a PHQ-2 score of 0  Preventive health issues were discussed with patient, and age appropriate screening tests were ordered as noted in patient's After Visit Summary  Personalized health advice and appropriate referrals for health education or preventive services given if needed, as noted in patient's After Visit Summary       History of Present Illness:     Patient presents for a Medicare Wellness Visit    HPI   Patient Care Team:  Chelsea Sam DO as PCP - General (Family Medicine)  Maureen Holm, MD Brittany Bland MD Gareld Hench, MD       Problem List:     Patient Active Problem List   Diagnosis    Left foot drop    History of shingles    Increased PTH level    Nodular thyroid disease    Degenerative lumbar spinal stenosis    Hypercalcemia    Irregular heartbeat    Premature atrial contraction    Tendinitis of right rotator cuff    Varicose veins of bilateral lower extremities with other complications    Aortic valve regurgitation    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    Stress fracture of calcaneus    Osteoporosis    Hyperlipidemia    Vitamin D deficiency    Pneumonia    Leukocytosis    Hyperparathyroidism ([address] )    Multiple thyroid nodules    Medicare annual wellness visit, subsequent      Past Medical and Surgical History:     Past Medical History:   Diagnosis Date    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-mother also has had the corneal disease    Arthritis     Foot drop, left foot 05/2017    S/P shingles that attacked the nerves-wears a brace prn,uses a cane for long distance    Irregular heart beat     regular, irregular-Dr Garcia-ECHO-negative,no heart disease    Murmur     had ECHO-on 6/28/18    Rotator cuff injury     right-slight tear-healed with therapy    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    Spinal stenosis     Varicose veins of legs     Vitamin D deficiency     Wears glasses      Past Surgical History:   Procedure Laterality Date    CATARACT EXTRACTION      GA XCAPSL CTRC RMVL INSJ IO LENS PROSTH W/O ECP Right 5/14/2018    Procedure: EXTRACTION EXTRACAPSULAR CATARACT PHACO INTRAOCULAR LENS (IOL); Surgeon: Khushbu Moralez MD;  Location: [facility] MAIN OR;  Service: Ophthalmology    GA XCAPSL CTRC RMVL INSJ IO LENS PROSTH W/O ECP Left 7/10/2018    Procedure: EXTRACTION EXTRACAPSULAR CATARACT PHACO INTRAOCULAR LENS (IOL);   Surgeon: Khushbu Moralez MD;  [facility] OR;  Service: Ophthalmology    TUBAL LIGATION        Family History:     Family History   Problem Relation Age of Onset    Other Mother         corneal transplants, Parathyroid disease    Osteoporosis Mother     Stroke Father     Hypertension Father  Heart disease Father         cardiac disorder    Osteoporosis Father     Other Father         Parathyroid disease    Asthma Sister     Hiatal hernia Sister     Other Brother         parathyroid-removed, Parathyroid disease    Heart disease Brother         " maker"      Social History:     Social History     Socioeconomic History    Marital status:      Spouse name: None    Number of children: None    Years of education: None    Highest education level: None   Occupational History    None   Tobacco Use    Smoking status: Never Smoker    Smokeless tobacco: Never Used   Vaping Use    Vaping Use: Never used   Substance and Sexual Activity    Alcohol use: No    Drug use: No    Sexual activity: None   Other Topics Concern    None   Social History Narrative    Caffeine use, active     Social Determinants of Health     Financial Resource Strain: Low Risk     Difficulty of Paying Living Expenses: Not hard at all   Food Insecurity: Not on file   Transportation Needs: No Transportation Needs    Lack of Transportation (Medical): No    Lack of Transportation (Non-Medical): No   Physical Activity: Not on file   Stress: Not on file   Social Connections: Not on file   Intimate Partner Violence: Not on file   Housing Stability: Not on file      Medications and Allergies:     Current Outpatient Medications   Medication Sig Dispense Refill    cholecalciferol (VITAMIN D3) 1,000 units tablet Take 1,000 Units by mouth every morning      ibuprofen (MOTRIN) 200 mg tablet Take 1 tablet by mouth 3 (three) times a day as needed      Multiple Vitamins-Minerals (CENTRUM SILVER 50+WOMEN PO) Take by mouth every morning      sodium chloride (ALESIA 128) 2 % hypertonic ophthalmic solution Administer 1 drop to both eyes 3 (three) times a day Alesia 128 gel at HS        No current facility-administered medications for this visit       Allergies   Allergen Reactions    Prednisone Swelling     Facial swelling, redness-no dyspnea      Immunizations:     Immunization History   Administered Date(s) Administered    COVID-19 PFIZER VACCINE 0 3 ML IM 01/21/2021, 02/11/2021    INFLUENZA 11/11/2011, 10/12/2012, 10/14/2013, 11/03/2020    Influenza, high dose seasonal 0 7 mL 01/02/2019, 11/25/2019    Pneumococcal Conjugate 13-Valent 06/19/2018    Pneumococcal Polysaccharide PPV23 01/08/2013    Td (adult), adsorbed 07/30/2013    influenza, injectable, quadrivalent 11/03/2020      Health Maintenance: There are no preventive care reminders to display for this patient  Topic Date Due    COVID-19 Vaccine (3 - Booster for Pfizer series) 07/11/2021    Influenza Vaccine (1) 09/01/2022      Medicare Screening Tests and Risk Assessments:     Sonya is here for her Subsequent Wellness visit  Health Risk Assessment:   Patient rates overall health as good  Patient feels that their physical health rating is same  Patient is very satisfied with their life  Eyesight was rated as same  Hearing was rated as same  Patient feels that their emotional and mental health rating is same  Patients states they are never, rarely angry  Patient states they are never, rarely unusually tired/fatigued  Pain experienced in the last 7 days has been none  Patient states that she has experienced no weight loss or gain in last 6 months  Depression Screening:   PHQ-2 Score: 0      Fall Risk Screening: In the past year, patient has experienced: no history of falling in past year      Urinary Incontinence Screening:   Patient has not leaked urine accidently in the last six months  Home Safety:  Patient does not have trouble with stairs inside or outside of their home  Patient has working smoke alarms and has no working carbon monoxide detector  Home safety hazards include: none  Nutrition:   Current diet is Regular  Medications:   Patient is not currently taking any over-the-counter supplements   Patient is able to manage medications  Activities of Daily Living (ADLs)/Instrumental Activities of Daily Living (IADLs):   Walk and transfer into and out of bed and chair?: Yes  Dress and groom yourself?: Yes    Bathe or shower yourself?: Yes    Feed yourself? Yes  Do your laundry/housekeeping?: Yes  Manage your money, pay your bills and track your expenses?: Yes  Make your own meals?: Yes    Do your own shopping?: Yes    Previous Hospitalizations:   Any hospitalizations or ED visits within the last 12 months?: No      Advance Care Planning:   Living will: Yes    Durable POA for healthcare: Yes    Advanced directive: Yes      Comments: Son and grandson do more intensive housework such as mowing lawn, etc      Cognitive Screening:   Provider or family/friend/caregiver concerned regarding cognition?: No    PREVENTIVE SCREENINGS      Cardiovascular Screening:    General: Risks and Benefits Discussed    Due for: Lipid Panel      Diabetes Screening:     General: Screening Current      Colorectal Cancer Screening:     General: Risks and Benefits Discussed and Patient Declines      Breast Cancer Screening:     General: Risks and Benefits Discussed      Cervical Cancer Screening:    General: Screening Not Indicated      Osteoporosis Screening:    General: History Osteoporosis      Abdominal Aortic Aneurysm (AAA) Screening:        General: Screening Not Indicated      Lung Cancer Screening:     General: Screening Not Indicated      Hepatitis C Screening:    General: Risks and Benefits Discussed and Patient Declines      Preventive Screening Comments: DXA scan scheduled for next week  Patient states she prefers to call for script for mammogram as she currently has several orders she needs to follow up on/are scheduled  Screening, Brief Intervention, and Referral to Treatment (SBIRT)    Screening  Typical number of drinks in a day: 0  Typical number of drinks in a week: 0  Interpretation: Low risk drinking behavior      AUDIT-C Screenin) How often did you have a drink containing alcohol in the past year? never  2) How many drinks did you have on a typical day when you were drinking in the past year? 0  3) How often did you have 6 or more drinks on one occasion in the past year? never    AUDIT-C Score: 0  Interpretation: Score 0-2 (female): Negative screen for alcohol misuse    Single Item Drug Screening:  How often have you used an illegal drug (including marijuana) or a prescription medication for non-medical reasons in the past year? never    Single Item Drug Screen Score: 0  Interpretation: Negative screen for possible drug use disorder    Other Counseling Topics:   Car/seat belt/driving safety, skin self-exam, sunscreen and regular weightbearing exercise and calcium and vitamin D intake         /84 (BP Location: Left arm, Patient Position: Sitting, Cuff Size: Adult)   Pulse 94   Temp 98 6 °F (37 °C)   Resp 16   Ht 5' 1" (1 549 m)   Wt 63 2 kg (139 lb 6 4 oz)   BMI 26 34 kg/m²       Pritesh Valdes, DO

## 2022-09-26 NOTE — PATIENT INSTRUCTIONS
Medicare Preventive Visit Patient Instructions  Thank you for completing your Welcome to Medicare Visit or Medicare Annual Wellness Visit today  Your next wellness visit will be due in one year (9/27/2023)  The screening/preventive services that you may require over the next 5-10 years are detailed below  Some tests may not apply to you based off risk factors and/or age  Screening tests ordered at today's visit but not completed yet may show as past due  Also, please note that scanned in results may not display below  Preventive Screenings:  Service Recommendations Previous Testing/Comments   Colorectal Cancer Screening  * Colonoscopy    * Fecal Occult Blood Test (FOBT)/Fecal Immunochemical Test (FIT)  * Fecal DNA/Cologuard Test  * Flexible Sigmoidoscopy Age: 39-70 years old   Colonoscopy: every 10 years (may be performed more frequently if at higher risk)  OR  FOBT/FIT: every 1 year  OR  Cologuard: every 3 years  OR  Sigmoidoscopy: every 5 years  Screening may be recommended earlier than age 39 if at higher risk for colorectal cancer  Also, an individualized decision between you and your healthcare provider will decide whether screening between the ages of 74-80 would be appropriate  Colonoscopy: Not on file  FOBT/FIT: Not on file  Cologuard: Not on file  Sigmoidoscopy: Not on file          Breast Cancer Screening Age: 36 years old  Frequency: every 1-2 years  Not required if history of left and right mastectomy Mammogram: 01/18/2019        Cervical Cancer Screening Between the ages of 21-29, pap smear recommended once every 3 years  Between the ages of 33-67, can perform pap smear with HPV co-testing every 5 years     Recommendations may differ for women with a history of total hysterectomy, cervical cancer, or abnormal pap smears in past  Pap Smear: Not on file    Screening Not Indicated   Hepatitis C Screening Once for adults born between [facility]  More frequently in patients at high risk for Hepatitis C Hep C Antibody: Not on file        Diabetes Screening 1-2 times per year if you're at risk for diabetes or have pre-diabetes Fasting glucose: 94 mg/dL (9/17/2022)  A1C: No results in last 5 years (No results in last 5 years)  Screening Current   Cholesterol Screening Once every 5 years if you don't have a lipid disorder  May order more often based on risk factors  Lipid panel: 06/25/2020    Screening Not Indicated  History Lipid Disorder     Other Preventive Screenings Covered by Medicare:  1  Abdominal Aortic Aneurysm (AAA) Screening: covered once if your at risk  You're considered to be at risk if you have a family history of AAA  2  Lung Cancer Screening: covers low dose CT scan once per year if you meet all of the following conditions: (1) Age 50-69; (2) No signs or symptoms of lung cancer; (3) Current smoker or have quit smoking within the last 15 years; (4) You have a tobacco smoking history of at least 20 pack years (packs per day multiplied by number of years you smoked); (5) You get a written order from a healthcare provider  3  Glaucoma Screening: covered annually if you're considered high risk: (1) You have diabetes OR (2) Family history of glaucoma OR (3)  aged 48 and older OR (3)  American aged 72 and older  3  Osteoporosis Screening: covered every 2 years if you meet one of the following conditions: (1) You're estrogen deficient and at risk for osteoporosis based off medical history and other findings; (2) Have a vertebral abnormality; (3) On glucocorticoid therapy for more than 3 months; (4) Have primary hyperparathyroidism; (5) On osteoporosis medications and need to assess response to drug therapy  · Last bone density test (DXA Scan): 01/18/2019   5  HIV Screening: covered annually if you're between the age of 15-65  Also covered annually if you are younger than 13 and older than 72 with risk factors for HIV infection   For pregnant patients, it is covered up to 3 times per pregnancy  Immunizations:  Immunization Recommendations   Influenza Vaccine Annual influenza vaccination during flu season is recommended for all persons aged >= 6 months who do not have contraindications   Pneumococcal Vaccine   * Pneumococcal conjugate vaccine = PCV13 (Prevnar 13), PCV15 (Vaxneuvance), PCV20 (Prevnar 20)  * Pneumococcal polysaccharide vaccine = PPSV23 (Pneumovax) Adults 25-60 years old: 1-3 doses may be recommended based on certain risk factors  Adults 72 years old: 1-2 doses may be recommended based off what pneumonia vaccine you previously received   Hepatitis B Vaccine 3 dose series if at intermediate or high risk (ex: diabetes, end stage renal disease, liver disease)   Tetanus (Td) Vaccine - COST NOT COVERED BY MEDICARE PART B Following completion of primary series, a booster dose should be given every 10 years to maintain immunity against tetanus  Td may also be given as tetanus wound prophylaxis  Tdap Vaccine - COST NOT COVERED BY MEDICARE PART B Recommended at least once for all adults  For pregnant patients, recommended with each pregnancy  Shingles Vaccine (Shingrix) - COST NOT COVERED BY MEDICARE PART B  2 shot series recommended in those aged 48 and above     Health Maintenance Due:  There are no preventive care reminders to display for this patient  Immunizations Due:      Topic Date Due    COVID-19 Vaccine (3 - Booster for Pfizer series) 07/11/2021    Influenza Vaccine (1) 09/01/2022     Advance Directives   What are advance directives? Advance directives are legal documents that state your wishes and plans for medical care  These plans are made ahead of time in case you lose your ability to make decisions for yourself  Advance directives can apply to any medical decision, such as the treatments you want, and if you want to donate organs  What are the types of advance directives?   There are many types of advance directives, and each state has rules about how to use them  You may choose a combination of any of the following:  · Living will: This is a written record of the treatment you want  You can also choose which treatments you do not want, which to limit, and which to stop at a certain time  This includes surgery, medicine, IV fluid, and tube feedings  · Durable power of  for healthcare [facility]): This is a written record that states who you want to make healthcare choices for you when you are unable to make them for yourself  This person, called a proxy, is usually a family member or a friend  You may choose more than 1 proxy  · Do not resuscitate (DNR) order:  A DNR order is used in case your heart stops beating or you stop breathing  It is a request not to have certain forms of treatment, such as CPR  A DNR order may be included in other types of advance directives  · Medical directive: This covers the care that you want if you are in a coma, near death, or unable to make decisions for yourself  You can list the treatments you want for each condition  Treatment may include pain medicine, surgery, blood transfusions, dialysis, IV or tube feedings, and a ventilator (breathing machine)  · Values history: This document has questions about your views, beliefs, and how you feel and think about life  This information can help others choose the care that you would choose  Why are advance directives important? An advance directive helps you control your care  Although spoken wishes may be used, it is better to have your wishes written down  Spoken wishes can be misunderstood, or not followed  Treatments may be given even if you do not want them  An advance directive may make it easier for your family to make difficult choices about your care     Weight Management   Why it is important to manage your weight:  Being overweight increases your risk of health conditions such as heart disease, high blood pressure, type 2 diabetes, and certain types of cancer  It can also increase your risk for osteoarthritis, sleep apnea, and other respiratory problems  Aim for a slow, steady weight loss  Even a small amount of weight loss can lower your risk of health problems  How to lose weight safely:  A safe and healthy way to lose weight is to eat fewer calories and get regular exercise  You can lose up about 1 pound a week by decreasing the number of calories you eat by 500 calories each day  Healthy meal plan for weight management:  A healthy meal plan includes a variety of foods, contains fewer calories, and helps you stay healthy  A healthy meal plan includes the following:  · Eat whole-grain foods more often  A healthy meal plan should contain fiber  Fiber is the part of grains, fruits, and vegetables that is not broken down by your body  Whole-grain foods are healthy and provide extra fiber in your diet  Some examples of whole-grain foods are whole-wheat breads and pastas, oatmeal, brown rice, and bulgur  · Eat a variety of vegetables every day  Include dark, leafy greens such as spinach, kale, pia greens, and mustard greens  Eat yellow and orange vegetables such as carrots, sweet potatoes, and winter squash  · Eat a variety of fruits every day  Choose fresh or canned fruit (canned in its own juice or light syrup) instead of juice  Fruit juice has very little or no fiber  · Eat low-fat dairy foods  Drink fat-free (skim) milk or 1% milk  Eat fat-free yogurt and low-fat cottage cheese  Try low-fat cheeses such as mozzarella and other reduced-fat cheeses  · Choose meat and other protein foods that are low in fat  Choose beans or other legumes such as split peas or lentils  Choose fish, skinless poultry (chicken or turkey), or lean cuts of red meat (beef or pork)  Before you cook meat or poultry, cut off any visible fat  · Use less fat and oil  Try baking foods instead of frying them   Add less fat, such as margarine, sour cream, regular salad dressing and mayonnaise to foods  Eat fewer high-fat foods  Some examples of high-fat foods include french fries, doughnuts, ice cream, and cakes  · Eat fewer sweets  Limit foods and drinks that are high in sugar  This includes candy, cookies, regular soda, and sweetened drinks  Exercise:  Exercise at least 30 minutes per day on most days of the week  Some examples of exercise include walking, biking, dancing, and swimming  You can also fit in more physical activity by taking the stairs instead of the elevator or parking farther away from stores  Ask your healthcare provider about the best exercise plan for you  © Copyright [employer] 2018 Information is for End User's use only and may not be sold, redistributed or otherwise used for commercial purposes   All illustrations and images included in CareNotes® are the copyrighted property of A D A M , Inc  or [address] [place] [address] [employer]

## 2022-09-29 ENCOUNTER — TELEPHONE (OUTPATIENT)
Dept: FAMILY MEDICINE CLINIC | Facility: MEDICAL CENTER | Age: 82
End: 2022-09-29

## 2022-09-29 NOTE — TELEPHONE ENCOUNTER
Called pt, she has had loose bowels for 2 days  Has not taken any OTC medication at this time  Recommended that she increase her water intake to avoid dehydration, and to eat bland foods, try imodium  Patent did note that she feels like her waist is "popping out, watery" like bloated but no abdominal pain  Patient will watch and call back is anything changes

## 2022-09-29 NOTE — TELEPHONE ENCOUNTER
Pt called to ask advice  She has had loose bowels for a day and a half  She has not tried imodium or anything over the counter  She would like Clinical to call her      Routed to Clinical

## 2022-09-30 NOTE — TELEPHONE ENCOUNTER
Pt called back  She has some questions because she doesn't know which medication to take  Please, call pt back

## 2022-09-30 NOTE — TELEPHONE ENCOUNTER
Called pt  Stools are not as loose as the were yesterday now they are just "soft"  Patient did not want to take the Imodium since it was getting better  Recommended that she increase her fiber and fluids  Pt agreeable  Heriberto Ramirez is concerned about a "watery" feeling in her abdomin above belly button  No pain or tenderness, no fever  Informed pt is any of this occurs she will need to be seen at Er or UC for evaluation, otherwise she is scheduled for an apt on Tuesday with Giovanni Bedolla, Marie Mckeon

## 2022-09-30 NOTE — TELEPHONE ENCOUNTER
Pt called again to request appt or call  She still is unsure what to do  She said her stools have not been watery, but they have not been solid either  She has not been eating the Whisk (formerly Zypsee), nor has she used the Imodium as directed  She said "above her waist feels watery" and she is concerned about that      Routed to Clinical - please triage again; thanks

## 2022-10-03 ENCOUNTER — HOSPITAL ENCOUNTER (OUTPATIENT)
Dept: RADIOLOGY | Facility: MEDICAL CENTER | Age: 82
Discharge: HOME | End: 2022-10-03
Payer: MEDICARE

## 2022-10-03 DIAGNOSIS — E83.52 HYPERCALCEMIA: ICD-10-CM

## 2022-10-03 DIAGNOSIS — E04.2 MULTIPLE THYROID NODULES: ICD-10-CM

## 2022-10-03 DIAGNOSIS — M81.0 AGE-RELATED OSTEOPOROSIS WITHOUT CURRENT PATHOLOGICAL FRACTURE: ICD-10-CM

## 2022-10-03 DIAGNOSIS — E55.9 VITAMIN D DEFICIENCY: ICD-10-CM

## 2022-10-03 DIAGNOSIS — E21.3 HYPERPARATHYROIDISM (HCC): ICD-10-CM

## 2022-10-03 PROCEDURE — 76536 US EXAM OF HEAD AND NECK: CPT

## 2022-10-03 PROCEDURE — 77080 DXA BONE DENSITY AXIAL: CPT

## 2022-10-07 DIAGNOSIS — E04.2 MULTIPLE THYROID NODULES: Primary | ICD-10-CM

## 2022-11-25 PROBLEM — Z00.00 MEDICARE ANNUAL WELLNESS VISIT, SUBSEQUENT: Status: RESOLVED | Noted: 2022-09-26 | Resolved: 2022-11-25

## 2022-12-19 ENCOUNTER — TELEPHONE (OUTPATIENT)
Dept: FAMILY MEDICINE CLINIC | Facility: MEDICAL CENTER | Age: 82
End: 2022-12-19

## 2022-12-19 NOTE — TELEPHONE ENCOUNTER
Pt called for advice from Clinical   Yesterday she had a runny nose  Today she has a cough that sounds deep to her  She doesn't cough often, but it sounds deep  She has company coming on Sunday and wondered what she could take over the counter to help get rid of it before her company comes      Routed to Clinical

## 2022-12-19 NOTE — TELEPHONE ENCOUNTER
Triaged- viral precautions given  OTC medications advised- rest, fluids,    If symptoms worsen , call

## 2023-01-31 ENCOUNTER — TELEPHONE (OUTPATIENT)
Dept: FAMILY MEDICINE CLINIC | Facility: MEDICAL CENTER | Age: 83
End: 2023-01-31

## 2023-01-31 NOTE — TELEPHONE ENCOUNTER
Pt ended up getting Delsym  She took a dose  She coughed and the sputum was pale green  She wants to know if she needs to be concerned  She said it is mostly white

## 2023-01-31 NOTE — TELEPHONE ENCOUNTER
Pt called to request advice  She started with a runny nose Sunday night  Now also has a deep cough  She is only taking mucinex    Per Clinical, pt advised plain Robitussin for the cough, as well as plenty of fluids and rest     Routed to Clinical - fyi

## 2023-01-31 NOTE — TELEPHONE ENCOUNTER
Pt called to ask advice  She went to 3 pharmacies and could not get plain Robitussin  She wondered if there was something else she could try to find  Asked pt if she also looked for Delsym, but she said she did not  She said the shelves were pretty bare      Routed to Clinical - please advise a substitute for Robitussin; thank you

## 2023-01-31 NOTE — TELEPHONE ENCOUNTER
Spoke with patient  She will call around to the pharmacies  To see if they have the store brand or brand name robitussin or delsym

## 2023-02-03 NOTE — TELEPHONE ENCOUNTER
Pt said she still has a cough and she wonders how long it will take to go away  She said it is much better  Please, advise pt

## 2023-03-27 ENCOUNTER — OFFICE VISIT (OUTPATIENT)
Dept: FAMILY MEDICINE CLINIC | Facility: MEDICAL CENTER | Age: 83
End: 2023-03-27

## 2023-03-27 VITALS
RESPIRATION RATE: 16 BRPM | DIASTOLIC BLOOD PRESSURE: 70 MMHG | BODY MASS INDEX: 24.51 KG/M2 | WEIGHT: 133.2 LBS | HEIGHT: 62 IN | HEART RATE: 65 BPM | SYSTOLIC BLOOD PRESSURE: 122 MMHG | TEMPERATURE: 98.4 F

## 2023-03-27 DIAGNOSIS — M81.0 AGE-RELATED OSTEOPOROSIS WITHOUT CURRENT PATHOLOGICAL FRACTURE: ICD-10-CM

## 2023-03-27 DIAGNOSIS — E04.2 MULTIPLE THYROID NODULES: ICD-10-CM

## 2023-03-27 DIAGNOSIS — I35.1 AORTIC VALVE INSUFFICIENCY, ETIOLOGY OF CARDIAC VALVE DISEASE UNSPECIFIED: ICD-10-CM

## 2023-03-27 DIAGNOSIS — R03.0 WHITE COAT SYNDROME WITHOUT DIAGNOSIS OF HYPERTENSION: ICD-10-CM

## 2023-03-27 DIAGNOSIS — Z09 FOLLOW-UP EXAM, 3-6 MONTHS SINCE PREVIOUS EXAM: Primary | ICD-10-CM

## 2023-03-27 DIAGNOSIS — I49.1 PREMATURE ATRIAL CONTRACTION: ICD-10-CM

## 2023-03-27 DIAGNOSIS — E21.3 HYPERPARATHYROIDISM (HCC): ICD-10-CM

## 2023-03-27 DIAGNOSIS — E78.5 HYPERLIPIDEMIA, UNSPECIFIED HYPERLIPIDEMIA TYPE: ICD-10-CM

## 2023-03-27 DIAGNOSIS — G57.02 COMMON PERONEAL NEUROPATHY OF LEFT LOWER EXTREMITY: ICD-10-CM

## 2023-03-27 DIAGNOSIS — Z86.19 HISTORY OF SHINGLES: ICD-10-CM

## 2023-03-27 NOTE — PROGRESS NOTES
[facility] - Clinic Note  [facility]     Annette Franco MRN: [id number] : 1940 Age: 79 y o  Assessment/Plan     1  Follow-up exam, 3-6 months since previous exam    -Patient presents for medical follow-up today  -She will return in about 6 months for Medicare annual wellness visit and sooner as needed    2  Hyperparathyroidism [facility])    -Patient reminded about outstanding lab work with Endocrinology    3  Multiple thyroid nodules    -Established with Endocrinology  -10/3/2022 thyroid ultrasound reviewed, there is plan for repeat ultrasound in 1 year thereafter    4  Age-related osteoporosis without current pathological fracture    -Continue calcium vitamin D supplementation    5  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    -Has followed with Neurology in the past, last follow-up with neurology in 2019 recommended that she follow-up in 1 year or on as needed basis  -Patient did seek consultation with neurology in 2019 about proceeding with shingles vaccine and Neurology did mention she can proceed if she so decides    6  Premature atrial contraction    -Had consultation with cardiology in 2017 for this due to echocardiogram and adviseD that no further cardiac work-up related to this is necessary and follow up on as needed basis     7  Aortic valve insufficiency, etiology of cardiac valve disease unspecified    -Reminded patient about prior recommendation to consult with Cardiology for [address] [place], [place] [address]ly Nenzel  - Ambulatory Referral to Cardiology; Future    8  History of shingles    -Patient states she is still contemplating about the Shingrix vaccination series, counseled    9  White coat syndrome without diagnosis of hypertension    -Blood pressure did come down upon repeat    10  Hyperlipidemia, unspecified hyperlipidemia type    -Follow-up lipid panel    Annette Situ acknowledged understanding of treatment plan, all questions answered      Subjective "Matteo Martines is a 79 y o  female who presents for 6-month follow-up  Patient states that she saw her podiatrist this morning  Patient states she does check her blood pressures at home and systolic usually \"around 860\" and diastolic 83Q to 95E  Patient has no acute complaints today  He denies any chest pain, shortness of breath, palpitations  Patient states she enjoys spending time with her grandchildren and great-grandchildren  Upon chart review there is note of dilated aortic root, 8/8/2019 Echo noted AORTA: The root exhibited normal size  The following portions of the patient's history were reviewed and updated as appropriate: allergies, current medications, past family history, past medical history, past social history, past surgical history and problem list      Past Medical History:   Diagnosis Date   • 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-mother also has had the corneal disease   • Arthritis    • Foot drop, left foot 05/2017    S/P shingles that attacked the nerves-wears a brace prn,uses a cane for long distance   • Irregular heart beat     regular, irregular-Dr 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 MAIN OR;  Service: Ophthalmology   • AZ XCAPSL CTRC RMVL INSJ IO LENS PROSTH W/O ECP Left 7/10/2018    Procedure: EXTRACTION EXTRACAPSULAR CATARACT PHACO " "INTRAOCULAR LENS (IOL); Surgeon: Benoit Quintana MD;  Location: [facility] MAIN OR;  Service: Ophthalmology   • TUBAL LIGATION         Family History   Problem Relation Age of Onset   • Other Mother         corneal transplants, Parathyroid disease   • Osteoporosis Mother    • Stroke Father    • Hypertension Father    • Heart disease Father         cardiac disorder   • Osteoporosis Father    • Other Father         Parathyroid disease   • Asthma Sister    • Hiatal hernia Sister    • Other Brother         parathyroid-removed, Parathyroid disease   • Heart disease Brother         \" maker\"       Social History     Socioeconomic History   • Marital status:      Spouse name: None   • Number of children: None   • Years of education: None   • Highest education level: None   Occupational History   • None   Tobacco Use   • Smoking status: Never   • Smokeless tobacco: Never   Vaping Use   • Vaping Use: Never used   Substance and Sexual Activity   • Alcohol use: No   • Drug use: No   • Sexual activity: None   Other Topics Concern   • None   Social History Narrative    Caffeine use, active     Social Determinants of Health     Financial Resource Strain: Low Risk    • Difficulty of Paying Living Expenses: Not hard at all   Food Insecurity: Not on file   Transportation Needs: No Transportation Needs   • Lack of Transportation (Medical): No   • Lack of Transportation (Non-Medical):  No   Physical Activity: Not on file   Stress: Not on file   Social Connections: Not on file   Intimate Partner Violence: Not on file   Housing Stability: Not on file       Current Outpatient Medications   Medication Sig Dispense Refill   • cholecalciferol (VITAMIN D3) 1,000 units tablet Take 1,000 Units by mouth every morning     • ibuprofen (MOTRIN) 200 mg tablet Take 1 tablet by mouth 3 (three) times a day as needed     • Multiple Vitamins-Minerals (CENTRUM SILVER 50+WOMEN PO) Take by mouth every morning     • sodium chloride (MARCIN 128) 2 % " "hypertonic ophthalmic solution Administer 1 drop to both eyes 3 (three) times a day Alesia 128 gel at HS        No current facility-administered medications for this visit  Review of Systems     As noted in HPI    Objective      /70   Pulse 65   Temp 98 4 °F (36 9 °C)   Resp 16   Ht 5' 1 5\" (1 562 m)   Wt 60 4 kg (133 lb 3 2 oz)   BMI 24 76 kg/m²     Physical Exam  Vitals reviewed  Constitutional:       General: She is not in acute distress  Appearance: Normal appearance  HENT:      Head: Normocephalic and atraumatic  Right Ear: External ear normal       Left Ear: External ear normal       Nose: Nose normal       Mouth/Throat:      Mouth: Mucous membranes are moist       Pharynx: Oropharynx is clear  Eyes:      Extraocular Movements: Extraocular movements intact  Conjunctiva/sclera: Conjunctivae normal       Pupils: Pupils are equal, round, and reactive to light  Cardiovascular:      Rate and Rhythm: Normal rate and regular rhythm  Pulses: Normal pulses  Pulmonary:      Effort: Pulmonary effort is normal       Breath sounds: Normal breath sounds  Abdominal:      General: Abdomen is flat  Bowel sounds are normal       Palpations: Abdomen is soft  Tenderness: There is no abdominal tenderness  Musculoskeletal:      Cervical back: Neck supple  Right lower leg: No edema  Left lower leg: No edema  Lymphadenopathy:      Cervical: No cervical adenopathy  Skin:     General: Skin is warm and dry  Capillary Refill: Capillary refill takes less than 2 seconds  Neurological:      Mental Status: She is alert and oriented to person, place, and time  Psychiatric:         Mood and Affect: Mood normal          Behavior: Behavior normal          Thought Content: Thought content normal          Judgment: Judgment normal              Some portions of this record may have been generated with voice recognition software   There may be translation, syntax, or " "grammatical errors  Occasional wrong word or \"sound-a-like\" substitutions may have occurred due to the inherent limitations of the voice recognition software  Read the chart carefully and recognize, using context, where substations may have occurred  If you have any questions, please contact the dictating provider for clarification or correction, as needed    "

## 2023-03-28 ENCOUNTER — TELEPHONE (OUTPATIENT)
Dept: ADMINISTRATIVE | Facility: OTHER | Age: 83
End: 2023-03-28

## 2023-03-28 NOTE — LETTER
Vaccination Request Form: [id number] aka SARS-CoV-2 (Rice Popper or Patricio Peter or [employer])      Date Requested: 23  Patient: Giovanny Pierce  Patient : 1940   Referring Provider: Adriana Raymond DO       The above patient has informed us that they have had their   most recent COVID-19 aka SARS-CoV-2 (Rice Popper or Patricio Rocky or [employer]) administered at your facility  Please   complete this form and attach all corresponding documentation  Date of Vaccine(s) Given  ______________________________    Lot Number(s) _______________________________________    Manufacture(s) ______________________________________    Dose Amount (s) _____________________________________    Expiration Date(s) ____________________________________    Comments __________________________________________________________  ____________________________________________________________________  ____________________________________________________________________  ____________________________________________________________________    Administering Facility  ________________________________________________    Vaccine Administered By (print name) ___________________________________      Form Completed By (print name) _______________________________________      Signature ___________________________________________________________      These reports are needed for  compliance  Please fax this completed form and a copy of the Vaccine Document(s) to our office located at [place] [address] as soon as possible to Fax [phone] attention [place]: Phone [phone]    We thank you for your assistance in treating our mutual patient

## 2023-03-28 NOTE — TELEPHONE ENCOUNTER
----- Message from Ken Malagon sent at 3/27/2023  1:41 PM EDT -----  Regarding: vaccine  03/27/23 1:42 PM    Hello, our patient Agnieszka Walls has had Immunization covid vaccine  completed/performed  Please assist in updating the patient chart by making an External outreach to [facility]  facility located in [place]  The date of service is 2022      Thank you,  Mavis Vail  [facility] [employer]

## 2023-03-29 NOTE — TELEPHONE ENCOUNTER
Upon review of the In Basket request we were able to locate, review, and update the patient chart as requested for Immunization(s) COVID  Any additional questions or concerns should be emailed to the Practice Liaisons via the appropriate education email address, please do not reply via In Basket      Thank you  Katlin Chris MA

## 2023-03-29 NOTE — TELEPHONE ENCOUNTER
Upon review of the In Basket request and the patient's chart, initial outreach has been made via fax to facility  Please see Contacts section for details       Thank you  Albina Segovia MA

## 2023-09-06 ENCOUNTER — TELEPHONE (OUTPATIENT)
Dept: FAMILY MEDICINE CLINIC | Facility: MEDICAL CENTER | Age: 83
End: 2023-09-06

## 2023-09-06 ENCOUNTER — OFFICE VISIT (OUTPATIENT)
Dept: FAMILY MEDICINE CLINIC | Facility: MEDICAL CENTER | Age: 83
End: 2023-09-06
Payer: MEDICARE

## 2023-09-06 VITALS
TEMPERATURE: 99 F | BODY MASS INDEX: 23.74 KG/M2 | SYSTOLIC BLOOD PRESSURE: 146 MMHG | DIASTOLIC BLOOD PRESSURE: 70 MMHG | HEART RATE: 88 BPM | WEIGHT: 129 LBS | OXYGEN SATURATION: 97 % | HEIGHT: 62 IN | RESPIRATION RATE: 16 BRPM

## 2023-09-06 DIAGNOSIS — M79.89 LEFT LEG SWELLING: Primary | ICD-10-CM

## 2023-09-06 DIAGNOSIS — L53.9 LEG ERYTHEMA: ICD-10-CM

## 2023-09-06 DIAGNOSIS — R60.0 LOCALIZED EDEMA: ICD-10-CM

## 2023-09-06 PROCEDURE — 99213 OFFICE O/P EST LOW 20 MIN: CPT | Performed by: STUDENT IN AN ORGANIZED HEALTH CARE EDUCATION/TRAINING PROGRAM

## 2023-09-06 NOTE — TELEPHONE ENCOUNTER
This is in regards to what Sonny Mijares discussed with you regarding pt cancelling the appt. I was starting to look into [employer] for pt, and then pt called back and said one of her children was going to take her tomorrow morning.

## 2023-09-06 NOTE — PROGRESS NOTES
[facility] - Clinic Note  Kristan Esquivel, 22     Yayo Castro MRN: [id number] : 1940 Age: 79 y.o. Assessment/Plan     1. Left leg swelling    -Patient with localized left lower extremity swelling with some associated erythema and tactile warmth and pain, will follow-up stat imaging to rule out clot  - VAS lower limb venous duplex study, unilateral/limited; STAT    2. Leg erythema    - VAS lower limb venous duplex study, unilateral/limited;  STAT    3. Localized edema    - VAS lower limb venous duplex study, unilateral/limited;  STAT      Yayo Castro acknowledged understanding of treatment plan, all questions answered. Follow-up stat imaging results and follow-up in 1 week. Azar Castro is a 79 y.o. female who presents for acute visit. She complains of left ankle swelling. Patient states she has had some swelling in her legs for months however, the past 4 weeks left ankle swelling worsening. She also notes some calf pain " when you press on it". No chest pain and no shortness of breath. She has also noticed gradual erythema medial aspect of her left lower leg. No personal history of gout.     The following portions of the patient's history were reviewed and updated as appropriate: allergies, current medications, past family history, past medical history, past social history, past surgical history and problem list.     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 Garcia-ECHO-negative,no heart disease   • Murmur     had ECHO-on 17   • Rotator cuff injury     right-slight tear-healed with therapy   • Shingles (herpes zoster) polyneuropathy 2017   • Spinal stenosis    • Varicose veins of legs    • Vitamin D deficiency    • Wears glasses        Allergies   Allergen Reactions   • Prednisone Swelling     Facial swelling, redness-no dyspnea       Past Surgical History:   Procedure Laterality Date   • CATARACT EXTRACTION     • DE XCAPSL CTRC RMVL INSJ IO LENS PROSTH W/O ECP Right 5/14/2018    Procedure: EXTRACTION EXTRACAPSULAR CATARACT PHACO INTRAOCULAR LENS (IOL); Surgeon: Lois Wheeler MD;  Location: [facility] MAIN OR;  Service: Ophthalmology   • DE XCAPSL CTRC RMVL INSJ IO LENS PROSTH W/O ECP Left 7/10/2018    Procedure: EXTRACTION EXTRACAPSULAR CATARACT PHACO INTRAOCULAR LENS (IOL); Surgeon: Lois Wheeler MD;  Location: [facility] MAIN OR;  Service: Ophthalmology   • TUBAL LIGATION         Family History   Problem Relation Age of Onset   • Other Mother         corneal transplants, Parathyroid disease   • Osteoporosis Mother    • Stroke Father    • Hypertension Father    • Heart disease Father         cardiac disorder   • Osteoporosis Father    • Other Father         Parathyroid disease   • Asthma Sister    • Hiatal hernia Sister    • Other Brother         parathyroid-removed, Parathyroid disease   • Heart disease Brother         " maker"       Social History     Socioeconomic History   • Marital status:       Spouse name: None   • Number of children: None   • Years of education: None   • Highest education level: None   Occupational History   • None   Tobacco Use   • Smoking status: Never   • Smokeless tobacco: Never   Vaping Use   • Vaping Use: Never used   Substance and Sexual Activity   • Alcohol use: No   • Drug use: No   • Sexual activity: None   Other Topics Concern   • None   Social History Narrative    Caffeine use, active     Social Determinants of Health     Financial Resource Strain: Low Risk  (9/26/2022)    Overall Financial Resource Strain (CARDIA)    • Difficulty of Paying Living Expenses: Not hard at all   Food Insecurity: Not on file Transportation Needs: No Transportation Needs (9/26/2022)    PRAPARE - Transportation    • Lack of Transportation (Medical): No    • Lack of Transportation (Non-Medical): No   Physical Activity: Not on file   Stress: Not on file   Social Connections: Not on file   Intimate Partner Violence: Not on file   Housing Stability: Not on file       Current Outpatient Medications   Medication Sig Dispense Refill   • cholecalciferol (VITAMIN D3) 1,000 units tablet Take 1,000 Units by mouth every morning     • ibuprofen (MOTRIN) 200 mg tablet Take 1 tablet by mouth 3 (three) times a day as needed     • Multiple Vitamins-Minerals (CENTRUM SILVER 50+WOMEN PO) Take by mouth every morning     • sodium chloride (ALESIA 128) 2 % hypertonic ophthalmic solution Administer 1 drop to both eyes 3 (three) times a day Alesia 128 gel at HS        No current facility-administered medications for this visit. Review of Systems     As noted in HPI    Objective      /70 (BP Location: Left arm, Patient Position: Sitting, Cuff Size: Adult)   Pulse 88   Temp 99 °F (37.2 °C)   Resp 16   Ht 5' 1.5" (1.562 m)   Wt 58.5 kg (129 lb)   SpO2 97%   BMI 23.98 kg/m²     Physical Exam  Vitals reviewed. Constitutional:       General: She is not in acute distress. Appearance: Normal appearance. HENT:      Head: Normocephalic and atraumatic. Eyes:      Conjunctiva/sclera: Conjunctivae normal.   Cardiovascular:      Rate and Rhythm: Normal rate and regular rhythm. Pulses: Normal pulses. Heart sounds: Normal heart sounds. Musculoskeletal:      Comments: Varicose veins bilateral lower extremities    Toe deformity    Intact peripheral pulses    Left ankle edema, pitting    Erythema medial aspect left lower extremity and associated tactile warmth and tenderness      Discoloration brown medial aspect right lower extremity   Neurological:      Mental Status: She is alert and oriented to person, place, and time.    Psychiatric: Mood and Affect: Mood normal.         Thought Content: Thought content normal.             Some portions of this record may have been generated with voice recognition software. There may be translation, syntax, or grammatical errors. Occasional wrong word or "sound-a-like" substitutions may have occurred due to the inherent limitations of the voice recognition software. Read the chart carefully and recognize, using context, where substations may have occurred. If you have any questions, please contact the dictating provider for clarification or correction, as needed.

## 2023-09-07 ENCOUNTER — HOSPITAL ENCOUNTER (OUTPATIENT)
Dept: VASCULAR ULTRASOUND | Facility: HOSPITAL | Age: 83
Discharge: HOME | End: 2023-09-07
Payer: MEDICARE

## 2023-09-07 ENCOUNTER — DOCUMENTATION (OUTPATIENT)
Dept: FAMILY MEDICINE CLINIC | Facility: MEDICAL CENTER | Age: 83
End: 2023-09-07

## 2023-09-07 DIAGNOSIS — M79.89 LEFT LEG SWELLING: ICD-10-CM

## 2023-09-07 DIAGNOSIS — R60.0 LOCALIZED EDEMA: ICD-10-CM

## 2023-09-07 DIAGNOSIS — L53.9 LEG ERYTHEMA: ICD-10-CM

## 2023-09-07 PROCEDURE — 93971 EXTREMITY STUDY: CPT

## 2023-09-07 PROCEDURE — 93971 EXTREMITY STUDY: CPT | Performed by: SURGERY

## 2023-09-07 NOTE — PROGRESS NOTES
Patient aware and went over instructions at length. Transferred to clerical to make an appointment for tomorrow.  Agreeable to vascular surgery referral.

## 2023-09-07 NOTE — PROGRESS NOTES
Received tiger text informing of venous duplex US report LLE with no DVT. There is superficial thrombophlebitis and venous insufficiency. Attempted to call patient, no response VM left to return call. I left message with clinical staff to relay if call returned. For superficial thrombophlebitis topical heat and NSAID prn recommended. Patient should establish with Vascular Surgery. Keep appointment next week and call sooner if any new or worsening symptoms.

## 2023-09-08 ENCOUNTER — TELEPHONE (OUTPATIENT)
Dept: FAMILY MEDICINE CLINIC | Facility: MEDICAL CENTER | Age: 83
End: 2023-09-08

## 2023-09-08 NOTE — TELEPHONE ENCOUNTER
Pt was told to put warm compresses on her leg. she's wondering how many times day she needs to be doing it?

## 2023-09-11 ENCOUNTER — RA CDI HCC (OUTPATIENT)
Dept: OTHER | Facility: HOSPITAL | Age: 83
End: 2023-09-11

## 2023-09-13 ENCOUNTER — OFFICE VISIT (OUTPATIENT)
Dept: FAMILY MEDICINE CLINIC | Facility: MEDICAL CENTER | Age: 83
End: 2023-09-13
Payer: MEDICARE

## 2023-09-13 VITALS
BODY MASS INDEX: 23.74 KG/M2 | SYSTOLIC BLOOD PRESSURE: 160 MMHG | DIASTOLIC BLOOD PRESSURE: 80 MMHG | HEIGHT: 62 IN | OXYGEN SATURATION: 98 % | TEMPERATURE: 97.4 F | WEIGHT: 129 LBS | HEART RATE: 76 BPM

## 2023-09-13 DIAGNOSIS — I35.1 AORTIC VALVE INSUFFICIENCY, ETIOLOGY OF CARDIAC VALVE DISEASE UNSPECIFIED: ICD-10-CM

## 2023-09-13 DIAGNOSIS — I83.12 VARICOSE VEINS OF BOTH LOWER EXTREMITIES WITH INFLAMMATION: ICD-10-CM

## 2023-09-13 DIAGNOSIS — Z09 FOLLOW-UP EXAM: Primary | ICD-10-CM

## 2023-09-13 DIAGNOSIS — R03.0 ELEVATED BLOOD PRESSURE READING: ICD-10-CM

## 2023-09-13 DIAGNOSIS — I83.11 VARICOSE VEINS OF BOTH LOWER EXTREMITIES WITH INFLAMMATION: ICD-10-CM

## 2023-09-13 PROCEDURE — 99213 OFFICE O/P EST LOW 20 MIN: CPT | Performed by: STUDENT IN AN ORGANIZED HEALTH CARE EDUCATION/TRAINING PROGRAM

## 2023-09-13 NOTE — PROGRESS NOTES
[facility] - Clinic Note  Emeka BranchRajeev, 22     Pool Sheth MRN: [id number] : 1940 Age: 79 y.o. Assessment/Plan     1. Follow-up exam    -Patient presents for recheck  -We reiterated results of lower extremity ultrasound 2023:     CONCLUSION:     Impression:     RIGHT LOWER LIMB LIMITED:  Evaluation shows no evidence of thrombus in the common femoral vein. Doppler evaluation shows a normal response to augmentation maneuvers. LEFT LOWER LIMB:  No evidence of acute or chronic deep vein thrombosis  Evidence of focal superficial thrombophlebitis noted in a varicose vein at the  ankle. Multiple large varicose veins noted at the ankle in area of discoloration. Doppler evaluation shows reflux in the great saphenous vein. Popliteal, posterior tibial and anterior tibial arterial Doppler waveform's are  Triphasic.  -Advised about topical heat and NSAID as needed    2. Varicose veins of both lower extremities with inflammation    - Ambulatory Referral to Vascular Surgery; Future    3. Aortic valve insufficiency, etiology of cardiac valve disease unspecified    - Ambulatory Referral to Cardiology; Future    4. Elevated blood pressure reading    -Elevated blood pressure reading confirmed upon repeat today in office  -Patient states she usually does have elevated blood pressure readings at doctor's office visit but, does check her blood pressures at home and states they are normal  -Would like to have patient return to office in 1 week for nurse visit for blood pressure recheck and she will bring her to compare    Pool Sheth acknowledged understanding of treatment plan, all questions answered. Subjective      Pool Sheth is a 79 y.o. female who presents for recheck of lower extremity. Patient feels well today. Elevated blood pressure reading. She denies any chest pain, shortness of breath or palpitations.   Patient states her endocrinologist is on leave and she is aware to reschedule follow-up. Patient also to schedule follow-up with cardiology for history of aortic valve regurgitation. The following portions of the patient's history were reviewed and updated as appropriate: allergies, current medications, past family history, past medical history, past social history, past surgical history and problem list.     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   • Spinal stenosis    • Varicose veins of legs    • Vitamin D deficiency    • Wears glasses        Allergies   Allergen Reactions   • Prednisone Swelling     Facial swelling, redness-no dyspnea       Past Surgical History:   Procedure Laterality Date   • CATARACT EXTRACTION     • NM XCAPSL CTRC RMVL INSJ IO LENS PROSTH W/O ECP Right 5/14/2018    Procedure: EXTRACTION EXTRACAPSULAR CATARACT PHACO INTRAOCULAR LENS (IOL); Surgeon: Toñito Ojeda MD;  Location: [facility] MAIN OR;  Service: Ophthalmology   • NM XCAPSL CTRC RMVL INSJ IO LENS PROSTH W/O ECP Left 7/10/2018    Procedure: EXTRACTION EXTRACAPSULAR CATARACT PHACO INTRAOCULAR LENS (IOL);   Surgeon: Toñito Ojeda MD;  Location: [facility] MAIN OR;  Service: Ophthalmology   • TUBAL LIGATION         Family History   Problem Relation Age of Onset   • Other Mother         corneal transplants, Parathyroid disease   • Osteoporosis Mother    • Stroke Father    • Hypertension Father    • Heart disease Father         cardiac disorder   • Osteoporosis Father    • Other Father         Parathyroid disease • Asthma Sister    • Hiatal hernia Sister    • Other Brother         parathyroid-removed, Parathyroid disease   • Heart disease Brother         " maker"       Social History     Socioeconomic History   • Marital status:      Spouse name: None   • Number of children: None   • Years of education: None   • Highest education level: None   Occupational History   • None   Tobacco Use   • Smoking status: Never   • Smokeless tobacco: Never   Vaping Use   • Vaping Use: Never used   Substance and Sexual Activity   • Alcohol use: No   • Drug use: No   • Sexual activity: None   Other Topics Concern   • None   Social History Narrative    Caffeine use, active     Social Determinants of Health     Financial Resource Strain: Low Risk  (9/26/2022)    Overall Financial Resource Strain (CARDIA)    • Difficulty of Paying Living Expenses: Not hard at all   Food Insecurity: Not on file   Transportation Needs: No Transportation Needs (9/26/2022)    PRAPARE - Transportation    • Lack of Transportation (Medical): No    • Lack of Transportation (Non-Medical): No   Physical Activity: Not on file   Stress: Not on file   Social Connections: Not on file   Intimate Partner Violence: Not on file   Housing Stability: Not on file       Current Outpatient Medications   Medication Sig Dispense Refill   • cholecalciferol (VITAMIN D3) 1,000 units tablet Take 1,000 Units by mouth every morning     • ibuprofen (MOTRIN) 200 mg tablet Take 1 tablet by mouth 3 (three) times a day as needed     • Multiple Vitamins-Minerals (CENTRUM SILVER 50+WOMEN PO) Take by mouth every morning     • sodium chloride (ALESIA 128) 2 % hypertonic ophthalmic solution Administer 1 drop to both eyes 3 (three) times a day Alesia 128 gel at HS        No current facility-administered medications for this visit.        Review of Systems     As noted in HPI    Objective      /80   Pulse 76   Temp (!) 97.4 °F (36.3 °C)   Ht 5' 1.5" (1.562 m)   Wt 58.5 kg (129 lb) SpO2 98%   BMI 23.98 kg/m²     Physical Exam  Vitals reviewed. Constitutional:       General: She is not in acute distress. Appearance: Normal appearance. HENT:      Head: Normocephalic and atraumatic. Eyes:      Conjunctiva/sclera: Conjunctivae normal.   Cardiovascular:      Rate and Rhythm: Normal rate and regular rhythm. Pulses: Normal pulses. Heart sounds: Murmur heard. Pulmonary:      Effort: Pulmonary effort is normal.      Breath sounds: Normal breath sounds. Skin:     Comments: Left lower extremity medial aspect localized area of erythema and related tactile warmth, localized minor tenderness   Neurological:      Mental Status: She is alert and oriented to person, place, and time. Psychiatric:         Mood and Affect: Mood normal.         Thought Content: Thought content normal.             Some portions of this record may have been generated with voice recognition software. There may be translation, syntax, or grammatical errors. Occasional wrong word or "sound-a-like" substitutions may have occurred due to the inherent limitations of the voice recognition software. Read the chart carefully and recognize, using context, where substations may have occurred. If you have any questions, please contact the dictating provider for clarification or correction, as needed.

## 2023-09-19 ENCOUNTER — TELEPHONE (OUTPATIENT)
Dept: FAMILY MEDICINE CLINIC | Facility: MEDICAL CENTER | Age: 83
End: 2023-09-19

## 2023-09-19 ENCOUNTER — CLINICAL SUPPORT (OUTPATIENT)
Dept: FAMILY MEDICINE CLINIC | Facility: MEDICAL CENTER | Age: 83
End: 2023-09-19

## 2023-09-19 VITALS — SYSTOLIC BLOOD PRESSURE: 172 MMHG | DIASTOLIC BLOOD PRESSURE: 96 MMHG

## 2023-09-19 DIAGNOSIS — Z01.30 BLOOD PRESSURE CHECK: Primary | ICD-10-CM

## 2023-09-19 NOTE — PROGRESS NOTES
Pt was in for a BP check today.      Taken with patient wrist cuff: 158/91    Take with the manual cuff: 172/96

## 2023-09-20 ENCOUNTER — TELEPHONE (OUTPATIENT)
Age: 83
End: 2023-09-20

## 2023-09-20 NOTE — TELEPHONE ENCOUNTER
Patient returned call regarding her Hypertension. Patient stated that the office called and wanted her to schedule appt regarding her blood pressure. However the patient stated she has appt on 10/2/23 and is very busy these next two weeks and prefers to address the hypertension at the appt on 10/2/23.

## 2023-10-02 ENCOUNTER — OFFICE VISIT (OUTPATIENT)
Dept: FAMILY MEDICINE CLINIC | Facility: MEDICAL CENTER | Age: 83
End: 2023-10-02
Payer: MEDICARE

## 2023-10-02 VITALS
DIASTOLIC BLOOD PRESSURE: 96 MMHG | HEART RATE: 80 BPM | OXYGEN SATURATION: 99 % | SYSTOLIC BLOOD PRESSURE: 140 MMHG | TEMPERATURE: 97.9 F | WEIGHT: 129 LBS | HEIGHT: 62 IN | BODY MASS INDEX: 23.74 KG/M2

## 2023-10-02 DIAGNOSIS — Z00.00 MEDICARE ANNUAL WELLNESS VISIT, SUBSEQUENT: Primary | ICD-10-CM

## 2023-10-02 DIAGNOSIS — Z13.89 SCREENING FOR NEPHROPATHY: ICD-10-CM

## 2023-10-02 DIAGNOSIS — Z23 IMMUNIZATION DUE: ICD-10-CM

## 2023-10-02 DIAGNOSIS — E78.5 HYPERLIPIDEMIA, UNSPECIFIED HYPERLIPIDEMIA TYPE: ICD-10-CM

## 2023-10-02 DIAGNOSIS — Z13.29 SCREENING FOR THYROID DISORDER: ICD-10-CM

## 2023-10-02 DIAGNOSIS — Z13.0 SCREENING FOR DEFICIENCY ANEMIA: ICD-10-CM

## 2023-10-02 PROCEDURE — G0439 PPPS, SUBSEQ VISIT: HCPCS | Performed by: STUDENT IN AN ORGANIZED HEALTH CARE EDUCATION/TRAINING PROGRAM

## 2023-10-02 PROCEDURE — G0008 ADMIN INFLUENZA VIRUS VAC: HCPCS

## 2023-10-02 PROCEDURE — 90662 IIV NO PRSV INCREASED AG IM: CPT

## 2023-10-02 NOTE — PROGRESS NOTES
Assessment and Plan:     Problem List Items Addressed This Visit        Other    Hyperlipidemia    Relevant Orders    Lipid Panel with Direct LDL reflex    Medicare annual wellness visit, subsequent - Primary     · Immunizations reviewed  · Counseled about influenza vaccine, agreeable to receiving today  · Advised about newest COVID-19 vaccine, she will consider  · Declines Shingrix vaccination series  · Immunizations otherwise UTD        Other Visit Diagnoses     Immunization due        Relevant Orders    influenza vaccine, high-dose, PF 0.7 mL (FLUZONE HIGH-DOSE) (Completed)    Screening for nephropathy        Relevant Orders    Comprehensive metabolic panel    Screening for thyroid disorder        Relevant Orders    TSH, 3rd generation with Free T4 reflex    Screening for deficiency anemia        Relevant Orders    CBC and differential           Preventive health issues were discussed with patient, and age appropriate screening tests were ordered as noted in patient's After Visit Summary. Personalized health advice and appropriate referrals for health education or preventive services given if needed, as noted in patient's After Visit Summary. Follow up in 6 months and sooner as needed. History of Present Illness:     Patient presents for a Medicare Wellness Visit. She is requesting lab work.      HPI   Patient Care Team:  Nathen Augustine DO as PCP - General (Family Medicine)  MD Abel Reyes MD Carey Main, MD     Review of Systems:     Review of Systems     As noted in HPI      Problem List:     Patient Active Problem List   Diagnosis   • Left foot drop   • History of shingles   • Increased PTH level   • Nodular thyroid disease   • Degenerative lumbar spinal stenosis   • Hypercalcemia   • Irregular heartbeat   • Premature atrial contraction   • Tendinitis of right rotator cuff   • Varicose veins of bilateral lower extremities with other complications   • Aortic valve regurgitation   • 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   • Stress fracture of calcaneus   • Osteoporosis   • Hyperlipidemia   • Vitamin D deficiency   • Pneumonia   • Leukocytosis   • Hyperparathyroidism ([address])   • Multiple thyroid nodules   • Medicare annual wellness visit, subsequent   • White coat syndrome without diagnosis of hypertension      Past Medical and Surgical History:     Past Medical History:   Diagnosis Date   • 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   • Spinal stenosis    • Varicose veins of legs    • Vitamin D deficiency    • Wears glasses      Past Surgical History:   Procedure Laterality Date   • CATARACT EXTRACTION     • NV XCAPSL CTRC RMVL INSJ IO LENS PROSTH W/O ECP Right 5/14/2018    Procedure: EXTRACTION EXTRACAPSULAR CATARACT PHACO INTRAOCULAR LENS (IOL); Surgeon: Nhung Sloan MD;  Location: [facility] MAIN OR;  Service: Ophthalmology   • NV XCAPSL CTRC RMVL INSJ IO LENS PROSTH W/O ECP Left 7/10/2018    Procedure: EXTRACTION EXTRACAPSULAR CATARACT PHACO INTRAOCULAR LENS (IOL);   Surgeon: Nhung Sloan MD;  Location: [facility] OR;  Service: Ophthalmology   • TUBAL LIGATION        Family History:     Family History   Problem Relation Age of Onset   • Other Mother         corneal transplants, Parathyroid disease   • Osteoporosis Mother    • Stroke Father    • Hypertension Father    • Heart disease Father         cardiac disorder   • Osteoporosis Father    • Other Father         Parathyroid disease   • Asthma Sister    • Hiatal hernia Sister    • Other Brother         parathyroid-removed, Parathyroid disease   • Heart disease Brother         " maker"      Social History:     Social History     Socioeconomic History   • Marital status:      Spouse name: None   • Number of children: None   • Years of education: None   • Highest education level: None   Occupational History   • None   Tobacco Use   • Smoking status: Never   • Smokeless tobacco: Never   Vaping Use   • Vaping Use: Never used   Substance and Sexual Activity   • Alcohol use: No   • Drug use: No   • Sexual activity: None   Other Topics Concern   • None   Social History Narrative    Caffeine use, active     Social Determinants of Health     Financial Resource Strain: Low Risk  (10/2/2023)    Overall Financial Resource Strain (CARDIA)    • Difficulty of Paying Living Expenses: Not hard at all   Food Insecurity: Not on file   Transportation Needs: No Transportation Needs (10/2/2023)    PRAPARE - Transportation    • Lack of Transportation (Medical): No    • Lack of Transportation (Non-Medical): No   Physical Activity: Not on file   Stress: Not on file   Social Connections: Not on file   Intimate Partner Violence: Not on file   Housing Stability: Not on file      Medications and Allergies:     Current Outpatient Medications   Medication Sig Dispense Refill   • cholecalciferol (VITAMIN D3) 1,000 units tablet Take 1,000 Units by mouth every morning     • ibuprofen (MOTRIN) 200 mg tablet Take 1 tablet by mouth 3 (three) times a day as needed     • Multiple Vitamins-Minerals (CENTRUM SILVER 50+WOMEN PO) Take by mouth every morning     • sodium chloride (ALESIA 128) 2 % hypertonic ophthalmic solution Administer 1 drop to both eyes 3 (three) times a day Alesia 128 gel at HS        No current facility-administered medications for this visit.      Allergies   Allergen Reactions   • Prednisone Swelling     Facial swelling, redness-no dyspnea      Immunizations:     Immunization History Administered Date(s) Administered   • COVID-19 PFIZER VACCINE 0.3 ML IM 01/21/2021, 02/11/2021, 11/02/2021   • INFLUENZA 11/11/2011, 10/12/2012, 10/14/2013, 11/03/2020, 11/08/2022   • Influenza, high dose seasonal 0.7 mL 01/02/2019, 11/25/2019, 10/02/2023   • Pneumococcal Conjugate 13-Valent 06/19/2018   • Pneumococcal Conjugate Vaccine 20-valent (Pcv20), Polysace 09/26/2022   • Pneumococcal Polysaccharide PPV23 01/08/2013   • Td (adult), adsorbed 07/30/2013   • influenza, injectable, quadrivalent 11/03/2020      Health Maintenance: There are no preventive care reminders to display for this patient. Topic Date Due   • COVID-19 Vaccine (4 - Pfizer series) 12/28/2021      Medicare Screening Tests and Risk Assessments:     Isaías is here for her Subsequent Wellness visit. Health Risk Assessment:   Patient rates overall health as very good. Patient feels that their physical health rating is slightly worse. Patient is very satisfied with their life. Eyesight was rated as slightly worse. Hearing was rated as same. Patient feels that their emotional and mental health rating is slightly better. Patients states they are never, rarely angry. Patient states they are sometimes unusually tired/fatigued. Pain experienced in the last 7 days has been none. Patient states that she has experienced no weight loss or gain in last 6 months. Depression Screening:   PHQ-2 Score: 0      Fall Risk Screening: In the past year, patient has experienced: no history of falling in past year      Urinary Incontinence Screening:   Patient has not leaked urine accidently in the last six months. Home Safety:  Patient does not have trouble with stairs inside or outside of their home. Patient has working smoke alarms and has no working carbon monoxide detector. Home safety hazards include: none. Nutrition:   Current diet is Limited junk food and Regular.      Medications:   Patient is currently taking over-the-counter supplements. OTC medications include: see medication list. Patient is able to manage medications. Activities of Daily Living (ADLs)/Instrumental Activities of Daily Living (IADLs):   Walk and transfer into and out of bed and chair?: Yes  Dress and groom yourself?: Yes    Bathe or shower yourself?: Yes    Feed yourself? Yes  Do your laundry/housekeeping?: Yes  Manage your money, pay your bills and track your expenses?: Yes  Make your own meals?: Yes    Do your own shopping?: Yes    Previous Hospitalizations:   Any hospitalizations or ED visits within the last 12 months?: No      Advance Care Planning:   Living will: Yes    Durable POA for healthcare:  Yes    Advanced directive: Yes      Cognitive Screening:   Provider or family/friend/caregiver concerned regarding cognition?: No    PREVENTIVE SCREENINGS      Cardiovascular Screening:    General: History Lipid Disorder    Due for: Lipid Panel      Diabetes Screening:     General: Risks and Benefits Discussed    Due for: Blood Glucose      Colorectal Cancer Screening:     General: Risks and Benefits Discussed and Patient Declines      Breast Cancer Screening:     General: Risks and Benefits Discussed and Patient Declines      Cervical Cancer Screening:    General: Screening Not Indicated      Osteoporosis Screening:    General: History Osteoporosis and Screening Current      Abdominal Aortic Aneurysm (AAA) Screening:        General: Screening Not Indicated      Lung Cancer Screening:     General: Screening Not Indicated      Hepatitis C Screening:    General: Risks and Benefits Discussed and Patient Declines    Screening, Brief Intervention, and Referral to Treatment (SBIRT)    Screening  Typical number of drinks in a day: 0    Single Item Drug Screening:  How often have you used an illegal drug (including marijuana) or a prescription medication for non-medical reasons in the past year? never    Single Item Drug Screen Score: 0  Interpretation: Negative screen for possible drug use disorder    Other Counseling Topics:   Car/seat belt/driving safety, skin self-exam, sunscreen and calcium and vitamin D intake and regular weightbearing exercise. No results found. Physical Exam:     /96 (BP Location: Left arm, Patient Position: Sitting, Cuff Size: Adult)   Pulse 80   Temp 97.9 °F (36.6 °C)   Ht 5' 1.5" (1.562 m)   Wt 58.5 kg (129 lb)   SpO2 99%   BMI 23.98 kg/m²     Physical Exam  Vitals reviewed. Constitutional:       General: She is not in acute distress. Appearance: Normal appearance. HENT:      Head: Normocephalic and atraumatic. Eyes:      Conjunctiva/sclera: Conjunctivae normal.   Pulmonary:      Effort: Pulmonary effort is normal.   Neurological:      Mental Status: She is alert and oriented to person, place, and time. Psychiatric:         Mood and Affect: Mood normal.         Behavior: Behavior normal.         Thought Content:  Thought content normal.         Judgment: Judgment normal.          Emeka Whalen DO

## 2023-10-02 NOTE — ASSESSMENT & PLAN NOTE
· Immunizations reviewed  · Counseled about influenza vaccine, agreeable to receiving today  · Advised about newest COVID-19 vaccine, she will consider  · Declines Shingrix vaccination series  · Immunizations otherwise UTD

## 2023-10-25 PROBLEM — I83.11 VARICOSE VEINS OF BOTH LOWER EXTREMITIES WITH INFLAMMATION: Status: ACTIVE | Noted: 2023-10-25

## 2023-10-25 PROBLEM — I83.12 VARICOSE VEINS OF BOTH LOWER EXTREMITIES WITH INFLAMMATION: Status: ACTIVE | Noted: 2023-10-25

## 2023-10-30 ENCOUNTER — OFFICE VISIT (OUTPATIENT)
Dept: CARDIOLOGY CLINIC | Facility: MEDICAL CENTER | Age: 83
End: 2023-10-30
Payer: MEDICARE

## 2023-10-30 VITALS
HEART RATE: 70 BPM | WEIGHT: 129 LBS | HEIGHT: 62 IN | DIASTOLIC BLOOD PRESSURE: 86 MMHG | SYSTOLIC BLOOD PRESSURE: 118 MMHG | OXYGEN SATURATION: 96 % | BODY MASS INDEX: 23.74 KG/M2

## 2023-10-30 DIAGNOSIS — I49.3 PVC (PREMATURE VENTRICULAR CONTRACTION): Primary | ICD-10-CM

## 2023-10-30 DIAGNOSIS — I35.1 AORTIC VALVE INSUFFICIENCY, ETIOLOGY OF CARDIAC VALVE DISEASE UNSPECIFIED: ICD-10-CM

## 2023-10-30 DIAGNOSIS — I49.9 IRREGULAR HEARTBEAT: ICD-10-CM

## 2023-10-30 DIAGNOSIS — E78.5 HYPERLIPIDEMIA, UNSPECIFIED HYPERLIPIDEMIA TYPE: ICD-10-CM

## 2023-10-30 DIAGNOSIS — E83.52 HYPERCALCEMIA: ICD-10-CM

## 2023-10-30 DIAGNOSIS — I49.1 PAC (PREMATURE ATRIAL CONTRACTION): ICD-10-CM

## 2023-10-30 PROCEDURE — 99204 OFFICE O/P NEW MOD 45 MIN: CPT | Performed by: INTERNAL MEDICINE

## 2023-10-30 PROCEDURE — 93000 ELECTROCARDIOGRAM COMPLETE: CPT | Performed by: INTERNAL MEDICINE

## 2023-10-30 NOTE — PROGRESS NOTES
Cardiology Consultation Visit       Reyna Chris   [id number]  1940      [facility] [id number]      Physician Requesting Consult:   Graciela Loomis DO    Reason for Consultation / Principal Problem:  Mrs. Reyna Chris is a 79 y.o. female and never smoker with a PMH significant for but not limited to AI (asymptomatic, mild to mod), Aortic Root Ectasia (mild 3.7 cm, 2018), Symptomatic PACs, HLD, HPTH, Varicose Veins. She presents to clinic by PCP referral for AI . History of Present Illness:  Mrs. Justo Field was at her baseline state of health: independent of adl's,  retired but active , though not exercising regularly, when she was seen by her PCP for a routine visit . For her known history of AI, she was advised to reconnect with cardiology. She'd last been seen by my colleague in 2017 for Irregular Heartbeats attributed to PACs on EKG. Her workup was essentially negative at that time beyond incidental findings of mild AI by TTE. Her follow up TTEs in 2018 and 2019 demonstrated mild-mod and mild findings respectively. Since then, she has done well overall. She notes recent swelling in her legs (left>right), which lead to a Vascular consultation. She completed an US that was negative for VTE, but her exam was positive for varicose veins. With supportive measures including elevation and compression hose, she's noted some improvement in her symptoms. She currently denies any cp, diaphoresis, n/v, sob, nguyen, palpitations, near syncope, syncope, orthopnea, pnd, or progressive ble edema since her vascular visit. She denies any recent hospitalizations, prior cardiac history, family history of early cad, or family history of scd. She notes good control of her diet and exercise habits.  She reports a diet that is well balanced, close control of salt intake, sufficient daily water intake, and an active lifestyle (never just sitting) though no dedicated exercise . She is otherwise compliant with medications, checks BP occasionally but does not maintain a detailed BP log. Of note, the patient's cardiac risk factor(s) include: advanced age and dyslipidemia.     Assessment & Plan   AI ,  mild on most recent TTE in 2019  Asymptomatic at this time, Patient states that they are at their baseline, Clinical exam without exaggerated PP, diastolic murmur, gallop, or bounding carotid pulse  Hold on TTE for now, will reassess on follow up visit  Monitor routinely for symptomatic changes    Symptomatic PACs,  previously diagnosed in 2017  Symptoms are all but resolved at this time  Cont supportive measures, valsalva maneuver, hydration maintenance, as well as stimulant avoidance  Monitor closely for symptomatic changes, record vitals if symptoms recur, ed/clinic precautions reviewed    HLD, recent FLP:  / TRI 82 / HDL 75 /  on 06/25/20  Poorly controlled, Previously on a statin, but now diet/lifestyle managed, Had previously tried Lovastatin but declined to cont mgmt  Check FLP now, Cont counseling on diet and lifestyle modification, Anticipate a repeat trial of low dose statin  Monitor FLP as noted above, will reassess on next visit    Elevated BP without the diagnosis of HTN  No home BP log for review, but clinic SBP in the 115-145 mmHg range  Cont counseling on low-sodium diet, Review the importance of maintaining a home BP log  Monitor BP at home routinely and review log on next visit    LLE Edema  Patient notes dependent edema that greate in the left than right, improved with compression hose, neg vas us  Proceed with vas c/s, no plans for change in medication regimen at this time, discussed benefits of exercise, compression hose, and ble elevation when resting  Monitoring routinely for symptomatic changes for now    Discussion / Summary:  Precautions and reasons to call our office or proceed to ER were discussed in detail. Patient expressed understanding and questions were answered. Plan on follow up in-clinic in 6 months. Office Visit Diagnosis:  1. PVC (premature ventricular contraction)        2. PAC (premature atrial contraction)        3. Irregular heartbeat  POCT ECG      4. Aortic valve insufficiency, etiology of cardiac valve disease unspecified  Ambulatory Referral to Cardiology      5. Hyperlipidemia, unspecified hyperlipidemia type  Lipid panel    CANCELED: Lipid panel      6. Hypercalcemia  PTH, intact          Subjective   Review of Systems   Constitutional: Negative for chills and fever. HENT:  Negative for congestion and sore throat. Eyes:  Negative for double vision and visual disturbance. Cardiovascular:  Positive for leg swelling (left more than right). Negative for chest pain, dyspnea on exertion, near-syncope, orthopnea, palpitations and syncope. Respiratory:  Negative for cough and wheezing. Hematologic/Lymphatic: Negative for bleeding problem. Does not bruise/bleed easily. Skin:  Negative for itching and rash. Musculoskeletal:  Negative for joint pain (mild pain near ankle associated with her LLE swelling) and joint swelling. Gastrointestinal:  Negative for abdominal pain, constipation, diarrhea, nausea and vomiting. Genitourinary:  Negative for hematuria. Neurological:  Negative for light-headedness and loss of balance. Psychiatric/Behavioral:  Negative for depression. The patient is not nervous/anxious.       The following portions of the patient's history were reviewed and updated as appropriate: past medical history, past social history, past family history, past surgical history, current medications, allergies, past surgical history and problem list.  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   • Spinal stenosis    • Varicose veins of legs    • Vitamin D deficiency    • Wears glasses      Social History     Socioeconomic History   • Marital status:      Spouse name: Not on file   • Number of children: Not on file   • Years of education: Not on file   • Highest education level: Not on file   Occupational History   • Not on file   Tobacco Use   • Smoking status: Never   • Smokeless tobacco: Never   Vaping Use   • Vaping Use: Never used   Substance and Sexual Activity   • Alcohol use: No   • Drug use: No   • Sexual activity: Not on file   Other Topics Concern   • Not on file   Social History Narrative    Caffeine use, active     Social Determinants of Health     Financial Resource Strain: Low Risk  (10/2/2023)    Overall Financial Resource Strain (CARDIA)    • Difficulty of Paying Living Expenses: Not hard at all   Food Insecurity: Not on file   Transportation Needs: No Transportation Needs (10/2/2023)    PRAPARE - Transportation    • Lack of Transportation (Medical): No    • Lack of Transportation (Non-Medical):  No   Physical Activity: Not on file   Stress: Not on file   Social Connections: Not on file   Intimate Partner Violence: Not on file   Housing Stability: Not on file     Family History   Problem Relation Age of Onset   • Other Mother         corneal transplants, Parathyroid disease   • Osteoporosis Mother    • Stroke Father    • Hypertension Father    • Heart disease Father         cardiac disorder   • Osteoporosis Father    • Other Father         Parathyroid disease   • Asthma Sister    • Hiatal hernia Sister    • Other Brother         parathyroid-removed, Parathyroid disease   • Heart disease Brother         " maker"     Past Surgical History:   Procedure Laterality Date   • CATARACT EXTRACTION     • NJ XCAPSL CTRC RMVL INSJ IO LENS PROSTH W/O ECP Right 5/14/2018    Procedure: EXTRACTION EXTRACAPSULAR CATARACT PHACO INTRAOCULAR LENS (IOL); Surgeon: Ganesh New MD;  Location: [facility] MAIN OR;  Service: Ophthalmology   • NJ XCAPSL CTRC RMVL INSJ IO LENS PROSTH W/O ECP Left 7/10/2018    Procedure: EXTRACTION EXTRACAPSULAR CATARACT PHACO INTRAOCULAR LENS (IOL);   Surgeon: Ganesh New MD;  Location: [facility] MAIN OR;  Service: Ophthalmology   • TUBAL LIGATION         Current Outpatient Medications:   •  cholecalciferol (VITAMIN D3) 1,000 units tablet, Take 1,000 Units by mouth every morning, Disp: , Rfl:   •  ibuprofen (MOTRIN) 200 mg tablet, Take 1 tablet by mouth 3 (three) times a day as needed, Disp: , Rfl:   •  Multiple Vitamins-Minerals (CENTRUM SILVER 50+WOMEN PO), Take by mouth every morning, Disp: , Rfl:   •  sodium chloride (ALESIA 128) 2 % hypertonic ophthalmic solution, Administer 1 drop to both eyes 3 (three) times a day Alesia 128 gel at HS , Disp: , Rfl:   •  ciprofloxacin (CIPRO) 500 mg tablet, Take 1 tablet (500 mg total) by mouth every 12 (twelve) hours for 10 days, Disp: 20 tablet, Rfl: 0  •  metroNIDAZOLE (FLAGYL) 500 mg tablet, Take 1 tablet (500 mg total) by mouth every 8 (eight) hours for 10 days, Disp: 30 tablet, Rfl: 0  •  saccharomyces boulardii (FLORASTOR) 250 mg capsule, Take 1 capsule (250 mg total) by mouth 2 (two) times a day for 10 days, Disp: 20 capsule, Rfl: 0  Allergies   Allergen Reactions   • Prednisone Swelling     Facial swelling, redness-no dyspnea     Patient Active Problem List   Diagnosis   • Left foot drop   • History of shingles   • Increased PTH level   • Nodular thyroid disease   • Degenerative lumbar spinal stenosis   • Hypercalcemia   • Irregular heartbeat   • Premature atrial contraction   • Tendinitis of right rotator cuff   • Varicose veins of bilateral lower extremities with other complications   • Aortic valve regurgitation   • 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98 kg/m². ? Physical Exam  Constitutional:       Appearance: She is normal weight. HENT:      Head: Normocephalic and atraumatic. Right Ear: External ear normal.      Left Ear: External ear normal.      Nose: Nose normal.   Eyes:      General: No scleral icterus. Right eye: No discharge. Left eye: No discharge. Neck:      Vascular: No carotid bruit. Cardiovascular:      Rate and Rhythm: Rhythm irregular. Pulses: Normal pulses. Heart sounds: Murmur (faint, almost II/VI KAY) heard. Pulmonary:      Effort: Pulmonary effort is normal. No respiratory distress. Breath sounds: No wheezing or rales. Musculoskeletal:      Cervical back: Neck supple. Right lower leg: No edema. Left lower leg: Edema (1+ edema) present. Skin:     General: Skin is warm. Capillary Refill: Capillary refill takes less than 2 seconds. Coloration: Skin is not jaundiced. Findings: No bruising or lesion. Neurological:      General: No focal deficit present. Mental Status: She is alert.    Psychiatric:         Mood and Affect: Mood normal.         Behavior: Behavior normal.       Labs:  Lab Results   Component Value Date    K 4.2 11/07/2023    K 3.9 09/17/2022    K 3.9 05/31/2022     11/07/2023     (H) 09/17/2022     (H) 05/31/2022    CO2 27 11/07/2023    CO2 27 09/17/2022    CO2 27 05/31/2022    BUN 13 11/07/2023    BUN 14 09/17/2022    BUN 12 05/31/2022    CREATININE 0.64 11/07/2023    CREATININE 0.62 09/17/2022    CREATININE 0.62 05/31/2022    EGFR 82 11/07/2023    EGFR 83 09/17/2022    EGFR 84 05/31/2022    GLUC 101 11/07/2023    GLUC 100 12/03/2019    GLUC 107 12/02/2019    AST 23 09/17/2022    AST 23 05/31/2022    AST 25 06/17/2021    ALT 22 09/17/2022    ALT 24 05/31/2022    ALT 24 06/17/2021    TBILI 0.52 09/17/2022    TBILI 0.56 05/31/2022    TBILI 0.55 06/17/2021    ALB 4.2 09/17/2022    ALB 4.0 05/31/2022    ALB 4.2 06/17/2021    MG 2.6 06/17/2021    CALCIUM 10.5 (H) 11/07/2023    CALCIUM 10.6 (H) 09/17/2022    CALCIUM 10.8 (H) 05/31/2022      Lab Results   Component Value Date    WBC 10.67 (H) 11/07/2023    WBC 18.58 (H) 12/03/2019    WBC 19.07 (H) 12/02/2019    HGB 13.0 11/07/2023    HGB 11.0 (L) 12/03/2019    HGB 12.3 12/02/2019    HCT 39.7 11/07/2023    HCT 33.3 (L) 12/03/2019    HCT 36.9 12/02/2019     11/07/2023     12/03/2019     12/02/2019    INR 1.33 (H) 12/02/2019    INR 1.04 05/06/2017      Lab Results   Component Value Date    CHOLESTEROL 263 (H) 06/25/2020    CHOLESTEROL 219 (H) 08/08/2019    CHOLESTEROL 266 (H) 02/06/2019    TRIG 82 06/25/2020    TRIG 81 08/08/2019    TRIG 132 02/06/2019    HDL 75 06/25/2020    HDL 68 (H) 08/08/2019    HDL 71 (H) 02/06/2019    LDLCALC 172 (H) 06/25/2020    LDLCALC 135 (H) 08/08/2019    LDLCALC 169 (H) 02/06/2019     Lab Results   Component Value Date    GLUF 94 09/17/2022    GLUF 101 (H) 05/31/2022    GLUF 91 09/21/2021     No results found for: "TSH", "FT4"  Lab Results   Component Value Date    TROPONINI 0.00 12/02/2019     No results found for: "BNP", "NTBNP"     Imaging:  I have personally reviewed pertinent reports. No results found.     Cardiac Studies:  EKG:   Date: 12/02/19, sinus tachycardia, PAC  Date: 10/30/23, normal sinus rhythm, PVC, low voltage    Tele:   No results found for this or any previous visit. Holter:   No results found for this or any previous visit. Recent Device Check:  No results found for this or any previous visit. Previous EPS/Interventions:  No results found for this or any previous visit. Previous Cath/PCI:  No results found for this or any previous visit. Previous STRESS TEST:  No results found for this or any previous visit. ECHO:  Results for orders placed during the hospital encounter of 08/08/19  Echo complete with contrast if indicated  Narrative  SUMMARY  LEFT VENTRICLE:  Systolic function was normal. Ejection fraction was estimated to be 60 %. There were no regional wall motion abnormalities. There was mild concentric hypertrophy. AORTIC VALVE:  There was mild regurgitation. TRICUSPID VALVE:  There was mild regurgitation. PULMONIC VALVE:  There was mild regurgitation. Results for orders placed during the hospital encounter of 06/28/18  Echo complete with contrast if indicated  Narrative  SUMMARY  LEFT VENTRICLE:  Systolic function was normal. Ejection fraction was estimated to be 60 %. There were no regional wall motion abnormalities. AORTIC VALVE:  There was mild to moderate regurgitation. AORTA:  The root exhibited dilatation, with a maximum measured diameter of 3.7 cm. Results for orders placed during the hospital encounter of 03/06/17  Echo complete with contrast if indicated  Narrative  SUMMARY  LEFT VENTRICLE:  Systolic function was normal. Ejection fraction was estimated to be 60 %. There were no regional wall motion abnormalities. AORTIC VALVE:  There was mild regurgitation. TERELL:  No results found for this or any previous visit. CMR:  No results found for this or any previous visit.     Counseling / Coordination of Care:  During our visit, I spent 40 minutes with the patient, and greater than 55% of this time was spent on counseling and coordination of care, including addressing diagnostic results, prognosis, risks and benefits of treatment options, instructions for management, patient/family education, importance of treatment compliance, along with risk factor reductions. Dictation Disclaimer: This note was completed in part utilizing [email or web address] direct voice recognition software. Grammatical errors, random word insertion, spelling mistakes, and incomplete sentences may be an occasional consequence of the system secondary to software limitations, ambient noise and hardware issues. At the time of dictation, efforts were made to edit, clarify and /or correct errors. Please read the chart carefully and recognize, using context, where substitutions have occurred. If you have any questions or concerns about the context, text or information contained within the body of this dictation, please contact myself, the provider, for further clarification.      Bobby Samaniego,  11/12/23

## 2023-11-06 ENCOUNTER — TELEPHONE (OUTPATIENT)
Age: 83
End: 2023-11-06

## 2023-11-06 NOTE — TELEPHONE ENCOUNTER
Patient has trouble with her bowel movements. It started last week with diarrhea, she took 2 anti-diarrhea medications on Saturday that seemed to help however now sometimes her bowels are hard, sometimes they are soft. Today she only had a small amount of stool when she went. She is uncertain if she should take something else. She is not sure if she should take something for constipation since she only went a small amount or not.

## 2023-11-06 NOTE — TELEPHONE ENCOUNTER
Need to reach out to patient this afternoon, if she calls back in the meantime please transfer to be triaged. Pt likely will need an appointment.

## 2023-11-07 ENCOUNTER — APPOINTMENT (OUTPATIENT)
Dept: LAB | Facility: CLINIC | Age: 83
End: 2023-11-07
Payer: MEDICARE

## 2023-11-07 ENCOUNTER — HOSPITAL ENCOUNTER (OUTPATIENT)
Dept: CT IMAGING | Facility: HOSPITAL | Age: 83
Discharge: HOME | End: 2023-11-07
Payer: MEDICARE

## 2023-11-07 ENCOUNTER — OFFICE VISIT (OUTPATIENT)
Dept: FAMILY MEDICINE CLINIC | Facility: MEDICAL CENTER | Age: 83
End: 2023-11-07
Payer: MEDICARE

## 2023-11-07 ENCOUNTER — NURSE TRIAGE (OUTPATIENT)
Age: 83
End: 2023-11-07

## 2023-11-07 VITALS
HEIGHT: 62 IN | WEIGHT: 126.4 LBS | BODY MASS INDEX: 23.26 KG/M2 | DIASTOLIC BLOOD PRESSURE: 62 MMHG | SYSTOLIC BLOOD PRESSURE: 136 MMHG | OXYGEN SATURATION: 96 % | HEART RATE: 64 BPM

## 2023-11-07 DIAGNOSIS — Z13.29 SCREENING FOR THYROID DISORDER: ICD-10-CM

## 2023-11-07 DIAGNOSIS — R10.30 LOWER ABDOMINAL PAIN: ICD-10-CM

## 2023-11-07 DIAGNOSIS — R10.30 LOWER ABDOMINAL PAIN: Primary | ICD-10-CM

## 2023-11-07 DIAGNOSIS — E78.5 HYPERLIPIDEMIA, UNSPECIFIED HYPERLIPIDEMIA TYPE: ICD-10-CM

## 2023-11-07 DIAGNOSIS — E83.52 HYPERCALCEMIA: ICD-10-CM

## 2023-11-07 DIAGNOSIS — R19.8 CHANGE IN BOWEL MOVEMENT: ICD-10-CM

## 2023-11-07 DIAGNOSIS — Z13.0 SCREENING FOR DEFICIENCY ANEMIA: ICD-10-CM

## 2023-11-07 DIAGNOSIS — Z13.89 SCREENING FOR NEPHROPATHY: ICD-10-CM

## 2023-11-07 LAB
ANION GAP SERPL CALCULATED.3IONS-SCNC: 8 MMOL/L
BASOPHILS # BLD AUTO: 0.04 THOUSANDS/ÂΜL (ref 0–0.1)
BASOPHILS NFR BLD AUTO: 0 % (ref 0–1)
BUN SERPL-MCNC: 13 MG/DL (ref 5–25)
CALCIUM SERPL-MCNC: 10.5 MG/DL (ref 8.4–10.2)
CHLORIDE SERPL-SCNC: 100 MMOL/L (ref 96–108)
CO2 SERPL-SCNC: 27 MMOL/L (ref 21–32)
CREAT SERPL-MCNC: 0.64 MG/DL (ref 0.6–1.3)
EOSINOPHIL # BLD AUTO: 0.04 THOUSAND/ÂΜL (ref 0–0.61)
EOSINOPHIL NFR BLD AUTO: 0 % (ref 0–6)
ERYTHROCYTE [DISTWIDTH] IN BLOOD BY AUTOMATED COUNT: 12.7 % (ref 11.6–15.1)
GFR SERPL CREATININE-BSD FRML MDRD: 82 ML/MIN/1.73SQ M
GLUCOSE SERPL-MCNC: 101 MG/DL (ref 65–140)
HCT VFR BLD AUTO: 39.7 % (ref 34.8–46.1)
HGB BLD-MCNC: 13 G/DL (ref 11.5–15.4)
IMM GRANULOCYTES # BLD AUTO: 0.04 THOUSAND/UL (ref 0–0.2)
IMM GRANULOCYTES NFR BLD AUTO: 0 % (ref 0–2)
LYMPHOCYTES # BLD AUTO: 1.76 THOUSANDS/ÂΜL (ref 0.6–4.47)
LYMPHOCYTES NFR BLD AUTO: 17 % (ref 14–44)
MCH RBC QN AUTO: 30 PG (ref 26.8–34.3)
MCHC RBC AUTO-ENTMCNC: 32.7 G/DL (ref 31.4–37.4)
MCV RBC AUTO: 92 FL (ref 82–98)
MONOCYTES # BLD AUTO: 0.83 THOUSAND/ÂΜL (ref 0.17–1.22)
MONOCYTES NFR BLD AUTO: 8 % (ref 4–12)
NEUTROPHILS # BLD AUTO: 7.96 THOUSANDS/ÂΜL (ref 1.85–7.62)
NEUTS SEG NFR BLD AUTO: 75 % (ref 43–75)
NRBC BLD AUTO-RTO: 0 /100 WBCS
PLATELET # BLD AUTO: 278 THOUSANDS/UL (ref 149–390)
PMV BLD AUTO: 10 FL (ref 8.9–12.7)
POTASSIUM SERPL-SCNC: 4.2 MMOL/L (ref 3.5–5.3)
RBC # BLD AUTO: 4.34 MILLION/UL (ref 3.81–5.12)
SODIUM SERPL-SCNC: 135 MMOL/L (ref 135–147)
WBC # BLD AUTO: 10.67 THOUSAND/UL (ref 4.31–10.16)

## 2023-11-07 PROCEDURE — 80048 BASIC METABOLIC PNL TOTAL CA: CPT

## 2023-11-07 PROCEDURE — 99214 OFFICE O/P EST MOD 30 MIN: CPT

## 2023-11-07 PROCEDURE — G1004 CDSM NDSC: HCPCS

## 2023-11-07 PROCEDURE — 36415 COLL VENOUS BLD VENIPUNCTURE: CPT

## 2023-11-07 PROCEDURE — 74177 CT ABD & PELVIS W/CONTRAST: CPT

## 2023-11-07 PROCEDURE — 85025 COMPLETE CBC W/AUTO DIFF WBC: CPT

## 2023-11-07 RX ADMIN — IOHEXOL 100 ML: 350 INJECTION, SOLUTION INTRAVENOUS at 17:47

## 2023-11-07 NOTE — PROGRESS NOTES
Assessment/Plan:    Check labs. CT abdomen and pelvis. Will follow up pending results. Advised to add metamucil daily. 1. Lower abdominal pain  Check labs and CT abdomen pelvis. Ensure adequate hydration daily. Add Metamucil daily.  - CBC and differential; Future  - Basic metabolic panel; Future  - CT abdomen pelvis w contrast; Future    2. Change in bowel movement  Ensure adequate hydration daily. Add Metamucil daily. Subjective:      Patient ID: Charlene Patel is a 79 y.o. female. 65-year-old female presents with complaints of diarrhea x 2 days (Thursday and Friday of last week), approx. 2-3 BM per day, she then took Imodium x 2 doses on Saturday and since then stools have become more formed. She tells me yesterday she had two hard stools which were difficult to pass. Today she is unable to describe her bowel movements. Denies blood in stool, fever, urinary symptoms, nausea, vomiting. Tolerating PO intake well. She also reports intermittent left lower quadrant pain. Denies history of diverticulitis. Denies history of constipation. No current bowel regimen. Denies recent medication changes, dietary changes. No recent antibiotic use. She is concerned due to the lower abdominal pain which is new for her. The following portions of the patient's history were reviewed and updated as appropriate: allergies, current medications, past family history, past medical history, past surgical history, and problem list.    Review of Systems   Constitutional: Negative. HENT: Negative. Eyes: Negative. Respiratory: Negative. Cardiovascular: Negative. Gastrointestinal:  Positive for abdominal pain, constipation and diarrhea. Negative for anal bleeding, blood in stool, nausea and vomiting. Endocrine: Negative. Genitourinary: Negative. Musculoskeletal: Negative. Skin: Negative. Allergic/Immunologic: Negative. Neurological: Negative. Hematological: Negative. Psychiatric/Behavioral: Negative. Objective:      /62 (BP Location: Left arm, Patient Position: Sitting, Cuff Size: Adult)   Pulse 64   Ht 5' 1.5" (1.562 m)   Wt 57.3 kg (126 lb 6.4 oz)   SpO2 96%   BMI 23.50 kg/m²          Physical Exam  Vitals and nursing note reviewed. Constitutional:       General: She is not in acute distress. Appearance: Normal appearance. She is normal weight. She is not ill-appearing. HENT:      Head: Normocephalic and atraumatic. Eyes:      Conjunctiva/sclera: Conjunctivae normal.      Pupils: Pupils are equal, round, and reactive to light. Cardiovascular:      Rate and Rhythm: Normal rate. Rhythm irregular. Pulses: Normal pulses. Pulmonary:      Effort: Pulmonary effort is normal. No respiratory distress. Breath sounds: Normal breath sounds. No stridor. No wheezing, rhonchi or rales. Abdominal:      General: Bowel sounds are normal.      Palpations: Abdomen is soft. Tenderness: There is abdominal tenderness (LLQ). Musculoskeletal:         General: Normal range of motion. Cervical back: Normal range of motion and neck supple. Skin:     General: Skin is warm and dry. Neurological:      General: No focal deficit present. Mental Status: She is alert. Psychiatric:         Mood and Affect: Mood normal.         Behavior: Behavior normal.         Thought Content:  Thought content normal.                    Brendan Shannon

## 2023-11-07 NOTE — TELEPHONE ENCOUNTER
Radha Hill called back. Pt states she is now also experiencing left side pain on and off. Transferred to [address] [place] [address]

## 2023-11-07 NOTE — TELEPHONE ENCOUNTER
Pt calling. She had some diarrhea over the weeknd, took imodium and now is constipated. No N/V. She has 5/10 intermittent LLQ pain. It is relieved by BMs. Her last BM was today. +Flatus. Appt made for pt this afternoon. Reason for Disposition   Patient wants to be seen    Answer Assessment - Initial Assessment Questions  1. STOOL PATTERN OR FREQUENCY: "How often do you pass bowel movements (BMs)?"  (Normal range: tid to q 3 days)  "When was the last BM passed?"        My BM are not normal.  I had diarrhea no constipated   2. STRAINING: "Do you have to strain to have a BM?"       no  3. RECTAL PAIN: "Does your rectum hurt when the stool comes out?" If Yes, ask: "Do you have hemorrhoids? How bad is the pain?"  (Scale 1-10; or mild, moderate, severe)      no  4. STOOL COMPOSITION: "Are the stools hard?"       hard  5. BLOOD ON STOOLS: "Has there been any blood on the toilet tissue or on the surface of the BM?" If Yes, ask: "When was the last time?"       no  6. CHRONIC CONSTIPATION: "Is this a new problem for you?"  If no, ask: "How long have you had this problem?" (days, weeks, months)       no  7. CHANGES IN DIET OR HYDRATION: "Have there been any recent changes in your diet?" "How much fluids are you drinking consuming on a daily basis?"  "How much have you had to drink today?"      Yes, I was eating a lot of bread   8. MEDICATIONS: "Have you been taking any new medications?" "Are you taking any narcotic pain medications?" (e.g., Vicoden, Percocet, morphine, dilaudid)      I had diarrhea so I took imodium over the weekend   9. LAXATIVES: "Have you been using any stool softeners, laxatives, or enemas?"  If yes, ask "What, how often, and when was the last time?"  10. ACTIVITY:  "How much walking do you do every day? on a daily basis?"  "Has your activity level decreased in the past week?"         no  11. CAUSE: "What do you think is causing the constipation?"         diet  12.  OTHER SYMPTOMS: "Do you have any other symptoms?" (e.g., abdominal pain, bloating, fever, vomiting)        LLQ pain that is relieved by a BM, 5/10  13. MEDICAL HISTORY: "Do you have a history of hemorrhoids, rectal fissures, or rectal surgery or rectal abscess?"          no  14.  PREGNANCY: "Is there any chance you are pregnant?" "When was your last menstrual period?"        no    Protocols used: Constipation-ADULT-[place]

## 2023-11-07 NOTE — PATIENT INSTRUCTIONS
Take Metamucil once daily as directed. You can buy this over the counter at any pharmacy. Ensure you are drinking adequate amount of water daily. Please have your blood work done now and call to schedule your CT scan. We will call with results. Call if your symptoms worsen or fail to improve.

## 2023-11-08 ENCOUNTER — TELEPHONE (OUTPATIENT)
Dept: FAMILY MEDICINE CLINIC | Facility: MEDICAL CENTER | Age: 83
End: 2023-11-08

## 2023-11-08 DIAGNOSIS — K57.92 DIVERTICULITIS: Primary | ICD-10-CM

## 2023-11-08 RX ORDER — CIPROFLOXACIN 500 MG/1
500 TABLET, FILM COATED ORAL EVERY 12 HOURS SCHEDULED
Qty: 20 TABLET | Refills: 0 | Status: SHIPPED | OUTPATIENT
Start: 2023-11-08 | End: 2023-11-15

## 2023-11-08 RX ORDER — SACCHAROMYCES BOULARDII 250 MG
250 CAPSULE ORAL 2 TIMES DAILY
Qty: 20 CAPSULE | Refills: 0 | Status: SHIPPED | OUTPATIENT
Start: 2023-11-08 | End: 2023-11-18

## 2023-11-08 RX ORDER — METRONIDAZOLE 500 MG/1
500 TABLET ORAL EVERY 8 HOURS SCHEDULED
Qty: 30 TABLET | Refills: 0 | Status: SHIPPED | OUTPATIENT
Start: 2023-11-08 | End: 2023-11-15

## 2023-11-08 NOTE — TELEPHONE ENCOUNTER
Patient is aware and understands instructions. Wrote down all of the instructions and repeated them back to me to verify understanding. Please reach out to her to set up her 1 week recheck.

## 2023-11-08 NOTE — TELEPHONE ENCOUNTER
----- Message from Avril Abdullahi, [facility] sent at 11/8/2023  9:18 AM EST -----  Please inform patient CT scan shows acute diverticulitis which is an inflammation/infection in one of the pouches of the digestive tract. For this, I am prescribing 2 antibiotics. Flagyl and Cipro. She should take as directed along with a probiotic daily to avoid GI upset/diarrhea. She will take both antibiotics for 7-10 days. I will prescribe both for 10 full days however she can stop after day 7 if symptoms resolve. I would also like her to follow a low fiber diet for now during the flare then advance to high fiber diet after 1 week. She should return in 1 week for recheck.

## 2023-11-13 ENCOUNTER — NURSE TRIAGE (OUTPATIENT)
Age: 83
End: 2023-11-13

## 2023-11-13 NOTE — TELEPHONE ENCOUNTER
Pt has been on Flagyl and Cipro for diverticulitis since 11/08/2023. Today would be pt's fifth day on them. Pt originally had abdominal pain and diarrhea that resolved when she started the abx. However, yesterday she started with diarrhea again. She had about 3 episodes and describes it as a mucous consistency. She was incontinent of her stool, as well. This happened again this morning. Pt did not take her am dose of abx due to these symptoms. She has no symptoms of dehydration. Please advise. Should pt continue on abx? She will wait for a call back. Reason for Disposition   Mucus or pus in stool has been present > 2 days and diarrhea is more than mild    Answer Assessment - Initial Assessment Questions  1. DIARRHEA SEVERITY: "How bad is the diarrhea?" "How many extra stools have you had in the past 24 hours than normal?"     - NO DIARRHEA (SCALE 0)    - MILD (SCALE 1-3): Few loose or mushy BMs; increase of 1-3 stools over normal daily number of stools; mild increase in ostomy output. -  MODERATE (SCALE 4-7): Increase of 4-6 stools daily over normal; moderate increase in ostomy output. * SEVERE (SCALE 8-10; OR 'WORST POSSIBLE'): Increase of 7 or more stools daily over normal; moderate increase in ostomy output; incontinence. 3 episodes   2. ONSET: "When did the diarrhea begin?"       Diarrhea started Sunday again   3. BM CONSISTENCY: "How loose or watery is the diarrhea?"       Watery   4. VOMITING: "Are you also vomiting?" If Yes, ask: "How many times in the past 24 hours?"        no  5. ABDOMINAL PAIN: "Are you having any abdominal pain?" If Yes, ask: "What does it feel like?" (e.g., crampy, dull, intermittent, constant)       no  6.  ABDOMINAL PAIN SEVERITY: If present, ask: "How bad is the pain?"  (e.g., Scale 1-10; mild, moderate, or severe)    - MILD (1-3): doesn't interfere with normal activities, abdomen soft and not tender to touch     - MODERATE (4-7): interferes with normal activities or awakens from sleep, tender to touch     - SEVERE (8-10): excruciating pain, doubled over, unable to do any normal activities        none  7. ORAL INTAKE: If vomiting, "Have you been able to drink liquids?" "How much fluids have you had in the past 24 hours?"      yes  8. HYDRATION: "Any signs of dehydration?" (e.g., dry mouth [not just dry lips], too weak to stand, dizziness, new weight loss) "When did you last urinate?"      no  9. EXPOSURE: "Have you traveled to a foreign country recently?" "Have you been exposed to anyone with diarrhea?" "Could you have eaten any food that was spoiled?"      no  10. ANTIBIOTIC USE: "Are you taking antibiotics now or have you taken antibiotics in the past 2 months?"        Yes cipro and flagyl  11. OTHER SYMPTOMS: "Do you have any other symptoms?" (e.g., fever, blood in stool)       no  12.  PREGNANCY: "Is there any chance you are pregnant?" "When was your last menstrual period?"        no    Protocols used: Diarrhea-ADULT-[place]

## 2023-11-13 NOTE — TELEPHONE ENCOUNTER
Regarding: uncontrolable Diarrhea  ----- Message from Judymagno Sebastian sent at 11/13/2023  8:29 AM EST -----  Patient was seen 11/7 by Gracie Arita for having previously had diarrhea and then being constipated. She was given medication and now having uncontrollable diarrhea. Offered an appointment with Dr. Errol Dolan this afternoon but patient is concerned to come in as she can not control herself. She states in just coming out of her. Attempted to transfer to triage nurse but not available at time of call.   Please call patient on Home phone

## 2023-11-13 NOTE — TELEPHONE ENCOUNTER
Can hold antibiotics at this time. Hydrate well. Observe diarrhea symptoms off of antibiotic. Keep follow-up appointment on 11/15/2023.

## 2023-11-15 ENCOUNTER — OFFICE VISIT (OUTPATIENT)
Dept: FAMILY MEDICINE CLINIC | Facility: MEDICAL CENTER | Age: 83
End: 2023-11-15
Payer: MEDICARE

## 2023-11-15 VITALS
HEART RATE: 80 BPM | SYSTOLIC BLOOD PRESSURE: 138 MMHG | HEIGHT: 62 IN | OXYGEN SATURATION: 98 % | TEMPERATURE: 97.7 F | BODY MASS INDEX: 23.7 KG/M2 | DIASTOLIC BLOOD PRESSURE: 78 MMHG | WEIGHT: 128.8 LBS

## 2023-11-15 DIAGNOSIS — Z87.19 HISTORY OF DIVERTICULITIS: ICD-10-CM

## 2023-11-15 DIAGNOSIS — Z09 FOLLOW-UP EXAMINATION: Primary | ICD-10-CM

## 2023-11-15 PROCEDURE — 99213 OFFICE O/P EST LOW 20 MIN: CPT | Performed by: STUDENT IN AN ORGANIZED HEALTH CARE EDUCATION/TRAINING PROGRAM

## 2023-11-15 NOTE — PROGRESS NOTES
[facility]  Kristan Anna, 11/15/23     Chioma Mejia MRN: [id number] : 1940 Age: 79 y.o. Assessment/Plan     1. Follow-up examination    -Patient presents for recheck after treatment for diverticulitis  -Stable vital signs today and benign abdominal exam  -Advised continued adequate hydration, low fiber diet at this time, advance diet as tolerated  -We will follow-up repeat CBC  -Although patient did not complete entirety of antibiotic course, clinically she appears very well today  -2023 CT abdomen pelvis with contrast:  IMPRESSION:   Findings in keeping with acute diverticulitis involving the proximal sigmoid colon. No evidence of perforation or abscess.  -Patient and daughter advised about signs and symptoms of diverticulitis in which case they would need to be aware of in the future, they expressed understanding    2. History of diverticulitis      Chioma Mejia acknowledged understanding of treatment plan, all questions answered. Subjective      Chioma Mejia is a 79 y.o. female who presents for 1 week recheck. Patient was evaluated by advanced practitioner on 2023 for diarrhea. Diagnosed and treated with diverticulitis. Patient was prescribed ciprofloxacin and Flagyl. Daughter Gilma Fitzgerald is present with patient today. Patient completed 4-1/2 days of antibiotic course. She began to experience increased diarrhea and was informed on 2023 to hold antibiotic. Patient states that yesterday evening she had "some relief". Patient states today is the first day she is feeling much better. She mentions she had bowel movement this morning dark brown and formed. Tolerated toast this morning. She denies any abdominal pain, nausea, vomiting, fever.     The following portions of the patient's history were reviewed and updated as appropriate: allergies, current medications, past family history, past medical history, past social history, past surgical history and problem list.     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    Spinal stenosis     Varicose veins of legs     Vitamin D deficiency     Wears glasses        Allergies   Allergen Reactions    Prednisone Swelling     Facial swelling, redness-no dyspnea       Past Surgical History:   Procedure Laterality Date    CATARACT EXTRACTION      RI XCAPSL CTRC RMVL INSJ IO LENS PROSTH W/O ECP Right 5/14/2018    Procedure: EXTRACTION EXTRACAPSULAR CATARACT PHACO INTRAOCULAR LENS (IOL); Surgeon: Jody Granado MD;  Location: [facility] MAIN OR;  Service: Ophthalmology    RI XCAPSL CTRC RMVL INSJ IO LENS PROSTH W/O ECP Left 7/10/2018    Procedure: EXTRACTION EXTRACAPSULAR CATARACT PHACO INTRAOCULAR LENS (IOL); Surgeon: Jody Granado MD;  Location: [facility] MAIN OR;  Service: Ophthalmology    TUBAL LIGATION         Family History   Problem Relation Age of Onset    Other Mother         corneal transplants, Parathyroid disease    Osteoporosis Mother     Stroke Father     Hypertension Father     Heart disease Father         cardiac disorder    Osteoporosis Father     Other Father         Parathyroid disease    Asthma Sister     Hiatal hernia Sister     Other Brother         parathyroid-removed, Parathyroid disease    Heart disease Brother         " maker"       Social History     Socioeconomic History    Marital status:       Spouse name: None    Number of children: None    Years of education: None    Highest education level: None Occupational History    None   Tobacco Use    Smoking status: Never    Smokeless tobacco: Never   Vaping Use    Vaping Use: Never used   Substance and Sexual Activity    Alcohol use: No    Drug use: No    Sexual activity: None   Other Topics Concern    None   Social History Narrative    Caffeine use, active     Social Determinants of Health     Financial Resource Strain: Low Risk  (10/2/2023)    Overall Financial Resource Strain (CARDIA)     Difficulty of Paying Living Expenses: Not hard at all   Food Insecurity: Not on file   Transportation Needs: No Transportation Needs (10/2/2023)    PRAPARE - Transportation     Lack of Transportation (Medical): No     Lack of Transportation (Non-Medical): No   Physical Activity: Not on file   Stress: Not on file   Social Connections: Not on file   Intimate Partner Violence: Not on file   Housing Stability: Not on file       Current Outpatient Medications   Medication Sig Dispense Refill    cholecalciferol (VITAMIN D3) 1,000 units tablet Take 1,000 Units by mouth every morning      ibuprofen (MOTRIN) 200 mg tablet Take 1 tablet by mouth 3 (three) times a day as needed      Multiple Vitamins-Minerals (CENTRUM SILVER 50+WOMEN PO) Take by mouth every morning      saccharomyces boulardii (FLORASTOR) 250 mg capsule Take 1 capsule (250 mg total) by mouth 2 (two) times a day for 10 days 20 capsule 0    sodium chloride (ALESIA 128) 2 % hypertonic ophthalmic solution Administer 1 drop to both eyes 3 (three) times a day Alesia 128 gel at HS       ciprofloxacin (CIPRO) 500 mg tablet Take 1 tablet (500 mg total) by mouth every 12 (twelve) hours for 10 days (Patient not taking: Reported on 11/15/2023) 20 tablet 0    metroNIDAZOLE (FLAGYL) 500 mg tablet Take 1 tablet (500 mg total) by mouth every 8 (eight) hours for 10 days (Patient not taking: Reported on 11/15/2023) 30 tablet 0     No current facility-administered medications for this visit.        Review of Systems     As noted in HPI    Objective      /78 (BP Location: Left arm, Patient Position: Sitting, Cuff Size: Adult)   Pulse 80   Temp 97.7 °F (36.5 °C)   Ht 5' 1.5" (1.562 m)   Wt 58.4 kg (128 lb 12.8 oz)   SpO2 98%   BMI 23.94 kg/m²     Physical Exam  Vitals reviewed. Constitutional:       General: She is not in acute distress. Appearance: Normal appearance. HENT:      Head: Normocephalic and atraumatic. Mouth/Throat:      Mouth: Mucous membranes are moist.      Pharynx: Oropharynx is clear. Eyes:      Conjunctiva/sclera: Conjunctivae normal.   Cardiovascular:      Rate and Rhythm: Normal rate. Rhythm irregular. Pulses: Normal pulses. Heart sounds: Normal heart sounds. Pulmonary:      Effort: Pulmonary effort is normal.      Breath sounds: Normal breath sounds. Abdominal:      General: Abdomen is flat. Bowel sounds are normal. There is no distension. Palpations: Abdomen is soft. Tenderness: There is no abdominal tenderness. There is no guarding or rebound. Musculoskeletal:      Cervical back: Neck supple. Right lower leg: Right lower leg edema: scant. Left lower leg: Left lower leg edema: scant. Skin:     General: Skin is warm and dry. Capillary Refill: Capillary refill takes less than 2 seconds. Neurological:      Mental Status: She is alert and oriented to person, place, and time. Psychiatric:         Mood and Affect: Mood normal.         Behavior: Behavior normal.         Thought Content: Thought content normal.             Some portions of this record may have been generated with voice recognition software. There may be translation, syntax, or grammatical errors. Occasional wrong word or "sound-a-like" substitutions may have occurred due to the inherent limitations of the voice recognition software. Read the chart carefully and recognize, using context, where substations may have occurred.  If you have any questions, please contact the dictating provider for clarification or correction, as needed.

## 2023-11-27 ENCOUNTER — APPOINTMENT (OUTPATIENT)
Dept: LAB | Facility: MEDICAL CENTER | Age: 83
End: 2023-11-27
Payer: MEDICARE

## 2023-11-27 LAB
ALBUMIN SERPL BCP-MCNC: 4.3 G/DL (ref 3.5–5)
ALP SERPL-CCNC: 72 U/L (ref 34–104)
ALT SERPL W P-5'-P-CCNC: 17 U/L (ref 7–52)
ANION GAP SERPL CALCULATED.3IONS-SCNC: 7 MMOL/L
AST SERPL W P-5'-P-CCNC: 27 U/L (ref 13–39)
BASOPHILS # BLD AUTO: 0.05 THOUSANDS/ÂΜL (ref 0–0.1)
BASOPHILS NFR BLD AUTO: 1 % (ref 0–1)
BILIRUB SERPL-MCNC: 0.53 MG/DL (ref 0.2–1)
BUN SERPL-MCNC: 10 MG/DL (ref 5–25)
CALCIUM SERPL-MCNC: 10.2 MG/DL (ref 8.4–10.2)
CHLORIDE SERPL-SCNC: 106 MMOL/L (ref 96–108)
CHOLEST SERPL-MCNC: 256 MG/DL
CO2 SERPL-SCNC: 29 MMOL/L (ref 21–32)
CREAT SERPL-MCNC: 0.51 MG/DL (ref 0.6–1.3)
EOSINOPHIL # BLD AUTO: 0.06 THOUSAND/ÂΜL (ref 0–0.61)
EOSINOPHIL NFR BLD AUTO: 1 % (ref 0–6)
ERYTHROCYTE [DISTWIDTH] IN BLOOD BY AUTOMATED COUNT: 13.2 % (ref 11.6–15.1)
GFR SERPL CREATININE-BSD FRML MDRD: 88 ML/MIN/1.73SQ M
GLUCOSE P FAST SERPL-MCNC: 95 MG/DL (ref 65–99)
HCT VFR BLD AUTO: 41 % (ref 34.8–46.1)
HDLC SERPL-MCNC: 64 MG/DL
HGB BLD-MCNC: 13.6 G/DL (ref 11.5–15.4)
IMM GRANULOCYTES # BLD AUTO: 0.02 THOUSAND/UL (ref 0–0.2)
IMM GRANULOCYTES NFR BLD AUTO: 0 % (ref 0–2)
LDLC SERPL CALC-MCNC: 175 MG/DL (ref 0–100)
LYMPHOCYTES # BLD AUTO: 1.32 THOUSANDS/ÂΜL (ref 0.6–4.47)
LYMPHOCYTES NFR BLD AUTO: 28 % (ref 14–44)
MCH RBC QN AUTO: 30.2 PG (ref 26.8–34.3)
MCHC RBC AUTO-ENTMCNC: 33.2 G/DL (ref 31.4–37.4)
MCV RBC AUTO: 91 FL (ref 82–98)
MONOCYTES # BLD AUTO: 0.21 THOUSAND/ÂΜL (ref 0.17–1.22)
MONOCYTES NFR BLD AUTO: 4 % (ref 4–12)
NEUTROPHILS # BLD AUTO: 3.12 THOUSANDS/ÂΜL (ref 1.85–7.62)
NEUTS SEG NFR BLD AUTO: 66 % (ref 43–75)
NRBC BLD AUTO-RTO: 0 /100 WBCS
PLATELET # BLD AUTO: 295 THOUSANDS/UL (ref 149–390)
PMV BLD AUTO: 10.7 FL (ref 8.9–12.7)
POTASSIUM SERPL-SCNC: 4 MMOL/L (ref 3.5–5.3)
PROT SERPL-MCNC: 6.9 G/DL (ref 6.4–8.4)
PTH-INTACT SERPL-MCNC: 95.9 PG/ML (ref 12–88)
RBC # BLD AUTO: 4.51 MILLION/UL (ref 3.81–5.12)
SODIUM SERPL-SCNC: 142 MMOL/L (ref 135–147)
TRIGL SERPL-MCNC: 84 MG/DL
TSH SERPL DL<=0.05 MIU/L-ACNC: 2.86 UIU/ML (ref 0.45–4.5)
WBC # BLD AUTO: 4.78 THOUSAND/UL (ref 4.31–10.16)

## 2023-11-27 PROCEDURE — 80053 COMPREHEN METABOLIC PANEL: CPT

## 2023-11-27 PROCEDURE — 84443 ASSAY THYROID STIM HORMONE: CPT

## 2023-11-27 PROCEDURE — 83970 ASSAY OF PARATHORMONE: CPT

## 2023-11-27 PROCEDURE — 80061 LIPID PANEL: CPT

## 2023-11-27 PROCEDURE — 85025 COMPLETE CBC W/AUTO DIFF WBC: CPT

## 2023-11-30 ENCOUNTER — TELEPHONE (OUTPATIENT)
Dept: FAMILY MEDICINE CLINIC | Facility: MEDICAL CENTER | Age: 83
End: 2023-11-30

## 2023-11-30 NOTE — TELEPHONE ENCOUNTER
Left a voicemail for the patient to call back. Please relay the message and/or results below when the patient returns the call. Thank you!

## 2023-11-30 NOTE — TELEPHONE ENCOUNTER
Reviewed patients lab results with patient. Patient stated she will think about starting the statins.

## 2023-11-30 NOTE — TELEPHONE ENCOUNTER
----- Message from Carrie Wakefield DO sent at 11/30/2023  1:29 PM EST -----  Please inform patient that lipid panel again returned with elevated LDL "bad" cholesterol and elevated total cholesterol. HDL "good" cholesterol is normal.  After 75years old, shared decision making about starting statin therapy. If patient interested in that can schedule a time to discuss. Comprehensive metabolic panel with no electrolyte abnormalities, stable kidney function, normal liver function testing and normal fasting blood sugar. Thyroid function testing normal.  BBC with normal white blood cell count and no anemia.

## 2023-12-01 PROBLEM — Z00.00 MEDICARE ANNUAL WELLNESS VISIT, SUBSEQUENT: Status: RESOLVED | Noted: 2022-09-26 | Resolved: 2023-12-01

## 2024-01-11 ENCOUNTER — TELEPHONE (OUTPATIENT)
Dept: VASCULAR SURGERY | Facility: CLINIC | Age: 84
End: 2024-01-11

## 2024-01-11 NOTE — TELEPHONE ENCOUNTER
"Pt called the office to request OV for re-evaluation of varicose veins. Pt states her veins are \"popping out more\" since her last OV. She states that she obtained her compression stockings from [employer] but the one on the L is rubbing at the knee. She did have tubigrip applied at the last OV and she thought that they felt good but she didn't like that they were open toe. Pt would like to be re-evaluated. Call was transferred to scheduling.  "

## 2024-01-18 ENCOUNTER — OFFICE VISIT (OUTPATIENT)
Dept: VASCULAR SURGERY | Facility: CLINIC | Age: 84
End: 2024-01-18
Payer: MEDICARE

## 2024-01-18 VITALS
HEIGHT: 62 IN | DIASTOLIC BLOOD PRESSURE: 84 MMHG | WEIGHT: 128 LBS | BODY MASS INDEX: 23.55 KG/M2 | HEART RATE: 78 BPM | SYSTOLIC BLOOD PRESSURE: 124 MMHG

## 2024-01-18 DIAGNOSIS — I83.893 VARICOSE VEINS OF BILATERAL LOWER EXTREMITIES WITH OTHER COMPLICATIONS: Primary | ICD-10-CM

## 2024-01-18 PROCEDURE — 99213 OFFICE O/P EST LOW 20 MIN: CPT | Performed by: NURSE PRACTITIONER

## 2024-01-18 NOTE — PATIENT INSTRUCTIONS
Bilateral lower extremity varicosities with swelling and chronic venous stasis changes, left greater than right.  History of superficial thrombophlebitis left distal medial lower leg.  Recommended continued use of gradient compression hose on a daily basis.  Try petite or short compression for better fit.  Walk 10 minutes 2-3 times a day.  Elevate legs 3 times a day for 30 minutes each  Use moisturizer daily to maintain skin integrity  Follow-up in the office in 3 to 6 months to recheck swelling and stasis changes  Call the office with any new issues or concerns    Varicose Veins   AMBULATORY CARE:   Varicose veins  are veins that become large, twisted, and swollen. They are common on the back of the calves, knees, and thighs. Varicose veins are caused by valves in your veins that do not work properly. This causes blood to collect and increase pressure in the veins of your legs. The increased pressure causes your veins to stretch, get larger, swell, and twist.       Common signs and symptoms:   Blue or purple bulging veins in your legs    Pain, swelling, or muscle cramps in your legs    Feeling of tiredness or heaviness in your legs    Seek care immediately if:   You have an injury that has broken your skin and caused your varicose veins to bleed.    Your leg is swollen and hard.    Your legs or feet are turning blue or black.    Your leg feels warm, tender, and painful. It may look swollen and red.    You have a wound on your leg that does not heal or is infected.    Call your doctor if:   You have pain in your leg that does not go away or gets worse.    You have large bruising on your legs.    You have a rash on your leg.    Your symptoms keep you from doing your daily activities.    You have questions or concerns about your condition or care.    Treatment of varicose veins  aims to decrease symptoms, improve appearance, and prevent further problems. Treatment will depend on which veins are affected and how severe  your symptoms are. You may need a procedure to treat or remove your varicose veins. For example, your provider may inject a solution or use a laser to close the varicose veins. Surgery to remove long veins may also be done. Ask your provider for more information about procedures used to treat varicose veins.  Manage varicose veins:   Maintain a healthy weight.  Excess weight or obesity can make your varicose veins worse. Ask your provider what a healthy weight is for you. Ask your provider to help you create a weight loss plan, if needed.    Wear pressure stockings as directed.  The stockings are tight and put pressure on your legs. This improves blood flow and helps prevent clots.         Elevate your legs.  Keep them above the level of your heart as often as you can. Prop your legs on pillows or blankets to keep them elevated comfortably. This will help blood flow back to your heart and decrease pain and swelling.         Get regular exercise.  Talk to your provider about the best exercise plan for you. Exercise can improve blood flow to your legs and feet.       Prevent your varicose veins from getting worse:   Do not sit or stand for long periods of time, if possible.  This can cause the blood to collect in your legs and make your symptoms worse. Bend or rotate your ankles several times every hour. Walk around for a few minutes every hour to get blood moving in your legs.    Do not smoke.  Nicotine and other chemicals in cigarettes and cigars can cause lung damage. Ask your healthcare provider for information if you currently smoke and need help to quit. E-cigarettes or smokeless tobacco still contain nicotine. Talk to your provider before you use these products.    Do not wear tight clothes or high-heeled shoes.  Avoid clothes that are tight around your waist and knees. Wear clothes that fit well to help blood flow in your legs. Your symptoms may get worse if you wear high-heeled shoes.    Do not cross your  legs when you sit.  This decreases blood flow to your feet and can make your symptoms worse. Make sure your feet can reach the chair's footrest or ground when you sit.    Follow up with your doctor as directed:  Write down your questions so you remember to ask them during your visits.  © Copyright [employer] 2023 Information is for End User's use only and may not be sold, redistributed or otherwise used for commercial purposes.  The above information is an  only. It is not intended as medical advice for individual conditions or treatments. Talk to your doctor, nurse or pharmacist before following any medical regimen to see if it is safe and effective for you.

## 2024-01-18 NOTE — PROGRESS NOTES
"Assessment/Plan:    Varicose veins of bilateral lower extremities with other complications  83-year-old female with aortic insufficiency, aortic root ectasia, HLD, hx shingles on the left lateral lower leg w/ drop foot, BLE varicose veins with swelling and venous stasis changes, L>R, LLE superficial phlebitis. Patient returns to the office to recheck veins and difficulty with compression     -Bilateral lower extremity varicosities with swelling and chronic venous stasis changes, left greater than right  -History of superficial thrombophlebitis left distal medial lower leg on duplex 9/7/23. No need to continue warm compresses at this point  -Recommended continued use of gradient compression hose on a daily basis.  Try petite or short compression for better fit.  -Walk 10 minutes 2-3 times a day  -Elevate legs 3 times a day for 30 minutes each  -Use moisturizer daily to maintain skin integrity  -Follow-up in the office in 3 to 6 months to recheck swelling and stasis changes  -Call the office with any new issues or concerns       Diagnoses and all orders for this visit:    Varicose veins of bilateral lower extremities with other complications  -     Compression Stocking    Other orders  -     Probiotic Product (PROBIOTIC ADVANCED PO); Take by mouth      Subjective:      Patient ID: Kay Bright is a 83 y.o. female.    Patient presents for evaluation of worsening varicose veins. She reports compression stockings cause discomfort and she noticed varicose veins \"popping out more\". She has swelling in both legs, especially at the ankles. She states swelling reduces overnight when elevating her legs. She is not a smoker.     HPI  83-year-old female with aortic insufficiency, aortic root ectasia, HLD, hx shingles on the left lateral lower leg w/ drop foot, BLE varicose veins with swelling and venous stasis changes, L>R, LLE superficial phlebitis. Patient returns to the office to recheck veins and difficulty with " "compression   Patient is accompanied by her daughter Jayde. She was initially evaluated by Susy MERCADO in October and given light compression stockings. Patient didn't like tubi  compression because it bunched up under her foot. She was wearing 15-20mmHg compression though it dug in at the knee on the left leg and seemed longer on the left leg though fit properly on the right. We discussed donning compression evenly and if this does not work trying petite or short compression   She has chronic, long standing bilateral lower extremity varicosities with swelling and chronic venous stasis changes, left greater than right. With mild inflammation and lipodermatosclerosis of the left distal medial lower leg just above the ankle. She denies any significant pain. She also notes more veins at the right foot.   The following portions of the patient's history were reviewed and updated as appropriate: allergies, current medications, past family history, past medical history, past social history, past surgical history, and problem list.  ROS reviewed     Review of Systems   Cardiovascular:  Positive for leg swelling.   Skin:         Protruding varicosities   All other systems reviewed and are negative.        Objective:  I have reviewed and made appropriate changes to the review of systems input by the medical assistant.    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    Stress fracture of calcaneus    Osteoporosis    Hyperlipidemia    Vitamin D deficiency    Pneumonia    Leukocytosis    Hyperparathyroidism (HCC)    Multiple thyroid nodules    White coat syndrome without diagnosis of hypertension    Varicose veins of both lower extremities with inflammation    History of diverticulitis       Past Surgical History:   Procedure Laterality Date    CATARACT EXTRACTION      CT XCAPSL CTRC RMVL INSJ IO LENS PROSTH W/O ECP Right 5/14/2018    Procedure: EXTRACTION EXTRACAPSULAR CATARACT PHACO INTRAOCULAR LENS (IOL);  Surgeon: Rian Benson MD;  Location: [facility] MAIN OR;  Service: Ophthalmology    CT XCAPSL CTRC RMVL INSJ IO LENS PROSTH W/O ECP Left 7/10/2018    Procedure: EXTRACTION EXTRACAPSULAR CATARACT PHACO INTRAOCULAR LENS (IOL);  Surgeon: Rian Benson MD;  Location: [facility] MAIN OR;  Service: Ophthalmology    TUBAL LIGATION         Family History   Problem Relation Age of Onset    Other Mother         corneal transplants, Parathyroid disease    Osteoporosis Mother     Stroke Father     Hypertension Father     Heart disease Father         cardiac disorder    Osteoporosis Father     Other Father         Parathyroid disease    Asthma Sister     Hiatal hernia Sister     Other Brother         parathyroid-removed, Parathyroid disease    Heart disease Brother         \" maker\"       Social History     Socioeconomic History    Marital status:      Spouse name: Not on file    Number of children: Not on file    Years of education: Not on file    Highest education level: Not on file   Occupational History    Not on file   Tobacco Use    Smoking status: Never    Smokeless tobacco: Never   Vaping Use    Vaping status: Never Used   Substance and Sexual Activity    Alcohol use: No    Drug use: No    Sexual activity: Not on file   Other Topics Concern    Not on file   Social History Narrative    Caffeine use, active     Social Determinants of Health     Financial " "Resource Strain: Low Risk  (10/2/2023)    Overall Financial Resource Strain (CARDIA)     Difficulty of Paying Living Expenses: Not hard at all   Food Insecurity: Not on file   Transportation Needs: No Transportation Needs (10/2/2023)    PRAPARE - Transportation     Lack of Transportation (Medical): No     Lack of Transportation (Non-Medical): No   Physical Activity: Not on file   Stress: Not on file   Social Connections: Not on file   Intimate Partner Violence: Not on file   Housing Stability: Not on file       Allergies   Allergen Reactions    Prednisone Swelling     Facial swelling, redness-no dyspnea         Current Outpatient Medications:     cholecalciferol (VITAMIN D3) 1,000 units tablet, Take 1,000 Units by mouth every morning, Disp: , Rfl:     ibuprofen (MOTRIN) 200 mg tablet, Take 1 tablet by mouth 3 (three) times a day as needed, Disp: , Rfl:     Multiple Vitamins-Minerals (CENTRUM SILVER 50+WOMEN PO), Take by mouth every morning, Disp: , Rfl:     Probiotic Product (PROBIOTIC ADVANCED PO), Take by mouth, Disp: , Rfl:     sodium chloride (ALESIA 128) 2 % hypertonic ophthalmic solution, Administer 1 drop to both eyes 3 (three) times a day Alesia 128 gel at HS , Disp: , Rfl:       /84 (BP Location: Left arm, Patient Position: Sitting, Cuff Size: Standard)   Pulse 78   Ht 5' 1.5\" (1.562 m)   Wt 58.1 kg (128 lb)   BMI 23.79 kg/m²          Physical Exam  Vitals and nursing note reviewed.   Constitutional:       Appearance: Normal appearance.   HENT:      Head: Normocephalic and atraumatic.   Eyes:      Extraocular Movements: Extraocular movements intact.   Cardiovascular:      Rate and Rhythm: Rhythm irregular.      Pulses:           Dorsalis pedis pulses are 2+ on the right side and 2+ on the left side.   Pulmonary:      Effort: Pulmonary effort is normal.      Breath sounds: Normal breath sounds.   Musculoskeletal:         General: Swelling present.      Comments: BLE bulky varicosities and scattered " spider/reticular veins. Left distal medial lower leg 3cm above the ankle is mildly inflamed and indurated, lipodermatosclerotic stasis changes and discoloration. No acute swelling or discomfort    Neurological:      General: No focal deficit present.      Mental Status: She is alert and oriented to person, place, and time.   Psychiatric:         Mood and Affect: Mood normal.         Behavior: Behavior normal.

## 2024-01-18 NOTE — ASSESSMENT & PLAN NOTE
83-year-old female with aortic insufficiency, aortic root ectasia, HLD, hx shingles on the left lateral lower leg w/ drop foot, BLE varicose veins with swelling and venous stasis changes, L>R, LLE superficial phlebitis. Patient returns to the office to recheck veins and difficulty with compression     -Bilateral lower extremity varicosities with swelling and chronic venous stasis changes, left greater than right  -History of superficial thrombophlebitis left distal medial lower leg on duplex 9/7/23. No need to continue warm compresses at this point  -Recommended continued use of gradient compression hose on a daily basis.  Try petite or short compression for better fit.  -Walk 10 minutes 2-3 times a day  -Elevate legs 3 times a day for 30 minutes each  -Use moisturizer daily to maintain skin integrity  -Follow-up in the office in 3 to 6 months to recheck swelling and stasis changes  -Call the office with any new issues or concerns

## 2024-02-21 PROBLEM — J18.9 PNEUMONIA: Status: RESOLVED | Noted: 2019-12-02 | Resolved: 2024-02-21

## 2024-02-29 ENCOUNTER — NURSE TRIAGE (OUTPATIENT)
Age: 84
End: 2024-02-29

## 2024-02-29 ENCOUNTER — OFFICE VISIT (OUTPATIENT)
Dept: FAMILY MEDICINE CLINIC | Facility: MEDICAL CENTER | Age: 84
End: 2024-02-29
Payer: MEDICARE

## 2024-02-29 ENCOUNTER — APPOINTMENT (OUTPATIENT)
Dept: LAB | Facility: MEDICAL CENTER | Age: 84
End: 2024-02-29
Payer: MEDICARE

## 2024-02-29 VITALS
HEART RATE: 64 BPM | SYSTOLIC BLOOD PRESSURE: 120 MMHG | BODY MASS INDEX: 22.97 KG/M2 | DIASTOLIC BLOOD PRESSURE: 82 MMHG | TEMPERATURE: 99.3 F | RESPIRATION RATE: 16 BRPM | OXYGEN SATURATION: 96 % | WEIGHT: 124.8 LBS | HEIGHT: 62 IN

## 2024-02-29 DIAGNOSIS — K57.92 DIVERTICULITIS: Primary | ICD-10-CM

## 2024-02-29 DIAGNOSIS — R10.30 LOWER ABDOMINAL PAIN: ICD-10-CM

## 2024-02-29 DIAGNOSIS — K57.92 DIVERTICULITIS: ICD-10-CM

## 2024-02-29 LAB
ALBUMIN SERPL BCP-MCNC: 4.3 G/DL (ref 3.5–5)
ALP SERPL-CCNC: 94 U/L (ref 34–104)
ALT SERPL W P-5'-P-CCNC: 13 U/L (ref 7–52)
ANION GAP SERPL CALCULATED.3IONS-SCNC: 8 MMOL/L
AST SERPL W P-5'-P-CCNC: 21 U/L (ref 13–39)
BASOPHILS # BLD AUTO: 0.03 THOUSANDS/ÂΜL (ref 0–0.1)
BASOPHILS NFR BLD AUTO: 0 % (ref 0–1)
BILIRUB SERPL-MCNC: 0.57 MG/DL (ref 0.2–1)
BILIRUB UR QL STRIP: NEGATIVE
BUN SERPL-MCNC: 12 MG/DL (ref 5–25)
CALCIUM SERPL-MCNC: 10.6 MG/DL (ref 8.4–10.2)
CHLORIDE SERPL-SCNC: 102 MMOL/L (ref 96–108)
CLARITY UR: CLEAR
CO2 SERPL-SCNC: 28 MMOL/L (ref 21–32)
COLOR UR: ABNORMAL
CREAT SERPL-MCNC: 0.51 MG/DL (ref 0.6–1.3)
CRP SERPL QL: 76.6 MG/L
EOSINOPHIL # BLD AUTO: 0.03 THOUSAND/ÂΜL (ref 0–0.61)
EOSINOPHIL NFR BLD AUTO: 0 % (ref 0–6)
ERYTHROCYTE [DISTWIDTH] IN BLOOD BY AUTOMATED COUNT: 12.7 % (ref 11.6–15.1)
GFR SERPL CREATININE-BSD FRML MDRD: 88 ML/MIN/1.73SQ M
GLUCOSE P FAST SERPL-MCNC: 95 MG/DL (ref 65–99)
GLUCOSE UR STRIP-MCNC: NEGATIVE MG/DL
HCT VFR BLD AUTO: 42.5 % (ref 34.8–46.1)
HGB BLD-MCNC: 13.3 G/DL (ref 11.5–15.4)
HGB UR QL STRIP.AUTO: NEGATIVE
IMM GRANULOCYTES # BLD AUTO: 0.05 THOUSAND/UL (ref 0–0.2)
IMM GRANULOCYTES NFR BLD AUTO: 0 % (ref 0–2)
KETONES UR STRIP-MCNC: ABNORMAL MG/DL
LEUKOCYTE ESTERASE UR QL STRIP: NEGATIVE
LIPASE SERPL-CCNC: 12 U/L (ref 11–82)
LYMPHOCYTES # BLD AUTO: 1.05 THOUSANDS/ÂΜL (ref 0.6–4.47)
LYMPHOCYTES NFR BLD AUTO: 9 % (ref 14–44)
MCH RBC QN AUTO: 29.4 PG (ref 26.8–34.3)
MCHC RBC AUTO-ENTMCNC: 31.3 G/DL (ref 31.4–37.4)
MCV RBC AUTO: 94 FL (ref 82–98)
MONOCYTES # BLD AUTO: 0.9 THOUSAND/ÂΜL (ref 0.17–1.22)
MONOCYTES NFR BLD AUTO: 8 % (ref 4–12)
NEUTROPHILS # BLD AUTO: 9.33 THOUSANDS/ÂΜL (ref 1.85–7.62)
NEUTS SEG NFR BLD AUTO: 83 % (ref 43–75)
NITRITE UR QL STRIP: NEGATIVE
NRBC BLD AUTO-RTO: 0 /100 WBCS
PH UR STRIP.AUTO: 6.5 [PH]
PLATELET # BLD AUTO: 250 THOUSANDS/UL (ref 149–390)
PMV BLD AUTO: 11.1 FL (ref 8.9–12.7)
POTASSIUM SERPL-SCNC: 4.2 MMOL/L (ref 3.5–5.3)
PROT SERPL-MCNC: 7.5 G/DL (ref 6.4–8.4)
PROT UR STRIP-MCNC: NEGATIVE MG/DL
RBC # BLD AUTO: 4.52 MILLION/UL (ref 3.81–5.12)
SODIUM SERPL-SCNC: 138 MMOL/L (ref 135–147)
SP GR UR STRIP.AUTO: 1.01 (ref 1–1.03)
UROBILINOGEN UR STRIP-ACNC: <2 MG/DL
WBC # BLD AUTO: 11.39 THOUSAND/UL (ref 4.31–10.16)

## 2024-02-29 PROCEDURE — 81003 URINALYSIS AUTO W/O SCOPE: CPT

## 2024-02-29 PROCEDURE — 86140 C-REACTIVE PROTEIN: CPT

## 2024-02-29 PROCEDURE — 36415 COLL VENOUS BLD VENIPUNCTURE: CPT

## 2024-02-29 PROCEDURE — 80053 COMPREHEN METABOLIC PANEL: CPT

## 2024-02-29 PROCEDURE — 85025 COMPLETE CBC W/AUTO DIFF WBC: CPT

## 2024-02-29 PROCEDURE — 99214 OFFICE O/P EST MOD 30 MIN: CPT

## 2024-02-29 PROCEDURE — 83690 ASSAY OF LIPASE: CPT

## 2024-02-29 RX ORDER — AMOXICILLIN AND CLAVULANATE POTASSIUM 875; 125 MG/1; MG/1
1 TABLET, FILM COATED ORAL EVERY 12 HOURS SCHEDULED
Qty: 14 TABLET | Refills: 0 | Status: SHIPPED | OUTPATIENT
Start: 2024-02-29 | End: 2024-03-07

## 2024-02-29 NOTE — TELEPHONE ENCOUNTER
"Reason for Disposition   MILD pain (e.g., does not interfere with normal activities) and pain comes and goes (cramps) lasts > 48 hours (Exception: this same abdominal pain is a chronic symptom recurrent or ongoing AND present > 4 weeks)    Answer Assessment - Initial Assessment Questions  1. LOCATION: \"Where does it hurt?\"       MIDDLE ABD.  2. RADIATION: \"Does the pain shoot anywhere else?\" (e.g., chest, back)      NO   3. ONSET: \"When did the pain begin?\" (e.g., minutes, hours or days ago)       THREE DAYS AGO  4. SUDDEN: \"Gradual or sudden onset?\"      GRADUAL  5. PATTERN \"Does the pain come and go, or is it constant?\"     - If constant: \"Is it getting better, staying the same, or worsening?\"       (Note: Constant means the pain never goes away completely; most serious pain is constant and it progresses)      - If intermittent: \"How long does it last?\" \"Do you have pain now?\"      (Note: Intermittent means the pain goes away completely between bouts)      COME AND GO   6. SEVERITY: \"How bad is the pain?\"  (e.g., Scale 1-10; mild, moderate, or severe)    - MILD (1-3): doesn't interfere with normal activities, abdomen soft and not tender to touch     - MODERATE (4-7): interferes with normal activities or awakens from sleep, tender to touch     - SEVERE (8-10): excruciating pain, doubled over, unable to do any normal activities       MILD 1  7. RECURRENT SYMPTOM: \"Have you ever had this type of stomach pain before?\" If Yes, ask: \"When was the last time?\" and \"What happened that time?\"       YES   8. CAUSE: \"What do you think is causing the stomach pain?\"      UNKNOWN   9. RELIEVING/AGGRAVATING FACTORS: \"What makes it better or worse?\" (e.g., movement, antacids, bowel movement)      BOWEL MOVEMENT   10. OTHER SYMPTOMS: \"Has there been any vomiting, diarrhea, constipation, or urine problems?\"        LOOSE STOOLS    Protocols used: Abdominal Pain - Female-ADULT-[place]    "

## 2024-02-29 NOTE — PROGRESS NOTES
Assessment/Plan:    Check labs and UA.  Start Augmentin.  Continue probiotic daily.  Soft, low fiber diet discussed, advance as tolerated.   CT abdomen/pelvis declined.  Return next week for f/u with Dr. Garcia PCP.   Follow up with GI, referral placed.        1. Diverticulitis  We will treat for diverticulitis as symptoms appear to be similar to prior diagnosis of diverticulitis 3 months ago in which antibiotic course was not completed.  Advised about CT abdomen pelvis, patient declines.  Check labs and UA as below.  Start Augmentin twice daily x 7 days as below.  Continue daily probiotic.   Advised about soft, low fiber diet and advance diet as tolerated.  Follow-up next week with PCP Dr. Garcia for recheck.  Referral placed to GI for follow-up.  - amoxicillin-clavulanate (AUGMENTIN) 875-125 mg per tablet; Take 1 tablet by mouth every 12 (twelve) hours for 7 days  Dispense: 14 tablet; Refill: 0  - CBC and differential; Future  - Comprehensive metabolic panel; Future  - Lipase; Future  - C-reactive protein; Future  - UA w Reflex to Microscopic w Reflex to Culture; Future  - Ambulatory Referral to Gastroenterology; Future    2. Lower abdominal pain  We will treat for diverticulitis as symptoms appear to be similar to prior diagnosis of diverticulitis 3 months ago in which antibiotic course was not completed.  Advised about CT abdomen pelvis, patient declines.  Check labs and UA as below.  Start Augmentin twice daily x 7 days as below.  Continue daily probiotic.   Advised about clear liquid diet then advance to low fiber diet and advance diet as tolerated.  Follow-up next week with PCP Dr. Garcia for recheck.  Referral placed to GI for follow-up.  - amoxicillin-clavulanate (AUGMENTIN) 875-125 mg per tablet; Take 1 tablet by mouth every 12 (twelve) hours for 7 days  Dispense: 14 tablet; Refill: 0  - CBC and differential; Future  - Comprehensive metabolic panel; Future  - Lipase; Future  - C-reactive protein; Future  - UA  w Reflex to Microscopic w Reflex to Culture; Future  - Ambulatory Referral to Gastroenterology; Future      Subjective:      Patient ID: Kay Bright is a 83 y.o. female.    83 year old female presents with complaints of lower abdominal pain and loose stools x 4-5 days.  She describes pain as intermittent and cramping in nature.  Symptoms appear to be  similar to when she had diverticulitis flare 3 months ago with the exception of the abdominal pain which is now central lower abdomen versus left lower quadrant.  She tells me she had an appendectomy in the past. She denies nausea, vomiting, urinary symptoms, constipation. She did have similar symptoms in November however at that time her pain was more LLQ. At that time CT abdomen and pelvis was performed and results were positive for acute diverticulitis. She was prescribed flagyl and cipro which she tells me she only took for about 4 days due to diarrhea, she did not finish the course of antibiotics.   She started taking a probiotic daily 3 months ago and is still taking this daily.    Abdominal Pain  Associated symptoms include diarrhea (loose stools). Pertinent negatives include no constipation, nausea or vomiting.       The following portions of the patient's history were reviewed and updated as appropriate: current medications, past family history, past medical history, past social history, past surgical history, and problem list.    Review of Systems   Constitutional: Negative.    HENT: Negative.     Eyes: Negative.    Respiratory: Negative.     Cardiovascular: Negative.    Gastrointestinal:  Positive for abdominal pain and diarrhea (loose stools). Negative for blood in stool, constipation, nausea and vomiting.   Endocrine: Negative.    Genitourinary: Negative.    Musculoskeletal: Negative.    Skin: Negative.    Allergic/Immunologic: Negative.    Neurological: Negative.    Hematological: Negative.    Psychiatric/Behavioral: Negative.        "    Objective:      /82 (BP Location: Left arm, Patient Position: Sitting, Cuff Size: Standard)   Pulse 64   Temp 99.3 °F (37.4 °C) (Temporal)   Resp 16   Ht 5' 1.5\" (1.562 m)   Wt 56.6 kg (124 lb 12.8 oz)   SpO2 96%   BMI 23.20 kg/m²          Physical Exam  Vitals and nursing note reviewed.   Constitutional:       General: She is not in acute distress.     Appearance: She is well-developed. She is not ill-appearing.   HENT:      Head: Normocephalic and atraumatic.   Cardiovascular:      Rate and Rhythm: Rhythm irregular.   Pulmonary:      Effort: Pulmonary effort is normal. No respiratory distress.      Breath sounds: No stridor. No wheezing, rhonchi or rales.   Abdominal:      General: Abdomen is flat. Bowel sounds are normal.      Palpations: Abdomen is soft.      Tenderness: There is abdominal tenderness in the right lower quadrant, periumbilical area and suprapubic area. There is guarding.   Skin:     General: Skin is warm and dry.   Neurological:      General: No focal deficit present.      Mental Status: She is alert.   Psychiatric:         Mood and Affect: Mood normal.                    ROGELIO Ruggiero  "

## 2024-02-29 NOTE — PATIENT INSTRUCTIONS
Start with clear liquid diet and advance to soft diet as tolerated.  Follow low fiber diet over the next few days and then advance your diet as tolerated.    Take Augmentin antibiotic twice daily x 7 days and continue probiotic daily.    Check labs, urinalysis now.    Follow-up next week with your PCP Dr. Garcia.    Follow up with Gastroenterology as well. Referral placed.

## 2024-03-01 ENCOUNTER — RA CDI HCC (OUTPATIENT)
Dept: OTHER | Facility: HOSPITAL | Age: 84
End: 2024-03-01

## 2024-03-07 ENCOUNTER — TELEPHONE (OUTPATIENT)
Dept: OTHER | Facility: OTHER | Age: 84
End: 2024-03-07

## 2024-03-07 ENCOUNTER — TELEPHONE (OUTPATIENT)
Dept: ADMINISTRATIVE | Facility: OTHER | Age: 84
End: 2024-03-07

## 2024-03-07 NOTE — TELEPHONE ENCOUNTER
03/07/24 2:32 PM    Patient contacted (left message) to bring Advance Directive, POLST, or Living Will document to next scheduled pcp visit.    Thank you.  Pamela Cevallos [facility]

## 2024-03-07 NOTE — TELEPHONE ENCOUNTER
Patient called, stated that she finished a course of antibiotic yesterday. Patient received a call from the pharmacy today stating that antibiotic is ready for . There is no refill for Rx Augmentin. Patient was advised to follow up on this matter tomorrow at her appointment.

## 2024-03-08 ENCOUNTER — OFFICE VISIT (OUTPATIENT)
Dept: FAMILY MEDICINE CLINIC | Facility: MEDICAL CENTER | Age: 84
End: 2024-03-08
Payer: MEDICARE

## 2024-03-08 VITALS
TEMPERATURE: 97.7 F | WEIGHT: 121 LBS | DIASTOLIC BLOOD PRESSURE: 74 MMHG | OXYGEN SATURATION: 98 % | HEART RATE: 73 BPM | SYSTOLIC BLOOD PRESSURE: 112 MMHG | BODY MASS INDEX: 22.49 KG/M2

## 2024-03-08 DIAGNOSIS — Z09 FOLLOW-UP EXAM AFTER TREATMENT: Primary | ICD-10-CM

## 2024-03-08 PROCEDURE — 99213 OFFICE O/P EST LOW 20 MIN: CPT | Performed by: STUDENT IN AN ORGANIZED HEALTH CARE EDUCATION/TRAINING PROGRAM

## 2024-03-08 PROCEDURE — G2211 COMPLEX E/M VISIT ADD ON: HCPCS | Performed by: STUDENT IN AN ORGANIZED HEALTH CARE EDUCATION/TRAINING PROGRAM

## 2024-03-08 NOTE — PROGRESS NOTES
[facility] - Clinic Note  Abelino Garcia DO, 24     Kay Bright MRN: [id number] : 1940 Age: 83 y.o.     Assessment/Plan     1. Follow-up exam after treatment    -Patient completed course of Augmentin for diverticulitis flare, responded well to treatment  -Benign abdominal exam today  -Reminded to schedule appoint with Gastroenterologist as previously advised    Kay Bright acknowledged understanding of treatment plan, all questions answered.    Subjective      Kay Bright is a 83 y.o. female who presents for recheck.  Patient was evaluated in office by advanced practitioner on 2024, diagnosed with diverticulitis and treated with Augmentin.  She completed antibiotic course.  Patient was also advised to follow-up with Gastroenterology.  Today, patient feels very well.  She denies any fever, chills, abdominal pain, constipation, diarrhea, hematochezia, melena, nausea, vomiting.  Appetite is good and hydrating well.    The following portions of the patient's history were reviewed and updated as appropriate: allergies, current medications, past family history, past medical history, past social history, past surgical history and problem list.    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 Garcia-ECHO-negative,no heart disease    Murmur     had ECHO-on 17    Rotator cuff injury     right-slight tear-healed with therapy    Shingles (herpes zoster) polyneuropathy 2017    Spinal stenosis     Varicose veins of legs     Vitamin D deficiency     Wears glasses        Allergies   Allergen Reactions    Prednisone Swelling     Facial swelling, redness-no dyspnea       Past Surgical History:  "  Procedure Laterality Date    CATARACT EXTRACTION      HI XCAPSL CTRC RMVL INSJ IO LENS PROSTH W/O ECP Right 5/14/2018    Procedure: EXTRACTION EXTRACAPSULAR CATARACT PHACO INTRAOCULAR LENS (IOL);  Surgeon: Rian Benson MD;  Location: [facility] MAIN OR;  Service: Ophthalmology    HI XCAPSL CTRC RMVL INSJ IO LENS PROSTH W/O ECP Left 7/10/2018    Procedure: EXTRACTION EXTRACAPSULAR CATARACT PHACO INTRAOCULAR LENS (IOL);  Surgeon: Rian Benson MD;  Location: [facility] MAIN OR;  Service: Ophthalmology    TUBAL LIGATION         Family History   Problem Relation Age of Onset    Other Mother         corneal transplants, Parathyroid disease    Osteoporosis Mother     Stroke Father     Hypertension Father     Heart disease Father         cardiac disorder    Osteoporosis Father     Other Father         Parathyroid disease    Asthma Sister     Hiatal hernia Sister     Other Brother         parathyroid-removed, Parathyroid disease    Heart disease Brother         \" maker\"       Social History     Socioeconomic History    Marital status:      Spouse name: None    Number of children: None    Years of education: None    Highest education level: None   Occupational History    None   Tobacco Use    Smoking status: Never    Smokeless tobacco: Never   Vaping Use    Vaping status: Never Used   Substance and Sexual Activity    Alcohol use: No    Drug use: No    Sexual activity: None   Other Topics Concern    None   Social History Narrative    Caffeine use, active     Social Determinants of Health     Financial Resource Strain: Low Risk  (10/2/2023)    Overall Financial Resource Strain (CARDIA)     Difficulty of Paying Living Expenses: Not hard at all   Food Insecurity: Not on file   Transportation Needs: No Transportation Needs (10/2/2023)    PRAPARE - Transportation     Lack of Transportation (Medical): No     Lack of Transportation (Non-Medical): No   Physical Activity: Not on file   Stress: Not on file   Social " Connections: Not on file   Intimate Partner Violence: Not on file   Housing Stability: Not on file       Current Outpatient Medications   Medication Sig Dispense Refill    cholecalciferol (VITAMIN D3) 1,000 units tablet Take 1,000 Units by mouth every morning      ibuprofen (MOTRIN) 200 mg tablet Take 1 tablet by mouth 3 (three) times a day as needed      Multiple Vitamins-Minerals (CENTRUM SILVER 50+WOMEN PO) Take by mouth every morning      Probiotic Product (PROBIOTIC ADVANCED PO) Take by mouth      sodium chloride (ALESIA 128) 2 % hypertonic ophthalmic solution Administer 1 drop to both eyes 3 (three) times a day Alesia 128 gel at HS        No current facility-administered medications for this visit.       Review of Systems     As noted in HPI    Objective      /74 (BP Location: Left arm, Patient Position: Sitting, Cuff Size: Standard)   Pulse 73   Temp 97.7 °F (36.5 °C) (Temporal)   Wt 54.9 kg (121 lb)   SpO2 98%   BMI 22.49 kg/m²     Physical Exam  Vitals reviewed.   Constitutional:       General: She is not in acute distress.     Appearance: Normal appearance. She is not ill-appearing or toxic-appearing.   HENT:      Head: Normocephalic and atraumatic.      Mouth/Throat:      Mouth: Mucous membranes are moist.      Pharynx: Oropharynx is clear.   Eyes:      Conjunctiva/sclera: Conjunctivae normal.   Cardiovascular:      Rate and Rhythm: Normal rate. Rhythm irregular.   Pulmonary:      Effort: Pulmonary effort is normal. No respiratory distress.      Breath sounds: Normal breath sounds.   Abdominal:      General: Abdomen is flat. Bowel sounds are normal.      Palpations: Abdomen is soft.      Tenderness: There is no abdominal tenderness. There is no guarding or rebound.   Skin:     General: Skin is warm and dry.   Neurological:      Mental Status: She is alert and oriented to person, place, and time.   Psychiatric:         Mood and Affect: Mood normal.         Behavior: Behavior normal.         Thought  "Content: Thought content normal.         Judgment: Judgment normal.             Some portions of this record may have been generated with voice recognition software. There may be translation, syntax, or grammatical errors. Occasional wrong word or \"sound-a-like\" substitutions may have occurred due to the inherent limitations of the voice recognition software. Read the chart carefully and recognize, using context, where substations may have occurred. If you have any questions, please contact the dictating provider for clarification or correction, as needed.  "

## 2024-03-18 ENCOUNTER — APPOINTMENT (EMERGENCY)
Dept: CT IMAGING | Facility: HOSPITAL | Age: 84
DRG: 482 | End: 2024-03-18
Payer: MEDICARE

## 2024-03-18 ENCOUNTER — APPOINTMENT (INPATIENT)
Dept: MRI IMAGING | Facility: HOSPITAL | Age: 84
DRG: 482 | End: 2024-03-18
Payer: MEDICARE

## 2024-03-18 ENCOUNTER — APPOINTMENT (OUTPATIENT)
Dept: RADIOLOGY | Facility: HOSPITAL | Age: 84
DRG: 482 | End: 2024-03-18
Payer: MEDICARE

## 2024-03-18 ENCOUNTER — APPOINTMENT (EMERGENCY)
Dept: RADIOLOGY | Facility: HOSPITAL | Age: 84
DRG: 482 | End: 2024-03-18
Payer: MEDICARE

## 2024-03-18 ENCOUNTER — HOSPITAL ENCOUNTER (INPATIENT)
Facility: HOSPITAL | Age: 84
LOS: 3 days | Discharge: HOME HEALTH | DRG: 482 | End: 2024-03-21
Attending: EMERGENCY MEDICINE | Admitting: SURGERY
Payer: MEDICARE
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
## 2024-03-18 DIAGNOSIS — S72.002A CLOSED FRACTURE OF LEFT HIP, INITIAL ENCOUNTER (HCC): Primary | ICD-10-CM

## 2024-03-18 DIAGNOSIS — M25.552 LEFT HIP PAIN: ICD-10-CM

## 2024-03-18 DIAGNOSIS — M19.90 ARTHRITIS: ICD-10-CM

## 2024-03-18 DIAGNOSIS — S72.115A CLOSED NONDISPLACED FRACTURE OF GREATER TROCHANTER OF LEFT FEMUR, INITIAL ENCOUNTER (HCC): ICD-10-CM

## 2024-03-18 DIAGNOSIS — M25.562 LEFT KNEE PAIN: ICD-10-CM

## 2024-03-18 PROBLEM — S72.112A FRACTURE OF GREATER TROCHANTER OF LEFT FEMUR (HCC): Status: ACTIVE | Noted: 2024-03-18

## 2024-03-18 PROBLEM — S72.111A FRACTURE OF GREATER TROCHANTER OF RIGHT FEMUR (HCC): Status: ACTIVE | Noted: 2024-03-18

## 2024-03-18 LAB
ALBUMIN SERPL BCP-MCNC: 4.2 G/DL (ref 3.5–5)
ALP SERPL-CCNC: 102 U/L (ref 34–104)
ALT SERPL W P-5'-P-CCNC: 20 U/L (ref 7–52)
ANION GAP SERPL CALCULATED.3IONS-SCNC: 6 MMOL/L (ref 4–13)
APTT PPP: 29 SECONDS (ref 23–37)
AST SERPL W P-5'-P-CCNC: 25 U/L (ref 13–39)
ATRIAL RATE: 72 BPM
BASOPHILS # BLD AUTO: 0.05 THOUSANDS/ÂΜL (ref 0–0.1)
BASOPHILS NFR BLD AUTO: 1 % (ref 0–1)
BILIRUB SERPL-MCNC: 0.59 MG/DL (ref 0.2–1)
BUN SERPL-MCNC: 9 MG/DL (ref 5–25)
CALCIUM SERPL-MCNC: 10.6 MG/DL (ref 8.4–10.2)
CHLORIDE SERPL-SCNC: 107 MMOL/L (ref 96–108)
CO2 SERPL-SCNC: 28 MMOL/L (ref 21–32)
CREAT SERPL-MCNC: 0.5 MG/DL (ref 0.6–1.3)
EOSINOPHIL # BLD AUTO: 0.2 THOUSAND/ÂΜL (ref 0–0.61)
EOSINOPHIL NFR BLD AUTO: 3 % (ref 0–6)
ERYTHROCYTE [DISTWIDTH] IN BLOOD BY AUTOMATED COUNT: 12.6 % (ref 11.6–15.1)
GFR SERPL CREATININE-BSD FRML MDRD: 89 ML/MIN/1.73SQ M
GLUCOSE SERPL-MCNC: 103 MG/DL (ref 65–140)
HCT VFR BLD AUTO: 41.5 % (ref 34.8–46.1)
HGB BLD-MCNC: 13.4 G/DL (ref 11.5–15.4)
IMM GRANULOCYTES # BLD AUTO: 0.01 THOUSAND/UL (ref 0–0.2)
IMM GRANULOCYTES NFR BLD AUTO: 0 % (ref 0–2)
INR PPP: 1.04 (ref 0.84–1.19)
LYMPHOCYTES # BLD AUTO: 1.11 THOUSANDS/ÂΜL (ref 0.6–4.47)
LYMPHOCYTES NFR BLD AUTO: 17 % (ref 14–44)
MCH RBC QN AUTO: 29.3 PG (ref 26.8–34.3)
MCHC RBC AUTO-ENTMCNC: 32.3 G/DL (ref 31.4–37.4)
MCV RBC AUTO: 91 FL (ref 82–98)
MONOCYTES # BLD AUTO: 0.45 THOUSAND/ÂΜL (ref 0.17–1.22)
MONOCYTES NFR BLD AUTO: 7 % (ref 4–12)
NEUTROPHILS # BLD AUTO: 4.89 THOUSANDS/ÂΜL (ref 1.85–7.62)
NEUTS SEG NFR BLD AUTO: 72 % (ref 43–75)
NRBC BLD AUTO-RTO: 0 /100 WBCS
P AXIS: 80 DEGREES
PLATELET # BLD AUTO: 243 THOUSANDS/UL (ref 149–390)
PMV BLD AUTO: 10.3 FL (ref 8.9–12.7)
POTASSIUM SERPL-SCNC: 3.9 MMOL/L (ref 3.5–5.3)
PR INTERVAL: 164 MS
PROT SERPL-MCNC: 7.3 G/DL (ref 6.4–8.4)
PROTHROMBIN TIME: 14.2 SECONDS (ref 11.6–14.5)
QRS AXIS: 0 DEGREES
QRSD INTERVAL: 90 MS
QT INTERVAL: 392 MS
QTC INTERVAL: 429 MS
RBC # BLD AUTO: 4.57 MILLION/UL (ref 3.81–5.12)
SODIUM SERPL-SCNC: 141 MMOL/L (ref 135–147)
T WAVE AXIS: 35 DEGREES
VENTRICULAR RATE: 72 BPM
WBC # BLD AUTO: 6.71 THOUSAND/UL (ref 4.31–10.16)

## 2024-03-18 PROCEDURE — 73718 MRI LOWER EXTREMITY W/O DYE: CPT

## 2024-03-18 PROCEDURE — 99285 EMERGENCY DEPT VISIT HI MDM: CPT

## 2024-03-18 PROCEDURE — 73502 X-RAY EXAM HIP UNI 2-3 VIEWS: CPT

## 2024-03-18 PROCEDURE — 93005 ELECTROCARDIOGRAM TRACING: CPT

## 2024-03-18 PROCEDURE — 85610 PROTHROMBIN TIME: CPT | Performed by: EMERGENCY MEDICINE

## 2024-03-18 PROCEDURE — 99223 1ST HOSP IP/OBS HIGH 75: CPT | Performed by: SURGERY

## 2024-03-18 PROCEDURE — 93010 ELECTROCARDIOGRAM REPORT: CPT | Performed by: INTERNAL MEDICINE

## 2024-03-18 PROCEDURE — 99285 EMERGENCY DEPT VISIT HI MDM: CPT | Performed by: EMERGENCY MEDICINE

## 2024-03-18 PROCEDURE — 80053 COMPREHEN METABOLIC PANEL: CPT | Performed by: EMERGENCY MEDICINE

## 2024-03-18 PROCEDURE — 73700 CT LOWER EXTREMITY W/O DYE: CPT

## 2024-03-18 PROCEDURE — 85730 THROMBOPLASTIN TIME PARTIAL: CPT | Performed by: EMERGENCY MEDICINE

## 2024-03-18 PROCEDURE — 85025 COMPLETE CBC W/AUTO DIFF WBC: CPT | Performed by: EMERGENCY MEDICINE

## 2024-03-18 PROCEDURE — 96372 THER/PROPH/DIAG INJ SC/IM: CPT

## 2024-03-18 PROCEDURE — 36415 COLL VENOUS BLD VENIPUNCTURE: CPT | Performed by: EMERGENCY MEDICINE

## 2024-03-18 PROCEDURE — 73564 X-RAY EXAM KNEE 4 OR MORE: CPT

## 2024-03-18 RX ORDER — OXYCODONE HYDROCHLORIDE 5 MG/1
5 TABLET ORAL EVERY 4 HOURS PRN
Status: DISCONTINUED | OUTPATIENT
Start: 2024-03-18 | End: 2024-03-21 | Stop reason: HOSPADM

## 2024-03-18 RX ORDER — MELATONIN
1000 EVERY MORNING
Status: DISCONTINUED | OUTPATIENT
Start: 2024-03-19 | End: 2024-03-21 | Stop reason: HOSPADM

## 2024-03-18 RX ORDER — ACETAMINOPHEN 325 MG/1
975 TABLET ORAL EVERY 8 HOURS SCHEDULED
Status: DISCONTINUED | OUTPATIENT
Start: 2024-03-18 | End: 2024-03-21 | Stop reason: HOSPADM

## 2024-03-18 RX ORDER — SACCHAROMYCES BOULARDII 250 MG
250 CAPSULE ORAL DAILY
Status: DISCONTINUED | OUTPATIENT
Start: 2024-03-18 | End: 2024-03-21 | Stop reason: HOSPADM

## 2024-03-18 RX ORDER — AMOXICILLIN 250 MG
1 CAPSULE ORAL
Status: DISCONTINUED | OUTPATIENT
Start: 2024-03-18 | End: 2024-03-21 | Stop reason: HOSPADM

## 2024-03-18 RX ORDER — HYDROMORPHONE HCL IN WATER/PF 6 MG/30 ML
0.2 PATIENT CONTROLLED ANALGESIA SYRINGE INTRAVENOUS EVERY 2 HOUR PRN
Status: DISCONTINUED | OUTPATIENT
Start: 2024-03-18 | End: 2024-03-21 | Stop reason: HOSPADM

## 2024-03-18 RX ORDER — SODIUM CHLORIDE, SODIUM GLUCONATE, SODIUM ACETATE, POTASSIUM CHLORIDE, MAGNESIUM CHLORIDE, SODIUM PHOSPHATE, DIBASIC, AND POTASSIUM PHOSPHATE .53; .5; .37; .037; .03; .012; .00082 G/100ML; G/100ML; G/100ML; G/100ML; G/100ML; G/100ML; G/100ML
75 INJECTION, SOLUTION INTRAVENOUS CONTINUOUS
Status: DISCONTINUED | OUTPATIENT
Start: 2024-03-18 | End: 2024-03-21

## 2024-03-18 RX ORDER — KETOROLAC TROMETHAMINE 30 MG/ML
15 INJECTION, SOLUTION INTRAMUSCULAR; INTRAVENOUS ONCE
Status: COMPLETED | OUTPATIENT
Start: 2024-03-18 | End: 2024-03-18

## 2024-03-18 RX ORDER — ENOXAPARIN SODIUM 100 MG/ML
30 INJECTION SUBCUTANEOUS EVERY 12 HOURS
Status: DISCONTINUED | OUTPATIENT
Start: 2024-03-18 | End: 2024-03-21 | Stop reason: HOSPADM

## 2024-03-18 RX ADMIN — KETOROLAC TROMETHAMINE 15 MG: 30 INJECTION, SOLUTION INTRAMUSCULAR; INTRAVENOUS at 15:07

## 2024-03-18 RX ADMIN — ACETAMINOPHEN 975 MG: 325 TABLET, FILM COATED ORAL at 21:36

## 2024-03-18 RX ADMIN — SODIUM CHLORIDE, SODIUM GLUCONATE, SODIUM ACETATE, POTASSIUM CHLORIDE, MAGNESIUM CHLORIDE, SODIUM PHOSPHATE, DIBASIC, AND POTASSIUM PHOSPHATE 75 ML/HR: .53; .5; .37; .037; .03; .012; .00082 INJECTION, SOLUTION INTRAVENOUS at 21:42

## 2024-03-18 RX ADMIN — SENNOSIDES, DOCUSATE SODIUM 1 TABLET: 8.6; 5 TABLET ORAL at 21:36

## 2024-03-18 RX ADMIN — TRANEXAMIC ACID 550 MG: 100 INJECTION, SOLUTION INTRAVENOUS at 18:21

## 2024-03-18 NOTE — DISCHARGE INSTRUCTIONS
Today you were seen in the emergency department for left knee and hip pain after a fall. Your workup included x-ray imaging and pain control. I believe your symptoms to be the result of joint sprain/strain and arthritis. At this time there does not appear to be ... or emergent life threatening cause to explain your symptoms. You are stable for discharge home with outpatient follow up.     Please follow up with your primary care provider in the next 2-3 days. Please review all results discussed today with your primary care provider.  We have also provided you a referral to orthopedics to follow-up with them regarding your joint pain.    In the meantime please continue to use rest, ice/heat, compression and elevation to help treat your pain.  You may also use Voltaren gel along with Tylenol for pain relief.  Please limit NSAIDs such as ibuprofen/Aleve as this may hurt your kidneys, and discussed use of these medications with your regular doctor.    Please return to the emergency department as soon as possible if you develop uncontrollable fevers (Temp >100.4), uncontrollable pain, weakness/numbness out of your normal in your leg, increased swelling or pain in your left calf, color change or decreased pulses in the leg, difficulty walking, vomiting, chest pain, trouble breathing, or any other concerning symptoms.     Thank you for choosing [facility] for your care.

## 2024-03-18 NOTE — H&P
"[facility]  H&P  Name: Kay Bright 83 y.o. female I MRN: [id number]  Unit/Bed#: ED-24 I Date of Admission: 3/18/2024   Date of Service: 3/18/2024 I Hospital Day: 0      Assessment/Plan   Fall  Assessment & Plan  Mechanical fall 4 days prior to presentation  Injuries listed below   PT/OT    Fracture of greater trochanter of left femur (HCC)  Assessment & Plan  Secondary to fall  CT imaging showed comminuted fracture greater trochanter left proximal femur.   Orthopedics consulted note appreciated  NWB LLE  Multimodal pain regimen  Continue to monitor neurovascular exam  PT/OT    Vitamin D deficiency  Assessment & Plan  Continue home supplementation    Left foot drop  Assessment & Plan  Associated with foot drop  Multi-Podus boot for support  PT/OT           Trauma Alert: Evaluation; trauma team notified at 1701 via text   Model of Arrival:  Family     Trauma Team: Attending To and REYNALDO Corea  Consultants:     Orthopedics: routine consult; Epic consult order placed and consultant notified (via phone/text @ time 1800);     History of Present Illness     Chief Complaint: \"I fell\"  Mechanism:Fall     HPI:    Kay Bright is a 83 y.o. female with history of a left-sided foot drop associated with a previous shingles exacerbation, presenting today for evaluation of left hip pain.  Patient and son who is at bedside have correlating story is that patient typically ambulates independently and is stable.  4 days ago, she was walking down her steps backwards while talking on the phone when she missed the bottom 2 steps falling onto her left hip.  No head strike or loss of consciousness.  Since the event patient has been able to get up and ambulate with assistance of a cane though ambulation has been limited due to pain.  Due to consistency of pain and ambulatory dysfunction that was not improving, family and patient agreed to have formal evaluation today.  Patient was worked up in the ED and " found to have a left greater trochanteric fracture.  Patient notes some left hip pain with ranging.  She denies any significant hematoma or swelling associated with the pain.  She does report having her baseline left foot drop but denies any other neurologic symptoms.  No other complaints offered.    Review of Systems   Constitutional: Negative.    HENT: Negative.     Eyes: Negative.    Respiratory: Negative.     Cardiovascular: Negative.    Gastrointestinal: Negative.    Endocrine: Negative.    Genitourinary: Negative.    Musculoskeletal:  Positive for arthralgias (Left hip pain).        Left foot drop at baseline     Allergic/Immunologic: Negative.    Neurological:  Negative for syncope, light-headedness, numbness and headaches.   Hematological: Negative.    Psychiatric/Behavioral: Negative.       12-point, complete review of systems was reviewed and negative except as stated above.     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    Spinal stenosis     Varicose veins of legs     Vitamin D deficiency     Wears glasses      Past Surgical History:   Procedure Laterality Date    CATARACT EXTRACTION      [place] [facility] INSJ IO LENS PROSTH W/O ECP Right 5/14/2018    Procedure: EXTRACTION EXTRACAPSULAR CATARACT PHACO INTRAOCULAR LENS (IOL);  Surgeon: Rian Benson MD;  Location: [facility] MAIN OR;  Service: Ophthalmology    [place] X[facility] RMVL INSJ IO LENS PROSTH W/O ECP Left 7/10/2018    Procedure: EXTRACTION EXTRACAPSULAR  CATARACT PHACO INTRAOCULAR LENS (IOL);  Surgeon: Rian Benson MD;  Location: [facility] MAIN OR;  Service: Ophthalmology    TUBAL LIGATION          Social History     Tobacco Use    Smoking status: Never    Smokeless tobacco: Never   Vaping Use    Vaping status: Never Used   Substance Use Topics    Alcohol use: No    Drug use: No     Immunization History   Administered Date(s) Administered    COVID-19 PFIZER VACCINE 0.3 ML IM 01/21/2021, 02/11/2021, 11/02/2021    INFLUENZA 11/11/2011, 10/12/2012, 10/14/2013, 11/03/2020, 11/08/2022    Influenza, high dose seasonal 0.7 mL 01/02/2019, 11/25/2019, 10/02/2023    Pneumococcal Conjugate 13-Valent 06/19/2018    Pneumococcal Conjugate Vaccine 20-valent (Pcv20), Polysace 09/26/2022    Pneumococcal Polysaccharide PPV23 01/08/2013    Td (adult), adsorbed 07/30/2013    influenza, injectable, quadrivalent 11/03/2020     Last Tetanus: 2013  Family History: Non-contributory    1. Before the illness or injury that brought you to the Emergency, did you need someone to help you on a regular basis? 0=No   2. Since the illness or injury that brought you to the Emergency, have you needed more help than usual to take care of yourself? 1=Yes   3. Have you been hospitalized for one or more nights during the past 6 months (excluding a stay in the Emergency Department)? 0=No   4. In general, do you see well? 0=Yes   5. In general, do you have serious problems with your memory? 0=No   6. Do you take more than three different medications everyday? 1=Yes   TOTAL   2     Did you order a geriatric consult if the score was 2 or greater?: yes     Meds/Allergies   all current active meds have been reviewed     Allergies   Allergen Reactions    Prednisone Swelling     Facial swelling, redness-no dyspnea       Objective   Initial Vitals:   Temperature: 97.8 °F (36.6 °C) (03/18/24 1114)  Pulse: 89 (03/18/24 1114)  Respirations: 18 (03/18/24 1114)  Blood Pressure: (!) 175/84 (03/18/24 1114)    Primary  Survey:   Airway:        Status: patent;        Pre-hospital Interventions: none        Hospital Interventions: none  Breathing:        Pre-hospital Interventions: none       Effort: normal       Right breath sounds: normal       Left breath sounds: normal  Circulation:        Rhythm: regular       Rate: regular   Right Pulses Left Pulses    R radial: 2+  R femoral: 2+  R pedal: 2+     L radial: 2+  L femoral: 2+  L pedal: 2+       Disability:        GCS: Eye: 4; Verbal: 5 Motor: 6 Total: 15       Right Pupil: 3 mm;  round;  reactive         Left Pupil:  3 mm;  round;  reactive      R Motor Strength L Motor Strength    R : 5/5  R dorsiflex: 5/5  R plantarflex: 5/5 L : 5/5  L dorsiflex: 0/5  L plantarflex: 5/5        Sensory:  No sensory deficit  Exposure:       Completed: Yes      Secondary Survey:  Physical Exam  Constitutional:       General: She is not in acute distress.     Appearance: She is not ill-appearing.   HENT:      Head: Normocephalic and atraumatic.      Right Ear: External ear normal.      Left Ear: External ear normal.      Nose: Nose normal.      Mouth/Throat:      Mouth: Mucous membranes are moist.      Pharynx: Oropharynx is clear.   Eyes:      Extraocular Movements: Extraocular movements intact.      Pupils: Pupils are equal, round, and reactive to light.   Cardiovascular:      Rate and Rhythm: Normal rate. Rhythm irregular.      Pulses: Normal pulses.      Heart sounds: Normal heart sounds. No murmur heard.     No friction rub. No gallop.   Pulmonary:      Effort: No respiratory distress.      Breath sounds: Normal breath sounds. No stridor. No wheezing, rhonchi or rales.   Chest:      Chest wall: No tenderness.   Abdominal:      General: Abdomen is flat. There is no distension.      Palpations: Abdomen is soft.      Tenderness: There is no abdominal tenderness. There is no guarding.   Genitourinary:     Comments: Pelvis is stable  Musculoskeletal:         General: Tenderness present. No  swelling or deformity.      Cervical back: Normal range of motion and neck supple. No rigidity or tenderness.      Comments: Left hip is mildly tender on palpation.  No significant effusion or hematoma appreciated.  Patient is able to range hip to approximately 45 degrees before experiencing pain.  Left knee is nontender with no significant effusion or hematoma.  Left ankle is not able to dorsiflex.  All other extremities display full range of motion.   Skin:     Comments: Fading ecchymosis noted on left hip.   Neurological:      General: No focal deficit present.      Mental Status: She is alert and oriented to person, place, and time.   Psychiatric:         Behavior: Behavior normal.         Thought Content: Thought content normal.         Invasive Devices       Peripheral Intravenous Line  Duration             Peripheral IV 03/18/24 Left Antecubital <1 day                  Lab Results: I have personally reviewed all pertinent laboratory/test results from 03/18/24, including the preceding 24 hours.  Recent Labs     03/18/24  1711   WBC 6.71   HGB 13.4   HCT 41.5      SODIUM 141   K 3.9      CO2 28   BUN 9   CREATININE 0.50*   GLUC 103   AST 25   ALT 20   ALB 4.2   TBILI 0.59   ALKPHOS 102   PTT 29   INR 1.04       Imaging Results: I have personally reviewed pertinent images saved in PACS. CT scan findings (and other pertinent positive findings on images) were discussed with radiology. My interpretation of the images/reports are as follows:  Chest Xray(s): N/A   FAST exam(s): N/A   CT Scan(s): positive for acute findings: Left femur fracture   Additional Xray(s): N/A     Other Studies: none    Code Status: Level 1 - Full Code  Advance Directive and Living Will:      Power of :    POLST:    I have spent 25 minutes with Patient and Son today in which greater than 50% of this time was spent in counseling/coordination of care regarding Diagnostic results, Prognosis, Risks and benefits of tx  options, Instructions for management, Patient and family education, Importance of tx compliance, Risk factor reductions, Impressions, Counseling / Coordination of care, Documenting in the medical record, Reviewing / ordering tests, medicine, procedures  , Obtaining or reviewing history  , and Communicating with other healthcare professionals .

## 2024-03-18 NOTE — ED PROVIDER NOTES
History  Chief Complaint   Patient presents with    Fall     Fall down last step on Thursday, denies head strike. Denies thinners/asa. +left knee pain     83-year-old female with history of left foot drop status post shingles, arthritis, superficial thrombophlebitis of the left calf, and varicose veins presenting to the ED with complaints of constant moderate left knee and hip pain status post fall 4 days ago.  Patient reports mechanical misstep on the last step of her staircase.  Reports falling onto her knee and left elbow, but denies hitting her head.  No LOC.  No AC/AP use.  Patient has been ambulatory since the fall, normally ambulates without assistance.  Has been taking Tylenol and Motrin with some relief.  History of chronic foot drop, but no new focal deficits or sensory loss.  Has mild ecchymosis, with overlying superficial abrasion/scab without signs of cellulitis.  Denies fever, chills,        Prior to Admission Medications   Prescriptions Last Dose Informant Patient Reported? Taking?   Multiple Vitamins-Minerals (CENTRUM SILVER 50+WOMEN PO)  Self Yes No   Sig: Take by mouth every morning   Probiotic Product (PROBIOTIC ADVANCED PO)  Self Yes No   Sig: Take by mouth   cholecalciferol (VITAMIN D3) 1,000 units tablet  Self Yes No   Sig: Take 1,000 Units by mouth every morning   ibuprofen (MOTRIN) 200 mg tablet  Self Yes No   Sig: Take 1 tablet by mouth 3 (three) times a day as needed   sodium chloride (ALESIA 128) 2 % hypertonic ophthalmic solution  Self Yes No   Sig: Administer 1 drop to both eyes 3 (three) times a day Alesia 128 gel at HS       Facility-Administered Medications: None       Past Medical History:   Diagnosis Date    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 MAIN OR;  Service: Ophthalmology    VT XCAPSL CTRC RMVL INSJ IO LENS PROSTH W/O ECP Left 7/10/2018    Procedure: EXTRACTION EXTRACAPSULAR CATARACT PHACO INTRAOCULAR LENS (IOL);  Surgeon: Rian Benson MD;  Location: [facility] MAIN OR;  Service: Ophthalmology    TUBAL LIGATION         Family History   Problem Relation Age of Onset    Other Mother         corneal transplants, Parathyroid disease    Osteoporosis Mother     Stroke Father     Hypertension Father     Heart disease Father         cardiac disorder    Osteoporosis Father     Other Father         Parathyroid disease    Asthma Sister     Hiatal hernia Sister     Other Brother         parathyroid-removed, Parathyroid disease    Heart disease Brother         \" maker\"     I have reviewed and agree with the history as documented.    E-Cigarette/Vaping    E-Cigarette Use Never User      E-Cigarette/Vaping Substances    Nicotine No     THC No     CBD No     Flavoring No     Other No     Unknown No      Social History     Tobacco Use    Smoking status: Never    Smokeless tobacco: Never   Vaping Use    Vaping status: Never Used   Substance Use Topics    Alcohol use: No    Drug use: No        Review of Systems    Physical Exam  ED Triage Vitals   Temperature Pulse Respirations Blood Pressure SpO2   03/18/24 1114 03/18/24 1114 03/18/24 1114 03/18/24 1114 03/18/24 1114   97.8 °F (36.6 °C) 89 18 (!) 175/84 96 " %      Temp src Heart Rate Source Patient Position - Orthostatic VS BP Location FiO2 (%)   -- 03/18/24 1449 03/18/24 1449 03/18/24 1449 --    Monitor Sitting Right arm       Pain Score       03/18/24 1114       8             Orthostatic Vital Signs  Vitals:    03/18/24 1449 03/18/24 1501 03/18/24 1530 03/18/24 1731   BP: (!) 191/81 164/84 (!) 175/81 161/74   Pulse: 84  73 74   Patient Position - Orthostatic VS: Sitting  Sitting Lying       Physical Exam    ED Medications  Medications   multi-electrolyte (PLASMALYTE-A/ISOLYTE-S PH 7.4) IV solution (has no administration in time range)   tranexamic Acid 550 mg in sodium chloride 0.9 % 100 mL IVPB (has no administration in time range)   acetaminophen (TYLENOL) tablet 975 mg (has no administration in time range)   senna-docusate sodium (SENOKOT S) 8.6-50 mg per tablet 1 tablet (has no administration in time range)   HYDROmorphone HCl (DILAUDID) injection 0.2 mg (has no administration in time range)   oxyCODONE (ROXICODONE) split tablet 2.5 mg (has no administration in time range)     Or   oxyCODONE (ROXICODONE) IR tablet 5 mg (has no administration in time range)   enoxaparin (LOVENOX) subcutaneous injection 30 mg (has no administration in time range)   ketorolac (TORADOL) injection 15 mg (15 mg Intramuscular Given 3/18/24 1507)       Diagnostic Studies  Results Reviewed       Procedure Component Value Units Date/Time    Comprehensive metabolic panel [[id number]]  (Abnormal) Collected: 03/18/24 1711    Lab Status: Final result Specimen: Blood from Arm, Left Updated: 03/18/24 1737     Sodium 141 mmol/L      Potassium 3.9 mmol/L      Chloride 107 mmol/L      CO2 28 mmol/L      ANION GAP 6 mmol/L      BUN 9 mg/dL      Creatinine 0.50 mg/dL      Glucose 103 mg/dL      Calcium 10.6 mg/dL      AST 25 U/L      ALT 20 U/L      Alkaline Phosphatase 102 U/L      Total Protein 7.3 g/dL      Albumin 4.2 g/dL      Total Bilirubin 0.59 mg/dL      eGFR 89 ml/min/1.73sq m     Narrative:       National Kidney Disease Foundation guidelines for Chronic Kidney Disease (CKD):     Stage 1 with normal or high GFR (GFR > 90 mL/min/1.73 square meters)    Stage 2 Mild CKD (GFR = 60-89 mL/min/1.73 square meters)    Stage 3A Moderate CKD (GFR = 45-59 mL/min/1.73 square meters)    Stage 3B Moderate CKD (GFR = 30-44 mL/min/1.73 square meters)    Stage 4 Severe CKD (GFR = 15-29 mL/min/1.73 square meters)    Stage 5 End Stage CKD (GFR <15 mL/min/1.73 square meters)  Note: GFR calculation is accurate only with a steady state creatinine    Protime-INR [[id number]]  (Normal) Collected: 03/18/24 1711    Lab Status: Final result Specimen: Blood from Arm, Left Updated: 03/18/24 1733     Protime 14.2 seconds      INR 1.04    APTT [[id number]]  (Normal) Collected: 03/18/24 1711    Lab Status: Final result Specimen: Blood from Arm, Left Updated: 03/18/24 1733     PTT 29 seconds     Platelet count [[id number]]     Lab Status: No result Specimen: Blood     CBC and differential [[id number]] Collected: 03/18/24 1711    Lab Status: Final result Specimen: Blood from Arm, Left Updated: 03/18/24 1718     WBC 6.71 Thousand/uL      RBC 4.57 Million/uL      Hemoglobin 13.4 g/dL      Hematocrit 41.5 %      MCV 91 fL      MCH 29.3 pg      MCHC 32.3 g/dL      RDW 12.6 %      MPV 10.3 fL      Platelets 243 Thousands/uL      nRBC 0 /100 WBCs      Neutrophils Relative 72 %      Immature Grans % 0 %      Lymphocytes Relative 17 %      Monocytes Relative 7 %      Eosinophils Relative 3 %      Basophils Relative 1 %      Neutrophils Absolute 4.89 Thousands/µL      Absolute Immature Grans 0.01 Thousand/uL      Absolute Lymphocytes 1.11 Thousands/µL      Absolute Monocytes 0.45 Thousand/µL      Eosinophils Absolute 0.20 Thousand/µL      Basophils Absolute 0.05 Thousands/µL     UA w Reflex to Microscopic w Reflex to Culture [[id number]]     Lab Status: No result Specimen: Urine                    CT lower extremity wo contrast left   Final Result by  Tad Reis MD (03/18 1639)      Comminuted fracture greater trochanter left proximal femur.         Workstation performed: [id number]         XR knee 4+ views LEFT   ED Interpretation by Bonifacio Lozano DO (03/18 1514)   Tricompartmental arthritis of the left knee joint without acute fracture or dislocation.      XR hip/pelv 2-3 vws left if performed    (Results Pending)         Procedures  ECG 12 Lead Documentation Only    Date/Time: 3/18/2024 5:45 PM    Performed by: Bonifacio Lozano DO  Authorized by: Bonifacio Lozano DO    Patient location:  ED  Interpretation:     Interpretation: non-specific    Rate:     ECG rate:  72    ECG rate assessment: normal    Rhythm:     Rhythm: sinus rhythm    Ectopy:     Ectopy: PAC and PVCs      PVCs:  Infrequent  QRS:     QRS axis:  Normal    QRS intervals:  Normal  ST segments:     ST segments:  Normal        ED Course  ED Course as of 03/18/24 1752   Mon Mar 18, 2024   1445 Reviewed patient's recent lab work on 2/29/2024, last GFR 88.   1456 Reviewed VAS doppler 9/24:    RIGHT LOWER LIMB LIMITED:  Evaluation shows no evidence of thrombus in the common femoral vein.  Doppler evaluation shows a normal response to augmentation maneuvers.     LEFT LOWER LIMB:  No evidence of acute or chronic deep vein thrombosis  Evidence of focal superficial thrombophlebitis noted in a varicose vein at the  ankle.  Multiple large varicose veins noted at the ankle in area of discoloration.  Doppler evaluation shows reflux in the great saphenous vein.  Popliteal, posterior tibial and anterior tibial arterial Doppler waveform's are  triphasic.   1520 Blood Pressure: 164/84   1554 Questionable fracture of the GT of the L hip noted on L hip XR. Will further eval with CT. Pt reports improvement in pain with toradol.    1639 Comminuted fracture greater trochanter left proximal femur.    Discussed with trauma surgery on-call, will evaluate and likely admit to their service.  Preop labs and other testing  "ordered.   1744 CBC and differential  WNL   1744 Protime-INR  WNL   1744 APTT  WNL   1744 Comprehensive metabolic panel(!)  Unremarkable                                       Medical Decision Making  Patient with history as above presented to triage with CC: \"Patient presents with:  Fall: Fall down last step on Thursday, denies head strike. Denies thinners/asa. +left knee pain  \"    Hx obtained from pt and family    83-year-old female with history of osteoporosis, osteoarthritis, left foot drop status post shingles, varicose veins, and superficial thrombophlebitis of the left lower extremity presents to the ED with complaints of left hip pain and left knee pain status post mechanical fall.  Initial x-ray imaging of the left knee without evidence of acute traumatic injury, left hip x-ray concerning for possible fracture of the greater trochanter.  CT imaging of the left lower extremity obtained for further evaluation, and significant for comminuted left greater trochanteric fracture.  On exam patient otherwise without neurovascular compromise from baseline.  Discussed case with trauma surgery, will admit to their service for further management.  Patient's pain well-controlled after Toradol.  Shared decision making was made for admission with patient and she is agreeable with plan.    Patient stable. Exam as above.    I have independently ordered, reviewed and interpreted the following: labs and/or imaging studies listed above, EKG above  Reviewed external records including notes, and prior labs/imaging results.    DDx including but not limited to: hip fracture, hip dislocation, bursitis, tendinitis, arthritis, septic arthritis, osteomyelitis, sprain, strain, other traumatic injuries, zoster. DDX including but not limited to: arthritis, bursitis, tendinitis, sprain, strain, fracture, meniscal tear, cellulitis, osteomyelitis, gout, Lyme disease, Baker's cyst, rheumatologic process; doubt septic arthritis or DVT or " arterial occlusion.     Disposition: Discussed need for admission for further management and workup with pt and they are agreeable with plan. Discussed case with Trauma, who agreed to admit patient to IP status.     See ED Course for further MDM.      PLEASE NOTE:  This encounter was completed utilizing the [employer] Direct Speech Voice Recognition Software. Grammatical errors, random word insertions, pronoun errors and incomplete sentences are occasional inherent consequences of the system due to software limitations, ambient noise and hardware issues.These may be missed by proof reading prior to affixing electronic signature. Any questions or concerns about the content, text or information contained within the body of this dictation should be directly addressed to the physician for clarification. Please do not hesitate to call me directly if you have any questions or concerns.      Amount and/or Complexity of Data Reviewed  External Data Reviewed: labs, radiology and notes.  Labs: ordered. Decision-making details documented in ED Course.  Radiology: ordered and independent interpretation performed. Decision-making details documented in ED Course.  ECG/medicine tests: ordered and independent interpretation performed. Decision-making details documented in ED Course.    Risk  Prescription drug management.  Decision regarding hospitalization.          Disposition  Final diagnoses:   Left knee pain   Left hip pain   Fall, initial encounter   Arthritis   Closed fracture of left hip, initial encounter (HCC)   Closed nondisplaced fracture of greater trochanter of left femur, initial encounter (HCC)     Time reflects when diagnosis was documented in both MDM as applicable and the Disposition within this note       Time User Action Codes Description Comment    3/18/2024  3:15 PM Bonifacio Lozano Add [M25.562] Left knee pain     3/18/2024  3:15 PM Bonifacio Lozano Add [M25.552] Left hip pain     3/18/2024  3:15 PM Blake  Bonifacio Ruvalcaba [W19.XXXA] Fall, initial encounter     3/18/2024  3:17 PM Bonifacio Lozano Add [M19.90] Arthritis     3/18/2024  4:56 PM Bonifacio Lozano Modify [M25.562] Left knee pain     3/18/2024  4:56 PM Bonifacio Lozano Add [S72.002A] Closed fracture of left hip, initial encounter ([facility])     3/18/2024  5:30 PM Antwan Corea Add [S72.115A] Closed nondisplaced fracture of greater trochanter of left femur, initial encounter ([facility])           ED Disposition       ED Disposition   Admit    Condition   Stable    Date/Time   Mon Mar 18, 2024  4:57 PM    Comment   Case was discussed with trauma and the patient's admission status was agreed to be Admission Status: inpatient status to the service of Dr. Paredes .               Follow-up Information       Follow up With Specialties Details Why Contact Info    Abelino Garcia DO Family Medicine  As needed [address] [place] [id number] [phone]              Patient's Medications   Discharge Prescriptions    No medications on file         PDMP Review       None             ED Provider  Attending physically available and evaluated Kay Bright. I managed the patient along with the ED Attending.    Electronically Signed by           Bonifacio Lozano DO  03/18/24 8733

## 2024-03-18 NOTE — PLAN OF CARE
Problem: PAIN - ADULT  Goal: Verbalizes/displays adequate comfort level or baseline comfort level  Description: Interventions:  - Encourage patient to monitor pain and request assistance  - Assess pain using appropriate pain scale  - Administer analgesics based on type and severity of pain and evaluate response  - Implement non-pharmacological measures as appropriate and evaluate response  - Consider cultural and social influences on pain and pain management  - Notify physician/advanced practitioner if interventions unsuccessful or patient reports new pain  Outcome: Progressing     Problem: INFECTION - ADULT  Goal: Absence or prevention of progression during hospitalization  Description: INTERVENTIONS:  - Assess and monitor for signs and symptoms of infection  - Monitor lab/diagnostic results  - Monitor all insertion sites, i.e. indwelling lines, tubes, and drains  - Monitor endotracheal if appropriate and nasal secretions for changes in amount and color  - [place] appropriate cooling/warming therapies per order  - Administer medications as ordered  - Instruct and encourage patient and family to use good hand hygiene technique  - Identify and instruct in appropriate isolation precautions for identified infection/condition  Outcome: Progressing  Goal: Absence of fever/infection during neutropenic period  Description: INTERVENTIONS:  - Monitor WBC    Outcome: Progressing     Problem: SAFETY ADULT  Goal: Patient will remain free of falls  Description: INTERVENTIONS:  - Educate patient/family on patient safety including physical limitations  - Instruct patient to call for assistance with activity   - Consult OT/PT to assist with strengthening/mobility   - Keep Call bell within reach  - Keep bed low and locked with side rails adjusted as appropriate  - Keep care items and personal belongings within reach  - Initiate and maintain comfort rounds  - Make Fall Risk Sign visible to staff  - Apply yellow socks and bracelet  for high fall risk patients  - Consider moving patient to room near nurses station  Outcome: Progressing  Goal: Maintain or return to baseline ADL function  Description: INTERVENTIONS:  -  Assess patient's ability to carry out ADLs; assess patient's baseline for ADL function and identify physical deficits which impact ability to perform ADLs (bathing, care of mouth/teeth, toileting, grooming, dressing, etc.)  - Assess/evaluate cause of self-care deficits   - Assess range of motion  - Assess patient's mobility; develop plan if impaired  - Assess patient's need for assistive devices and provide as appropriate  - Encourage maximum independence but intervene and supervise when necessary  - Involve family in performance of ADLs  - Assess for home care needs following discharge   - Consider OT consult to assist with ADL evaluation and planning for discharge  - Provide patient education as appropriate  Outcome: Progressing  Goal: Maintains/Returns to pre admission functional level  Description: INTERVENTIONS:  - Perform AM-PAC 6 Click Basic Mobility/ Daily Activity assessment daily.  - Set and communicate daily mobility goal to care team and patient/family/caregiver.   - Out of bed for toileting  - Record patient progress and toleration of activity level   Outcome: Progressing     Problem: DISCHARGE PLANNING  Goal: Discharge to home or other facility with appropriate resources  Description: INTERVENTIONS:  - Identify barriers to discharge w/patient and caregiver  - Arrange for needed discharge resources and transportation as appropriate  - Identify discharge learning needs (meds, wound care, etc.)  - Arrange for interpretive services to assist at discharge as needed  - Refer to Case Management Department for coordinating discharge planning if the patient needs post-hospital services based on physician/advanced practitioner order or complex needs related to functional status, cognitive ability, or social support  system  Outcome: Progressing     Problem: Knowledge Deficit  Goal: Patient/family/caregiver demonstrates understanding of disease process, treatment plan, medications, and discharge instructions  Description: Complete learning assessment and assess knowledge base.  Interventions:  - Provide teaching at level of understanding  - Provide teaching via preferred learning methods  Outcome: Progressing     Problem: Nutrition/Hydration-ADULT  Goal: Nutrient/Hydration intake appropriate for improving, restoring or maintaining nutritional needs  Description: Monitor and assess patient's nutrition/hydration status for malnutrition. Collaborate with interdisciplinary team and initiate plan and interventions as ordered.  Monitor patient's weight and dietary intake as ordered or per policy. Utilize nutrition screening tool and intervene as necessary. Determine patient's food preferences and provide high-protein, high-caloric foods as appropriate.     INTERVENTIONS:  - Monitor oral intake, urinary output, labs, and treatment plans  - Assess nutrition and hydration status and recommend course of action  - Evaluate amount of meals eaten  - Assist patient with eating if necessary   - Allow adequate time for meals  - Recommend/ encourage appropriate diets, oral nutritional supplements, and vitamin/mineral supplements  - Order, calculate, and assess calorie counts as needed  - Recommend, monitor, and adjust tube feedings and TPN/PPN based on assessed needs  - Assess need for intravenous fluids  - Provide specific nutrition/hydration education as appropriate  - Include patient/family/caregiver in decisions related to nutrition  Outcome: Progressing     Problem: MUSCULOSKELETAL - ADULT  Goal: Maintain or return mobility to safest level of function  Description: INTERVENTIONS:  - Assess patient's ability to carry out ADLs; assess patient's baseline for ADL function and identify physical deficits which impact ability to perform ADLs  (bathing, care of mouth/teeth, toileting, grooming, dressing, etc.)  - Assess/evaluate cause of self-care deficits   - Assess range of motion  - Assess patient's mobility  - Assess patient's need for assistive devices and provide as appropriate  - Encourage maximum independence but intervene and supervise when necessary  - Involve family in performance of ADLs  - Assess for home care needs following discharge   - Consider OT consult to assist with ADL evaluation and planning for discharge  - Provide patient education as appropriate  Outcome: Progressing  Goal: Maintain proper alignment of affected body part  Description: INTERVENTIONS:  - Support, maintain and protect limb and body alignment  - Provide patient/ family with appropriate education  Outcome: Progressing

## 2024-03-18 NOTE — ASSESSMENT & PLAN NOTE
Secondary to fall  CT imaging showed comminuted fracture greater trochanter left proximal femur.   Orthopedics consulted note appreciated  NWB LLE  Multimodal pain regimen  Continue to monitor neurovascular exam  PT/OT

## 2024-03-18 NOTE — ED ATTENDING ATTESTATION
3/18/2024  IBrigido MD, saw and evaluated the patient. I have discussed the patient with the resident/non-physician practitioner and agree with the resident's/non-physician practitioner's findings, Plan of Care, and MDM as documented in the resident's/non-physician practitioner's note, except where noted. All available labs and Radiology studies were reviewed.  I was present for key portions of any procedure(s) performed by the resident/non-physician practitioner and I was immediately available to provide assistance.       At this point I agree with the current assessment done in the Emergency Department.  I have conducted an independent evaluation of this patient a history and physical is as follows:    This is an 83-year-old female without any significantly related past medical history presenting to the ED today for complaint of a fall.  Patient fell on Thursday, and since then has had left knee and left hip pain.  Patient states that she did not lose consciousness, did not hit her head, and only hit her shoulder but her shoulder does not hurt her anymore.  She denies any other significantly associated symptoms.  On exam she has good mobility of her left hip, knee, and does not just play any joint laxity, or joint laxity or significant deformity.  However she does have tenderness overlying the greater trochanter.  She also has pain when externally rotating or abducting at the left hip.  Her differential diagnosis includes: Contusion versus fracture versus dislocation versus other.  X-rays of her knee did not show any significant acute abnormality.  She has a slight joint effusion, with tricompartmental arthritis, however no evidence of fracture or dislocation.  Her left hip does have some abnormality, particularly at the greater trochanter which I suspect may potentially be an occult fracture versus decreased bone mineralization and a chronic fracture.  We ordered a CT scan due to this diagnostic  uncertainty, and her CT scan shows evidence for acute fracture of her greater trochanter.  Trauma consulted, labs ordered, trauma accepted to take the patient onto their service without any further orders requested.    ED Course         Critical Care Time  Procedures

## 2024-03-19 ENCOUNTER — APPOINTMENT (INPATIENT)
Dept: RADIOLOGY | Facility: HOSPITAL | Age: 84
DRG: 482 | End: 2024-03-19
Payer: MEDICARE

## 2024-03-19 ENCOUNTER — ANESTHESIA (INPATIENT)
Dept: PERIOP | Facility: HOSPITAL | Age: 84
DRG: 482 | End: 2024-03-19
Payer: MEDICARE

## 2024-03-19 ENCOUNTER — ANESTHESIA EVENT (INPATIENT)
Dept: PERIOP | Facility: HOSPITAL | Age: 84
DRG: 482 | End: 2024-03-19
Payer: MEDICARE

## 2024-03-19 LAB
ABO GROUP BLD: NORMAL
ABO GROUP BLD: NORMAL
ANION GAP SERPL CALCULATED.3IONS-SCNC: 5 MMOL/L (ref 4–13)
BACTERIA UR QL AUTO: ABNORMAL /HPF
BILIRUB UR QL STRIP: NEGATIVE
BLD GP AB SCN SERPL QL: NEGATIVE
BUN SERPL-MCNC: 11 MG/DL (ref 5–25)
CALCIUM SERPL-MCNC: 9.5 MG/DL (ref 8.4–10.2)
CHLORIDE SERPL-SCNC: 110 MMOL/L (ref 96–108)
CLARITY UR: CLEAR
CO2 SERPL-SCNC: 25 MMOL/L (ref 21–32)
COLOR UR: ABNORMAL
CREAT SERPL-MCNC: 0.5 MG/DL (ref 0.6–1.3)
ERYTHROCYTE [DISTWIDTH] IN BLOOD BY AUTOMATED COUNT: 12.8 % (ref 11.6–15.1)
GFR SERPL CREATININE-BSD FRML MDRD: 89 ML/MIN/1.73SQ M
GLUCOSE SERPL-MCNC: 93 MG/DL (ref 65–140)
GLUCOSE UR STRIP-MCNC: NEGATIVE MG/DL
HCT VFR BLD AUTO: 38.6 % (ref 34.8–46.1)
HGB BLD-MCNC: 12.5 G/DL (ref 11.5–15.4)
HGB UR QL STRIP.AUTO: NEGATIVE
KETONES UR STRIP-MCNC: ABNORMAL MG/DL
LEUKOCYTE ESTERASE UR QL STRIP: ABNORMAL
MCH RBC QN AUTO: 29.4 PG (ref 26.8–34.3)
MCHC RBC AUTO-ENTMCNC: 32.4 G/DL (ref 31.4–37.4)
MCV RBC AUTO: 91 FL (ref 82–98)
MUCOUS THREADS UR QL AUTO: ABNORMAL
NITRITE UR QL STRIP: NEGATIVE
NON-SQ EPI CELLS URNS QL MICRO: ABNORMAL /HPF
PH UR STRIP.AUTO: 6 [PH]
PLATELET # BLD AUTO: 220 THOUSANDS/UL (ref 149–390)
PMV BLD AUTO: 10.4 FL (ref 8.9–12.7)
POTASSIUM SERPL-SCNC: 4.2 MMOL/L (ref 3.5–5.3)
PROT UR STRIP-MCNC: NEGATIVE MG/DL
RBC # BLD AUTO: 4.25 MILLION/UL (ref 3.81–5.12)
RBC #/AREA URNS AUTO: ABNORMAL /HPF
RH BLD: POSITIVE
RH BLD: POSITIVE
SODIUM SERPL-SCNC: 140 MMOL/L (ref 135–147)
SP GR UR STRIP.AUTO: 1.01 (ref 1–1.03)
SPECIMEN EXPIRATION DATE: NORMAL
UROBILINOGEN UR STRIP-ACNC: <2 MG/DL
WBC # BLD AUTO: 4.77 THOUSAND/UL (ref 4.31–10.16)
WBC #/AREA URNS AUTO: ABNORMAL /HPF

## 2024-03-19 PROCEDURE — NC001 PR NO CHARGE: Performed by: EMERGENCY MEDICINE

## 2024-03-19 PROCEDURE — 80048 BASIC METABOLIC PNL TOTAL CA: CPT | Performed by: NURSE PRACTITIONER

## 2024-03-19 PROCEDURE — 86900 BLOOD TYPING SEROLOGIC ABO: CPT | Performed by: NURSE PRACTITIONER

## 2024-03-19 PROCEDURE — 99223 1ST HOSP IP/OBS HIGH 75: CPT | Performed by: STUDENT IN AN ORGANIZED HEALTH CARE EDUCATION/TRAINING PROGRAM

## 2024-03-19 PROCEDURE — 81001 URINALYSIS AUTO W/SCOPE: CPT | Performed by: EMERGENCY MEDICINE

## 2024-03-19 PROCEDURE — 86901 BLOOD TYPING SEROLOGIC RH(D): CPT | Performed by: NURSE PRACTITIONER

## 2024-03-19 PROCEDURE — 87086 URINE CULTURE/COLONY COUNT: CPT | Performed by: EMERGENCY MEDICINE

## 2024-03-19 PROCEDURE — C1769 GUIDE WIRE: HCPCS | Performed by: STUDENT IN AN ORGANIZED HEALTH CARE EDUCATION/TRAINING PROGRAM

## 2024-03-19 PROCEDURE — C1713 ANCHOR/SCREW BN/BN,TIS/BN: HCPCS | Performed by: STUDENT IN AN ORGANIZED HEALTH CARE EDUCATION/TRAINING PROGRAM

## 2024-03-19 PROCEDURE — 27245 TREAT THIGH FRACTURE: CPT | Performed by: STUDENT IN AN ORGANIZED HEALTH CARE EDUCATION/TRAINING PROGRAM

## 2024-03-19 PROCEDURE — 99232 SBSQ HOSP IP/OBS MODERATE 35: CPT | Performed by: EMERGENCY MEDICINE

## 2024-03-19 PROCEDURE — 85027 COMPLETE CBC AUTOMATED: CPT | Performed by: NURSE PRACTITIONER

## 2024-03-19 PROCEDURE — 99222 1ST HOSP IP/OBS MODERATE 55: CPT | Performed by: INTERNAL MEDICINE

## 2024-03-19 PROCEDURE — 73502 X-RAY EXAM HIP UNI 2-3 VIEWS: CPT

## 2024-03-19 PROCEDURE — 0QS706Z REPOSITION LEFT UPPER FEMUR WITH INTRAMEDULLARY INTERNAL FIXATION DEVICE, OPEN APPROACH: ICD-10-PCS | Performed by: STUDENT IN AN ORGANIZED HEALTH CARE EDUCATION/TRAINING PROGRAM

## 2024-03-19 PROCEDURE — 86850 RBC ANTIBODY SCREEN: CPT | Performed by: NURSE PRACTITIONER

## 2024-03-19 DEVICE — 11MM/130 DEG TI CANN TFNA 170MM - STERILE
Type: IMPLANTABLE DEVICE | Site: FEMUR | Status: FUNCTIONAL
Brand: TFN-ADVANCE

## 2024-03-19 DEVICE — TFNA FENESTRATED HELICAL BLADE 90MM - STERILE
Type: IMPLANTABLE DEVICE | Site: TROCHANTER | Status: FUNCTIONAL
Brand: TFN-ADVANCE

## 2024-03-19 DEVICE — LOCKING SCREW FOR IM NAIL Ø 5MM/ 36MM/ XL25/ STERILE: Type: IMPLANTABLE DEVICE | Site: FEMUR | Status: FUNCTIONAL

## 2024-03-19 RX ORDER — METOCLOPRAMIDE HYDROCHLORIDE 5 MG/ML
10 INJECTION INTRAMUSCULAR; INTRAVENOUS ONCE AS NEEDED
Status: DISCONTINUED | OUTPATIENT
Start: 2024-03-19 | End: 2024-03-19 | Stop reason: HOSPADM

## 2024-03-19 RX ORDER — TRANEXAMIC ACID 10 MG/ML
INJECTION, SOLUTION INTRAVENOUS AS NEEDED
Status: DISCONTINUED | OUTPATIENT
Start: 2024-03-19 | End: 2024-03-19

## 2024-03-19 RX ORDER — ONDANSETRON 2 MG/ML
INJECTION INTRAMUSCULAR; INTRAVENOUS AS NEEDED
Status: DISCONTINUED | OUTPATIENT
Start: 2024-03-19 | End: 2024-03-19

## 2024-03-19 RX ORDER — CEFAZOLIN SODIUM 2 G/50ML
2000 SOLUTION INTRAVENOUS EVERY 8 HOURS
Status: COMPLETED | OUTPATIENT
Start: 2024-03-19 | End: 2024-03-20

## 2024-03-19 RX ORDER — PROPOFOL 10 MG/ML
INJECTION, EMULSION INTRAVENOUS AS NEEDED
Status: DISCONTINUED | OUTPATIENT
Start: 2024-03-19 | End: 2024-03-19

## 2024-03-19 RX ORDER — HYDROMORPHONE HYDROCHLORIDE 1 MG/ML
INJECTION, SOLUTION INTRAMUSCULAR; INTRAVENOUS; SUBCUTANEOUS AS NEEDED
Status: DISCONTINUED | OUTPATIENT
Start: 2024-03-19 | End: 2024-03-19

## 2024-03-19 RX ORDER — TRANEXAMIC ACID 10 MG/ML
1000 INJECTION, SOLUTION INTRAVENOUS
Status: CANCELLED | OUTPATIENT
Start: 2024-03-19 | End: 2024-03-20

## 2024-03-19 RX ORDER — SODIUM CHLORIDE, SODIUM LACTATE, POTASSIUM CHLORIDE, CALCIUM CHLORIDE 600; 310; 30; 20 MG/100ML; MG/100ML; MG/100ML; MG/100ML
INJECTION, SOLUTION INTRAVENOUS CONTINUOUS PRN
Status: DISCONTINUED | OUTPATIENT
Start: 2024-03-19 | End: 2024-03-19

## 2024-03-19 RX ORDER — PHENYLEPHRINE HCL IN 0.9% NACL 1 MG/10 ML
SYRINGE (ML) INTRAVENOUS AS NEEDED
Status: DISCONTINUED | OUTPATIENT
Start: 2024-03-19 | End: 2024-03-19

## 2024-03-19 RX ORDER — LIDOCAINE HYDROCHLORIDE 20 MG/ML
INJECTION, SOLUTION EPIDURAL; INFILTRATION; INTRACAUDAL; PERINEURAL AS NEEDED
Status: DISCONTINUED | OUTPATIENT
Start: 2024-03-19 | End: 2024-03-19

## 2024-03-19 RX ORDER — ROCURONIUM BROMIDE 10 MG/ML
INJECTION, SOLUTION INTRAVENOUS AS NEEDED
Status: DISCONTINUED | OUTPATIENT
Start: 2024-03-19 | End: 2024-03-19

## 2024-03-19 RX ORDER — CEFAZOLIN SODIUM 2 G/50ML
2000 SOLUTION INTRAVENOUS
Status: COMPLETED | OUTPATIENT
Start: 2024-03-19 | End: 2024-03-19

## 2024-03-19 RX ORDER — FENTANYL CITRATE/PF 50 MCG/ML
25 SYRINGE (ML) INJECTION
Status: COMPLETED | OUTPATIENT
Start: 2024-03-19 | End: 2024-03-19

## 2024-03-19 RX ORDER — ONDANSETRON 2 MG/ML
4 INJECTION INTRAMUSCULAR; INTRAVENOUS ONCE
Status: COMPLETED | OUTPATIENT
Start: 2024-03-19 | End: 2024-03-19

## 2024-03-19 RX ORDER — FENTANYL CITRATE 50 UG/ML
INJECTION, SOLUTION INTRAMUSCULAR; INTRAVENOUS AS NEEDED
Status: DISCONTINUED | OUTPATIENT
Start: 2024-03-19 | End: 2024-03-19

## 2024-03-19 RX ADMIN — FENTANYL CITRATE 25 MCG: 50 INJECTION INTRAMUSCULAR; INTRAVENOUS at 10:59

## 2024-03-19 RX ADMIN — ROCURONIUM BROMIDE 20 MG: 10 INJECTION, SOLUTION INTRAVENOUS at 10:27

## 2024-03-19 RX ADMIN — ONDANSETRON 4 MG: 2 INJECTION INTRAMUSCULAR; INTRAVENOUS at 13:28

## 2024-03-19 RX ADMIN — PROPOFOL 80 MG: 10 INJECTION, EMULSION INTRAVENOUS at 10:02

## 2024-03-19 RX ADMIN — FENTANYL CITRATE 25 MCG: 50 INJECTION INTRAMUSCULAR; INTRAVENOUS at 11:59

## 2024-03-19 RX ADMIN — Medication 1000 UNITS: at 08:14

## 2024-03-19 RX ADMIN — HYDROMORPHONE HYDROCHLORIDE 0.25 MG: 1 INJECTION, SOLUTION INTRAMUSCULAR; INTRAVENOUS; SUBCUTANEOUS at 10:34

## 2024-03-19 RX ADMIN — SUGAMMADEX 200 MG: 100 INJECTION, SOLUTION INTRAVENOUS at 11:02

## 2024-03-19 RX ADMIN — SENNOSIDES, DOCUSATE SODIUM 1 TABLET: 8.6; 5 TABLET ORAL at 23:19

## 2024-03-19 RX ADMIN — CEFAZOLIN SODIUM 2000 MG: 2 SOLUTION INTRAVENOUS at 10:15

## 2024-03-19 RX ADMIN — ACETAMINOPHEN 975 MG: 325 TABLET, FILM COATED ORAL at 23:19

## 2024-03-19 RX ADMIN — LIDOCAINE HYDROCHLORIDE 40 MG: 20 INJECTION, SOLUTION EPIDURAL; INFILTRATION; INTRACAUDAL; PERINEURAL at 10:02

## 2024-03-19 RX ADMIN — MULTIPLE VITAMINS W/ MINERALS TAB 1 TABLET: TAB ORAL at 08:14

## 2024-03-19 RX ADMIN — FENTANYL CITRATE 25 MCG: 50 INJECTION INTRAMUSCULAR; INTRAVENOUS at 11:55

## 2024-03-19 RX ADMIN — ROCURONIUM BROMIDE 10 MG: 10 INJECTION, SOLUTION INTRAVENOUS at 10:36

## 2024-03-19 RX ADMIN — ENOXAPARIN SODIUM 30 MG: 30 INJECTION SUBCUTANEOUS at 18:07

## 2024-03-19 RX ADMIN — ROCURONIUM BROMIDE 30 MG: 10 INJECTION, SOLUTION INTRAVENOUS at 10:02

## 2024-03-19 RX ADMIN — CEFAZOLIN SODIUM 2000 MG: 2 SOLUTION INTRAVENOUS at 18:18

## 2024-03-19 RX ADMIN — ACETAMINOPHEN 975 MG: 325 TABLET, FILM COATED ORAL at 14:25

## 2024-03-19 RX ADMIN — FENTANYL CITRATE 25 MCG: 50 INJECTION INTRAMUSCULAR; INTRAVENOUS at 11:41

## 2024-03-19 RX ADMIN — SODIUM CHLORIDE, SODIUM LACTATE, POTASSIUM CHLORIDE, AND CALCIUM CHLORIDE: .6; .31; .03; .02 INJECTION, SOLUTION INTRAVENOUS at 09:50

## 2024-03-19 RX ADMIN — TRANEXAMIC ACID 1000 MG: 10 INJECTION, SOLUTION INTRAVENOUS at 10:33

## 2024-03-19 RX ADMIN — FENTANYL CITRATE 25 MCG: 50 INJECTION INTRAMUSCULAR; INTRAVENOUS at 12:07

## 2024-03-19 RX ADMIN — PROPOFOL 10 MG: 10 INJECTION, EMULSION INTRAVENOUS at 10:07

## 2024-03-19 RX ADMIN — Medication 100 MCG: at 10:08

## 2024-03-19 RX ADMIN — Medication 250 MG: at 08:14

## 2024-03-19 RX ADMIN — FENTANYL CITRATE 25 MCG: 50 INJECTION INTRAMUSCULAR; INTRAVENOUS at 10:02

## 2024-03-19 RX ADMIN — ONDANSETRON 4 MG: 2 INJECTION INTRAMUSCULAR; INTRAVENOUS at 10:59

## 2024-03-19 RX ADMIN — LIDOCAINE HYDROCHLORIDE 40 MG: 20 INJECTION, SOLUTION EPIDURAL; INFILTRATION; INTRACAUDAL; PERINEURAL at 11:03

## 2024-03-19 RX ADMIN — PROPOFOL 30 MG: 10 INJECTION, EMULSION INTRAVENOUS at 11:08

## 2024-03-19 RX ADMIN — FENTANYL CITRATE 50 MCG: 50 INJECTION INTRAMUSCULAR; INTRAVENOUS at 10:27

## 2024-03-19 NOTE — PROGRESS NOTES
"[facility]  Progress Note  Name: Kay Bright I  MRN: [id number]  Unit/Bed#: W -01 I Date of Admission: 3/18/2024   Date of Service: 3/19/2024 I Hospital Day: 1    Assessment/Plan   Fall  Assessment & Plan  Mechanical fall 4 days prior to presentation  Injuries listed below   PT/OT    Fracture of greater trochanter of left femur (HCC)  Assessment & Plan  Secondary to fall  CT imaging showed comminuted fracture greater trochanter left proximal femur.   Orthopedics consulted note appreciated  NPO for OR possibly today  NWB LLE  3/18 TXA administered  Multimodal pain regimen  Continue to monitor neurovascular exam  PT/OT    Vitamin D deficiency  Assessment & Plan  Continue home supplementation    Left foot drop  Assessment & Plan  Associated with foot drop  Multi-Podus boot for support  PT/OT             TRAUMA TERTIARY SURVEY NOTE    VTE Prophylaxis:Sequential compression device (Venodyne)  and Enoxaparin (Lovenox)     Disposition: Pending ortho eval    Code status:  Level 1 - Full Code    Consultants: IP CONSULT TO TRAUMA SURGERY  IP CONSULT TO GERONTOLOGY  IP CONSULT TO ORTHOPEDIC SURGERY  IP CONSULT TO CASE MANAGEMENT    Subjective     Mechanism of Injury:Fall     Chief Complaint: \"I don't have much pain\"    HPI/Last 24 hour events: This an 83-year-old female who describes suffering a fall approximately 4 days ago where she landed on her left hip.  She was found to have a left hip fracture yesterday.  Patient notes that she did have some difficulty falling asleep last night but eventually did.  She notes about it being minimal pain in her left hip.  She denies any other pain or discomfort.  She denies any numbness or tingling.  She continues to endorse having left foot drop at baseline.  No other complaints offered.     Objective   Vitals:   Temp:  [97.5 °F (36.4 °C)-97.8 °F (36.6 °C)] 97.8 °F (36.6 °C)  HR:  [72-89] 72  Resp:  [16-18] 16  BP: (127-191)/(64-86) 127/72    I/O  "        03/17 0701  03/18 0700 03/18 0701  03/19 0700    I.V.  623.8    Total Intake  623.8    Net  +623.8          Unmeasured Urine Occurrence  2 x             Physical Exam:   GENERAL APPEARANCE: No acute distress.  NEURO: GCS is 15.  Light touch sensation intact in all extremities.  HEENT:, Atraumatic.  Neck supple.  CV: Regular rate and rhythm.  +2 radial dorsalis pedis pulses, bilaterally  LUNGS: Clear to auscultation, bilaterally.  GI: Abdomen is soft nontender.  : Pelvis is stable.  MSK: Left hip is nontender on palpation.  No significant hematoma or effusion appreciated.  Limited range of motion of left hip.  All other extremities display full range of motion.  No deformities.  SKIN: Warm, dry.  Fading ecchymosis noted on left hip.    Invasive Devices       Peripheral Intravenous Line  Duration             Peripheral IV 03/18/24 Left Antecubital <1 day                        Lab Results: Results: I have personally reviewed all pertinent laboratory/tests results, BMP/CMP:   Lab Results   Component Value Date    SODIUM 140 03/19/2024    K 4.2 03/19/2024     (H) 03/19/2024    CO2 25 03/19/2024    BUN 11 03/19/2024    CREATININE 0.50 (L) 03/19/2024    CALCIUM 9.5 03/19/2024    AST 25 03/18/2024    ALT 20 03/18/2024    ALKPHOS 102 03/18/2024    EGFR 89 03/19/2024   , and CBC:   Lab Results   Component Value Date    WBC 4.77 03/19/2024    HGB 12.5 03/19/2024    HCT 38.6 03/19/2024    MCV 91 03/19/2024     03/19/2024    RBC 4.25 03/19/2024    MCH 29.4 03/19/2024    MCHC 32.4 03/19/2024    RDW 12.8 03/19/2024    MPV 10.4 03/19/2024    NRBC 0 03/18/2024       Imaging Results: I have personally reviewed pertinent reports.    Chest Xray(s): negative for acute findings   FAST exam(s): N/A   CT Scan(s): positive for acute findings: Left hip fracture   Additional Xray(s): N/A     Other Studies: none

## 2024-03-19 NOTE — PLAN OF CARE
Problem: PAIN - ADULT  Goal: Verbalizes/displays adequate comfort level or baseline comfort level  Description: Interventions:  - Encourage patient to monitor pain and request assistance  - Assess pain using appropriate pain scale  - Administer analgesics based on type and severity of pain and evaluate response  - Implement non-pharmacological measures as appropriate and evaluate response  - Consider cultural and social influences on pain and pain management  - Notify physician/advanced practitioner if interventions unsuccessful or patient reports new pain  Outcome: Progressing     Problem: INFECTION - ADULT  Goal: Absence or prevention of progression during hospitalization  Description: INTERVENTIONS:  - Assess and monitor for signs and symptoms of infection  - Monitor lab/diagnostic results  - Monitor all insertion sites, i.e. indwelling lines, tubes, and drains  - Monitor endotracheal if appropriate and nasal secretions for changes in amount and color  - Trail appropriate cooling/warming therapies per order  - Administer medications as ordered  - Instruct and encourage patient and family to use good hand hygiene technique  - Identify and instruct in appropriate isolation precautions for identified infection/condition  Outcome: Progressing  Goal: Absence of fever/infection during neutropenic period  Description: INTERVENTIONS:  - Monitor WBC    Outcome: Progressing     Problem: SAFETY ADULT  Goal: Patient will remain free of falls  Description: INTERVENTIONS:  - Educate patient/family on patient safety including physical limitations  - Instruct patient to call for assistance with activity   - Consult OT/PT to assist with strengthening/mobility   - Keep Call bell within reach  - Keep bed low and locked with side rails adjusted as appropriate  - Keep care items and personal belongings within reach  - Initiate and maintain comfort rounds  - Make Fall Risk Sign visible to staff  - Offer Toileting every  Hours,  in advance of need  - Obtain necessary fall risk management equipment:   - Apply yellow socks and bracelet for high fall risk patients  - Consider moving patient to room near nurses station  Outcome: Progressing  Goal: Maintain or return to baseline ADL function  Description: INTERVENTIONS:  -  Assess patient's ability to carry out ADLs; assess patient's baseline for ADL function and identify physical deficits which impact ability to perform ADLs (bathing, care of mouth/teeth, toileting, grooming, dressing, etc.)  - Assess/evaluate cause of self-care deficits   - Assess range of motion  - Assess patient's mobility; develop plan if impaired  - Assess patient's need for assistive devices and provide as appropriate  - Encourage maximum independence but intervene and supervise when necessary  - Involve family in performance of ADLs  - Assess for home care needs following discharge   - Consider OT consult to assist with ADL evaluation and planning for discharge  - Provide patient education as appropriate  Outcome: Progressing  Goal: Maintains/Returns to pre admission functional level  Description: INTERVENTIONS:  - Perform AM-PAC 6 Click Basic Mobility/ Daily Activity assessment daily.  - Set and communicate daily mobility goal to care team and patient/family/caregiver.   - Collaborate with rehabilitation services on mobility goals if consulted  - Perform Range of Motion  times a day.  - Reposition patient every hours.  - Dangle patient  times a day  - Stand patient  times a day  - Ambulate patient  times a day  - Out of bed to chair  times a day   - Out of bed for meals  times a day  - Out of bed for toileting  - Record patient progress and toleration of activity level   Outcome: Progressing     Problem: DISCHARGE PLANNING  Goal: Discharge to home or other facility with appropriate resources  Description: INTERVENTIONS:  - Identify barriers to discharge w/patient and caregiver  - Arrange for needed discharge resources  and transportation as appropriate  - Identify discharge learning needs (meds, wound care, etc.)  - Arrange for interpretive services to assist at discharge as needed  - Refer to Case Management Department for coordinating discharge planning if the patient needs post-hospital services based on physician/advanced practitioner order or complex needs related to functional status, cognitive ability, or social support system  Outcome: Progressing     Problem: Knowledge Deficit  Goal: Patient/family/caregiver demonstrates understanding of disease process, treatment plan, medications, and discharge instructions  Description: Complete learning assessment and assess knowledge base.  Interventions:  - Provide teaching at level of understanding  - Provide teaching via preferred learning methods  Outcome: Progressing     Problem: Nutrition/Hydration-ADULT  Goal: Nutrient/Hydration intake appropriate for improving, restoring or maintaining nutritional needs  Description: Monitor and assess patient's nutrition/hydration status for malnutrition. Collaborate with interdisciplinary team and initiate plan and interventions as ordered.  Monitor patient's weight and dietary intake as ordered or per policy. Utilize nutrition screening tool and intervene as necessary. Determine patient's food preferences and provide high-protein, high-caloric foods as appropriate.     INTERVENTIONS:  - Monitor oral intake, urinary output, labs, and treatment plans  - Assess nutrition and hydration status and recommend course of action  - Evaluate amount of meals eaten  - Assist patient with eating if necessary   - Allow adequate time for meals  - Recommend/ encourage appropriate diets, oral nutritional supplements, and vitamin/mineral supplements  - Order, calculate, and assess calorie counts as needed  - Recommend, monitor, and adjust tube feedings and TPN/PPN based on assessed needs  - Assess need for intravenous fluids  - Provide specific  nutrition/hydration education as appropriate  - Include patient/family/caregiver in decisions related to nutrition  Outcome: Progressing     Problem: MUSCULOSKELETAL - ADULT  Goal: Maintain or return mobility to safest level of function  Description: INTERVENTIONS:  - Assess patient's ability to carry out ADLs; assess patient's baseline for ADL function and identify physical deficits which impact ability to perform ADLs (bathing, care of mouth/teeth, toileting, grooming, dressing, etc.)  - Assess/evaluate cause of self-care deficits   - Assess range of motion  - Assess patient's mobility  - Assess patient's need for assistive devices and provide as appropriate  - Encourage maximum independence but intervene and supervise when necessary  - Involve family in performance of ADLs  - Assess for home care needs following discharge   - Consider OT consult to assist with ADL evaluation and planning for discharge  - Provide patient education as appropriate  Outcome: Progressing  Goal: Maintain proper alignment of affected body part  Description: INTERVENTIONS:  - Support, maintain and protect limb and body alignment  - Provide patient/ family with appropriate education  Outcome: Progressing

## 2024-03-19 NOTE — ANESTHESIA POSTPROCEDURE EVALUATION
Post-Op Assessment Note    CV Status:  Stable  Pain Score: 0    Pain management: adequate       Mental Status:  Alert and awake   Hydration Status:  Euvolemic   PONV Controlled:  Controlled   Airway Patency:  Patent  Airway: intubated     Post Op Vitals Reviewed: Yes    No anethesia notable event occurred.    Staff: CRNA               BP   135/85   Temp      Pulse  59   Resp   22   SpO2   99

## 2024-03-19 NOTE — PHYSICAL THERAPY NOTE
Physical Therapy Cancellation Note       03/19/24 0734   Note Type   Note type Cancelled Session   Cancel Reasons Patient to operating room   Additional Comments PT consult received and chart reviewed. Per EMR, pt admitted 3/18/24 s/p fall resulting in L femur fracture. Pt planned for OR today 3/19/24 for L femur IM nail. Will f/u post op for completion of PT evaluation. Updated WB and activity orders appreciated post op.       Angeline Tipton, PT, DPT   Available via [employer]  NPI # [id number]  [place] License - [id number]  3/19/2024

## 2024-03-19 NOTE — PROGRESS NOTES
Post Op Check    S/ 82 y/o female s/p fall down steps with left hip fracture. She returned from the OR from ORIF of the left femur with IMN. She had some nausea initially which resolved with zofran. She is feeling well now. Pain is controlled. Family is at bedside.     O/  Vitals:    03/19/24 1245   BP: 112/79   Pulse: 69   Resp:    Temp:    SpO2: 92%     GEN: NAD  HEENT: NCAT  NEURO: GCS 15,non-focal  CV: RRR, no MGR  PULM: CTA bilaterally  GI: soft,non-tender,non-distended  : voiding  MSK: + left thigh incisions C/D/I; no edema, contusion or deformity   SKIN: pink, warm, dry       A/P   82 y/o female s/p mechanical fall sustaining a left femoral fracture POD #0 from L femoral IMN    - WBAT  - pain control  - Lovenox for DVT ppx  - PT/OT  - NV checks  - Regular diet  - zofran prn nausea

## 2024-03-19 NOTE — OCCUPATIONAL THERAPY NOTE
Occupational Therapy Cancellation Note         Patient Name: Kay Bright  Today's Date: 3/19/2024         03/19/24 0737   Note Type   Note type Cancelled Session   Cancel Reasons Patient to operating room   Additional Comments OT orders received, chart review completed. Pt admit due to fall resulting in fx of greater trochanter of L femur. Pt is planned for OR today. Will hold OT evaluation and see post op as appropriate/able     Viola Guerrero MS, OTR/L

## 2024-03-19 NOTE — CONSULTS
Consultation - Geriatric Medicine   Kay Bright 83 y.o. female MRN: [id number]  Unit/Bed#: W -01 Encounter: [id number]      Assessment/Plan     Ambulatory Dysfunction: status post mechanical fall with secondary femoral fracture.  Occurred while patient attempted to descend stairs backwards.  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  Does not utilize ambulatory device at baseline.    - Maintain fall precautions  -PT/OT assessment and intervention.  - Maintain multimodal pain regimen;  scheduled tylenol 975 mg q8h + oxycodone 2.5 mg q4h prn (moderate pain) linked to oxycodone 5 mg q4h prn (Severe pain) + hydromorphone 0.2 mg q2h prn (breakthrough pain).    Fracture of greater Trochanter of Left Femur:   - Plan  for ORIF today by orthopedic surgery.  - NWB to LLE.  - Pain regimen as above.  - PT/OT  - VTE PPx, to resume postprocedure at the discretion of primary service.  - Monitor H&H.    At Risk for Delirium: Secondary to advanced age, trauma, pain and pain management and unfamiliar environment.    - Maintain adequate sleep-wake cycle.  Minimize ambient noise.  Maintain comfortable room temperature.  Avoid exposure to bluelight at night.  - Limit exposure to medications associated with altered mental status such as benzodiazepines, anticholinergics, tramadol and other hypnotic/sedative medications.  Judicious use of narcotics.      Home medication review    No current prescriptions per pharmacy.    Personally confirmed with as above.     History of Present Illness   Physician Requesting Consult: Jennifer Paredes DO  Reason for Consult / Principal Problem: Ambulatory dysfunction.  Hx and PE limited by: None.  Additional history obtained from: Chart review and patient evaluation      HPI: Kay Bright is a 83 y.o. year old female who presents with ambulatory dysfunction. Patient was home trying to descend stair backward and  trip and missed the bottom 2 steps. This occurred about 4 days prior to presentation. Since the eventg patient has had pain with ambulation. She ambulates with a cane at baseline. She denies any head strike of loss of consciousness asociated with the event. She has baseline left foot drop.     Patient seen at the bedside after undergoing surgery. Her family is present at the bedside.  She is awake and oriented x 3 upon initiation of the encounter, with no prior diagnosis of dementia.  She reports no pain at this time. She lives with her son in a multilevel unit. She is independent of ADLs as well as IADLs, and still drives short distances such as to the mall.  Denies any recent falls.  Has not reported any associated dizziness or syncopal episodes.    Inpatient consult to Gerontology  Consult performed by: Fracisco Donohue MD  Consult ordered by: ROGELIO Torres        Review of Systems   Constitutional:  Negative for appetite change, chills and fever.   HENT:  Negative for trouble swallowing.    Eyes:  Negative for visual disturbance.   Respiratory:  Negative for shortness of breath.    Cardiovascular:  Negative for chest pain.   Gastrointestinal:  Negative for abdominal pain, constipation, diarrhea, nausea and vomiting.   Genitourinary:  Negative for difficulty urinating.   Musculoskeletal:  Positive for arthralgias.   Skin:  Negative for color change.   Neurological:  Negative for dizziness and headaches.   Psychiatric/Behavioral:  Negative for agitation and behavioral problems.        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   • Spinal stenosis    • Varicose veins of legs    • Vitamin D deficiency    • Wears glasses      Past Surgical History:   Procedure Laterality Date   • CATARACT EXTRACTION     • NH XCAPSL CTRC RMVL INSJ IO LENS PROSTH W/O ECP Right 5/14/2018    Procedure: EXTRACTION EXTRACAPSULAR CATARACT PHACO INTRAOCULAR LENS (IOL);  Surgeon: Rian Benson MD;  Location: [facility] MAIN OR;  Service: Ophthalmology   • NH XCAPSL CTRC RMVL INSJ IO LENS PROSTH W/O ECP Left 7/10/2018    Procedure: EXTRACTION EXTRACAPSULAR CATARACT PHACO INTRAOCULAR LENS (IOL);  Surgeon: Rian Benson MD;  Location: [facility] MAIN OR;  Service: Ophthalmology   • TUBAL LIGATION       Social History   Social History     Substance and Sexual Activity   Alcohol Use No     Social History     Substance and Sexual Activity   Drug Use No     Social History     Tobacco Use   Smoking Status Never   Smokeless Tobacco Never     Family History:   Family History   Problem Relation Age of Onset   • Other Mother         corneal transplants, Parathyroid disease   • Osteoporosis Mother    • Stroke Father    • Hypertension Father    • Heart disease Father         cardiac disorder   • Osteoporosis Father    • Other Father         Parathyroid disease   • Asthma Sister    • Hiatal hernia Sister    • Other Brother         parathyroid-removed, Parathyroid disease   • Heart disease Brother         \" maker\"       Meds/Allergies   all current active meds have been reviewed    Allergies   Allergen Reactions   • Prednisone Swelling     Facial swelling, redness-no dyspnea       Objective     Intake/Output Summary (Last 24 hours) at 3/19/2024 0922  Last data filed at 3/19/2024 0601  Gross per 24 hour   Intake 623.75 ml   Output --   Net 623.75 ml     Invasive Devices       Peripheral Intravenous Line  Duration             Peripheral IV 03/18/24 Left Antecubital <1 " day                    Physical Exam  Vitals and nursing note reviewed.   Constitutional:       General: She is not in acute distress.     Appearance: She is well-developed.   HENT:      Head: Normocephalic and atraumatic.   Eyes:      Conjunctiva/sclera: Conjunctivae normal.   Cardiovascular:      Rate and Rhythm: Normal rate and regular rhythm.      Heart sounds: No murmur heard.  Pulmonary:      Effort: Pulmonary effort is normal. No respiratory distress.      Breath sounds: Normal breath sounds.   Abdominal:      Palpations: Abdomen is soft.   Musculoskeletal:         General: No swelling.      Cervical back: Neck supple.   Skin:     General: Skin is warm and dry.      Capillary Refill: Capillary refill takes less than 2 seconds.   Neurological:      Mental Status: She is alert and oriented to person, place, and time.   Psychiatric:         Mood and Affect: Mood normal.         Behavior: Behavior normal.       Lab Results:   I have personally reviewed pertinent lab results including the following:  -CBC.  Serum chemistries.    I have personally reviewed the following imaging study reports in PACS:  -Trauma workup.    Personal interpretation of imaging studies mentioned above:    -None.    Therapies:   PT: Status post ORIF; awaiting assessment.  OT: As above.    VTE Prophylaxis: Enoxaparin (Lovenox)    Code Status: Level 1 - Full Code  Advance Directive and Living Will:      Power of :    POLST:      Family and Social Support:   Living Arrangements: Lives w/ Children  Support Systems: Children; Family members  Assistance Needed: none  Type of Current Residence: Private residence  Current Home Care Services: No      Goals of Care: Not discussed at this time.    Fracisco Donohue MD  [facility]  Geriatric Medicine Fellow, PGY-4

## 2024-03-19 NOTE — DISCHARGE INSTR - AVS FIRST PAGE
Discharge Instructions - Orthopedics  Kay Bright 83 y.o. female MRN: [id number]  Unit/Bed#: APU 1    Weight Bearing Status:                                           Weightbearing as tolerated to left lower extremity    DVT prophylaxis:  Asprin 325mg twice per day for 6 weeks upon discharge     Pain:  Continue analgesics as directed    Dressing Instructions:   Please keep clean, dry and intact until follow up     Appt Instructions:   If you do not have your appointment, please call the clinic at [phone] to schedule with Dr. Duggan  Otherwise follow up as scheduled.    Contact the office sooner if you experience any increased numbness/tingling in the extremities.      Miscellaneous:  Follow up two weeks from surgery for staple removal and xrays

## 2024-03-19 NOTE — OP NOTE
OPERATIVE REPORT  PATIENT NAME: Kay Bright    :  1940  MRN: [id number]  Pt Location: AN OR ROOM 01    SURGERY DATE: 3/19/2024    Surgeon(s) and Role:     * Poncho Duggan,  - Primary     * Delmer Brooks MD - Co-surgeon     * Nolberto Meza PA-C   I was present for the entire procedure. and I was present for all critical portions of the procedure.    Preop Diagnosis:  #1 left nondisplaced intertrochanteric femur fracture    Post-Op Diagnosis:  #1 left nondisplaced intertrochanteric femur fracture    Procedures:  #1 surgical fixation of left intertrochanteric femur fracture with an intramedullary nail      Specimen(s):  None    Estimated Blood Loss:   20 cc    Drains:  None    Anesthesia Type:   General endotracheal    Operative Indications:  Patient is an 83-year-old female that had a ground-level fall 3 days ago onto her left side.  The patient had some hip pain after the initial fall but was able to ambulate.  She continued to ambulate for the next 24 hours but then pain started to worsen.  She required cane for assistive device and the pain continued to get worse so she presented to the emergency department where x-rays and CT scan demonstrated a greater trochanteric femur fracture.  In order check for intertrochanteric extension and MRI was ordered which showed a fracture line extending through the intertrochanteric region not fully completed through the medial side.  Patient was given the option of period of nonweightbearing with sequential radiographic follow-ups or surgical fixation of the left peritrochanteric femur fracture in order to restore ambulatory status and decrease complications associate with bedridden status patient was consented for surgical fixation of the left hip      Implants:   Synthes 11 mm x 170 mm TFN a with a 90 mm helical blade    Tourniquet time:   None      Complications:   No acute complications were encountered.  Patient was transferred to PACU in stable  condition    Operative findings:  Patient had a nondisplaced peritrochanteric femur fracture.  She was placed in lateral position for this case and a intramedullary nail was placed through the nondisplaced fracture site.  Stable fixation was obtained and compression was obtained through the external jig.  The nail was secured distally.  The patient tolerated the procedure well    Procedure and Technique:  Patient is an 83-year-old female who was seen and examined the preoperative holding area.  The operative extremities marked.  All patient's questions were answered.  The patient was then taken back to the operating room and general endotracheal anesthesia was administered by department anesthesia.  The patient was then transferred over the operating table using CT LS spine precautions.  All bony prominences were well-padded.  Patient was placed in lateral decubitus position.  The patient's left lower extremity was then prepped and draped in the standard sterile orthopedic fashion.  A timeout was performed to confirm correct side, correct patient, correct procedure.  All were in agreement the procedure was started.  Radiographic landmarks were marked out and a incision was made proximal to the greater trochanter.  Sharp dissection was carried down to skin subcutaneous tissues.  Blunt dissection was carried down to the level of the greater trochanter.  A guidewire was then inserted into the appropriate starting point with the appropriate entry angle into the proximal femoral segment.  A opening reamer was used after confirmation of guidewire placement on AP and lateral x-rays to gain access to the medullary canal.  A 11 mm Synthes  mm nail was selected attached to the jig and inserted into the intramedullary canal.  Once seated to the appropriate depth it was secured using a guidewire for the path of the helical blade.  Once the path was confirmed under AP and lateral x-rays a 90 mm helical blade was inserted  into the proximal femoral segment.  The setscrew was tightened and backed off half a turn.  Compression was obtained through the device.  The patient was then secured distally using the external jig through the static hole with a 36 mm 5 mm interlocking screw.  Final reduction implant placement was confirmed after removal of the jig.  The wounds were then copiously irrigated with sterile normal saline.  The wounds were then closed with 0 PDS suture through the iliotibial band and through the gluteus musculature.  The subcutaneous tissues were repaired using a 2-0 Monocryl suture and the skin was closed with staples.  The wounds were then dressed in Mepilex dressings.  The patient was then transferred off the operating room table using CT LS spine precautions extubated and transferred to PACU in stable condition        Postoperative plan:  Will be weightbearing as tolerated to the left lower extremity.  Patient received 24 hours of postoperative antibiotics for infection prophylaxis.  The patient will need to be started on Lovenox while in the hospital and will be discharged on aspirin 325 mg twice daily for DVT prophylaxis for 6 weeks.  The patient will need follow-up in the office in 2 weeks time for repeat x-ray evaluation and removal of staples    SIGNATURE: Poncho Duggan DO  DATE: March 19, 2024  TIME: 11:12 AM

## 2024-03-19 NOTE — ASSESSMENT & PLAN NOTE
Secondary to fall  CT imaging showed comminuted fracture greater trochanter left proximal femur.   Orthopedics consulted note appreciated  NPO for OR possibly today  NWB LLE  3/18 TXA administered  Multimodal pain regimen  Continue to monitor neurovascular exam  PT/OT

## 2024-03-19 NOTE — ANESTHESIA PREPROCEDURE EVALUATION
Procedure:  INSERTION NAIL IM FEMUR ANTEGRADE (TROCHANTERIC) and all associated procedures (Left: Leg Upper)    Relevant Problems   CARDIO   (+) Aortic valve regurgitation   (+) Hyperlipidemia   (+) Premature atrial contraction   (+) White coat syndrome without diagnosis of hypertension      ENDO   (+) Hyperparathyroidism (HCC)        Physical Exam    Airway    Mallampati score: II  TM Distance: >3 FB  Neck ROM: full     Dental   No notable dental hx     Cardiovascular  Rhythm: regular, Rate: normal    Pulmonary   Breath sounds clear to auscultation    Other Findings  post-pubertal.      Anesthesia Plan  ASA Score- 3     Anesthesia Type- general with ASA Monitors.         Additional Monitors:     Airway Plan: ETT.           Plan Factors-Exercise tolerance (METS): >4 METS.    Chart reviewed. EKG reviewed.  Existing labs reviewed. Patient summary reviewed.    Patient is not a current smoker.      Obstructive sleep apnea risk education given perioperatively.        Induction- intravenous.    Postoperative Plan- Plan for postoperative opioid use.     Informed Consent- Anesthetic plan and risks discussed with patient.  I personally reviewed this patient with the CRNA. Discussed and agreed on the Anesthesia Plan with the CRNA..

## 2024-03-19 NOTE — CONSULTS
Orthopedics   Kay Driscolllexie 83 y.o. female MRN: [id number]  Unit/Bed#: W -01      Chief Complaint:   left hip pain    HPI:   83 y.o. female community ambulator with no significant past medical history status post mechanical fall complaining of left hip pain and inability to bear weight.  The patient states she was at home on Thursday, 3/14/2024 when she was walking down her stairs backwards and fell, causing her to land on the left hip.  She states after the fall she felt mild pain in the left hip but was able to get herself back up and continue ambulating throughout the day with minimal discomfort.  She states over the next 2 to 3 days the pain continually got worse causing her to use a cane.  She states yesterday the pain became more severe and presented to the [facility] ED for further evaluation.  She states most of her pain is concentrated on the lateral aspect of the left hip.  She denies any radiation of symptoms.  She rates her pain today at a 5 out of 10.  She denies any previous traumas or surgeries to the left lower extremity.  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  She offers no additional complaints at this time.  She denies any numbness tingling or paresthesias in the left lower extremity.    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    Spinal stenosis     Varicose veins of legs     Vitamin D deficiency     Wears glasses        Past Surgical History:   Past Surgical History:   Procedure Laterality Date    CATARACT EXTRACTION      MA XCAPSL CTRC RMVL INSJ IO LENS PROSTH W/O ECP Right 5/14/2018    Procedure: EXTRACTION EXTRACAPSULAR CATARACT PHACO INTRAOCULAR LENS (IOL);  Surgeon: Rian Benson MD;  Location: [facility] MAIN OR;  Service: Ophthalmology    MA XCAPSL CTRC RMVL INSJ IO LENS PROSTH W/O ECP Left 7/10/2018    Procedure: EXTRACTION EXTRACAPSULAR CATARACT PHACO INTRAOCULAR LENS (IOL);  Surgeon: Rian Benson MD;  Location: [facility] MAIN OR;  Service: Ophthalmology    TUBAL LIGATION         Family History:  Family history reviewed and non-contributory  Family History   Problem Relation Age of Onset    Other Mother         corneal transplants, Parathyroid disease    Osteoporosis Mother     Stroke Father     Hypertension Father     Heart disease Father         cardiac disorder    Osteoporosis Father     Other Father         Parathyroid disease    Asthma Sister     Hiatal hernia Sister     Other Brother         parathyroid-removed, Parathyroid disease    Heart disease Brother         \" maker\"       Social History:  Social History     Socioeconomic History    Marital status:      Spouse name: None    Number of children: None    Years of education: None    Highest education level: None   Occupational History    None   Tobacco Use    Smoking status: Never    Smokeless tobacco: Never   Vaping Use    Vaping status: Never Used   Substance and Sexual Activity    Alcohol use: No    Drug use: No    Sexual activity: None   Other Topics Concern    None   Social History Narrative    Caffeine use, active     Social Determinants of Health     Financial Resource Strain: Low Risk  (10/2/2023)    Overall Financial Resource Strain (CARDIA)     Difficulty of Paying Living Expenses: Not hard at all   Food Insecurity: Not " on file   Transportation Needs: No Transportation Needs (10/2/2023)    PRAPARE - Transportation     Lack of Transportation (Medical): No     Lack of Transportation (Non-Medical): No   Physical Activity: Not on file   Stress: Not on file   Social Connections: Not on file   Intimate Partner Violence: Not on file   Housing Stability: Not on file       Allergies:   Allergies   Allergen Reactions    Prednisone Swelling     Facial swelling, redness-no dyspnea           Labs:  0   Lab Value Date/Time    HCT 38.6 03/19/2024 0527    HCT 41.5 03/18/2024 1711    HCT 42.5 02/29/2024 1209    HGB 12.5 03/19/2024 0527    HGB 13.4 03/18/2024 1711    HGB 13.3 02/29/2024 1209    INR 1.04 03/18/2024 1711    WBC 4.77 03/19/2024 0527    WBC 6.71 03/18/2024 1711    WBC 11.39 (H) 02/29/2024 1209    ESR 5 05/06/2017 1157    CRP 76.6 (H) 02/29/2024 1209       Meds:    Current Facility-Administered Medications:     acetaminophen (TYLENOL) tablet 975 mg, 975 mg, Oral, Q8H DINO, ROGELIO Torres, 975 mg at 03/18/24 2136    Artificial Tears ophthalmic solution 1 drop, 1 drop, Both Eyes, Q6H PRN, ROGELIO Torres    cholecalciferol (VITAMIN D3) tablet 1,000 Units, 1,000 Units, Oral, QAM, ROGELIO Torres    enoxaparin (LOVENOX) subcutaneous injection 30 mg, 30 mg, Subcutaneous, Q12H, ROGELIO Torres    HYDROmorphone HCl (DILAUDID) injection 0.2 mg, 0.2 mg, Intravenous, Q2H PRN, ROGELIO Torres    multi-electrolyte (PLASMALYTE-A/ISOLYTE-S PH 7.4) IV solution, 75 mL/hr, Intravenous, Continuous, ROGELIO Torres, Last Rate: 75 mL/hr at 03/18/24 2142, 75 mL/hr at 03/18/24 2142    multivitamin-minerals (CENTRUM) tablet 1 tablet, 1 tablet, Oral, QAM, ROGELIO Torres    oxyCODONE (ROXICODONE) split tablet 2.5 mg, 2.5 mg, Oral, Q4H PRN **OR** oxyCODONE (ROXICODONE) IR tablet 5 mg, 5 mg, Oral, Q4H PRN, ROGELIO Torres    saccharomyces boulardii (FLORASTOR) capsule 250 mg, 250 mg, Oral, Daily, ROGELIO Torres     "senna-docusate sodium (SENOKOT S) 8.6-50 mg per tablet 1 tablet, 1 tablet, Oral, HS, Antwan JUSTO ROGELIO Corea, 1 tablet at 03/18/24 2136    Blood Culture:   Lab Results   Component Value Date    BLOODCX No Growth After 5 Days. 12/02/2019       Wound Culture:   No results found for: \"WOUNDCULT\"    Ins and Outs:  I/O last 24 hours:  In: 623.8 [I.V.:623.8]  Out: -           Physical Exam:   /72 (BP Location: Right arm)   Pulse 72   Temp 97.8 °F (36.6 °C) (Oral)   Resp 16   SpO2 95%   Gen: No acute distress, resting comfortably in bed  HEENT: Eyes clear, moist mucus membranes, hearing intact  Respiratory: No audible wheezing or stridor  Cardiovascular: Well Perfused peripherally, 2+ distal pulse  Abdomen: nondistended, no peritoneal signs  Musculoskeletal: left lower extremity  Superficial abrasion noted on anterior aspect of the left patella.  No other open wounds, ecchymosis, effusion or obvious osseous deformity noted.    Tender to palpation over lateral aspect of left hip  ROM not assessed 2/2 known fracture  Sensation intact L3-S1  Intact ankle plantar flexion, FHL  Patient has a weak EHL and dorsiflexion at 1/5 strength compared to contralateral side  2+ DP/ PT pulse comparable to contralateral side  Positive logroll, positive heel strike  Musculature is soft and compressible, no pain with passive stretch    Tertiary: No pain or tenderness elicited over all other joints/long bones as except already stated.  Clavicles, shoulders, upper arms, elbows, forearms, wrists, hands, fingers, right hip, mid femurs, knees, tibias, ankles, feet, toes all palpated and ranged without significant tenderness to palpation or obvious osseous deformity noted.    Radiology:   I personally reviewed the films.  X-rays left hip shows fracture of greater trochanter   MRI of left hip shows fracture of greater trochanter with extension into the lesser trochanter   Films reviewed and discussed with Dr. Duggan         Assessment:  83 " y.o.female status post mechanical fall  with leftperitrochanteric femur fracture     Plan:   Non weight bearing left lower extremity  To the OR today for operative intervention of left peritrochanteric femur fracture  N.p.o. status confirmed  Preoperative antibiotics ordered  Intraoperative TXA ordered  Cleared by trauma for or today  CBC, BMP, type and screen, INR, chest x-ray, EKG all reviewed preoperatively  Pain control per primary team  DVT prophylaxis per primary team  All other medical comorbidities to be managed per primary team  Dispo: Ortho will follow  Case reviewed and discussed with Dr. Olga Lidia Meza PA-C

## 2024-03-20 LAB
ANION GAP SERPL CALCULATED.3IONS-SCNC: 5 MMOL/L (ref 4–13)
BASOPHILS # BLD AUTO: 0.03 THOUSANDS/ÂΜL (ref 0–0.1)
BASOPHILS NFR BLD AUTO: 0 % (ref 0–1)
BUN SERPL-MCNC: 9 MG/DL (ref 5–25)
CALCIUM SERPL-MCNC: 9.4 MG/DL (ref 8.4–10.2)
CHLORIDE SERPL-SCNC: 106 MMOL/L (ref 96–108)
CO2 SERPL-SCNC: 27 MMOL/L (ref 21–32)
CREAT SERPL-MCNC: 0.56 MG/DL (ref 0.6–1.3)
EOSINOPHIL # BLD AUTO: 0.14 THOUSAND/ÂΜL (ref 0–0.61)
EOSINOPHIL NFR BLD AUTO: 2 % (ref 0–6)
ERYTHROCYTE [DISTWIDTH] IN BLOOD BY AUTOMATED COUNT: 12.5 % (ref 11.6–15.1)
GFR SERPL CREATININE-BSD FRML MDRD: 85 ML/MIN/1.73SQ M
GLUCOSE SERPL-MCNC: 95 MG/DL (ref 65–140)
HCT VFR BLD AUTO: 37.3 % (ref 34.8–46.1)
HGB BLD-MCNC: 12.2 G/DL (ref 11.5–15.4)
IMM GRANULOCYTES # BLD AUTO: 0.03 THOUSAND/UL (ref 0–0.2)
IMM GRANULOCYTES NFR BLD AUTO: 0 % (ref 0–2)
LYMPHOCYTES # BLD AUTO: 1.58 THOUSANDS/ÂΜL (ref 0.6–4.47)
LYMPHOCYTES NFR BLD AUTO: 21 % (ref 14–44)
MCH RBC QN AUTO: 29.6 PG (ref 26.8–34.3)
MCHC RBC AUTO-ENTMCNC: 32.7 G/DL (ref 31.4–37.4)
MCV RBC AUTO: 91 FL (ref 82–98)
MONOCYTES # BLD AUTO: 0.68 THOUSAND/ÂΜL (ref 0.17–1.22)
MONOCYTES NFR BLD AUTO: 9 % (ref 4–12)
NEUTROPHILS # BLD AUTO: 4.98 THOUSANDS/ÂΜL (ref 1.85–7.62)
NEUTS SEG NFR BLD AUTO: 68 % (ref 43–75)
NRBC BLD AUTO-RTO: 0 /100 WBCS
PLATELET # BLD AUTO: 200 THOUSANDS/UL (ref 149–390)
PMV BLD AUTO: 10.7 FL (ref 8.9–12.7)
POTASSIUM SERPL-SCNC: 4 MMOL/L (ref 3.5–5.3)
RBC # BLD AUTO: 4.12 MILLION/UL (ref 3.81–5.12)
SODIUM SERPL-SCNC: 138 MMOL/L (ref 135–147)
WBC # BLD AUTO: 7.44 THOUSAND/UL (ref 4.31–10.16)

## 2024-03-20 PROCEDURE — 99232 SBSQ HOSP IP/OBS MODERATE 35: CPT | Performed by: EMERGENCY MEDICINE

## 2024-03-20 PROCEDURE — 80048 BASIC METABOLIC PNL TOTAL CA: CPT | Performed by: PHYSICIAN ASSISTANT

## 2024-03-20 PROCEDURE — 97760 ORTHOTIC MGMT&TRAING 1ST ENC: CPT

## 2024-03-20 PROCEDURE — 97167 OT EVAL HIGH COMPLEX 60 MIN: CPT

## 2024-03-20 PROCEDURE — 97163 PT EVAL HIGH COMPLEX 45 MIN: CPT

## 2024-03-20 PROCEDURE — 97535 SELF CARE MNGMENT TRAINING: CPT

## 2024-03-20 PROCEDURE — 99024 POSTOP FOLLOW-UP VISIT: CPT | Performed by: PHYSICIAN ASSISTANT

## 2024-03-20 PROCEDURE — 85025 COMPLETE CBC W/AUTO DIFF WBC: CPT | Performed by: PHYSICIAN ASSISTANT

## 2024-03-20 PROCEDURE — 99232 SBSQ HOSP IP/OBS MODERATE 35: CPT | Performed by: NURSE PRACTITIONER

## 2024-03-20 RX ADMIN — CEFAZOLIN SODIUM 2000 MG: 2 SOLUTION INTRAVENOUS at 02:06

## 2024-03-20 RX ADMIN — Medication 250 MG: at 10:50

## 2024-03-20 RX ADMIN — ACETAMINOPHEN 975 MG: 325 TABLET, FILM COATED ORAL at 14:19

## 2024-03-20 RX ADMIN — Medication 1000 UNITS: at 10:49

## 2024-03-20 RX ADMIN — SENNOSIDES, DOCUSATE SODIUM 1 TABLET: 8.6; 5 TABLET ORAL at 21:46

## 2024-03-20 RX ADMIN — MULTIPLE VITAMINS W/ MINERALS TAB 1 TABLET: TAB ORAL at 10:49

## 2024-03-20 RX ADMIN — ACETAMINOPHEN 975 MG: 325 TABLET, FILM COATED ORAL at 05:49

## 2024-03-20 RX ADMIN — ACETAMINOPHEN 975 MG: 325 TABLET, FILM COATED ORAL at 21:46

## 2024-03-20 RX ADMIN — ENOXAPARIN SODIUM 30 MG: 30 INJECTION SUBCUTANEOUS at 05:49

## 2024-03-20 NOTE — PROGRESS NOTES
[facility]  Progress Note  Name: Kay Bright I  MRN: [id number]  Unit/Bed#: W -01 I Date of Admission: 3/18/2024   Date of Service: 3/20/2024 I Hospital Day: 2    Assessment/Plan   Fall  Assessment & Plan  Mechanical fall 4 days prior to presentation  Injuries listed below   Fall precautions  Geriatrics consult, appreciate recommendations  PT/OT evaluation and treatment as indicated    Vitamin D deficiency  Assessment & Plan  Continue home supplementation    Left foot drop  Assessment & Plan  Associated with foot drop  Multi-Podus boot for support  PT/OT    * Fracture of greater trochanter of left femur (HCC)  Assessment & Plan  Secondary to fall  CT imaging showed comminuted fracture greater trochanter left proximal femur.   Orthopedics consulted note appreciated  3/19 IMN left femur  WBAT LLE  3/18 TXA administered  Multimodal pain regimen  Continue to monitor neurovascular exam  PT/OT evaluation and treatment as indicated  Outpatient follow up with orthopedic surgery             Bowel Regimen: Senokot S  VTE Prophylaxis:Enoxaparin (Lovenox)     Disposition: Continue current level of care.  Pending PT/OT evaluation.  CM following for disposition planning.    Subjective   Chief Complaint: Left leg pain    Subjective: Patient reports mild left leg pain this morning which is well-controlled at rest and with current pain regimen.  She otherwise denies complaints.  She is tolerating a diet and denies abdominal pain, nausea, vomiting.     Objective   Vitals:   Temp:  [97 °F (36.1 °C)-98.4 °F (36.9 °C)] 97 °F (36.1 °C)  HR:  [51-85] 85  Resp:  [17-20] 18  BP: (107-156)/(60-85) 107/66    I/O         03/18 0701  03/19 0700 03/19 0701  03/20 0700    P.O. 540     I.V. (mL/kg) 623.8 800 (14.3)    Total Intake(mL/kg) 1163.8 800 (14.3)    Net +1163.8 +800          Unmeasured Urine Occurrence 2 x              Physical Exam:   GENERAL APPEARANCE: Patient in no acute distress.  HEENT: NCAT;  PERRL, EOMs intact; Mucous membranes moist  NECK / BACK: ROM normal  CV: Regular rate and rhythm; no murmur/gallops/rubs appreciated.  CHEST / LUNGS: Clear to auscultation; no wheezes/rales/rhonci.  ABD: NABS; soft; non-distended; non-tender.  : voiding  EXT: LLE surgical dressings clean, dry, intact; LLE NVI; +2 pulses bilaterally upper & lower extremities; no edema.  NEURO: GCS 15; no focal neurologic deficits; neurovascularly intact.  SKIN: Warm, dry and well perfused; no rash; no jaundice.      Invasive Devices       Peripheral Intravenous Line  Duration             Peripheral IV 03/18/24 Left Antecubital 1 day    Peripheral IV 03/19/24 Right Forearm <1 day                          Lab Results: Results: I have personally reviewed all pertinent laboratory/tests results, BMP/CMP:   Lab Results   Component Value Date    SODIUM 138 03/20/2024    K 4.0 03/20/2024     03/20/2024    CO2 27 03/20/2024    BUN 9 03/20/2024    CREATININE 0.56 (L) 03/20/2024    CALCIUM 9.4 03/20/2024    EGFR 85 03/20/2024   , and CBC:   Lab Results   Component Value Date    WBC 7.44 03/20/2024    HGB 12.2 03/20/2024    HCT 37.3 03/20/2024    MCV 91 03/20/2024     03/20/2024    RBC 4.12 03/20/2024    MCH 29.6 03/20/2024    MCHC 32.7 03/20/2024    RDW 12.5 03/20/2024    MPV 10.7 03/20/2024    NRBC 0 03/20/2024     Imaging: I have personally reviewed pertinent reports.     Other Studies: None

## 2024-03-20 NOTE — ASSESSMENT & PLAN NOTE
Secondary to fall  CT imaging showed comminuted fracture greater trochanter left proximal femur.   Orthopedics consulted note appreciated  3/19 IMN left femur  WBAT LLE  3/18 TXA administered  Multimodal pain regimen  Continue to monitor neurovascular exam  PT/OT evaluation and treatment as indicated  Outpatient follow up with orthopedic surgery

## 2024-03-20 NOTE — CASE MANAGEMENT
Case Management Assessment & Discharge Planning Note    Patient name Kay Bright  Location W /W -01 MRN [id number]  : 1940 Date 3/20/2024       Current Admission Date: 3/18/2024  Current Admission Diagnosis:Fracture of greater trochanter of left femur (HCC)   Patient Active Problem List    Diagnosis Date Noted    Fracture of greater trochanter of left femur (HCC) 2024    Fall 2024    History of diverticulitis 11/15/2023    Varicose veins of both lower extremities with inflammation 10/25/2023    White coat syndrome without diagnosis of hypertension 2023    Multiple thyroid nodules 2022    Hyperparathyroidism (HCC) 2020    Leukocytosis 2019    Vitamin D deficiency 2019    Hyperlipidemia 2019    Osteoporosis 2019    Stress fracture of calcaneus 2019    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    Aortic valve regurgitation 2018    Nodular thyroid disease 2018    Increased PTH level 2018    Left foot drop 2018    History of shingles 2018    Hypercalcemia 2017    Degenerative lumbar spinal stenosis 2017    Varicose veins of bilateral lower extremities with other complications 2017    Tendinitis of right rotator cuff 2017    Irregular heartbeat 2017    Premature atrial contraction 2017      LOS (days): 2  Geometric Mean LOS (GMLOS) (days): 2.8  Days to GMLOS:0.8     OBJECTIVE:    Risk of Unplanned Readmission Score: 5.88         Current admission status: Inpatient       Preferred Pharmacy:   [facility]/pharmacy [facility][id number] - [place] [address]  [place] [address]  Phone: [phone] Fax: [phone]    Primary Care Provider: Abelino Garcia DO    Primary Insurance: MEDICARE  Secondary Insurance: AARP    ASSESSMENT:  Active Health Care Proxies    There are no active Health Care Proxies on file.    Readmission Root Cause  30 Day Readmission:  No    Patient Information  Admitted from:: Home  Mental Status: Alert  During Assessment patient was accompanied by: Daughter  Assessment information provided by:: Patient  Primary Caregiver: Self  Support Systems: Children, Family members  County of Residence: [place]  What city do you live in?: [place]  Home entry access options. Select all that apply.: Stairs  Number of steps to enter home.: 4  Type of Current Residence: Bi-level  Upon entering residence, is there a bedroom on the main floor (no further steps)?: No  A bedroom is located on the following floor levels of residence (select all that apply):: 2nd Floor  Upon entering residence, is there a bathroom on the main floor (no further steps)?: No  Indicate which floors of current residence have a bathroom (select all the apply):: 2nd Floor  Number of steps to 2nd floor from main floor: 4  Living Arrangements: Lives w/ Son    Activities of Daily Living Prior to Admission  Functional Status: Independent  Completes ADLs independently?: Yes  Ambulates independently?: Yes  Does patient use assisted devices?: No  Does patient currently own DME?: Yes  What DME does the patient currently own?: Walker  Does patient have a history of Outpatient Therapy (PT/OT)?: No  Does the patient have a history of Short-Term Rehab?: No  Does patient have a history of HHC?: No  Does patient currently have HHC?: No    Patient Information Continued  Income Source: Pension/halfway  Does patient have prescription coverage?: Yes  Does patient receive dialysis treatments?: No  Does patient have a history of substance abuse?: No    Means of Transportation  Means of Transport to Appts:: Family transport    Social Determinants of Health (SDOH)      Flowsheet Row Most Recent Value   Housing Stability    In the last 12 months, was there a time when you were not able to pay the mortgage or rent on time? N   In the last 12 months, how many places have you lived? 1   In the last 12 months,  was there a time when you did not have a steady place to sleep or slept in a shelter (including now)? N   Transportation Needs    In the past 12 months, has lack of transportation kept you from medical appointments or from getting medications? no   In the past 12 months, has lack of transportation kept you from meetings, work, or from getting things needed for daily living? No   Food Insecurity    Within the past 12 months, you worried that your food would run out before you got the money to buy more. Never true   Within the past 12 months, the food you bought just didn't last and you didn't have money to get more. Never true   Utilities    In the past 12 months has the electric, gas, oil, or water company threatened to shut off services in your home? No     DISCHARGE DETAILS:    Discharge planning discussed with:: patient and daughters- Thea at bedside  [place] of Choice: Yes  Comments - [place] of Choice: HHC/ DME  CM contacted family/caregiver?: Yes  Were Treatment Team discharge recommendations reviewed with patient/caregiver?: Yes  Did patient/caregiver verbalize understanding of patient care needs?: Yes  Were patient/caregiver advised of the risks associated with not following Treatment Team discharge recommendations?: Yes    Contacts  Patient Contacts: Jayde/ Luca  Relationship to Patient:: Family (daughters)  Contact Method: In Person  Reason/Outcome: Continuity of Care, Emergency Contact, Referral, Discharge Planning    Requested Home Health Care         Is the patient interested in HHC at discharge?: Yes  Home Health Discipline requested:: Occupational Therapy, Physical Therapy, Nursing  Home Health Agency Name:: Other (pending referrals)  Home Health Follow-Up Provider:: PCP  Home Health Services Needed:: Strengthening/Theraputic Exercises to Improve Function, Evaluate Functional Status and Safety, Gait/ADL Training, Post-Op Care and Assessment  Homebound Criteria Met:: Uses an Assist Device  (i.e. cane, walker, etc)  Supporting Clincal Findings:: Limited Endurance    DME Referral Provided  Referral made for DME?: Yes  DME referral completed for the following items:: Bedside Commode  DME Supplier Name:: [employer]    Other Referral/Resources/Interventions Provided:  Interventions: HHC, DME  Referral Comments: Patient admitted to [facility] s/p fall. CM met with patient and daughters, Luca and Jayde at bedside to complete assessment/ discuss dcp. Patient lives with son in a bi-level home, 4STE- 4 steps to bed and bath. Patient is independent at baseline, does own a walker. CM dicussed therapy recommendation of HHC with increased family support, daughters confirming they can assist more following d/c and all parties agreeable to HHC. Patient and family also confirming they would like  [facility]. Order placed via [facility] for [facility] and HHC referrals placed via [facility]. CM to follow up as able to continue with dcp.    Would you like to participate in our [facility] service program?  : No - Declined    Treatment Team Recommendation: Home with Home Health Care  Discharge Destination Plan:: Home with Home Health Care  Transport at Discharge : Family

## 2024-03-20 NOTE — PROGRESS NOTES
Progress Note - Geriatric Medicine   Kay MIGDALIA Driscolllexie 83 y.o. female MRN: [id number]  Unit/Bed#: W -01 Encounter: [id number]      Assessment/Plan:  Fracture of greater trochanter of left femur  S/p fall  CT imaging showed comminuted fracture greater trochanter of left proximal femur  Orthopedics was consulted, underwent IMN left femur on 3/19/2024  Currently WBAT to LLE  Multimodal pain regimen including geriatric pain protocol  PT/OT following  Outpatient follow-up with orthopedics    Fall  [place] fall precautions   Assess patient frequently for physical needs, encourage use of assistant devices as needed and directed by PT/OT  Identify cognitive and physical deficits and behaviors that affect risk of falls  Consider moving patient closer to nursing station to monitor more closely for impulsive behavior which may increase risk of falls  Educate patient/family on patient safety including physical limitations and importance of using call bell for assistance  Modify environment to reduce risk of injury including disconnecting from pole when not in use, ensuring adequate lighting in room and restroom, ensuring that path to restroom is clear and free of trip hazards  PT/OT consulted to assist with strengthening/mobility and assist with discharge planning to appropriate level of care    Vitamin D deficiency  Vitamin D level on file  Takes oral vitamin D supplement    Cognitive screening  Patient is alert oriented x4  No cognitive testing about  No history of dementia or cognitive impairment  Most recent TSH stable at 2.86 from 11/27/23  At risk for delirium due to hospitalization, acute pain, sleep disturbance, anesthesia, age  Recommend delirium precautions  Maintain sleep-wake cycle, avoid nighttime interruptions  minimize overnight interruptions, group overnight vitals/labs/nursing checks as possible  dim lights, close blinds and turn off tv to minimize stimulation and encourage sleep environment in  evenings  Provide adequate pain control  Avoid urinary retention and constipation  Provide frequent and early mobilization  Provide frequent redirection and reorientation as needed  Avoid medications that may worsen or precipitate delirium such as tramadol, benzodiazepines, anticholinergics, and Benadryl  Redirect unwanted behaviors as first-line therapy, avoid physical restraints as able to  Continue to monitor    Insomnia  First-line treatment is behavior modification  Maintain sleep-wake cycle, avoid nighttime interruptions  Avoid caffeine throughout the day  Avoid napping throughout the day  Encourage physical activity throughout the day  Avoid sedative hypnotics including benzodiazepines and benadryl  If needed use melatonin 3 mg nightly    Constipation  Last BM was prior to hospitalization  Is currently on Senokot-S 1 tablet nightly  Recommend increasing to twice daily  Encourage adequate p.o. Hydration  Encourage prune juice as tolerated    Ambulatory dysfunction  At a baseline ambulates with out assistive device  PT/OT following  Has a history of left foot drop for which she was given a Multi-Podus boot for support  Fall precautions  Out of bed as tolerated  Encourage early and frequent mobilization  Encourage adequate hydration and nutrition  Provide adequate pain management   Goal is undetermined,  Continue with PT/OT for continued strength and balance training     Subjective:   Kay is an 83-year-old female being seen for a geriatric follow-up.  Upon exam patient was lying in bed, resting.  She appeared comfortable and was in no acute distress.  She reports her pain is well-controlled at rest, mild pain with movement.  She slept well overnight.  Appetite is slightly decreased, although she reports she does not have much of an appetite normally.  Per nursing no acute concerns or issues at this time.    Review of Systems   Constitutional:  Positive for appetite change. Negative for activity change, chills  and fever.   HENT:  Negative for sore throat and trouble swallowing.    Eyes:  Negative for visual disturbance.   Respiratory:  Negative for cough and shortness of breath.    Cardiovascular:  Negative for chest pain and palpitations.   Gastrointestinal:  Positive for constipation. Negative for abdominal pain, diarrhea, nausea and vomiting.   Genitourinary:  Negative for difficulty urinating, dysuria and hematuria.   Musculoskeletal:  Positive for arthralgias and gait problem. Negative for back pain.   Skin:  Negative for color change and rash.   Neurological:  Positive for weakness. Negative for dizziness, seizures, syncope, light-headedness and headaches.   Psychiatric/Behavioral:  Negative for sleep disturbance. The patient is not nervous/anxious.          Objective:     Vitals: Blood pressure 137/77, pulse 71, temperature (!) 97.4 °F (36.3 °C), temperature source Oral, resp. rate 18, weight 61.6 kg (135 lb 12.9 oz), SpO2 97%.,Body mass index is 25.24 kg/m².    No intake or output data in the 24 hours ending 03/20/24 1405    Current Medications: Reviewed    Physical Exam:   Physical Exam  Vitals reviewed.   Constitutional:       General: She is not in acute distress.     Appearance: She is well-developed. She is not ill-appearing.   HENT:      Head: Normocephalic and atraumatic.   Cardiovascular:      Rate and Rhythm: Normal rate and regular rhythm.      Heart sounds: No murmur heard.  Pulmonary:      Effort: Pulmonary effort is normal. No respiratory distress.      Breath sounds: Normal breath sounds.   Abdominal:      General: Bowel sounds are normal. There is no distension.      Palpations: Abdomen is soft.      Tenderness: There is no abdominal tenderness.   Musculoskeletal:         General: Tenderness and signs of injury present. No swelling.      Right lower leg: No edema.      Left lower leg: No edema.   Skin:     General: Skin is warm and dry.   Neurological:      General: No focal deficit present.       "Mental Status: She is alert and oriented to person, place, and time. Mental status is at baseline.      Cranial Nerves: No cranial nerve deficit.      Motor: Weakness present.      Gait: Gait abnormal.   Psychiatric:         Mood and Affect: Mood normal.          Invasive Devices       Peripheral Intravenous Line  Duration             Peripheral IV 03/18/24 Left Antecubital 1 day    Peripheral IV 03/19/24 Right Forearm 1 day                    Lab, Imaging and other studies:    Lab Results:   I have personally reviewed pertinent lab results including the following:  -CBC, BMP    I have personally reviewed the following imaging study reports in PACS:  No new imaging to review  - I have personally reviewed pertinent reports.        Please note:  Voice-recognition software may have been used in the preparation of this document.  Occasional wrong word or \"sound-alike\" substitutions may have occurred due to the inherent limitations of voice recognition software.  Interpretation should be guided by context.    "

## 2024-03-20 NOTE — PLAN OF CARE
"  Problem: PHYSICAL THERAPY ADULT  Goal: Performs mobility at highest level of function for planned discharge setting.  See evaluation for individualized goals.  Description: Treatment/Interventions: Functional transfer training, LE strengthening/ROM, Elevations, Therapeutic exercise, Endurance training, Patient/family training, Equipment eval/education, Bed mobility, Gait training, Compensatory technique education, Spoke to nursing, Spoke to case management    Equipment Recommended: Walker     See flowsheet documentation for full assessment, interventions and recommendations.  Note: Prognosis: Good  Problem List: Decreased strength, Decreased range of motion, Decreased endurance, Impaired balance, Decreased mobility, Decreased skin integrity, Orthopedic restrictions, Pain, Decreased safety awareness, Impaired judgement  Assessment: Pt seen for PT evaluation for mobility assessment & discharge needs. Pt is an 83 yr old female admitted 3/18/24 s/p fall down steps 4 days PTA, c/o L knee pain, resulting in L femur fracture. 3/19/24 pt underwent L femur IM nail by Dr. Duggan - [facility]. Comorbidities affecting pt's fnxl performance include: L foot drop, shingles, arthritis, spinal stenosis, falls. During PT IE, pt completes bed mobility with S, transfers STS with S +RW, and ambulation of 80ft with RW and S (distance limited d/t pt report of \"not wanting to over do it day 1\". Pt displays above outlined functional impairments & limitations, and presents below her baseline level of functional mobility. The AM-PAC & Barthel Index outcome tools were used to assist in determining pt safety w/ mobility/self care & appropriate d/c recommendations, see above for scores. Pt is at risk of falls d/t multiple comorbidities, h/o falls, impaired balance, use of ambulatory aid, varying levels of pain , advanced age, acuity of medical illness, ongoing medical treatment of primary dx, and polypharmacy. Pt's clinical presentation is " currently unstable/unpredictable as seen in pt's presentation of changing level of pain, increased fall risk, new onset of impairment of functional mobility, decreased endurance, and new onset of weakness. Pt will benefit from continued PT services in order to address impairments, decrease risk of falls, maximize independence w/ fnxl mobility, & ensure safety w/ mobility for transition to next level of care. Based on pt presentation & impairments, pt would most appropriately benefit from Level III (minimal PT intensity) resources upon d/c.    Barriers to Discharge: Decreased caregiver support     Rehab Resource Intensity Level, PT: III (Minimum Resource Intensity)    See flowsheet documentation for full assessment.

## 2024-03-20 NOTE — PLAN OF CARE
Problem: PAIN - ADULT  Goal: Verbalizes/displays adequate comfort level or baseline comfort level  Description: Interventions:  - Encourage patient to monitor pain and request assistance  - Assess pain using appropriate pain scale  - Administer analgesics based on type and severity of pain and evaluate response  - Implement non-pharmacological measures as appropriate and evaluate response  - Consider cultural and social influences on pain and pain management  - Notify physician/advanced practitioner if interventions unsuccessful or patient reports new pain  Outcome: Progressing     Problem: INFECTION - ADULT  Goal: Absence or prevention of progression during hospitalization  Description: INTERVENTIONS:  - Assess and monitor for signs and symptoms of infection  - Monitor lab/diagnostic results  - Monitor all insertion sites, i.e. indwelling lines, tubes, and drains  - Monitor endotracheal if appropriate and nasal secretions for changes in amount and color  - [place] appropriate cooling/warming therapies per order  - Administer medications as ordered  - Instruct and encourage patient and family to use good hand hygiene technique  - Identify and instruct in appropriate isolation precautions for identified infection/condition  Outcome: Progressing  Goal: Absence of fever/infection during neutropenic period  Description: INTERVENTIONS:  - Monitor WBC    Outcome: Progressing     Problem: SAFETY ADULT  Goal: Patient will remain free of falls  Description: INTERVENTIONS:  - Educate patient/family on patient safety including physical limitations  - Instruct patient to call for assistance with activity   - Consult OT/PT to assist with strengthening/mobility   - Keep Call bell within reach  - Keep bed low and locked with side rails adjusted as appropriate  - Keep care items and personal belongings within reach  - Initiate and maintain comfort rounds  - Make Fall Risk Sign visible to staff  - Offer Toileting every  Hours,  in advance of need  - Initiate/Maintain alarm  - Obtain necessary fall risk management equipment:   - Apply yellow socks and bracelet for high fall risk patients  - Consider moving patient to room near nurses station  Outcome: Progressing  Goal: Maintain or return to baseline ADL function  Description: INTERVENTIONS:  -  Assess patient's ability to carry out ADLs; assess patient's baseline for ADL function and identify physical deficits which impact ability to perform ADLs (bathing, care of mouth/teeth, toileting, grooming, dressing, etc.)  - Assess/evaluate cause of self-care deficits   - Assess range of motion  - Assess patient's mobility; develop plan if impaired  - Assess patient's need for assistive devices and provide as appropriate  - Encourage maximum independence but intervene and supervise when necessary  - Involve family in performance of ADLs  - Assess for home care needs following discharge   - Consider OT consult to assist with ADL evaluation and planning for discharge  - Provide patient education as appropriate  Outcome: Progressing  Goal: Maintains/Returns to pre admission functional level  Description: INTERVENTIONS:  - Perform AM-PAC 6 Click Basic Mobility/ Daily Activity assessment daily.  - Set and communicate daily mobility goal to care team and patient/family/caregiver.   - Collaborate with rehabilitation services on mobility goals if consulted  - Perform Range of Motion  times a day.  - Reposition patient every  hours.  - Dangle patient  times a day  - Stand patient  times a day  - Ambulate patient  times a day  - Out of bed to chair  times a day   - Out of bed for meals times a day  - Out of bed for toileting  - Record patient progress and toleration of activity level   Outcome: Progressing     Problem: DISCHARGE PLANNING  Goal: Discharge to home or other facility with appropriate resources  Description: INTERVENTIONS:  - Identify barriers to discharge w/patient and caregiver  - Arrange for  needed discharge resources and transportation as appropriate  - Identify discharge learning needs (meds, wound care, etc.)  - Arrange for interpretive services to assist at discharge as needed  - Refer to Case Management Department for coordinating discharge planning if the patient needs post-hospital services based on physician/advanced practitioner order or complex needs related to functional status, cognitive ability, or social support system  Outcome: Progressing     Problem: Knowledge Deficit  Goal: Patient/family/caregiver demonstrates understanding of disease process, treatment plan, medications, and discharge instructions  Description: Complete learning assessment and assess knowledge base.  Interventions:  - Provide teaching at level of understanding  - Provide teaching via preferred learning methods  Outcome: Progressing     Problem: Nutrition/Hydration-ADULT  Goal: Nutrient/Hydration intake appropriate for improving, restoring or maintaining nutritional needs  Description: Monitor and assess patient's nutrition/hydration status for malnutrition. Collaborate with interdisciplinary team and initiate plan and interventions as ordered.  Monitor patient's weight and dietary intake as ordered or per policy. Utilize nutrition screening tool and intervene as necessary. Determine patient's food preferences and provide high-protein, high-caloric foods as appropriate.     INTERVENTIONS:  - Monitor oral intake, urinary output, labs, and treatment plans  - Assess nutrition and hydration status and recommend course of action  - Evaluate amount of meals eaten  - Assist patient with eating if necessary   - Allow adequate time for meals  - Recommend/ encourage appropriate diets, oral nutritional supplements, and vitamin/mineral supplements  - Order, calculate, and assess calorie counts as needed  - Recommend, monitor, and adjust tube feedings and TPN/PPN based on assessed needs  - Assess need for intravenous fluids  -  Provide specific nutrition/hydration education as appropriate  - Include patient/family/caregiver in decisions related to nutrition  Outcome: Progressing     Problem: MUSCULOSKELETAL - ADULT  Goal: Maintain or return mobility to safest level of function  Description: INTERVENTIONS:  - Assess patient's ability to carry out ADLs; assess patient's baseline for ADL function and identify physical deficits which impact ability to perform ADLs (bathing, care of mouth/teeth, toileting, grooming, dressing, etc.)  - Assess/evaluate cause of self-care deficits   - Assess range of motion  - Assess patient's mobility  - Assess patient's need for assistive devices and provide as appropriate  - Encourage maximum independence but intervene and supervise when necessary  - Involve family in performance of ADLs  - Assess for home care needs following discharge   - Consider OT consult to assist with ADL evaluation and planning for discharge  - Provide patient education as appropriate  Outcome: Progressing  Goal: Maintain proper alignment of affected body part  Description: INTERVENTIONS:  - Support, maintain and protect limb and body alignment  - Provide patient/ family with appropriate education  Outcome: Progressing     Problem: Prexisting or High Potential for Compromised Skin Integrity  Goal: Skin integrity is maintained or improved  Description: INTERVENTIONS:  - Identify patients at risk for skin breakdown  - Assess and monitor skin integrity  - Assess and monitor nutrition and hydration status  - Monitor labs   - Assess for incontinence   - Turn and reposition patient  - Assist with mobility/ambulation  - Relieve pressure over bony prominences  - Avoid friction and shearing  - Provide appropriate hygiene as needed including keeping skin clean and dry  - Evaluate need for skin moisturizer/barrier cream  - Collaborate with interdisciplinary team   - Patient/family teaching  - Consider wound care consult   Outcome: Progressing

## 2024-03-20 NOTE — OCCUPATIONAL THERAPY NOTE
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    Spinal stenosis     Varicose veins of legs     Vitamin D deficiency     Wears glasses      Past Surgical History  Past Surgical History:   Procedure Laterality Date    CATARACT EXTRACTION      DC OPTX FEM SHFT FX W/INSJ IMED IMPLT W/WO SCREW Left 3/19/2024    Procedure: INSERTION NAIL IM FEMUR ANTEGRADE (TROCHANTERIC) and all associated procedures;  Surgeon: Poncho Duggan DO;  Location: [facility];  Service: Orthopedics    DC XCAPSL CTRC RMVL INSJ IO LENS PROSTH W/O ECP Right 5/14/2018    Procedure: EXTRACTION EXTRACAPSULAR CATARACT PHACO INTRAOCULAR LENS (IOL);  Surgeon: Rian Benson MD;  Location: [facility] MAIN OR;  Service: Ophthalmology    DC XCAPSL CTRC RMVL INSJ IO LENS PROSTH W/O ECP Left 7/10/2018    Procedure: EXTRACTION EXTRACAPSULAR CATARACT PHACO INTRAOCULAR LENS (IOL);  Surgeon: Rian Benson MD;  Location: [facility] MAIN OR;  Service: Ophthalmology    TUBAL LIGATION               03/20/24 0857   OT Last Visit   OT Visit Date 03/20/24   Note Type   Note type Evaluation   Pain Assessment   Pain Assessment Tool 0-10   Pain Score 3   Pain Location/Orientation Orientation: Left;Location: Hip   Patient's Stated  "Pain Goal No pain   Hospital Pain Intervention(s) Repositioned;Ambulation/increased activity;Emotional support;Elevated   Multiple Pain Sites No   Restrictions/Precautions   Weight Bearing Precautions Per Order Yes   LLE Weight Bearing Per Order WBAT  (POD #1 s/p IM nail)   Braces or Orthoses   (multipodus boot for LLE)   Other Precautions Cognitive;Chair Alarm;Bed Alarm;Multiple lines;Fall Risk;Pain   Home Living   Type of Home House   Home Layout Two level;Laundry in basement  (4 HERBERT c HR, 4 steps to living room + bedrooms (able to maintain this level on d/c). FF to basement/family room)   Bathroom Shower/Tub Tub/shower unit   Bathroom Toilet Standard  (uses towel bar for support when standing from toilet)   Bathroom Equipment   (none: friend is currently installing GB in shower)   Bathroom Accessibility Accessible   Home Equipment Cane;Reacher;Sock aid  (has standing frame sock aid for compression socks. can sleep in recliner if needed)   Additional Comments no use of AD at baseline, use of SPC when \"I have to go far\"   Prior Function   Level of [place] Independent with ADLs   Lives With Son  (son works during the day)   Receives Help From Family   IADLs Independent with meal prep;Independent with medication management;Independent with driving   Falls in the last 6 months 1 to 4  (1, (+) fall hx >4 yrs)   Vocational Retired   Comments Pt reports over the last week after fall/injury, her son was providing assistance for lower body dressing, however at baseline she is fully independent.   Lifestyle   Autonomy PTA pt living with son in 2SH, pt (I) with ADLs and shares IADLs, (+)fall, no use of AD at baseline   Reciprocal Relationships supportive son and local family   Service to Others retired   Intrinsic Gratification enjoys going to [employer], walking and spending time with 12 great grandchildren   General   Additional Pertinent History Admit due to fall down steps on 3/14. Was able to move without pain around " "her home. Pt found to have fracture of L femur. Brought to OR on 3/19 for IM nail of L femur. PMH: L foot drop   Family/Caregiver Present No   Subjective   Subjective \"I use the sock stand at home to help with the compression socks\"   ADL   Eating Assistance 6  Modified independent   Grooming Assistance 6  Modified Independent   UB Bathing Assistance 5  Supervision/Setup   LB Bathing Assistance 4  Minimal Assistance   UB Dressing Assistance 5  Supervision/Setup   UB Dressing Deficit Thread RUE;Thread LUE;Pull around back   LB Dressing Assistance 3  Moderate Assistance   LB Dressing Deficit Increased time to complete;Supervision/safety;Verbal cueing;Don/doff R sock;Don/doff L sock;Thread RLE into underwear;Thread LLE into underwear;Pull up over hips;Thread LLE into pants;Thread RLE into pants;Use of adaptive equipment  (see treatment note for edu on LH AE)   Toileting Assistance  5  Supervision/Setup   Additional Comments Did not observe eating, grooming, UB bathing, LB bathing, and toileting at time of evaluation, with use of clinical reasoning, pt's performance throughout evaluation indicates that pt may be able to perform these tasks at the levels listed above   Bed Mobility   Supine to Sit 5  Supervision   Additional items Assist x 1;HOB elevated;Bedrails;Increased time required;Verbal cues   Transfers   Sit to Stand 5  Supervision   Additional items Increased time required;Verbal cues   Stand to Sit 5  Supervision   Additional items Increased time required;Verbal cues   Additional Comments use of RW   Functional Mobility   Functional Mobility 5  Supervision   Additional Comments functional household distance. Edu on hand placement and sequencing of task   Additional items Rolling walker   Balance   Static Sitting Fair +   Dynamic Sitting Fair   Static Standing Fair -   Dynamic Standing Fair -   Ambulatory Fair -   Activity Tolerance   Activity Tolerance Patient tolerated treatment well;Patient limited by pain "   Medical Staff Made Aware PT CHANTELLE Bentley Assessment   RUE Assessment WFL   LUE Assessment   LUE Assessment WFL   Hand Function   Gross Motor Coordination Functional   Fine Motor Coordination Functional   Cognition   Overall Cognitive Status WFL  (in conversation, HIGHLY suspect higher level cognitive deficits)   Arousal/Participation Alert;Cooperative   Attention Attends with cues to redirect   Orientation Level Oriented X4   Memory Decreased recall of recent events;Decreased recall of precautions   Following Commands Follows one step commands with increased time or repetition   Comments Pt pleasant and cooperative, limited insight into deficits, poor problem solving. Would benefit from formal cognitive evaluation   Assessment   Limitation Decreased ADL status;Decreased UE strength;Decreased Safe judgement during ADL;Decreased cognition;Decreased endurance;Decreased self-care trans;Decreased high-level ADLs  (impaired balance, fxnl mobility, act lorene, fxnl reach, trunk control, standing lorene, strength, fxnl sitting balance, fxnl sitting lorene, attention to task, direction following, safety awareness, insight, problem solving, learning new tasks, response time)   Prognosis Good   Assessment Pt is a 83 y.o. female seen for OT evaluation s/p admission [facility] on [facility]/18/[facility] due to fall down stairs. Diagnosed with Fracture of greater trochanter of left femur (HCC). Personal and env factors supporting pt at time of IE include (I) PLOF and supportive family . Personal and env factors inhibiting engagement in occupations include advanced age and HERBERT home + steps in home . Performance deficits that affect the pt’s occupational performance can be seen above. Due to pt's current functional limitations and medical complications pt is functioning below baseline. Pt would benefit from continued skilled OT treatment in order to maximize safety, independence and overall performance with ADLs, functional mobility, functional  "transfers, and cognition in order to achieve highest level of function.   Goals   Patient Goals \"Not to go to a nursing home\"   LTG Time Frame 10-14   Long Term Goal see goals listed below   Plan   Treatment Interventions ADL retraining;Functional transfer training;Endurance training;Cognitive reorientation;Patient/family training;Equipment evaluation/education;Compensatory technique education;Energy conservation;Activityengagement   Goal Expiration Date 03/30/24   OT Treatment Day 1   OT Frequency 3-5x/wk   Discharge Recommendation   Rehab Resource Intensity Level, OT III (Minimum Resource Intensity)   Equipment Recommended Bedside commode;Shower/Tub chair with back ($);Hip Kit ($)  (provided with hip kit at end of session)   Commode Type Standard   AM-PAC Daily Activity Inpatient   Lower Body Dressing 2   Bathing 3   Toileting 3   Upper Body Dressing 3   Grooming 4   Eating 4   Daily Activity Raw Score 19   Daily Activity Standardized Score (Calc for Raw Score >=11) 40.22   AM-PAC Applied Cognition Inpatient   Following a Speech/Presentation 3   Understanding Ordinary Conversation 3   Taking Medications 2   Remembering Where Things Are Placed or Put Away 2   Remembering List of 4-5 Errands 3   Taking Care of Complicated Tasks 2   Applied Cognition Raw Score 15   Applied Cognition Standardized Score 33.54   Additional Treatment Session   Start Time 0925   End Time 0935   Treatment Assessment Pt seen for additional skilled OT treatment to address LB dressing tasks. Pt with limited reach to LLE, able to complete tasks on RLE without difficulty. Pt provided with visual and verbal demonstration of use of reacher + sock aide. Pt able to replicate with overall min A, demonstrating improved performance with LB dressing. Able to don socks and pants with increased time. A for managing L sock on sock aide. Able to complete R sock without sock aide. Pt verbalizing that she would like new equipment for home, provided with " equipment at end of session.   Additional Treatment Day 1   End of Consult   Patient Position at End of Consult Bedside chair;Bed/Chair alarm activated;All needs within reach     GOALS:      -Patient will perform grooming tasks standing at sink with overall Mod I in order to increase overall independence    -Patient will be Mod I with UB dressing using AE and AD as needed in order to increase (I) with ADLs    -Patient will be Mod I with UB bathing using AE and AD as needed in order to increase (I) with ADLs    -Patient will be Mod I with LB dressing with use of AE and AD as needed in order to increase (I) with ADLs    -Patient will be Mod I with LB bathing with use of AE and AD as needed in order to increase (I) with ADLs    -Patient will complete toileting w/ Mod I w/ G hygiene/thoroughness in order to reduce caregiver burden    -Patient will demonstrate Mod I with bed mobility for ability to manage own comfort and initiate OOB tasks.     -Patient will perform functional transfers with Mod I to/from all surfaces using DME as needed in order to increase (I) with functional tasks    -Patient will be Mod I with functional mobility to/from bathroom for increased independence with toileting tasks    -Patient will tolerate therapeutic activities for greater than 30 min, in order to increase tolerance for functional activities.     -Patient will engage in ongoing cognitive assessment in order to assist with safe discharge planning/recommendations.    -Patient will demonstrate proper use of LH AE during 100% of tx sessions in order to increase ADL performance and safety following discharge      The patient's raw score on the -PAC Daily Activity Inpatient Short Form is 19. A raw score of greater than or equal to 19 suggests the patient may benefit from discharge to home. Please refer to the recommendation of the Occupational Therapist for safe discharge planning.    This session, pt required and most appropriately  benefited from skilled OT/PT co-eval due to significant regression from functional baseline and decreased activity tolerance. OT and PT goals were addressed separately as seen in documentation.     Viola Guerrero MS, OTR/L

## 2024-03-20 NOTE — ASSESSMENT & PLAN NOTE
Mechanical fall 4 days prior to presentation  Injuries listed below   Fall precautions  Geriatrics consult, appreciate recommendations  PT/OT evaluation and treatment as indicated   Metronidazole Counseling:  I discussed with the patient the risks of metronidazole including but not limited to seizures, nausea/vomiting, a metallic taste in the mouth, nausea/vomiting and severe allergy.

## 2024-03-20 NOTE — PROGRESS NOTES
Progress Note - Orthopedics   Kay Bright 83 y.o. female MRN: [id number]  Unit/Bed#: W -01      Subjective:    83 y.o.female seen and examined at bedside. Reports that she is feeling great. No pain. Eager to go home when able.     Labs:  0   Lab Value Date/Time    HCT 37.3 03/20/2024 0526    HCT 38.6 03/19/2024 0527    HCT 41.5 03/18/2024 1711    HGB 12.2 03/20/2024 0526    HGB 12.5 03/19/2024 0527    HGB 13.4 03/18/2024 1711    INR 1.04 03/18/2024 1711    WBC 7.44 03/20/2024 0526    WBC 4.77 03/19/2024 0527    WBC 6.71 03/18/2024 1711    ESR 5 05/06/2017 1157    CRP 76.6 (H) 02/29/2024 1209       Meds:    Current Facility-Administered Medications:     acetaminophen (TYLENOL) tablet 975 mg, 975 mg, Oral, Q8H DINO, Nolberto Meza PA-C, 975 mg at 03/20/24 0549    Artificial Tears ophthalmic solution 1 drop, 1 drop, Both Eyes, Q6H PRN, Nolberto Meza PA-C    cholecalciferol (VITAMIN D3) tablet 1,000 Units, 1,000 Units, Oral, QAM, Nolberto Meza PA-C, 1,000 Units at 03/20/24 1049    enoxaparin (LOVENOX) subcutaneous injection 30 mg, 30 mg, Subcutaneous, Q12H, Nolberto Meza PA-C, 30 mg at 03/20/24 0549    HYDROmorphone HCl (DILAUDID) injection 0.2 mg, 0.2 mg, Intravenous, Q2H PRN, Nolberto Meza PA-C    multi-electrolyte (PLASMALYTE-A/ISOLYTE-S PH 7.4) IV solution, 75 mL/hr, Intravenous, Continuous, Nolberto Meza PA-C, Stopped at 03/19/24 0950    multivitamin-minerals (CENTRUM) tablet 1 tablet, 1 tablet, Oral, QAM, Nolberto Meza PA-C, 1 tablet at 03/20/24 1049    oxyCODONE (ROXICODONE) split tablet 2.5 mg, 2.5 mg, Oral, Q4H PRN **OR** oxyCODONE (ROXICODONE) IR tablet 5 mg, 5 mg, Oral, Q4H PRN, Nolberto Meza PA-C    saccharomyces boulardii (FLORASTOR) capsule 250 mg, 250 mg, Oral, Daily, Nolberto Meza PA-C, 250 mg at 03/20/24 1050    senna-docusate sodium (SENOKOT S) 8.6-50 mg per tablet 1 tablet, 1 tablet, Oral, HS, Nolberto Meza PA-C, 1 tablet at 03/19/24 0162    Blood Culture:   Lab Results   Component Value Date     "BLOODCX No Growth After 5 Days. 12/02/2019       Wound Culture:   No results found for: \"WOUNDCULT\"    Ins and Outs:  I/O last 24 hours:  In: 800 [I.V.:800]  Out: -           Physical:  Vitals:    03/20/24 0700   BP: 137/77   Pulse: 71   Resp:    Temp: (!) 97.4 °F (36.3 °C)   SpO2: 97%     Musculoskeletal: left Lower Extremity  Surgical dressings c/d/I without strike through  Thigh and calf soft and compressible.   Sensation intact to saphenous, sural, tibial, superficial peroneal nerve, and deep peroneal  Motor intact to +FHL/EHL, +ankle plantar flexion, +left foot drop (chronic)  2+ DP pulse  Digits warm and well perfused  Capillary refill < 2 seconds    Assessment:    83 y.o.female s/p surgical fixation of left intertrochanteric femur fracture with intramedullary nail with Dr. Duggan 3/19/2024 (POD 1) .     Plan:  WBAT to the left lower extremity.   Will monitor for ABLA and administer IVF/prbc as indicated for Greater than 2 gram drop or Hgb < 7, defer to primary team.   PT/OT  Pain control  DVT ppx: lovenox while admitted, recommend aspirin 325mg BID upon discharge x 6 weeks.   Medical management per primary team.   Dispo: Ortho will follow  Patient will need follow up with Dr. Duggan upon discharge.     Jennifer Mcnally PA-C      "

## 2024-03-20 NOTE — PLAN OF CARE
Problem: PAIN - ADULT  Goal: Verbalizes/displays adequate comfort level or baseline comfort level  Description: Interventions:  - Encourage patient to monitor pain and request assistance  - Assess pain using appropriate pain scale  - Administer analgesics based on type and severity of pain and evaluate response  - Implement non-pharmacological measures as appropriate and evaluate response  - Consider cultural and social influences on pain and pain management  - Notify physician/advanced practitioner if interventions unsuccessful or patient reports new pain  Outcome: Progressing     Problem: INFECTION - ADULT  Goal: Absence or prevention of progression during hospitalization  Description: INTERVENTIONS:  - Assess and monitor for signs and symptoms of infection  - Monitor lab/diagnostic results  - Monitor all insertion sites, i.e. indwelling lines, tubes, and drains  - Monitor endotracheal if appropriate and nasal secretions for changes in amount and color  - [place] appropriate cooling/warming therapies per order  - Administer medications as ordered  - Instruct and encourage patient and family to use good hand hygiene technique  - Identify and instruct in appropriate isolation precautions for identified infection/condition  Outcome: Progressing  Goal: Absence of fever/infection during neutropenic period  Description: INTERVENTIONS:  - Monitor WBC    Outcome: Progressing     Problem: SAFETY ADULT  Goal: Patient will remain free of falls  Description: INTERVENTIONS:  - Educate patient/family on patient safety including physical limitations  - Instruct patient to call for assistance with activity   - Consult OT/PT to assist with strengthening/mobility   - Keep Call bell within reach  - Keep bed low and locked with side rails adjusted as appropriate  - Keep care items and personal belongings within reach  - Initiate and maintain comfort rounds  - Make Fall Risk Sign visible to staff  - Offer Toileting every  Hours,  in advance of need  - Initiate/Maintain alarm  - Obtain necessary fall risk management equipment:   - Apply yellow socks and bracelet for high fall risk patients  - Consider moving patient to room near nurses station  Outcome: Progressing  Goal: Maintain or return to baseline ADL function  Description: INTERVENTIONS:  -  Assess patient's ability to carry out ADLs; assess patient's baseline for ADL function and identify physical deficits which impact ability to perform ADLs (bathing, care of mouth/teeth, toileting, grooming, dressing, etc.)  - Assess/evaluate cause of self-care deficits   - Assess range of motion  - Assess patient's mobility; develop plan if impaired  - Assess patient's need for assistive devices and provide as appropriate  - Encourage maximum independence but intervene and supervise when necessary  - Involve family in performance of ADLs  - Assess for home care needs following discharge   - Consider OT consult to assist with ADL evaluation and planning for discharge  - Provide patient education as appropriate  Outcome: Progressing  Goal: Maintains/Returns to pre admission functional level  Description: INTERVENTIONS:  - Perform AM-PAC 6 Click Basic Mobility/ Daily Activity assessment daily.  - Set and communicate daily mobility goal to care team and patient/family/caregiver.   - Collaborate with rehabilitation services on mobility goals if consulted  - Perform Range of Motion  times a day.  - Reposition patient every  hours.  - Dangle patient  times a day  - Stand patient  times a day  - Ambulate patient  times a day  - Out of bed to chair  times a day   - Out of bed for meals  times a day  - Out of bed for toileting  - Record patient progress and toleration of activity level   Outcome: Progressing     Problem: DISCHARGE PLANNING  Goal: Discharge to home or other facility with appropriate resources  Description: INTERVENTIONS:  - Identify barriers to discharge w/patient and caregiver  - Arrange for  needed discharge resources and transportation as appropriate  - Identify discharge learning needs (meds, wound care, etc.)  - Arrange for interpretive services to assist at discharge as needed  - Refer to Case Management Department for coordinating discharge planning if the patient needs post-hospital services based on physician/advanced practitioner order or complex needs related to functional status, cognitive ability, or social support system  Outcome: Progressing

## 2024-03-20 NOTE — PHYSICAL THERAPY NOTE
PHYSICAL THERAPY EVALUATION & ORTHOTIC FITTING  DATE: 03/20/24  TIME: 4205-8158    NAME:  Kay Bright  AGE:   83 y.o.  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    Spinal stenosis     Varicose veins of legs     Vitamin D deficiency     Wears glasses      Past Surgical History:   Procedure Laterality Date    CATARACT EXTRACTION      MN OPTX FEM SHFT FX W/INSJ IMED IMPLT W/WO SCREW Left 3/19/2024    Procedure: INSERTION NAIL IM FEMUR ANTEGRADE (TROCHANTERIC) and all associated procedures;  Surgeon: Poncho Duggan DO;  Location: [facility];  Service: Orthopedics    MN XCAPSL CTRC RMVL INSJ IO LENS PROSTH W/O ECP Right 5/14/2018    Procedure: EXTRACTION EXTRACAPSULAR CATARACT PHACO INTRAOCULAR LENS (IOL);  Surgeon: Rian Benson MD;  Location: [facility] MAIN OR;  Service: Ophthalmology    MN XCAPSL CTRC RMVL INSJ IO LENS PROSTH W/O ECP Left 7/10/2018    Procedure: EXTRACTION EXTRACAPSULAR CATARACT PHACO INTRAOCULAR LENS (IOL);  Surgeon: Rian Benson MD;  Location: [facility] MAIN OR;  Service: Ophthalmology    TUBAL LIGATION         Performed at least 2 patient identifiers during session: Name, Birthday, ID bracelet, and Epic photo     03/20/24 0902   PT Last  Visit   PT Visit Date 03/20/24   Note Type   Note type Orthotic Management/Training;Evaluation   Type of Brace   Brace Applied Multipodous Boot  (L ankle)   Additional Brace Ordered No   Patient Position When Brace Applied Seated   Bracing Recommendations None   Education   Education Provided Yes  (Pt fit for L multipodus boot while in seated position. Pt requires full physical assistance & 100% verbal instruction for donning/doffing brace at this time. Therapist educated pt on donning/doffing brace, wear schedule, and monitoring skin for irritation. Pt verbalizes good understanding. RN Irish wheatley aware.)   Pain Assessment   Pain Assessment Tool 0-10   Pain Score 3   Pain Location/Orientation Orientation: Left;Location: Hip   Pain Onset/Description Onset: Ongoing;Descriptor: Aching   Effect of Pain on Daily Activities limits gait mechanics and gait speed   Patient's Stated Pain Goal No pain   Hospital Pain Intervention(s) Repositioned;Ambulation/increased activity;Emotional support   Multiple Pain Sites No   Restrictions/Precautions   Weight Bearing Precautions Per Order Yes   LLE Weight Bearing Per Order WBAT  (s/p L femur IM nail by Dr. Duggan on 3/19/24)   Braces or Orthoses Other (Comment)  (L ankle multipodus boot while at rest, for L foot drop)   Other Precautions Chair Alarm;Bed Alarm;WBS;Fall Risk;Pain   Home Living   Type of Home House   Home Layout Two level;Stairs to enter with rails;Bed/bath upstairs  (4STE with HR, then 4 addt'l steps to 2nd floor bedroom/bathroom level (able to maintain single level living on this level, full flight of steps to basement/rec area)   Bathroom Shower/Tub Tub/shower unit   Bathroom Toilet Standard   Bathroom Equipment Other (Comment)  (uses towel bar for support when standing from toilet; reports recently contacting  about installing shower GBs)   Bathroom Accessibility Accessible   Home Equipment Cane;Reacher;Sock aid  (has standing frame sock aid for compression  "socks. can sleep in recliner if needed)   Prior Function   Level of [place] Independent with ADLs;Independent with functional mobility;Needs assistance with IADLS   Lives With Son   Receives Help From Family  (son works outside the home during daytime and is often away on the weekends; reports having many children and grandchildren local and able to help)   IADLs Independent with driving;Independent with meal prep;Independent with medication management   Falls in the last 6 months 1 to 4  (1 fall w/in 6 months, reports additional falls >4yrs ago)   Vocational Retired   Comments Pt reports that at baseline she ambulates household and short community distances with no AD, uses SPC for longer community distances. Pt reports over the last week after fall/injury, her son was providing assistance for lower body dressing, however at baseline she is fully independent.   General   Additional Pertinent History Pt is an 83 yr old female admitted 3/18/24 s/p fall down steps 4 days PTA, c/o L knee pain, resulting in L femur fracture. 3/19/24 pt underwent L femur IM nail by Dr. Duggan - WBAT LLE. L multipodus boot for L foot drop.   Family/Caregiver Present No   Cognition   Overall Cognitive Status WFL  (suspect higher level cognitive impairments, defer to OT for further assessment)   Arousal/Participation Cooperative   Attention Attends with cues to redirect   Orientation Level Oriented X4  (grossly oriented to date (+month and year))   Memory Decreased recall of recent events;Decreased recall of precautions   Following Commands Follows multistep commands with increased time or repetition   Subjective   Subjective \"I'm 83 years old but I don't want to go into a nursing home.\"   RUE Assessment   RUE Assessment WFL   LUE Assessment   LUE Assessment WFL   RLE Assessment   RLE Assessment WFL   LLE Assessment   LLE Assessment X  (grossly 3/5 to 3+/5 MMT throughout, s/p L femur IM nail 3/19/24)   Vision-Basic Assessment " "  Current Vision Wears glasses only for reading   Coordination   Movements are Fluid and Coordinated 0  (pt noted with mild tremors at rest and during ambulation, pt denies this at baseline, reports just being nervous re: functional mobility)   Sensation WFL   Light Touch   RLE Light Touch Grossly intact   LLE Light Touch Grossly intact   Proprioception   RLE Proprioception Grossly intact   LLE Proprioception Grossly Intact   Bed Mobility   Supine to Sit 5  Supervision   Additional items Assist x 1;HOB elevated;Bedrails;Increased time required   Sit to Supine   (NT as pt was left seated OOB in recliner chair with alarm engaged at end of session)   Transfers   Sit to Stand 5  Supervision   Additional items Assist x 1;Increased time required;Verbal cues   Stand to Sit 5  Supervision   Additional items Assist x 1;Armrests;Increased time required;Verbal cues   Stand pivot 5  Supervision   Additional items Assist x 1;Verbal cues;Increased time required  (RW)   Additional Comments Cues for hand placement and safe transfer technique, pt with good application and carry over t/o session. Use of RW for all transfers.   Ambulation/Elevation   Gait pattern Antalgic;Narrow TRELL;Decreased foot clearance;Short stride   Gait Assistance 5  Supervision   Additional items Assist x 1;Verbal cues;Tactile cues   Assistive Device Rolling walker   Distance 80ft x1  (pt declines further ambulation on this date stating \"i don't want to over do it day 1\")   Stair Management Assistance Not tested  (will f/u next day for stair training)   Balance   Static Sitting Fair +   Dynamic Sitting Fair   Static Standing Fair  (w/ RW)   Dynamic Standing Fair  (w/ RW)   Ambulatory Fair  (w/ RW)   Endurance Deficit   Endurance Deficit Yes   Activity Tolerance   Activity Tolerance Patient tolerated treatment well;Patient limited by pain   Medical Staff Made Aware Spoke with CM, OT, RN   Assessment   Prognosis Good   Problem List Decreased strength;Decreased " "range of motion;Decreased endurance;Impaired balance;Decreased mobility;Decreased skin integrity;Orthopedic restrictions;Pain;Decreased safety awareness;Impaired judgement   Assessment Pt seen for PT evaluation for mobility assessment & discharge needs. Pt is an 83 yr old female admitted 3/18/24 s/p fall down steps 4 days PTA, c/o L knee pain, resulting in L femur fracture. 3/19/24 pt underwent L femur IM nail by Dr. Duggan - [facility]. Comorbidities affecting pt's fnxl performance include: L foot drop, shingles, arthritis, spinal stenosis, falls. During PT IE, pt completes bed mobility with S, transfers STS with S +RW, and ambulation of 80ft with RW and S (distance limited d/t pt report of \"not wanting to over do it day 1\". Pt displays above outlined functional impairments & limitations, and presents below her baseline level of functional mobility. The AM-PAC & Barthel Index outcome tools were used to assist in determining pt safety w/ mobility/self care & appropriate d/c recommendations, see above for scores. Pt is at risk of falls d/t multiple comorbidities, h/o falls, impaired balance, use of ambulatory aid, varying levels of pain , advanced age, acuity of medical illness, ongoing medical treatment of primary dx, and polypharmacy. Pt's clinical presentation is currently unstable/unpredictable as seen in pt's presentation of changing level of pain, increased fall risk, new onset of impairment of functional mobility, decreased endurance, and new onset of weakness. Pt will benefit from continued PT services in order to address impairments, decrease risk of falls, maximize independence w/ fnxl mobility, & ensure safety w/ mobility for transition to next level of care. Based on pt presentation & impairments, pt would most appropriately benefit from Level III (minimal PT intensity) resources upon d/c.   Barriers to Discharge Decreased caregiver support   Goals   Patient Goals \"to not go to a nursing home.\"   STG " "Expiration Date 04/03/24   Short Term Goal #1 Patient PT goals established in order to address pt self reported goal of \"to stay independent\". Pt will: complete all bed mobility at [place] level in order to promote increased OOB functional mobility and simulate home environment; complete all transfers with RW at [place] level in order to increase safety with functional mobility; ambulate >150ft with RW and S in order to increase safety with household and short community distance functional mobility; negotiate 3-5 stairs with HR assist and S in order to facilitate safe access to all areas of her home; improve L LE strength to >/= 4-/5 MMT t/o in order to increase safety with functional mobility and decrease risk of falls; demonstrate understanding and independence with LE strengthening HEP; improve ambulatory balance to >/= good grade with LRAD in order to promote safety and increased independence with mobility; improve AM-PAC score to >/= 21/24 in order to increase independence with mobility and decrease burden of care; improve Barthel Index score to >/= 65/100 in order to increase independence and decrease risk of falls.   PT Treatment Day 0   Plan   Treatment/Interventions Functional transfer training;LE strengthening/ROM;Elevations;Therapeutic exercise;Endurance training;Patient/family training;Equipment eval/education;Bed mobility;Gait training;Compensatory technique education;Spoke to nursing;Spoke to case management   PT Frequency 3-5x/wk   Discharge Recommendation   Rehab Resource Intensity Level, PT III (Minimum Resource Intensity)   Equipment Recommended Walker   Walker Package Recommended Wheeled walker   Change/add to Walker Package? No   AM-PAC Basic Mobility Inpatient   Turning in Flat Bed Without Bedrails 3   Lying on Back to Sitting on Edge of Flat Bed Without Bedrails 3   Moving Bed to Chair 3   Standing Up From Chair Using Arms 3   Walk in Room 3   Climb 3-5 Stairs With Railing 3   Basic Mobility " Inpatient Raw Score 18   Basic Mobility Standardized Score 41.05   [facility] Highest Level Of Mobility   -HLM Goal 6: Walk 10 steps or more   JH-HLM Achieved 7: Walk 25 feet or more   Modified Outing Scale   Modified Deo Scale 3   Barthel Index   Feeding 10   Bathing 0   Grooming Score 0   Dressing Score 5   Bladder Score 10   Bowels Score 10   Toilet Use Score 5   Transfers (Bed/Chair) Score 10   Mobility (Level Surface) Score 0   Stairs Score 5   Barthel Index Score 55   End of Consult   Patient Position at End of Consult Bedside chair;Bed/Chair alarm activated;All needs within reach     Based on patient's [facility] Highest Level of Mobility scores today, patient currently has a goal of -M Levels: 7: WALK 25 FEET OR MORE, to be completed with RN staffing each shift, in order to improve overall activity tolerance and mobility, combat hospital related deconditioning, and maximize outcomes for d/c from the acute care setting.     The patient's AM-PAC Basic Mobility Inpatient Short Form Raw Score is 18. A Raw score of greater than 16 suggests the patient may benefit from discharge to home. Please also refer to the recommendation of the Physical Therapist for safe discharge planning.    Angeline Tipton, PT, DPT   Available via Taskforce  NPI # [id number]  [place] License - [id number]  3/20/2024

## 2024-03-20 NOTE — PLAN OF CARE
Problem: OCCUPATIONAL THERAPY ADULT  Goal: Performs self-care activities at highest level of function for planned discharge setting.  See evaluation for individualized goals.  Description: Treatment Interventions: ADL retraining, Functional transfer training, Endurance training, Cognitive reorientation, Patient/family training, Equipment evaluation/education, Compensatory technique education, Energy conservation, Activityengagement  Equipment Recommended: Bedside commode, Shower/Tub chair with back ($), Hip Kit ($) (provided with hip kit at end of session)       See flowsheet documentation for full assessment, interventions and recommendations.   Note: Limitation: Decreased ADL status, Decreased UE strength, Decreased Safe judgement during ADL, Decreased cognition, Decreased endurance, Decreased self-care trans, Decreased high-level ADLs (impaired balance, fxnl mobility, act lorene, fxnl reach, trunk control, standing lorene, strength, fxnl sitting balance, fxnl sitting lorene, attention to task, direction following, safety awareness, insight, problem solving, learning new tasks, response time)  Prognosis: Good  Assessment: Pt is a 83 y.o. female seen for OT evaluation s/p admission to [facility] on 3/18/2024 due to fall down stairs. Diagnosed with Fracture of greater trochanter of left femur (HCC). Personal and env factors supporting pt at time of IE include (I) PLOF and supportive family . Personal and env factors inhibiting engagement in occupations include advanced age and HERBERT home + steps in home . Performance deficits that affect the pt’s occupational performance can be seen above. Due to pt's current functional limitations and medical complications pt is functioning below baseline. Pt would benefit from continued skilled OT treatment in order to maximize safety, independence and overall performance with ADLs, functional mobility, functional transfers, and cognition in order to achieve highest level of function.     Rehab  Resource Intensity Level, OT: III (Minimum Resource Intensity)

## 2024-03-21 ENCOUNTER — TRANSITIONAL CARE MANAGEMENT (OUTPATIENT)
Dept: FAMILY MEDICINE CLINIC | Facility: MEDICAL CENTER | Age: 84
End: 2024-03-21

## 2024-03-21 ENCOUNTER — HOME HEALTH ADMISSION (OUTPATIENT)
Dept: HOME HEALTH SERVICES | Facility: HOME HEALTHCARE | Age: 84
End: 2024-03-21
Payer: MEDICARE

## 2024-03-21 VITALS
BODY MASS INDEX: 24.95 KG/M2 | HEIGHT: 62 IN | DIASTOLIC BLOOD PRESSURE: 66 MMHG | SYSTOLIC BLOOD PRESSURE: 126 MMHG | HEART RATE: 75 BPM | RESPIRATION RATE: 20 BRPM | OXYGEN SATURATION: 98 % | TEMPERATURE: 97.8 F | WEIGHT: 135.58 LBS

## 2024-03-21 LAB
ANION GAP SERPL CALCULATED.3IONS-SCNC: 5 MMOL/L (ref 4–13)
BACTERIA UR CULT: NORMAL
BUN SERPL-MCNC: 9 MG/DL (ref 5–25)
CALCIUM SERPL-MCNC: 9 MG/DL (ref 8.4–10.2)
CHLORIDE SERPL-SCNC: 109 MMOL/L (ref 96–108)
CO2 SERPL-SCNC: 26 MMOL/L (ref 21–32)
CREAT SERPL-MCNC: 0.46 MG/DL (ref 0.6–1.3)
DME PARACHUTE DELIVERY DATE ACTUAL: NORMAL
DME PARACHUTE DELIVERY DATE REQUESTED: NORMAL
DME PARACHUTE ITEM DESCRIPTION: NORMAL
DME PARACHUTE ORDER STATUS: NORMAL
DME PARACHUTE SUPPLIER NAME: NORMAL
DME PARACHUTE SUPPLIER PHONE: NORMAL
ERYTHROCYTE [DISTWIDTH] IN BLOOD BY AUTOMATED COUNT: 12.7 % (ref 11.6–15.1)
GFR SERPL CREATININE-BSD FRML MDRD: 91 ML/MIN/1.73SQ M
GLUCOSE SERPL-MCNC: 98 MG/DL (ref 65–140)
HCT VFR BLD AUTO: 38.3 % (ref 34.8–46.1)
HGB BLD-MCNC: 12.4 G/DL (ref 11.5–15.4)
MCH RBC QN AUTO: 29.2 PG (ref 26.8–34.3)
MCHC RBC AUTO-ENTMCNC: 32.4 G/DL (ref 31.4–37.4)
MCV RBC AUTO: 90 FL (ref 82–98)
PLATELET # BLD AUTO: 230 THOUSANDS/UL (ref 149–390)
PMV BLD AUTO: 10.8 FL (ref 8.9–12.7)
POTASSIUM SERPL-SCNC: 3.8 MMOL/L (ref 3.5–5.3)
RBC # BLD AUTO: 4.25 MILLION/UL (ref 3.81–5.12)
SODIUM SERPL-SCNC: 140 MMOL/L (ref 135–147)
WBC # BLD AUTO: 5.46 THOUSAND/UL (ref 4.31–10.16)

## 2024-03-21 PROCEDURE — 85027 COMPLETE CBC AUTOMATED: CPT

## 2024-03-21 PROCEDURE — 80048 BASIC METABOLIC PNL TOTAL CA: CPT

## 2024-03-21 PROCEDURE — 99238 HOSP IP/OBS DSCHRG MGMT 30/<: CPT | Performed by: EMERGENCY MEDICINE

## 2024-03-21 PROCEDURE — 97116 GAIT TRAINING THERAPY: CPT

## 2024-03-21 PROCEDURE — 99232 SBSQ HOSP IP/OBS MODERATE 35: CPT | Performed by: INTERNAL MEDICINE

## 2024-03-21 PROCEDURE — 99024 POSTOP FOLLOW-UP VISIT: CPT | Performed by: PHYSICIAN ASSISTANT

## 2024-03-21 PROCEDURE — 97530 THERAPEUTIC ACTIVITIES: CPT

## 2024-03-21 RX ORDER — ACETAMINOPHEN 325 MG/1
975 TABLET ORAL EVERY 8 HOURS SCHEDULED
Start: 2024-03-21

## 2024-03-21 RX ORDER — OXYCODONE HYDROCHLORIDE 5 MG/1
2.5 TABLET ORAL EVERY 4 HOURS PRN
Qty: 10 TABLET | Refills: 0 | Status: SHIPPED | OUTPATIENT
Start: 2024-03-21 | End: 2024-03-31

## 2024-03-21 RX ORDER — AMOXICILLIN 250 MG
1 CAPSULE ORAL
Start: 2024-03-21

## 2024-03-21 RX ORDER — ASPIRIN 325 MG
325 TABLET ORAL 2 TIMES DAILY
Start: 2024-03-21 | End: 2024-05-02

## 2024-03-21 RX ADMIN — ENOXAPARIN SODIUM 30 MG: 30 INJECTION SUBCUTANEOUS at 06:05

## 2024-03-21 RX ADMIN — Medication 1000 UNITS: at 09:14

## 2024-03-21 RX ADMIN — ACETAMINOPHEN 975 MG: 325 TABLET, FILM COATED ORAL at 06:04

## 2024-03-21 RX ADMIN — Medication 250 MG: at 09:14

## 2024-03-21 RX ADMIN — MULTIPLE VITAMINS W/ MINERALS TAB 1 TABLET: TAB ORAL at 09:14

## 2024-03-21 RX ADMIN — ACETAMINOPHEN 975 MG: 325 TABLET, FILM COATED ORAL at 14:45

## 2024-03-21 NOTE — PLAN OF CARE
Problem: PAIN - ADULT  Goal: Verbalizes/displays adequate comfort level or baseline comfort level  Description: Interventions:  - Encourage patient to monitor pain and request assistance  - Assess pain using appropriate pain scale  - Administer analgesics based on type and severity of pain and evaluate response  - Implement non-pharmacological measures as appropriate and evaluate response  - Consider cultural and social influences on pain and pain management  - Notify physician/advanced practitioner if interventions unsuccessful or patient reports new pain  Outcome: Progressing     Problem: INFECTION - ADULT  Goal: Absence or prevention of progression during hospitalization  Description: INTERVENTIONS:  - Assess and monitor for signs and symptoms of infection  - Monitor lab/diagnostic results  - Monitor all insertion sites, i.e. indwelling lines, tubes, and drains  - Monitor endotracheal if appropriate and nasal secretions for changes in amount and color  - [place] appropriate cooling/warming therapies per order  - Administer medications as ordered  - Instruct and encourage patient and family to use good hand hygiene technique  - Identify and instruct in appropriate isolation precautions for identified infection/condition  Outcome: Progressing  Goal: Absence of fever/infection during neutropenic period  Description: INTERVENTIONS:  - Monitor WBC    Outcome: Progressing     Problem: SAFETY ADULT  Goal: Patient will remain free of falls  Description: INTERVENTIONS:  - Educate patient/family on patient safety including physical limitations  - Instruct patient to call for assistance with activity   - Consult OT/PT to assist with strengthening/mobility   - Keep Call bell within reach  - Keep bed low and locked with side rails adjusted as appropriate  - Keep care items and personal belongings within reach  - Initiate and maintain comfort rounds  - Make Fall Risk Sign visible to staff  - Offer Toileting every  Hours,  in advance of need  - Initiate/Maintain alarm  - Obtain necessary fall risk management equipment:   - Apply yellow socks and bracelet for high fall risk patients  - Consider moving patient to room near nurses station  Outcome: Progressing  Goal: Maintain or return to baseline ADL function  Description: INTERVENTIONS:  -  Assess patient's ability to carry out ADLs; assess patient's baseline for ADL function and identify physical deficits which impact ability to perform ADLs (bathing, care of mouth/teeth, toileting, grooming, dressing, etc.)  - Assess/evaluate cause of self-care deficits   - Assess range of motion  - Assess patient's mobility; develop plan if impaired  - Assess patient's need for assistive devices and provide as appropriate  - Encourage maximum independence but intervene and supervise when necessary  - Involve family in performance of ADLs  - Assess for home care needs following discharge   - Consider OT consult to assist with ADL evaluation and planning for discharge  - Provide patient education as appropriate  Outcome: Progressing  Goal: Maintains/Returns to pre admission functional level  Description: INTERVENTIONS:  - Perform AM-PAC 6 Click Basic Mobility/ Daily Activity assessment daily.  - Set and communicate daily mobility goal to care team and patient/family/caregiver.   - Collaborate with rehabilitation services on mobility goals if consulted  - Perform Range of Motion times a day.  - Reposition patient every  hours.  - Dangle patient  times a day  - Stand patient  times a day  - Ambulate patient times a day  - Out of bed to chair times a day   - Out of bed for meals times a day  - Out of bed for toileting  - Record patient progress and toleration of activity level   Outcome: Progressing     Problem: DISCHARGE PLANNING  Goal: Discharge to home or other facility with appropriate resources  Description: INTERVENTIONS:  - Identify barriers to discharge w/patient and caregiver  - Arrange for  needed discharge resources and transportation as appropriate  - Identify discharge learning needs (meds, wound care, etc.)  - Arrange for interpretive services to assist at discharge as needed  - Refer to Case Management Department for coordinating discharge planning if the patient needs post-hospital services based on physician/advanced practitioner order or complex needs related to functional status, cognitive ability, or social support system  Outcome: Progressing     Problem: Knowledge Deficit  Goal: Patient/family/caregiver demonstrates understanding of disease process, treatment plan, medications, and discharge instructions  Description: Complete learning assessment and assess knowledge base.  Interventions:  - Provide teaching at level of understanding  - Provide teaching via preferred learning methods  Outcome: Progressing     Problem: Nutrition/Hydration-ADULT  Goal: Nutrient/Hydration intake appropriate for improving, restoring or maintaining nutritional needs  Description: Monitor and assess patient's nutrition/hydration status for malnutrition. Collaborate with interdisciplinary team and initiate plan and interventions as ordered.  Monitor patient's weight and dietary intake as ordered or per policy. Utilize nutrition screening tool and intervene as necessary. Determine patient's food preferences and provide high-protein, high-caloric foods as appropriate.     INTERVENTIONS:  - Monitor oral intake, urinary output, labs, and treatment plans  - Assess nutrition and hydration status and recommend course of action  - Evaluate amount of meals eaten  - Assist patient with eating if necessary   - Allow adequate time for meals  - Recommend/ encourage appropriate diets, oral nutritional supplements, and vitamin/mineral supplements  - Order, calculate, and assess calorie counts as needed  - Recommend, monitor, and adjust tube feedings and TPN/PPN based on assessed needs  - Assess need for intravenous fluids  -  Provide specific nutrition/hydration education as appropriate  - Include patient/family/caregiver in decisions related to nutrition  Outcome: Progressing     Problem: MUSCULOSKELETAL - ADULT  Goal: Maintain or return mobility to safest level of function  Description: INTERVENTIONS:  - Assess patient's ability to carry out ADLs; assess patient's baseline for ADL function and identify physical deficits which impact ability to perform ADLs (bathing, care of mouth/teeth, toileting, grooming, dressing, etc.)  - Assess/evaluate cause of self-care deficits   - Assess range of motion  - Assess patient's mobility  - Assess patient's need for assistive devices and provide as appropriate  - Encourage maximum independence but intervene and supervise when necessary  - Involve family in performance of ADLs  - Assess for home care needs following discharge   - Consider OT consult to assist with ADL evaluation and planning for discharge  - Provide patient education as appropriate  Outcome: Progressing  Goal: Maintain proper alignment of affected body part  Description: INTERVENTIONS:  - Support, maintain and protect limb and body alignment  - Provide patient/ family with appropriate education  Outcome: Progressing     Problem: Prexisting or High Potential for Compromised Skin Integrity  Goal: Skin integrity is maintained or improved  Description: INTERVENTIONS:  - Identify patients at risk for skin breakdown  - Assess and monitor skin integrity  - Assess and monitor nutrition and hydration status  - Monitor labs   - Assess for incontinence   - Turn and reposition patient  - Assist with mobility/ambulation  - Relieve pressure over bony prominences  - Avoid friction and shearing  - Provide appropriate hygiene as needed including keeping skin clean and dry  - Evaluate need for skin moisturizer/barrier cream  - Collaborate with interdisciplinary team   - Patient/family teaching  - Consider wound care consult   Outcome: Progressing

## 2024-03-21 NOTE — PROGRESS NOTES
Progress Note - Geriatric Medicine   Kay MIGDALIA Kaila 83 y.o. female MRN: [id number]  Unit/Bed#: W -01 Encounter: [id number]      Assessment/Plan:    Ambulatory Dysfunction: status post mechanical fall with secondary femoral fracture.  Occurred while patient attempted to descend stairs backwards.  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  Does not utilize ambulatory device at baseline.     - Maintain fall precautions  -PT/OT assessment and intervention.  - Currently with multimodal pain regimen during hospitalization of scheduled tylenol 975 mg q8h + oxycodone 2.5 mg q4h prn (moderate pain) linked to oxycodone 5 mg q4h prn (Severe pain) + hydromorphone 0.2 mg q2h prn (breakthrough pain). She has not required prn narcotics over 24-hour follow-up interval.   - Continue tylenol prn upon discharge. Will also be receiving  mg bid for 6 weeks on discharge. Consider adding PPI for PUD ppx over this duration.     Fracture of greater Trochanter of Left Femur:   - Status-post ORIF to LLE with uncomplicated procedure.  - WBAT to LLE.  - Continue rehabilitation endeavors upon discharge as indicated.  - Pain regimen as above.  - VTE PPx, to resumed; patient to receive  mg po bid x 6 weeks on discharge. Consider PPI for PUD ppx.  - Monitor H&H; has been stable. Hemodynamics preserved.     At Risk for Delirium: Secondary to advanced age, trauma, pain and pain management and unfamiliar environment.     - Maintain adequate sleep-wake cycle.  Minimize ambient noise.  Maintain comfortable room temperature.  Avoid exposure to bluelight at night.  - Limit exposure to medications associated with altered mental status such as benzodiazepines, anticholinergics, tramadol and other hypnotic/sedative medications.  Judicious use of narcotics.    Geriatric Medicine Services will sign off at this time. Please do not hesitate to contact this  "service, should any questions or  concerns arise.    Subjective:   Patient interviewed seated by the bedside on the day of this encounter.  She reports no acute issues at this time.  She states that pain is well-controlled.  She is looking forward to leaving the hospital.  She denies any headaches, dizziness, chest pain, palpitation or shortness of breath.  No acute issues reported by nursing/ancillary staff over the follow-up interval.    Review of Systems   Constitutional:  Negative for chills and fever.   HENT:  Negative for trouble swallowing.    Eyes:  Negative for visual disturbance.   Respiratory:  Negative for shortness of breath.    Cardiovascular:  Negative for chest pain.   Gastrointestinal:  Negative for abdominal pain, constipation, diarrhea, nausea and vomiting.   Genitourinary:  Negative for difficulty urinating.   Musculoskeletal:  Positive for arthralgias. Negative for myalgias.   Skin:  Negative for color change.   Neurological:  Negative for dizziness and headaches.   Psychiatric/Behavioral:  Negative for agitation and behavioral problems.          Objective:     Vitals: Blood pressure 126/66, pulse 75, temperature 97.8 °F (36.6 °C), resp. rate 20, height 5' 1.5\" (1.562 m), weight 61.5 kg (135 lb 9.3 oz), SpO2 98%.,Body mass index is 25.2 kg/m².      Intake/Output Summary (Last 24 hours) at 3/21/2024 1233  Last data filed at 3/20/2024 1700  Gross per 24 hour   Intake 180 ml   Output --   Net 180 ml       Current Medications: Reviewed    Physical Exam:   Physical Exam  Constitutional:       Appearance: Normal appearance.   HENT:      Head: Normocephalic and atraumatic.      Mouth/Throat:      Mouth: Mucous membranes are moist.   Eyes:      Extraocular Movements: Extraocular movements intact.   Cardiovascular:      Rate and Rhythm: Normal rate and regular rhythm.   Pulmonary:      Breath sounds: Normal breath sounds.   Abdominal:      Palpations: Abdomen is soft.   Musculoskeletal:      Cervical " back: Neck supple.      Right lower leg: No edema.      Left lower leg: No edema.   Neurological:      Mental Status: She is alert and oriented to person, place, and time.   Psychiatric:         Mood and Affect: Mood normal.         Behavior: Behavior normal.          Invasive Devices       Peripheral Intravenous Line  Duration             Peripheral IV 03/18/24 Left Antecubital 2 days                    Lab, Imaging and other studies:    Lab Results:   I have personally reviewed pertinent lab results including the following:  -Serum chemistries.  CBC.    I have personally reviewed the following imaging study reports in PACS:    -  N/A    Fracisco Donohue MD  [facility]  Geriatric Medicine Fellow, PGY-4

## 2024-03-21 NOTE — CASE MANAGEMENT
Case Management Discharge Planning Note    Patient name Kay Bright  Location W /W -01 MRN [id number]  : 1940 Date 3/21/2024       Current Admission Date: 3/18/2024  Current Admission Diagnosis:Fracture of greater trochanter of left femur (HCC)   Patient Active Problem List    Diagnosis Date Noted    Fracture of greater trochanter of left femur (HCC) 2024    Fall 2024    History of diverticulitis 11/15/2023    Varicose veins of both lower extremities with inflammation 10/25/2023    White coat syndrome without diagnosis of hypertension 2023    Multiple thyroid nodules 2022    Hyperparathyroidism (HCC) 2020    Leukocytosis 2019    Vitamin D deficiency 2019    Hyperlipidemia 2019    Osteoporosis 2019    Stress fracture of calcaneus 2019    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    Aortic valve regurgitation 2018    Nodular thyroid disease 2018    Increased PTH level 2018    Left foot drop 2018    History of shingles 2018    Hypercalcemia 2017    Degenerative lumbar spinal stenosis 2017    Varicose veins of bilateral lower extremities with other complications 2017    Tendinitis of right rotator cuff 2017    Irregular heartbeat 2017    Premature atrial contraction 2017      LOS (days): 3  Geometric Mean LOS (GMLOS) (days): 2.8  Days to GMLOS:0     OBJECTIVE:  Risk of Unplanned Readmission Score: 6.1         Current admission status: Inpatient   Preferred Pharmacy:   [facility]/pharmacy #[id number] - [place] [address]  [place] [address]  Phone: [phone] Fax: [phone]    Primary Care Provider: Abelino Garcia DO    Primary Insurance: MEDICARE  Secondary Insurance: AARP    DISCHARGE DETAILS:    Discharge planning discussed with:: Patient  Freedom of Choice: Yes  Comments - Freedom of Choice: Patient chose [facility] for [facility] provider.  ALAN  contacted family/caregiver?: No- see comments  Were Treatment Team discharge recommendations reviewed with patient/caregiver?: Yes  Did patient/caregiver verbalize understanding of patient care needs?: Yes  Were patient/caregiver advised of the risks associated with not following Treatment Team discharge recommendations?: Yes    Requested Home Health Care         Is the patient interested in HHC at discharge?: Yes  Home Health Discipline requested:: Nursing, Occupational Therapy, Physical Therapy  Home Health Agency Name:: [facility]  Home Health Follow-Up Provider:: PCP  Home Health Services Needed:: Evaluate Functional Status and Safety, Gait/ADL Training, Strengthening/Theraputic Exercises to Improve Function, Post-Op Care and Assessment  Homebound Criteria Met:: Requires the Assistance of Another Person for Safe Ambulation or to Leave the Home, Uses an Assist Device (i.e. cane, walker, etc)  Supporting Clincal Findings:: Fatigues Easliy in Short Distances, Limited Endurance    DME Referral Provided  Referral made for DME?: Yes  DME referral completed for the following items:: Bedside [employer]  DME Supplier Name:: [email or web address] (delivered bedside. Receipt signed. Given to [employer] liasion.)    Other Referral/Resources/Interventions Provided:  Interventions: HHC  Referral Comments: HHC options reviewed with patient bedside. Patient choice made for [facility]. Agency reserved in [facility]. AVS updated.    Treatment Team Recommendation: Home with Home Health Care  Discharge Destination Plan:: Home with Home Health Care ([facility])  Transport at Discharge : Family  ETA of Transport (Date): 03/21/24  ETA of Transport (Time): 3450

## 2024-03-21 NOTE — ASSESSMENT & PLAN NOTE
- Left greater trochanter fracture secondary to fall - Present on admission  - Orthopedics consulted note appreciated  - s/p 3/19 IMN left femur  - WBAT LLE  - Multimodal pain regimen  - Continue to monitor neurovascular exam  - PT/OT evaluation and treatment as indicated  - Outpatient follow up with orthopedic surgery

## 2024-03-21 NOTE — PHYSICAL THERAPY NOTE
PHYSICAL THERAPY TREATMENT  03/21/24  Time: 0831-0933    Name: Kay Bright  Age: 83 y.o.  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    Spinal stenosis     Varicose veins of legs     Vitamin D deficiency     Wears glasses      Functionally relevant past surgical hx:   Past Surgical History:   Procedure Laterality Date    CATARACT EXTRACTION      GA OPTX FEM SHFT FX W/INSJ IMED IMPLT W/WO SCREW Left 3/19/2024    Procedure: INSERTION NAIL IM FEMUR ANTEGRADE (TROCHANTERIC) and all associated procedures;  Surgeon: Poncho Duggan DO;  Location: [facility];  Service: Orthopedics    GA XCAPSL CTRC RMVL INSJ IO LENS PROSTH W/O ECP Right 5/14/2018    Procedure: EXTRACTION EXTRACAPSULAR CATARACT PHACO INTRAOCULAR LENS (IOL);  Surgeon: Rian Benson MD;  Location: [facility] MAIN OR;  Service: Ophthalmology    GA XCAPSL CTRC RMVL INSJ IO LENS PROSTH W/O ECP Left 7/10/2018    Procedure: EXTRACTION EXTRACAPSULAR CATARACT PHACO INTRAOCULAR LENS (IOL);  Surgeon: Rian Benson MD;  Location: [facility] MAIN OR;  Service: Ophthalmology    TUBAL LIGATION         Performed at least 2 patient identifiers during session: Name and Epic photo     03/21/24 0831   PT Last Visit   PT Visit Date 03/21/24   Note Type   Note Type Treatment   Pain Assessment   Pain Assessment Tool 0-10   Pain Score 2   Pain  "Location/Orientation Orientation: Left;Location: Knee   Effect of Pain on Daily Activities dec activity lorene and limits gait   Patient's Stated Pain Goal No pain   Hospital Pain Intervention(s) Medication (See MAR);Repositioned;Ambulation/increased activity;Emotional support;Rest   Restrictions/Precautions   Weight Bearing Precautions Per Order Yes   LLE Weight Bearing Per Order WBAT   Braces or Orthoses Other (Comment)  (L ankle multipodus boot while at rest, for L foot drop)   Other Precautions Cognitive;Chair Alarm;Bed Alarm;Multiple lines;Fall Risk;Pain   General   Chart Reviewed Yes   Additional Pertinent History Pt evaluated by PT yesterday, unable to assess stairs at that time. PT to assess safe stair negotiation today as pt has 4 HERBERT and additional steps within her home.   Amount of Missed Time (min) 10 Minutes   Missed Time Reason Wound care  (Ortho PA assessing L hip incision)   Response to Previous Treatment Patient with no complaints from previous session.   Family/Caregiver Present No   Cognition   Overall Cognitive Status WFL   Arousal/Participation Alert;Cooperative   Attention Attends with cues to redirect   Orientation Level Oriented X4   Memory Decreased recall of recent events;Decreased recall of precautions   Following Commands Follows multistep commands with increased time or repetition   Subjective   Subjective \"I try to stay up and active, they told me 10 minutes of activity followed by 30 minutes elevation\"   Bed Mobility   Additional Comments unable to assess, pt seated OOB in bedside recliner pre/post   Transfers   Sit to Stand 5  Supervision   Additional items Increased time required;Verbal cues;Armrests  (w/ RW, from recliner)   Stand to Sit 5  Supervision   Additional items Increased time required;Verbal cues;Armrests  (w/ RW to recliner)   Stand pivot 5  Supervision   Additional items Assist x 1;Increased time required;Verbal cues  (w/ RW)   Ambulation/Elevation   Gait pattern Improper " "Weight shift;Wide TRELL;Decreased foot clearance;Decreased L stance;Decreased heel strike;Step through pattern  (LLE externally rotated d/t L foot drop)   Gait Assistance 5  Supervision   Additional items Assist x 1;Verbal cues   Assistive Device Rolling walker   Distance 150'x2 w/ RW to/from staircase; 20'x1 w/ SPC and change in direction within room   Stair Management Assistance 5  Supervision   Additional items Assist x 1;Verbal cues;Increased time required   Stair Management Technique Step to pattern;Foreward;Nonreciprocal  (R HR to ascend, L HR to descend)   Number of Stairs 4   Balance   Static Sitting Good   Dynamic Sitting Fair +   Static Standing Fair   Dynamic Standing Fair -   Ambulatory Fair -   Endurance Deficit   Endurance Deficit Yes   Endurance Deficit Description dec activity lorene from baseline   Activity Tolerance   Activity Tolerance Patient tolerated treatment well;Patient limited by pain   Medical Staff Made Aware Yes, LPN Davina   Assessment   Prognosis Good   Problem List Decreased strength;Decreased range of motion;Decreased endurance;Impaired balance;Decreased mobility;Decreased skin integrity;Orthopedic restrictions;Pain;Decreased safety awareness;Impaired judgement   Assessment Pt agreeable to amb in halls w/ RW w/ the goal to assess stair negotiation and determine pt safety upon d/c to home. Pt reports recent fall that resulted in current admission occured when she was descending stairs while on the phone, states she is otherwise \"very careful\" on the steps and conscious of the L drop foot. Pt demonstrating inc amb lorene today, was able to navigate community distances w/ use of RW and (S), w/o rest breaks. Able to ascend/descend 4 steps w/ step-to pattern and u/l HR assist to simulate 4 HERBERT at home. Pt benefiting from VC of \"up with the good, down with the bad\"; PT emphasized importance of proper L foot proprioceptive awareness and placement as well as firm UE support on HR before proceeding " "to dec future fall risk, pt agreeable and understanding. Upon return to room pt agreeable to trial SPC as this is pt's preferred AD in the community, pt able to complete 20' within the room w/ CGA, but pt advised to call dtr to bring RW to temporarily use upon return to home until cleared by HHPT to protect recent LLE surgery.   Barriers to Discharge None   Barriers to Discharge Comments Pt living w/ son and has 2 daughters and grandchildren living locally to a   Goals   Patient Goals to go home   STG Expiration Date 04/03/24   Short Term Goal #1 Patient PT goals established in order to address pt self reported goal of \"to stay independent\". Pt will: complete all bed mobility at Flores level in order to promote increased OOB functional mobility and simulate home environment; complete all transfers with RW at Flores level in order to increase safety with functional mobility; improve L LE strength to >/= 4-/5 MMT t/o in order to increase safety with functional mobility and decrease risk of falls; demonstrate understanding and independence with LE strengthening HEP; improve ambulatory balance to >/= good grade with LRAD in order to promote safety and increased independence with mobility; improve AM-PAC score to >/= 21/24 in order to increase independence with mobility and decrease burden of care; improve Barthel Index score to >/= 65/100 in order to increase independence and decrease risk of falls.  (progressing, updated 03/21/24)   PT Treatment Day 2   Plan   Treatment/Interventions Functional transfer training;LE strengthening/ROM;Elevations;Therapeutic exercise;Endurance training;Cognitive reorientation;Patient/family training;Equipment eval/education;Bed mobility;Gait training;Compensatory technique education;Spoke to nursing;Spoke to case management   Progress Progressing toward goals   PT Frequency 3-5x/wk   Discharge Recommendation   Rehab Resource Intensity Level, PT III (Minimum Resource Intensity)   Equipment " Recommended Walker   AM-PAC Basic Mobility Inpatient   Turning in Flat Bed Without Bedrails 3   Lying on Back to Sitting on Edge of Flat Bed Without Bedrails 3   Moving Bed to Chair 3   Standing Up From Chair Using Arms 3   Walk in Room 3   Climb 3-5 Stairs With Railing 3   Basic Mobility Inpatient Raw Score 18   Basic Mobility Standardized Score 41.05   [facility] [place] Highest Level Of Mobility   -HLM Goal 6: Walk 10 steps or more   JH-HLM Achieved 8: Walk 250 feet ot more   Education   Education Provided Mobility training;Home exercise program   Patient Demonstrates acceptance/verbal understanding   End of Consult   Patient Position at End of Consult Bedside chair;Bed/Chair alarm activated;All needs within reach       The patient's AM-PAC Basic Mobility Inpatient Short Form Raw Score is 18. A Raw score of greater than 16 suggests the patient may benefit from discharge to home. Please also refer to the recommendation of the Physical Therapist for safe discharge planning.        Jennifer Alex, PT, DPT  [place] License [id number]

## 2024-03-21 NOTE — DISCHARGE SUMMARY
[facility]  Discharge- Kay Bright 1940, 83 y.o. female MRN: [id number]  Unit/Bed#: [facility] 409-01 Encounter: [id number]  Primary Care Provider: Abelino Garcia DO   Date and time admitted to hospital: 3/18/2024  2:43 PM    Fall  Assessment & Plan  - Mechanical fall 4 days prior to presentation  - Injuries as listed below   - Fall precautions  - Geriatrics consult, appreciate recommendations  - PT/OT evaluation and treatment as indicated    * Fracture of greater trochanter of left femur (HCC)  Assessment & Plan  - Left greater trochanter fracture secondary to fall - Present on admission  - Orthopedics consulted note appreciated  - s/p 3/19 IMN left femur  - WBAT LLE  - Multimodal pain regimen  - Continue to monitor neurovascular exam  - PT/OT evaluation and treatment as indicated  - Outpatient follow up with orthopedic surgery    Vitamin D deficiency  Assessment & Plan  - Continue home supplementation    Left foot drop  Assessment & Plan  - History of left foot drop  - Multi-Podus boot for support  - PT/OT        Gen: No acute distress resting comfortably in bed  Neuro: AAOx3, GCS 15, no focal neurodeficit  HEENT: PERRLA, EOMI, mucous membranes moist  Cards: RRR, S1, S2 without murmur rub or gallop  Pulm: Clear to auscultation bilaterally without wheezes rales or rhonchi  GI: Soft, nontender, nondistended  : Voiding independently  MSK: Left lower extremity DVNI compartments soft and compressible.   Skin: Warm, dry, surgical dressings intact without strike through        Medical Problems       Resolved Problems  Date Reviewed: 3/20/2024   None         Admission Date:   Admission Orders (From admission, onward)       Ordered        03/18/24 1732  Inpatient Admission  Once                            Admitting Diagnosis: Arthritis [M19.90]  Left hip pain [M25.552]  Left knee pain [M25.562]  Closed nondisplaced fracture of greater trochanter of left femur, initial encounter ([facility])  "[V54.115A]  Closed fracture of left hip, initial encounter ([facility]) [S72.002A]    HPI: From H&P by Antwan MERCADO: \"Kay Bright is a 83 y.o. female with history of a left-sided foot drop associated with a previous shingles exacerbation, presenting today for evaluation of left hip pain.  Patient and son who is at bedside have correlating story is that patient typically ambulates independently and is stable.  4 days ago, she was walking down her steps backwards while talking on the phone when she missed the bottom 2 steps falling onto her left hip.  No head strike or loss of consciousness.  Since the event patient has been able to get up and ambulate with assistance of a cane though ambulation has been limited due to pain.  Due to consistency of pain and ambulatory dysfunction that was not improving, family and patient agreed to have formal evaluation today.  Patient was worked up in the ED and found to have a left greater trochanteric fracture.  Patient notes some left hip pain with ranging.  She denies any significant hematoma or swelling associated with the pain.  She does report having her baseline left foot drop but denies any other neurologic symptoms.  No other complaints offered. \"    Procedures Performed:   Orders Placed This Encounter   Procedures    ED ECG Documentation Only       Summary of Hospital Course: Patient is an 84 yo female who presented after a fall while walking down the stairs. She had persistent pain in the left hip and was found to have left peritrochanteric femur fracture. She was seen by orthopedic surgery and offered both operative and non operative management. She opted for operative treatment and on 3/19 she underwent IMN of the left femur. Post operatively her pain was well controlled and she was able to ambulate with PT. She was medically stable for discharge to home with home health care.     Significant Findings, Care, Treatment and Services Provided: Left peritrochanteric " femur fracture s/p IMN     Complications: none    Condition at Discharge: good         Discharge instructions/Information to patient and family:   See after visit summary for information provided to patient and family.      Provisions for Follow-Up Care:  See after visit summary for information related to follow-up care and any pertinent home health orders.      PCP: Abelino Garcia DO    Disposition: Home    Planned Readmission: No    Discharge Statement   I spent 25 minutes discharging the patient. This time was spent on the day of discharge. I had direct contact with the patient on the day of discharge. Additional documentation is required if more than 30 minutes were spent on discharge.     Discharge Medications:  See after visit summary for reconciled discharge medications provided to patient and family.

## 2024-03-21 NOTE — PLAN OF CARE
"  Problem: PHYSICAL THERAPY ADULT  Goal: Performs mobility at highest level of function for planned discharge setting.  See evaluation for individualized goals.  Description: Treatment/Interventions: Functional transfer training, LE strengthening/ROM, Elevations, Therapeutic exercise, Endurance training, Patient/family training, Equipment eval/education, Bed mobility, Gait training, Compensatory technique education, Spoke to nursing, Spoke to case management    Equipment Recommended: Walker     See flowsheet documentation for full assessment, interventions and recommendations.  Note: Prognosis: Good  Problem List: Decreased strength, Decreased range of motion, Decreased endurance, Impaired balance, Decreased mobility, Decreased skin integrity, Orthopedic restrictions, Pain, Decreased safety awareness, Impaired judgement  Assessment: Pt agreeable to amb in halls w/ RW w/ the goal to assess stair negotiation and determine pt safety upon d/c to home. Pt reports recent fall that resulted in current admission occured when she was descending stairs while on the phone, states she is otherwise \"very careful\" on the steps and conscious of the L drop foot. Pt demonstrating inc amb lorene today, was able to navigate community distances w/ use of RW and (S), w/o rest breaks. Able to ascend/descend 4 steps w/ step-to pattern and u/l HR assist to simulate 4 HERBERT at home. Pt benefiting from VC of \"up with the good, down with the bad\"; PT emphasized importance of proper L foot proprioceptive awareness and placement as well as firm UE support on HR before proceeding to dec future fall risk, pt agreeable and understanding. Upon return to room pt agreeable to trial SPC as this is pt's preferred AD in the community, pt able to complete 20' within the room w/ CGA, but pt advised to call dtr to bring RW to temporarily use upon return to home until cleared by HHPT to protect recent LLE surgery.  Barriers to Discharge: None  Barriers to " Discharge Comments: Pt living w/ son and has 2 daughters and grandchildren living locally to a  Rehab Resource Intensity Level, PT: III (Minimum Resource Intensity)    See flowsheet documentation for full assessment.

## 2024-03-21 NOTE — PROGRESS NOTES
"Progress Note - Orthopedics   Kay Bright 83 y.o. female MRN: [id number]  Unit/Bed#: W -01      Subjective:    83 y.o.female seen and examined at bedside. She is working with physical therapy this morning. No significant complaints. Asking to be discharged home.     Labs:  0   Lab Value Date/Time    HCT 38.3 03/21/2024 0518    HCT 37.3 03/20/2024 0526    HCT 38.6 03/19/2024 0527    HGB 12.4 03/21/2024 0518    HGB 12.2 03/20/2024 0526    HGB 12.5 03/19/2024 0527    INR 1.04 03/18/2024 1711    WBC 5.46 03/21/2024 0518    WBC 7.44 03/20/2024 0526    WBC 4.77 03/19/2024 0527    ESR 5 05/06/2017 1157    CRP 76.6 (H) 02/29/2024 1209       Meds:    Current Facility-Administered Medications:     acetaminophen (TYLENOL) tablet 975 mg, 975 mg, Oral, Q8H DINO, Nolberto Meza PA-C, 975 mg at 03/21/24 0604    Artificial Tears ophthalmic solution 1 drop, 1 drop, Both Eyes, Q6H PRN, Nolberto Meza PA-C    cholecalciferol (VITAMIN D3) tablet 1,000 Units, 1,000 Units, Oral, QAM, Nolberto Meza PA-C, 1,000 Units at 03/21/24 0914    enoxaparin (LOVENOX) subcutaneous injection 30 mg, 30 mg, Subcutaneous, Q12H, Nolberto Meza PA-C, 30 mg at 03/21/24 0605    HYDROmorphone HCl (DILAUDID) injection 0.2 mg, 0.2 mg, Intravenous, Q2H PRN, Nolberto Meza PA-C    multivitamin-minerals (CENTRUM) tablet 1 tablet, 1 tablet, Oral, QAM, Nolberto Meza PA-C, 1 tablet at 03/21/24 0914    oxyCODONE (ROXICODONE) split tablet 2.5 mg, 2.5 mg, Oral, Q4H PRN **OR** oxyCODONE (ROXICODONE) IR tablet 5 mg, 5 mg, Oral, Q4H PRN, Nolberto Meza PA-C    saccharomyces boulardii (FLORASTOR) capsule 250 mg, 250 mg, Oral, Daily, Nolberto Meza PA-C, 250 mg at 03/21/24 0914    senna-docusate sodium (SENOKOT S) 8.6-50 mg per tablet 1 tablet, 1 tablet, Oral, HS, Nolberto Meza PA-C, 1 tablet at 03/20/24 6736    Blood Culture:   Lab Results   Component Value Date    BLOODCX No Growth After 5 Days. 12/02/2019       Wound Culture:   No results found for: \"WOUNDCULT\"    Ins and " Outs:  I/O last 24 hours:  In: 180 [P.O.:180]  Out: -           Physical:  Vitals:    03/21/24 0717   BP: 138/85   Pulse: 76   Resp:    Temp: (!) 97.3 °F (36.3 °C)   SpO2: 95%     Musculoskeletal: left Lower Extremity  Surgical dressings c/d/I without strike through  Thigh and calf soft and compressible.   Sensation intact to saphenous, sural, tibial, superficial peroneal nerve, and deep peroneal  Motor intact to +FHL/EHL, +ankle plantar flexion, +left foot drop (chronic)  2+ DP pulse  Digits warm and well perfused  Capillary refill < 2 seconds    Assessment:    83 y.o.female s/p surgical fixation of left intertrochanteric femur fracture with intramedullary nail with Dr. Duggan 3/19/2024 (POD 2) .     Plan:  WBAT to the left lower extremity.   Will monitor for ABLA and administer IVF/prbc as indicated for Greater than 2 gram drop or Hgb < 7, defer to primary team.   PT/OT  Pain control  DVT ppx: lovenox while admitted, recommend aspirin 325mg BID upon discharge x 6 weeks.   Medical management per primary team.   Dispo: Ortho stable.  Patient will need follow up with Dr. Duggan upon discharge.     Jennifer Mcnally PA-C

## 2024-03-21 NOTE — CASE MANAGEMENT
Case Management Progress Note    Patient name Kay Bright  Location W /W -01 MRN [id number]  : 1940 Date 3/21/2024       LOS (days): 3  Geometric Mean LOS (GMLOS) (days): 2.8  Days to GMLOS:-0.1        OBJECTIVE:        Current admission status: Inpatient  Preferred Pharmacy:   [facility]/pharmacy #[id number] - [place] [address]  [place] [address]  Phone: [phone] Fax: [phone]    Primary Care Provider: Abelino Garcia DO    Primary Insurance: MEDICARE  Secondary Insurance: AARP    PROGRESS NOTE:  CM met with patient at bedside to get address for HHC (patient had PO box listed)- address provided to [facility].     Patient chose [facility] and DME delivered to bedside, by ALAN Douglas.     Call made to warren Donohue, notifying of above. Daughter aware HHC agency will be in touch within 24-48hrs to set up initial appointment.     No further CM needs anticipated at this time.

## 2024-03-21 NOTE — PLAN OF CARE
Problem: PAIN - ADULT  Goal: Verbalizes/displays adequate comfort level or baseline comfort level  Description: Interventions:  - Encourage patient to monitor pain and request assistance  - Assess pain using appropriate pain scale  - Administer analgesics based on type and severity of pain and evaluate response  - Implement non-pharmacological measures as appropriate and evaluate response  - Consider cultural and social influences on pain and pain management  - Notify physician/advanced practitioner if interventions unsuccessful or patient reports new pain  Outcome: Progressing     Problem: INFECTION - ADULT  Goal: Absence or prevention of progression during hospitalization  Description: INTERVENTIONS:  - Assess and monitor for signs and symptoms of infection  - Monitor lab/diagnostic results  - Monitor all insertion sites, i.e. indwelling lines, tubes, and drains  - Monitor endotracheal if appropriate and nasal secretions for changes in amount and color  - [place] appropriate cooling/warming therapies per order  - Administer medications as ordered  - Instruct and encourage patient and family to use good hand hygiene technique  - Identify and instruct in appropriate isolation precautions for identified infection/condition  Outcome: Progressing  Goal: Absence of fever/infection during neutropenic period  Description: INTERVENTIONS:  - Monitor WBC    Outcome: Progressing     Problem: SAFETY ADULT  Goal: Patient will remain free of falls  Description: INTERVENTIONS:  - Educate patient/family on patient safety including physical limitations  - Instruct patient to call for assistance with activity   - Consult OT/PT to assist with strengthening/mobility   - Keep Call bell within reach  - Keep bed low and locked with side rails adjusted as appropriate  - Keep care items and personal belongings within reach  - Initiate and maintain comfort rounds  - Make Fall Risk Sign visible to staff  - Offer Toileting every  Hours,  in advance of need  - Initiate/Maintain alarm  - Obtain necessary fall risk management equipment:   - Apply yellow socks and bracelet for high fall risk patients  - Consider moving patient to room near nurses station  Outcome: Progressing  Goal: Maintain or return to baseline ADL function  Description: INTERVENTIONS:  -  Assess patient's ability to carry out ADLs; assess patient's baseline for ADL function and identify physical deficits which impact ability to perform ADLs (bathing, care of mouth/teeth, toileting, grooming, dressing, etc.)  - Assess/evaluate cause of self-care deficits   - Assess range of motion  - Assess patient's mobility; develop plan if impaired  - Assess patient's need for assistive devices and provide as appropriate  - Encourage maximum independence but intervene and supervise when necessary  - Involve family in performance of ADLs  - Assess for home care needs following discharge   - Consider OT consult to assist with ADL evaluation and planning for discharge  - Provide patient education as appropriate  Outcome: Progressing  Goal: Maintains/Returns to pre admission functional level  Description: INTERVENTIONS:  - Perform AM-PAC 6 Click Basic Mobility/ Daily Activity assessment daily.  - Set and communicate daily mobility goal to care team and patient/family/caregiver.   - Collaborate with rehabilitation services on mobility goals if consulted  - Perform Range of Motion  times a day.  - Reposition patient every  hours.  - Dangle patient  times a day  - Stand patient  times a day  - Ambulate patient  times a day  - Out of bed to chair  times a day   - Out of bed for meals  times a day  - Out of bed for toileting  - Record patient progress and toleration of activity level   Outcome: Progressing     Problem: DISCHARGE PLANNING  Goal: Discharge to home or other facility with appropriate resources  Description: INTERVENTIONS:  - Identify barriers to discharge w/patient and caregiver  - Arrange for  needed discharge resources and transportation as appropriate  - Identify discharge learning needs (meds, wound care, etc.)  - Arrange for interpretive services to assist at discharge as needed  - Refer to Case Management Department for coordinating discharge planning if the patient needs post-hospital services based on physician/advanced practitioner order or complex needs related to functional status, cognitive ability, or social support system  Outcome: Progressing     Problem: Knowledge Deficit  Goal: Patient/family/caregiver demonstrates understanding of disease process, treatment plan, medications, and discharge instructions  Description: Complete learning assessment and assess knowledge base.  Interventions:  - Provide teaching at level of understanding  - Provide teaching via preferred learning methods  Outcome: Progressing     Problem: Nutrition/Hydration-ADULT  Goal: Nutrient/Hydration intake appropriate for improving, restoring or maintaining nutritional needs  Description: Monitor and assess patient's nutrition/hydration status for malnutrition. Collaborate with interdisciplinary team and initiate plan and interventions as ordered.  Monitor patient's weight and dietary intake as ordered or per policy. Utilize nutrition screening tool and intervene as necessary. Determine patient's food preferences and provide high-protein, high-caloric foods as appropriate.     INTERVENTIONS:  - Monitor oral intake, urinary output, labs, and treatment plans  - Assess nutrition and hydration status and recommend course of action  - Evaluate amount of meals eaten  - Assist patient with eating if necessary   - Allow adequate time for meals  - Recommend/ encourage appropriate diets, oral nutritional supplements, and vitamin/mineral supplements  - Order, calculate, and assess calorie counts as needed  - Recommend, monitor, and adjust tube feedings and TPN/PPN based on assessed needs  - Assess need for intravenous fluids  -  Provide specific nutrition/hydration education as appropriate  - Include patient/family/caregiver in decisions related to nutrition  Outcome: Progressing     Problem: MUSCULOSKELETAL - ADULT  Goal: Maintain or return mobility to safest level of function  Description: INTERVENTIONS:  - Assess patient's ability to carry out ADLs; assess patient's baseline for ADL function and identify physical deficits which impact ability to perform ADLs (bathing, care of mouth/teeth, toileting, grooming, dressing, etc.)  - Assess/evaluate cause of self-care deficits   - Assess range of motion  - Assess patient's mobility  - Assess patient's need for assistive devices and provide as appropriate  - Encourage maximum independence but intervene and supervise when necessary  - Involve family in performance of ADLs  - Assess for home care needs following discharge   - Consider OT consult to assist with ADL evaluation and planning for discharge  - Provide patient education as appropriate  Outcome: Progressing  Goal: Maintain proper alignment of affected body part  Description: INTERVENTIONS:  - Support, maintain and protect limb and body alignment  - Provide patient/ family with appropriate education  Outcome: Progressing     Problem: Prexisting or High Potential for Compromised Skin Integrity  Goal: Skin integrity is maintained or improved  Description: INTERVENTIONS:  - Identify patients at risk for skin breakdown  - Assess and monitor skin integrity  - Assess and monitor nutrition and hydration status  - Monitor labs   - Assess for incontinence   - Turn and reposition patient  - Assist with mobility/ambulation  - Relieve pressure over bony prominences  - Avoid friction and shearing  - Provide appropriate hygiene as needed including keeping skin clean and dry  - Evaluate need for skin moisturizer/barrier cream  - Collaborate with interdisciplinary team   - Patient/family teaching  - Consider wound care consult   Outcome: Progressing

## 2024-03-21 NOTE — ASSESSMENT & PLAN NOTE
- Mechanical fall 4 days prior to presentation  - Injuries as listed below   - Fall precautions  - Geriatrics consult, appreciate recommendations  - PT/OT evaluation and treatment as indicated

## 2024-03-23 ENCOUNTER — HOME CARE VISIT (OUTPATIENT)
Dept: HOME HEALTH SERVICES | Facility: HOME HEALTHCARE | Age: 84
End: 2024-03-23
Payer: MEDICARE

## 2024-03-23 VITALS
RESPIRATION RATE: 18 BRPM | SYSTOLIC BLOOD PRESSURE: 125 MMHG | OXYGEN SATURATION: 100 % | HEART RATE: 76 BPM | TEMPERATURE: 97.8 F | DIASTOLIC BLOOD PRESSURE: 75 MMHG

## 2024-03-23 PROCEDURE — 400013 VN SOC

## 2024-03-23 PROCEDURE — G0299 HHS/HOSPICE OF RN EA 15 MIN: HCPCS

## 2024-03-23 PROCEDURE — 10330081 VN NO-PAY CLAIM PROCEDURE

## 2024-03-24 NOTE — CASE COMMUNICATION
[facility] has admitted your patient to Home Health service with the following disciplines:      SN, PT and OT  This report is informational only, no response is needed.  Primary focus of home health care: musculoskeletal / s/p IMN left femur.  Patient stated goals of care:  get stronger and be able to ambulate safely.  Anticipated visit pattern and next visit date: 1w3, next visit 3/27/24.  See medication list - meds in home differ  from AVS:  Medication on MAR/EPIC but pt taking differently: acetaminophen (TYLENOL) 325 mg tablet, Take 3 tablets (975 mg total) by mouth every 8 (eight) hours - pt takes 500mg as needed for pain.    Medication on MAR/EPIC, but not at home/pt not taking: oxyCODONE (ROXICODONE) 5 immediate release tablet, senna-docusate sodium (SENOKOT S) 8.6-50 mg per tablet.    Significant clinical findings:  Please be advised, MAR/[facility] identified dr hong interactions for this patient based on current medication profile.  Potential barriers to goal achievement: limited endurance, impaired mobility, pain, left foot drop.      Thank you for allowing us to participate in the care of your patient.      Lurdes Rubio RN.

## 2024-03-25 ENCOUNTER — HOME CARE VISIT (OUTPATIENT)
Dept: HOME HEALTH SERVICES | Facility: HOME HEALTHCARE | Age: 84
End: 2024-03-25
Payer: MEDICARE

## 2024-03-25 PROCEDURE — G0151 HHCP-SERV OF PT,EA 15 MIN: HCPCS

## 2024-03-26 ENCOUNTER — HOME CARE VISIT (OUTPATIENT)
Dept: HOME HEALTH SERVICES | Facility: HOME HEALTHCARE | Age: 84
End: 2024-03-26
Payer: MEDICARE

## 2024-03-26 VITALS
OXYGEN SATURATION: 100 % | SYSTOLIC BLOOD PRESSURE: 140 MMHG | RESPIRATION RATE: 18 BRPM | TEMPERATURE: 97 F | DIASTOLIC BLOOD PRESSURE: 88 MMHG | HEART RATE: 60 BPM

## 2024-03-26 VITALS
SYSTOLIC BLOOD PRESSURE: 118 MMHG | RESPIRATION RATE: 18 BRPM | DIASTOLIC BLOOD PRESSURE: 72 MMHG | OXYGEN SATURATION: 98 % | HEART RATE: 74 BPM

## 2024-03-26 PROCEDURE — G0299 HHS/HOSPICE OF RN EA 15 MIN: HCPCS

## 2024-03-26 PROCEDURE — G0152 HHCP-SERV OF OT,EA 15 MIN: HCPCS

## 2024-03-28 ENCOUNTER — HOME CARE VISIT (OUTPATIENT)
Dept: HOME HEALTH SERVICES | Facility: HOME HEALTHCARE | Age: 84
End: 2024-03-28
Payer: MEDICARE

## 2024-03-28 VITALS
DIASTOLIC BLOOD PRESSURE: 72 MMHG | RESPIRATION RATE: 16 BRPM | HEART RATE: 73 BPM | SYSTOLIC BLOOD PRESSURE: 138 MMHG | OXYGEN SATURATION: 96 %

## 2024-03-28 VITALS — SYSTOLIC BLOOD PRESSURE: 110 MMHG | HEART RATE: 52 BPM | OXYGEN SATURATION: 97 % | DIASTOLIC BLOOD PRESSURE: 68 MMHG

## 2024-03-28 PROCEDURE — G0157 HHC PT ASSISTANT EA 15: HCPCS

## 2024-03-28 PROCEDURE — G0152 HHCP-SERV OF OT,EA 15 MIN: HCPCS

## 2024-04-01 ENCOUNTER — OFFICE VISIT (OUTPATIENT)
Dept: OBGYN CLINIC | Facility: CLINIC | Age: 84
End: 2024-04-01

## 2024-04-01 ENCOUNTER — APPOINTMENT (OUTPATIENT)
Dept: RADIOLOGY | Facility: AMBULARY SURGERY CENTER | Age: 84
End: 2024-04-01
Attending: STUDENT IN AN ORGANIZED HEALTH CARE EDUCATION/TRAINING PROGRAM
Payer: MEDICARE

## 2024-04-01 VITALS
HEART RATE: 101 BPM | WEIGHT: 135 LBS | BODY MASS INDEX: 24.84 KG/M2 | SYSTOLIC BLOOD PRESSURE: 162 MMHG | HEIGHT: 62 IN | DIASTOLIC BLOOD PRESSURE: 92 MMHG
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
## 2024-04-01 DIAGNOSIS — M25.562 LEFT KNEE PAIN: ICD-10-CM

## 2024-04-01 DIAGNOSIS — M25.552 LEFT HIP PAIN: ICD-10-CM

## 2024-04-01 DIAGNOSIS — M19.90 ARTHRITIS: ICD-10-CM

## 2024-04-01 DIAGNOSIS — Z98.890 STATUS POST HIP SURGERY: Primary | ICD-10-CM

## 2024-04-01 DIAGNOSIS — Z98.890 STATUS POST HIP SURGERY: ICD-10-CM

## 2024-04-01 PROCEDURE — 73502 X-RAY EXAM HIP UNI 2-3 VIEWS: CPT

## 2024-04-01 PROCEDURE — 99024 POSTOP FOLLOW-UP VISIT: CPT | Performed by: STUDENT IN AN ORGANIZED HEALTH CARE EDUCATION/TRAINING PROGRAM

## 2024-04-01 NOTE — PROGRESS NOTES
Orthopaedic Surgery - Office Note  Kay Bright (83 y.o. female)   : 1940   MRN: [id number]  Encounter Date: 2024    No chief complaint on file.    Past Surgical History:   Procedure Laterality Date    CATARACT EXTRACTION      FL OPTX FEM SHFT FX W/INSJ IMED IMPLT W/WO SCREW Left 3/19/2024    Procedure: INSERTION NAIL IM FEMUR ANTEGRADE (TROCHANTERIC) and all associated procedures;  Surgeon: Poncho Duggan DO;  Location: [facility];  Service: Orthopedics    FL XCAPSL CTRC RMVL INSJ IO LENS PROSTH W/O ECP Right 2018    Procedure: EXTRACTION EXTRACAPSULAR CATARACT PHACO INTRAOCULAR LENS (IOL);  Surgeon: Rian Benson MD;  Location: [facility] MAIN OR;  Service: Ophthalmology    FL XCAPSL CTRC RMVL INSJ IO LENS PROSTH W/O ECP Left 7/10/2018    Procedure: EXTRACTION EXTRACAPSULAR CATARACT PHACO INTRAOCULAR LENS (IOL);  Surgeon: Rian Benson MD;  Location: [facility] MAIN OR;  Service: Ophthalmology    TUBAL LIGATION       Assessment / Plan  1.  Status post left nondisplaced intertrochanteric femur fracture  2.  Status post surgical fixation of left intertrochanteric femur fracture with intramedullary nail, date of surgery 3/19/2024    X-rays reviewed and discussed with patient revealing maintained alignment of patient's previous left nondisplaced intertrochanteric femur fracture.  No signs of hardware failure or loosening.  No acute fracture or dislocation noted.  Pt to be weightbearing as tolerated to left lower extremity  ROM as tolerated to left lower extremity  Discussed with patient she may begin to shower at this time, instructed not to submerge or scrub incisions  Pt to continue physical therapy for strength and mobility training  Pt to continue at home analgesic regimen with Tylenol   Continue ASA 325mg BID for another 4 weeks  Pt to follow up in 6 weeks for repeat x-ray and re-evaluation      History of Present Illness  Kay Bright is a 83 y.o. female who presents 2 weeks status post  surgical fixation of left intertrochanteric femur fracture with intramedullary nail, date of surgery 3/19/2024. She presents today ambulating with a walker. She uses a cane at home when ambulating. She states overall she is doing well and denies any significant pain in the left lower extremity.  She endorses some soreness and discomfort with overuse but this is well controlled with Tylenol. She continues with PT and is happy with her progression. She denies any numbness or paresthesias.     Review of Systems  Pertinent items are noted in HPI.  All other systems were reviewed and are negative.    Physical Exam  There were no vitals taken for this visit.  Cons: Appears well.  No apparent distress.  Psych: Alert. Oriented x3.  Mood and affect normal.  Eyes: PERRLA, EOMI  Resp: Normal effort.  No audible wheezing or stridor.  CV: Palpable pulse.  No discernable arrhythmia.    Lymph:  No palpable cervical, axillary, or inguinal lymphadenopathy.  Skin: Warm.  No palpable masses.  No visible lesions.  Neuro: Normal muscle tone.  Normal and symmetric DTR's.     Left lower extremity  The left lower extremity was exposed and inspected. Surgical incisions clean dry intact without signs of erythema or dehiscence.  Staples removed and Steri-Strips placed.  Visible skin intact without erythema, ecchymosis, effusion or obvious osseous deformity.  No TTP frida-incisionally. Pt able to passively range hip from 0-120 degrees of flexion. No pain with internal or external rotation. Sensation intact to superficial peroneal, deep peroneal, sural, saphenous, plantar nerve distributions. Motor intact to extensor hallux longus, tibialis anterior, gastrocnemius muscles, extensor mechanism intact. Limb is well perfused. Brisk capillary refill in all 5 digits. Compartments soft and compressible.     Studies Reviewed  X-rays pelvis reveal: Maintained alignment of patient's previous left nondisplaced intertrochanteric femur fracture.  No signs of  "hardware failure or loosening.  No acute fracture or dislocation noted.     Procedures      Medical, Surgical, Family, and Social History  The patient's medical history, family history, and social history, were reviewed and updated as appropriate.    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    Spinal stenosis     Varicose veins of legs     Vitamin D deficiency     Wears glasses            Family History   Problem Relation Age of Onset    Other Mother         corneal transplants, Parathyroid disease    Osteoporosis Mother     Stroke Father     Hypertension Father     Heart disease Father         cardiac disorder    Osteoporosis Father     Other Father         Parathyroid disease    Asthma Sister     Hiatal hernia Sister     Other Brother         parathyroid-removed, Parathyroid disease    Heart disease Brother         \" maker\"       Social History     Occupational History    Not on file   Tobacco Use    Smoking status: Never    Smokeless tobacco: Never   Vaping Use    Vaping status: Never Used   Substance and Sexual Activity    Alcohol use: No    Drug use: No    Sexual activity: Not on file       Allergies   Allergen Reactions    Prednisone Swelling     Facial swelling, redness-no dyspnea         Current Outpatient Medications:     acetaminophen (TYLENOL) 325 mg tablet, Take 3 tablets (975 mg total) by mouth every 8 (eight) hours (Patient taking differently: Take 3 tablets by mouth every 8 (eight) hours. pt takes 500mg PRN  Indications: Around " the clock pain.), Disp: , Rfl:     aspirin 325 mg tablet, Take 1 tablet (325 mg total) by mouth 2 (two) times a day, Disp: , Rfl:     cholecalciferol (VITAMIN D3) 1,000 units tablet, Take 1,000 Units by mouth every morning, Disp: , Rfl:     Multiple Vitamins-Minerals (CENTRUM SILVER 50+WOMEN PO), Take by mouth every morning, Disp: , Rfl:     Probiotic Product (PROBIOTIC ADVANCED PO), Take by mouth, Disp: , Rfl:     senna-docusate sodium (SENOKOT S) 8.6-50 mg per tablet, Take 1 tablet by mouth daily at bedtime (Patient not taking: Reported on 3/23/2024), Disp: , Rfl:     sodium chloride (ALESIA 128) 2 % hypertonic ophthalmic solution, Administer 1 drop to both eyes 3 (three) times a day Alesia 128 gel at HS , Disp: , Rfl:       Nolberto Meza PA-C    Scribe Attestation      I,:   am acting as a scribe while in the presence of the attending physician.:       I,:   personally performed the services described in this documentation    as scribed in my presence.:

## 2024-04-02 ENCOUNTER — HOME CARE VISIT (OUTPATIENT)
Dept: HOME HEALTH SERVICES | Facility: HOME HEALTHCARE | Age: 84
End: 2024-04-02
Payer: MEDICARE

## 2024-04-02 VITALS
SYSTOLIC BLOOD PRESSURE: 129 MMHG | DIASTOLIC BLOOD PRESSURE: 76 MMHG | HEART RATE: 67 BPM | OXYGEN SATURATION: 98 % | RESPIRATION RATE: 16 BRPM

## 2024-04-02 VITALS — HEART RATE: 60 BPM | DIASTOLIC BLOOD PRESSURE: 68 MMHG | SYSTOLIC BLOOD PRESSURE: 110 MMHG | OXYGEN SATURATION: 98 %

## 2024-04-02 PROCEDURE — G0157 HHC PT ASSISTANT EA 15: HCPCS

## 2024-04-02 PROCEDURE — G0152 HHCP-SERV OF OT,EA 15 MIN: HCPCS

## 2024-04-03 ENCOUNTER — HOME CARE VISIT (OUTPATIENT)
Dept: HOME HEALTH SERVICES | Facility: HOME HEALTHCARE | Age: 84
End: 2024-04-03
Payer: MEDICARE

## 2024-04-03 VITALS
RESPIRATION RATE: 18 BRPM | TEMPERATURE: 97.1 F | OXYGEN SATURATION: 100 % | DIASTOLIC BLOOD PRESSURE: 78 MMHG | SYSTOLIC BLOOD PRESSURE: 138 MMHG | HEART RATE: 63 BPM

## 2024-04-03 PROCEDURE — G0299 HHS/HOSPICE OF RN EA 15 MIN: HCPCS

## 2024-04-04 ENCOUNTER — HOME CARE VISIT (OUTPATIENT)
Dept: HOME HEALTH SERVICES | Facility: HOME HEALTHCARE | Age: 84
End: 2024-04-04
Payer: MEDICARE

## 2024-04-04 VITALS — SYSTOLIC BLOOD PRESSURE: 138 MMHG | HEART RATE: 70 BPM | DIASTOLIC BLOOD PRESSURE: 76 MMHG | OXYGEN SATURATION: 99 %

## 2024-04-04 VITALS
DIASTOLIC BLOOD PRESSURE: 82 MMHG | HEART RATE: 83 BPM | SYSTOLIC BLOOD PRESSURE: 112 MMHG | RESPIRATION RATE: 16 BRPM | OXYGEN SATURATION: 98 %

## 2024-04-04 PROCEDURE — G0152 HHCP-SERV OF OT,EA 15 MIN: HCPCS

## 2024-04-04 PROCEDURE — G0157 HHC PT ASSISTANT EA 15: HCPCS

## 2024-04-10 ENCOUNTER — TELEPHONE (OUTPATIENT)
Age: 84
End: 2024-04-10

## 2024-04-10 ENCOUNTER — HOME CARE VISIT (OUTPATIENT)
Dept: HOME HEALTH SERVICES | Facility: HOME HEALTHCARE | Age: 84
End: 2024-04-10
Payer: MEDICARE

## 2024-04-10 DIAGNOSIS — S72.112D CLOSED DISPLACED FRACTURE OF GREATER TROCHANTER OF LEFT FEMUR WITH ROUTINE HEALING, SUBSEQUENT ENCOUNTER: Primary | ICD-10-CM

## 2024-04-10 PROCEDURE — G0151 HHCP-SERV OF PT,EA 15 MIN: HCPCS

## 2024-04-10 NOTE — TELEPHONE ENCOUNTER
Caller: Chioma from [facility]    Doctor: Dr. Duggan    Reason for call: Chioma calling stating that patient would like a referral for outpatient Physical Therapy faxed to number below.  Will call back with fax number.     Call back#: [phone]

## 2024-04-10 NOTE — TELEPHONE ENCOUNTER
Caller: Layla SOLIMAN     Doctor: Olga Lidia    Reason for call: Fax number for out patient [phone]

## 2024-04-15 ENCOUNTER — HOME CARE VISIT (OUTPATIENT)
Dept: HOME HEALTH SERVICES | Facility: HOME HEALTHCARE | Age: 84
End: 2024-04-15
Payer: MEDICARE

## 2024-04-15 PROCEDURE — G0151 HHCP-SERV OF PT,EA 15 MIN: HCPCS

## 2024-05-02 ENCOUNTER — OFFICE VISIT (OUTPATIENT)
Dept: VASCULAR SURGERY | Facility: CLINIC | Age: 84
End: 2024-05-02
Payer: MEDICARE

## 2024-05-02 VITALS
WEIGHT: 116.8 LBS | BODY MASS INDEX: 21.49 KG/M2 | HEIGHT: 62 IN | OXYGEN SATURATION: 96 % | DIASTOLIC BLOOD PRESSURE: 86 MMHG | HEART RATE: 61 BPM | SYSTOLIC BLOOD PRESSURE: 126 MMHG

## 2024-05-02 DIAGNOSIS — I83.893 VARICOSE VEINS OF BILATERAL LOWER EXTREMITIES WITH OTHER COMPLICATIONS: Primary | ICD-10-CM

## 2024-05-02 PROCEDURE — 99213 OFFICE O/P EST LOW 20 MIN: CPT | Performed by: NURSE PRACTITIONER

## 2024-05-02 NOTE — PROGRESS NOTES
Assessment/Plan:    Varicose veins of bilateral lower extremities with other complications  83-year-old female with aortic insufficiency, aortic root ectasia, HLD, hx shingles on the left lateral lower leg w/ drop foot, BLE varicose veins with swelling and venous stasis changes, L>R, LLE superficial phlebitis. Mechanical fall s/p L femur IM nail 3/19/24     Patient returns to the office to recheck legs      -Bilateral lower extremity varicosities with swelling and chronic venous stasis changes, left greater than right  -significant reduction in lower extremity swelling with light OTC compression. No significant pain or discomfort at left distal medial lower leg chronic venous stasis changes  -Continue light compression  -Periodic leg elevation and regular walking, continue PT  -Continue to moisturize skin daily with Eucerin  -Follow-up in the office as needed  -Notify the office with any new issues       Diagnoses and all orders for this visit:    Varicose veins of bilateral lower extremities with other complications      Subjective:      Patient ID: Kay Bright is a 83 y.o. female.    Patient presents to the office for a 3 month re-check of varicose veins.  Pt has been wearing compression and states that the swelling has improved. Pt has limited walking due to recent fracture of left hip.    HPI  83-year-old female with aortic insufficiency, aortic root ectasia, HLD, hx shingles on the left lateral lower leg w/ drop foot, BLE varicose veins with swelling and venous stasis changes, L>R, LLE superficial phlebitis. Mechanical fall s/p L femur IM nail 3/19/24. Patient returns to the office to recheck legs.  She is companied by her daughter.  She is using Dr. Batista's light compression.  She has a significant reduction in lower extremity swelling and discomfort.  She feels well and confident in using compression on her own.  She is using moisturizer daily.  She had a fall and left hip fracture underwent repair  "and is going through physical therapy.  She has excellent range of motion.  The following portions of the patient's history were reviewed and updated as appropriate: allergies, current medications, past family history, past medical history, past social history, past surgical history, and problem list.  ROS reviewed    Review of Systems   Constitutional: Negative.    HENT: Negative.     Eyes: Negative.    Respiratory: Negative.     Cardiovascular: Negative.    Gastrointestinal: Negative.    Endocrine: Negative.    Genitourinary: Negative.    Musculoskeletal:  Positive for arthralgias and gait problem.        Left hip fracture   Skin: Negative.    Allergic/Immunologic: Negative.    Neurological:  Negative for dizziness, weakness and numbness.   Hematological: Negative.    Psychiatric/Behavioral: Negative.           Objective:  I have reviewed and made appropriate changes to the review of systems input by the medical assistant.    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    Stress fracture of calcaneus    Osteoporosis    Hyperlipidemia    Vitamin D deficiency    Leukocytosis    Hyperparathyroidism (HCC)    Multiple thyroid nodules    White coat syndrome without diagnosis of hypertension    Varicose veins of both lower extremities with inflammation    History of diverticulitis    Fracture of greater trochanter of left femur (HCC)    " "Fall       Past Surgical History:   Procedure Laterality Date    CATARACT EXTRACTION      NE OPTX FEM SHFT FX W/INSJ IMED IMPLT W/WO SCREW Left 3/19/2024    Procedure: INSERTION NAIL IM FEMUR ANTEGRADE (TROCHANTERIC) and all associated procedures;  Surgeon: Poncho Duggan DO;  Location: [facility];  Service: Orthopedics    NE XCAPSL CTRC RMVL INSJ IO LENS PROSTH W/O ECP Right 5/14/2018    Procedure: EXTRACTION EXTRACAPSULAR CATARACT PHACO INTRAOCULAR LENS (IOL);  Surgeon: Rian Benson MD;  Location: [facility] MAIN OR;  Service: Ophthalmology    NE XCAPSL CTRC RMVL INSJ IO LENS PROSTH W/O ECP Left 7/10/2018    Procedure: EXTRACTION EXTRACAPSULAR CATARACT PHACO INTRAOCULAR LENS (IOL);  Surgeon: Rian Benson MD;  Location: [facility] MAIN OR;  Service: Ophthalmology    TUBAL LIGATION         Family History   Problem Relation Age of Onset    Other Mother         corneal transplants, Parathyroid disease    Osteoporosis Mother     Stroke Father     Hypertension Father     Heart disease Father         cardiac disorder    Osteoporosis Father     Other Father         Parathyroid disease    Asthma Sister     Hiatal hernia Sister     Other Brother         parathyroid-removed, Parathyroid disease    Heart disease Brother         \" maker\"       Social History     Socioeconomic History    Marital status:      Spouse name: Not on file    Number of children: Not on file    Years of education: Not on file    Highest education level: Not on file   Occupational History    Not on file   Tobacco Use    Smoking status: Never    Smokeless tobacco: Never   Vaping Use    Vaping status: Never Used   Substance and Sexual Activity    Alcohol use: No    Drug use: No    Sexual activity: Not on file   Other Topics Concern    Not on file   Social History Narrative    Caffeine use, active     Social Determinants of Health     Financial Resource Strain: Low Risk  (10/2/2023)    Overall Financial Resource Strain (CARDIA)     Difficulty of " Paying Living Expenses: Not hard at all   Food Insecurity: No Food Insecurity (3/20/2024)    Hunger Vital Sign     Worried About Running Out of Food in the Last Year: Never true     Ran Out of Food in the Last Year: Never true   Transportation Needs: No Transportation Needs (3/20/2024)    PRAPARE - Transportation     Lack of Transportation (Medical): No     Lack of Transportation (Non-Medical): No   Physical Activity: Not on file   Stress: Not on file   Social Connections: Not on file   Intimate Partner Violence: Not on file   Housing Stability: Low Risk  (3/20/2024)    Housing Stability Vital Sign     Unable to Pay for Housing in the Last Year: No     Number of Places Lived in the Last Year: 1     Unstable Housing in the Last Year: No       Allergies   Allergen Reactions    Prednisone Swelling     Facial swelling, redness-no dyspnea         Current Outpatient Medications:     acetaminophen (TYLENOL) 325 mg tablet, Take 3 tablets (975 mg total) by mouth every 8 (eight) hours (Patient taking differently: Take 975 mg by mouth every 8 (eight) hours pt takes 500mg PRN), Disp: , Rfl:     cholecalciferol (VITAMIN D3) 1,000 units tablet, Take 1,000 Units by mouth every morning, Disp: , Rfl:     Multiple Vitamins-Minerals (CENTRUM SILVER 50+WOMEN PO), Take by mouth every morning, Disp: , Rfl:     Probiotic Product (PROBIOTIC ADVANCED PO), Take by mouth, Disp: , Rfl:     sodium chloride (ALESIA 128) 2 % hypertonic ophthalmic solution, Administer 1 drop to both eyes 3 (three) times a day Alesia 128 gel at HS , Disp: , Rfl:     aspirin 325 mg tablet, Take 1 tablet (325 mg total) by mouth 2 (two) times a day (Patient not taking: Reported on 5/2/2024), Disp: , Rfl:     senna-docusate sodium (SENOKOT S) 8.6-50 mg per tablet, Take 1 tablet by mouth daily at bedtime (Patient not taking: Reported on 3/23/2024), Disp: , Rfl:     sodium chloride (Alesia 128) 5 % hypertonic ophthalmic ointment, Administer 1 drop to both eyes daily at  "bedtime. Indications: NA (Patient not taking: Reported on 5/2/2024), Disp: , Rfl:       /86 (BP Location: Right leg, Patient Position: Sitting, Cuff Size: Standard)   Pulse 61   Ht 5' 1.5\" (1.562 m)   Wt 53 kg (116 lb 12.8 oz)   SpO2 96%   BMI 21.71 kg/m²          Physical Exam  Vitals and nursing note reviewed. Exam conducted with a chaperone present.   Constitutional:       Appearance: Normal appearance.   HENT:      Head: Normocephalic and atraumatic.   Eyes:      Extraocular Movements: Extraocular movements intact.   Cardiovascular:      Heart sounds: Normal heart sounds.   Musculoskeletal:      Comments: Trace bilateral lower extremity swelling.  Chronic venous stasis changes, all left distal medial lower leg.  Chronic hyperpigmentation's.  No open ulcerations or weeping   Neurological:      General: No focal deficit present.      Mental Status: She is alert and oriented to person, place, and time.   Psychiatric:         Mood and Affect: Mood normal.         Behavior: Behavior normal.           "

## 2024-05-02 NOTE — ASSESSMENT & PLAN NOTE
83-year-old female with aortic insufficiency, aortic root ectasia, HLD, hx shingles on the left lateral lower leg w/ drop foot, BLE varicose veins with swelling and venous stasis changes, L>R, LLE superficial phlebitis. Mechanical fall s/p L femur IM nail 3/19/24     Patient returns to the office to recheck legs      -Bilateral lower extremity varicosities with swelling and chronic venous stasis changes, left greater than right  -significant reduction in lower extremity swelling with light OTC compression. No significant pain or discomfort at left distal medial lower leg chronic venous stasis changes  -Continue light compression  -Periodic leg elevation and regular walking, continue PT  -Continue to moisturize skin daily with Eucerin  -Follow-up in the office as needed  -Notify the office with any new issues

## 2024-05-13 ENCOUNTER — APPOINTMENT (OUTPATIENT)
Dept: RADIOLOGY | Facility: AMBULARY SURGERY CENTER | Age: 84
End: 2024-05-13
Attending: STUDENT IN AN ORGANIZED HEALTH CARE EDUCATION/TRAINING PROGRAM
Payer: MEDICARE

## 2024-05-13 ENCOUNTER — OFFICE VISIT (OUTPATIENT)
Dept: OBGYN CLINIC | Facility: CLINIC | Age: 84
End: 2024-05-13

## 2024-05-13 VITALS — WEIGHT: 116.8 LBS | BODY MASS INDEX: 21.71 KG/M2

## 2024-05-13 DIAGNOSIS — Z98.890 STATUS POST HIP SURGERY: Primary | ICD-10-CM

## 2024-05-13 DIAGNOSIS — Z98.890 STATUS POST HIP SURGERY: ICD-10-CM

## 2024-05-13 PROCEDURE — 99024 POSTOP FOLLOW-UP VISIT: CPT | Performed by: STUDENT IN AN ORGANIZED HEALTH CARE EDUCATION/TRAINING PROGRAM

## 2024-05-13 PROCEDURE — 73502 X-RAY EXAM HIP UNI 2-3 VIEWS: CPT

## 2024-05-13 NOTE — PROGRESS NOTES
Orthopaedic Surgery - Office Note  Kay Bright (83 y.o. female)   : 1940   MRN: [id number]  Encounter Date: 2024    Chief Complaint   Patient presents with    Left Hip - Post-op     Past Surgical History:   Procedure Laterality Date    CATARACT EXTRACTION      MO OPTX FEM SHFT FX W/INSJ IMED IMPLT W/WO SCREW Left 3/19/2024    Procedure: INSERTION NAIL IM FEMUR ANTEGRADE (TROCHANTERIC) and all associated procedures;  Surgeon: Poncho Duggan DO;  Location[facility];  Service: Orthopedics    MO XCAPSL CTRC RMVL INSJ IO LENS PROSTH W/O ECP Right 2018    Procedure: EXTRACTION EXTRACAPSULAR CATARACT PHACO INTRAOCULAR LENS (IOL);  Surgeon: Rian Benson MD;  Location: [facility] MAIN OR;  Service: Ophthalmology    MO XCAPSL CTRC RMVL INSJ IO LENS PROSTH W/O ECP Left 7/10/2018    Procedure: EXTRACTION EXTRACAPSULAR CATARACT PHACO INTRAOCULAR LENS (IOL);  Surgeon: Rian Benson MD;  Location: [facility] MAIN OR;  Service: Ophthalmology    TUBAL LIGATION       Assessment / Plan  1.  Status post left nondisplaced intertrochanteric femur fracture  2.  Status post surgical fixation of left intertrochanteric femur fracture with intramedullary nail, date of surgery 3/19/2024    X-rays reviewed and discussed with patient revealing maintained alignment of patient's previous left nondisplaced intertrochanteric femur fracture.  No signs of hardware failure or loosening.  No acute fracture or dislocation noted.  Pt to be weightbearing as tolerated to left lower extremity  ROM as tolerated to left lower extremity  Pt to continue physical therapy for strength and mobility training  Pt to continue at home analgesic regimen with Tylenol   Pt has completed her ASA 325mg for DVT ppx  Pt to follow up in 8 weeks for repeat x-ray and re-evaluation      History of Present Illness  Kay Bright is a 83 y.o. female who presents 2 weeks status post surgical fixation of left intertrochanteric femur fracture with  intramedullary nail, date of surgery 3/19/2024. She presents today ambulating with a walker. She uses a cane at home when ambulating. She states overall she is doing well and denies any significant pain in the left lower extremity.  She endorses some soreness and discomfort with overuse but this is well controlled with Tylenol. She continues with PT and is happy with her progression. She denies any numbness or paresthesias.     Interval history 5/13/2024  Patient presents approximately 8 weeks status post surgical fixation of left intertrochanteric femur fracture with intramedullary nail.  She presents today ambulating with a cane. She continues with PT and is happy with her progression.  She states overall she feels well and denies any significant pain in the left lower extremity.  She denies any numbness or paresthesias of the left lower extremity.    Review of Systems  Pertinent items are noted in HPI.  All other systems were reviewed and are negative.    Physical Exam  Wt 53 kg (116 lb 12.8 oz)   BMI 21.71 kg/m²   Cons: Appears well.  No apparent distress.  Psych: Alert. Oriented x3.  Mood and affect normal.  Eyes: PERRLA, EOMI  Resp: Normal effort.  No audible wheezing or stridor.  CV: Palpable pulse.  No discernable arrhythmia.    Lymph:  No palpable cervical, axillary, or inguinal lymphadenopathy.  Skin: Warm.  No palpable masses.  No visible lesions.  Neuro: Normal muscle tone.  Normal and symmetric DTR's.     Left lower extremity  The left lower extremity was exposed and inspected. Surgical incisions clean dry intact without signs of erythema or dehiscence.  Staples removed and Steri-Strips placed.  Visible skin intact without erythema, ecchymosis, effusion or obvious osseous deformity.  No TTP frida-incisionally. Pt able to passively range hip from 0-120 degrees of flexion. No pain with internal or external rotation. Sensation intact to superficial peroneal, deep peroneal, sural, saphenous, plantar nerve  "distributions. Motor intact to extensor hallux longus, tibialis anterior, gastrocnemius muscles, extensor mechanism intact. Limb is well perfused. Brisk capillary refill in all 5 digits. Compartments soft and compressible.     Studies Reviewed  X-rays pelvis reveal: Maintained alignment of patient's previous left nondisplaced intertrochanteric femur fracture.  No signs of hardware failure or loosening.  No acute fracture or dislocation noted.     Procedures      Medical, Surgical, Family, and Social History  The patient's medical history, family history, and social history, were reviewed and updated as appropriate.    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    Spinal stenosis     Varicose veins of legs     Vitamin D deficiency     Wears glasses            Family History   Problem Relation Age of Onset    Other Mother         corneal transplants, Parathyroid disease    Osteoporosis Mother     Stroke Father     Hypertension Father     Heart disease Father         cardiac disorder    Osteoporosis Father     Other Father         Parathyroid disease    Asthma Sister     Hiatal hernia Sister     Other Brother         parathyroid-removed, Parathyroid disease    Heart disease Brother         \" maker\"       Social History     Occupational History    Not on file   Tobacco Use    Smoking status: Never    Smokeless tobacco: Never   Vaping Use    Vaping status: Never Used   Substance and Sexual Activity    Alcohol use: No    Drug use: No    Sexual " activity: Not on file       Allergies   Allergen Reactions    Prednisone Swelling     Facial swelling, redness-no dyspnea         Current Outpatient Medications:     acetaminophen (TYLENOL) 325 mg tablet, Take 3 tablets (975 mg total) by mouth every 8 (eight) hours (Patient taking differently: Take 975 mg by mouth every 8 (eight) hours pt takes 500mg PRN), Disp: , Rfl:     cholecalciferol (VITAMIN D3) 1,000 units tablet, Take 1,000 Units by mouth every morning, Disp: , Rfl:     Multiple Vitamins-Minerals (CENTRUM SILVER 50+WOMEN PO), Take by mouth every morning, Disp: , Rfl:     Probiotic Product (PROBIOTIC ADVANCED PO), Take by mouth, Disp: , Rfl:     sodium chloride (ALESIA 128) 2 % hypertonic ophthalmic solution, Administer 1 drop to both eyes 3 (three) times a day Alesia 128 gel at HS , Disp: , Rfl:     aspirin 325 mg tablet, Take 1 tablet (325 mg total) by mouth 2 (two) times a day (Patient not taking: Reported on 5/2/2024), Disp: , Rfl:     senna-docusate sodium (SENOKOT S) 8.6-50 mg per tablet, Take 1 tablet by mouth daily at bedtime (Patient not taking: Reported on 3/23/2024), Disp: , Rfl:     sodium chloride (Alesia 128) 5 % hypertonic ophthalmic ointment, Administer 1 drop to both eyes daily at bedtime. Indications: NA (Patient not taking: Reported on 5/2/2024), Disp: , Rfl:       Nolberto Meza PA-C    Scribe Attestation      I,:   am acting as a scribe while in the presence of the attending physician.:       I,:   personally performed the services described in this documentation    as scribed in my presence.:

## 2024-06-24 ENCOUNTER — TELEPHONE (OUTPATIENT)
Age: 84
End: 2024-06-24

## 2024-06-24 ENCOUNTER — APPOINTMENT (EMERGENCY)
Dept: CT IMAGING | Facility: HOSPITAL | Age: 84
End: 2024-06-24
Payer: MEDICARE

## 2024-06-24 ENCOUNTER — HOSPITAL ENCOUNTER (EMERGENCY)
Facility: HOSPITAL | Age: 84
Discharge: HOME | End: 2024-06-24
Attending: EMERGENCY MEDICINE
Payer: MEDICARE

## 2024-06-24 VITALS
DIASTOLIC BLOOD PRESSURE: 81 MMHG | OXYGEN SATURATION: 96 % | RESPIRATION RATE: 18 BRPM | TEMPERATURE: 97.4 F | SYSTOLIC BLOOD PRESSURE: 143 MMHG | HEART RATE: 67 BPM

## 2024-06-24 DIAGNOSIS — R51.9 HEADACHE: Primary | ICD-10-CM

## 2024-06-24 DIAGNOSIS — L03.114 LEFT ARM CELLULITIS: ICD-10-CM

## 2024-06-24 DIAGNOSIS — R74.8 ELEVATED ALKALINE PHOSPHATASE LEVEL: ICD-10-CM

## 2024-06-24 DIAGNOSIS — E83.52 HYPERCALCEMIA: ICD-10-CM

## 2024-06-24 LAB
ALBUMIN SERPL BCG-MCNC: 3.9 G/DL (ref 3.5–5)
ALP SERPL-CCNC: 109 U/L (ref 34–104)
ALT SERPL W P-5'-P-CCNC: 15 U/L (ref 7–52)
ANION GAP SERPL CALCULATED.3IONS-SCNC: 5 MMOL/L (ref 4–13)
AST SERPL W P-5'-P-CCNC: 21 U/L (ref 13–39)
BASOPHILS # BLD AUTO: 0.03 THOUSANDS/ÂΜL (ref 0–0.1)
BASOPHILS NFR BLD AUTO: 1 % (ref 0–1)
BILIRUB SERPL-MCNC: 0.3 MG/DL (ref 0.2–1)
BUN SERPL-MCNC: 11 MG/DL (ref 5–25)
CALCIUM SERPL-MCNC: 10.3 MG/DL (ref 8.4–10.2)
CHLORIDE SERPL-SCNC: 106 MMOL/L (ref 96–108)
CO2 SERPL-SCNC: 27 MMOL/L (ref 21–32)
CREAT SERPL-MCNC: 0.53 MG/DL (ref 0.6–1.3)
EOSINOPHIL # BLD AUTO: 0.07 THOUSAND/ÂΜL (ref 0–0.61)
EOSINOPHIL NFR BLD AUTO: 1 % (ref 0–6)
ERYTHROCYTE [DISTWIDTH] IN BLOOD BY AUTOMATED COUNT: 12.8 % (ref 11.6–15.1)
GFR SERPL CREATININE-BSD FRML MDRD: 87 ML/MIN/1.73SQ M
GLUCOSE SERPL-MCNC: 104 MG/DL (ref 65–140)
HCT VFR BLD AUTO: 38.5 % (ref 34.8–46.1)
HGB BLD-MCNC: 12.6 G/DL (ref 11.5–15.4)
IMM GRANULOCYTES # BLD AUTO: 0.01 THOUSAND/UL (ref 0–0.2)
IMM GRANULOCYTES NFR BLD AUTO: 0 % (ref 0–2)
LYMPHOCYTES # BLD AUTO: 1.09 THOUSANDS/ÂΜL (ref 0.6–4.47)
LYMPHOCYTES NFR BLD AUTO: 18 % (ref 14–44)
MAGNESIUM SERPL-MCNC: 2.3 MG/DL (ref 1.9–2.7)
MCH RBC QN AUTO: 29.2 PG (ref 26.8–34.3)
MCHC RBC AUTO-ENTMCNC: 32.7 G/DL (ref 31.4–37.4)
MCV RBC AUTO: 89 FL (ref 82–98)
MONOCYTES # BLD AUTO: 0.39 THOUSAND/ÂΜL (ref 0.17–1.22)
MONOCYTES NFR BLD AUTO: 6 % (ref 4–12)
NEUTROPHILS # BLD AUTO: 4.48 THOUSANDS/ÂΜL (ref 1.85–7.62)
NEUTS SEG NFR BLD AUTO: 74 % (ref 43–75)
NRBC BLD AUTO-RTO: 0 /100 WBCS
PLATELET # BLD AUTO: 309 THOUSANDS/UL (ref 149–390)
PMV BLD AUTO: 9.4 FL (ref 8.9–12.7)
POTASSIUM SERPL-SCNC: 4.2 MMOL/L (ref 3.5–5.3)
PROT SERPL-MCNC: 6.9 G/DL (ref 6.4–8.4)
RBC # BLD AUTO: 4.32 MILLION/UL (ref 3.81–5.12)
SODIUM SERPL-SCNC: 138 MMOL/L (ref 135–147)
WBC # BLD AUTO: 6.07 THOUSAND/UL (ref 4.31–10.16)

## 2024-06-24 PROCEDURE — 83735 ASSAY OF MAGNESIUM: CPT

## 2024-06-24 PROCEDURE — 70450 CT HEAD/BRAIN W/O DYE: CPT

## 2024-06-24 PROCEDURE — 85025 COMPLETE CBC W/AUTO DIFF WBC: CPT

## 2024-06-24 PROCEDURE — 99284 EMERGENCY DEPT VISIT MOD MDM: CPT | Performed by: EMERGENCY MEDICINE

## 2024-06-24 PROCEDURE — 99284 EMERGENCY DEPT VISIT MOD MDM: CPT

## 2024-06-24 PROCEDURE — 96365 THER/PROPH/DIAG IV INF INIT: CPT

## 2024-06-24 PROCEDURE — 36415 COLL VENOUS BLD VENIPUNCTURE: CPT

## 2024-06-24 PROCEDURE — 96375 TX/PRO/DX INJ NEW DRUG ADDON: CPT

## 2024-06-24 PROCEDURE — 80053 COMPREHEN METABOLIC PANEL: CPT

## 2024-06-24 RX ORDER — ACETAMINOPHEN 325 MG/1
975 TABLET ORAL ONCE
Status: COMPLETED | OUTPATIENT
Start: 2024-06-24 | End: 2024-06-24

## 2024-06-24 RX ORDER — CEPHALEXIN 500 MG/1
500 CAPSULE ORAL EVERY 6 HOURS SCHEDULED
Qty: 28 CAPSULE | Refills: 0 | Status: SHIPPED | OUTPATIENT
Start: 2024-06-24 | End: 2024-07-01

## 2024-06-24 RX ORDER — MAGNESIUM SULFATE HEPTAHYDRATE 40 MG/ML
2 INJECTION, SOLUTION INTRAVENOUS ONCE
Status: COMPLETED | OUTPATIENT
Start: 2024-06-24 | End: 2024-06-24

## 2024-06-24 RX ORDER — KETOROLAC TROMETHAMINE 30 MG/ML
15 INJECTION, SOLUTION INTRAMUSCULAR; INTRAVENOUS ONCE
Status: COMPLETED | OUTPATIENT
Start: 2024-06-24 | End: 2024-06-24

## 2024-06-24 RX ORDER — CEPHALEXIN 250 MG/1
500 CAPSULE ORAL ONCE
Status: COMPLETED | OUTPATIENT
Start: 2024-06-24 | End: 2024-06-24

## 2024-06-24 RX ADMIN — KETOROLAC TROMETHAMINE 15 MG: 30 INJECTION, SOLUTION INTRAMUSCULAR; INTRAVENOUS at 09:31

## 2024-06-24 RX ADMIN — ACETAMINOPHEN 975 MG: 325 TABLET, FILM COATED ORAL at 09:21

## 2024-06-24 RX ADMIN — CEPHALEXIN 500 MG: 250 CAPSULE ORAL at 10:02

## 2024-06-24 RX ADMIN — SODIUM CHLORIDE 500 ML: 0.9 INJECTION, SOLUTION INTRAVENOUS at 09:32

## 2024-06-24 RX ADMIN — MAGNESIUM SULFATE HEPTAHYDRATE 2 G: 40 INJECTION, SOLUTION INTRAVENOUS at 09:32

## 2024-06-24 NOTE — ED PROVIDER NOTES
History  Chief Complaint   Patient presents with    Headache     Pt reports headache x3 days. Denies n/v Denies history of migraines.      83-year-old female presenting to ED for evaluation of right-sided headache for the past 3 days, left forearm rash for 3 days.  Patient reports she has no history of headaches.  3 days ago, woke up with a right-sided headache, has been taking 1000 mg of Tylenol every 6 hours with minimal relief.  Last took Tylenol at 0500 this morning.  Left forearm rash initially was erythematous, small, has now been increasing in size.  No history of tick bites, does not spend time outdoors.  No nausea or vomiting, no vision changes, no paresthesias, no weakness on one side of the body.  No dizziness or lightheadedness.  No presyncope or syncope.  No difficulty chewing, no temple pain. Denies chest pain or shortness of breath, abdominal pain, urinary symptoms, URI symptoms, fever or chills.        Prior to Admission Medications   Prescriptions Last Dose Informant Patient Reported? Taking?   Multiple Vitamins-Minerals (CENTRUM SILVER 50+WOMEN PO)  Self Yes No   Sig: Take by mouth every morning   Probiotic Product (PROBIOTIC ADVANCED PO)  Self Yes No   Sig: Take by mouth   acetaminophen (TYLENOL) 325 mg tablet  Self No No   Sig: Take 3 tablets (975 mg total) by mouth every 8 (eight) hours   Patient taking differently: Take 975 mg by mouth every 8 (eight) hours pt takes 500mg PRN   aspirin 325 mg tablet  Self No No   Sig: Take 1 tablet (325 mg total) by mouth 2 (two) times a day   Patient not taking: Reported on 5/2/2024   cholecalciferol (VITAMIN D3) 1,000 units tablet  Self Yes No   Sig: Take 1,000 Units by mouth every morning   senna-docusate sodium (SENOKOT S) 8.6-50 mg per tablet  Self No No   Sig: Take 1 tablet by mouth daily at bedtime   Patient not taking: Reported on 3/23/2024   sodium chloride (MARCIN 128) 2 % hypertonic ophthalmic solution  Self Yes No   Sig: Administer 1 drop to both eyes  3 (three) times a day Alesia 128 gel at HS    sodium chloride (Alesia 128) 5 % hypertonic ophthalmic ointment  Self Yes No   Sig: Administer 1 drop to both eyes daily at bedtime. 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    Spinal stenosis     Varicose veins of legs     Vitamin D deficiency     Wears glasses        Past Surgical History:   Procedure Laterality Date    CATARACT EXTRACTION      NJ OPTX FEM SHFT FX W/INSJ IMED IMPLT W/WO SCREW Left 3/19/2024    Procedure: INSERTION NAIL IM FEMUR ANTEGRADE (TROCHANTERIC) and all associated procedures;  Surgeon: Poncho Duggan DO;  Location: [facility];  Service: Orthopedics    NJ XCAPSL CTRC RMVL INSJ IO LENS PROSTH W/O ECP Right 5/14/2018    Procedure: EXTRACTION EXTRACAPSULAR CATARACT PHACO INTRAOCULAR LENS (IOL);  Surgeon: Rian Benson MD;  Location: [facility] MAIN OR;  Service: Ophthalmology    NJ XCAPSL CTRC RMVL INSJ IO LENS PROSTH W/O ECP Left 7/10/2018    Procedure: EXTRACTION EXTRACAPSULAR CATARACT PHACO INTRAOCULAR LENS (IOL);  Surgeon: Rian Benson MD;  Location: [facility] MAIN OR;  Service: Ophthalmology    TUBAL LIGATION         Family History   Problem Relation Age of Onset    Other Mother         corneal transplants, Parathyroid disease    Osteoporosis Mother     Stroke Father     Hypertension Father     Heart disease Father         cardiac disorder    Osteoporosis Father     Other  "Father         Parathyroid disease    Asthma Sister     Hiatal hernia Sister     Other Brother         parathyroid-removed, Parathyroid disease    Heart disease Brother         \" maker\"     I have reviewed and agree with the history as documented.    E-Cigarette/Vaping    E-Cigarette Use Never User      E-Cigarette/Vaping Substances    Nicotine No     THC No     CBD No     Flavoring No     Other No     Unknown No      Social History     Tobacco Use    Smoking status: Never    Smokeless tobacco: Never   Vaping Use    Vaping status: Never Used   Substance Use Topics    Alcohol use: No    Drug use: No        Review of Systems    Physical Exam  ED Triage Vitals   Temperature Pulse Respirations Blood Pressure SpO2   06/24/24 0816 06/24/24 0815 06/24/24 0815 06/24/24 0815 06/24/24 0815   (!) 97.4 °F (36.3 °C) 90 18 (!) 177/99 95 %      Temp src Heart Rate Source Patient Position - Orthostatic VS BP Location FiO2 (%)   -- 06/24/24 1125 06/24/24 1125 06/24/24 1125 --    Monitor Sitting Right arm       Pain Score       06/24/24 0900       5             Orthostatic Vital Signs  Vitals:    06/24/24 0815 06/24/24 0900 06/24/24 1125   BP: (!) 177/99 160/76 143/81   Pulse: 90  67   Patient Position - Orthostatic VS:   Sitting       Physical Exam  Vitals and nursing note reviewed.   Constitutional:       General: She is not in acute distress.     Appearance: She is well-developed.   HENT:      Head: Normocephalic and atraumatic.      Jaw: There is normal jaw occlusion.        Comments: Tenderness in area circled above. No temple tenderness bilaterally  Eyes:      General: Lids are normal. Lids are everted, no foreign bodies appreciated. Vision grossly intact. Gaze aligned appropriately.      Extraocular Movements: Extraocular movements intact.      Conjunctiva/sclera: Conjunctivae normal.      Pupils: Pupils are equal, round, and reactive to light.      Visual Fields: Right eye visual fields normal and left eye visual fields " normal.      Comments: No nystagmus   Cardiovascular:      Rate and Rhythm: Normal rate and regular rhythm.      Pulses:           Radial pulses are 2+ on the right side and 2+ on the left side.        Dorsalis pedis pulses are 2+ on the right side and 2+ on the left side.        Posterior tibial pulses are 2+ on the right side and 2+ on the left side.      Heart sounds: Normal heart sounds, S1 normal and S2 normal. No murmur heard.  Pulmonary:      Effort: Pulmonary effort is normal. No respiratory distress.      Breath sounds: Normal breath sounds and air entry.   Abdominal:      Palpations: Abdomen is soft.      Tenderness: There is no abdominal tenderness.   Musculoskeletal:         General: No swelling.      Cervical back: Full passive range of motion without pain, normal range of motion and neck supple.      Left lower leg: No edema.   Skin:     General: Skin is warm and dry.      Capillary Refill: Capillary refill takes less than 2 seconds.      Comments: L arm: erythematous, warm rash in left AC region, 1 pustule noted. Normal ROM of elbow   Neurological:      Mental Status: She is alert and oriented to person, place, and time.      GCS: GCS eye subscore is 4. GCS verbal subscore is 5. GCS motor subscore is 6.      Cranial Nerves: Cranial nerves 2-12 are intact.      Sensory: Sensation is intact.      Motor: Motor function is intact.      Coordination: Coordination is intact. Finger-Nose-Finger Test and Heel to Shin Test normal.      Gait: Gait is intact.      Comments: No FNDs   Psychiatric:         Mood and Affect: Mood normal.         ED Medications  Medications   acetaminophen (TYLENOL) tablet 975 mg (975 mg Oral Given 6/24/24 0921)   ketorolac (TORADOL) injection 15 mg (15 mg Intravenous Given 6/24/24 0931)   sodium chloride 0.9 % bolus 500 mL (0 mL Intravenous Stopped 6/24/24 1100)   magnesium sulfate 2 g/50 mL IVPB (premix) 2 g (0 g Intravenous Stopped 6/24/24 1100)   cephalexin (KEFLEX) capsule 500  mg (500 mg Oral Given 6/24/24 1002)       Diagnostic Studies  Results Reviewed       Procedure Component Value Units Date/Time    Comprehensive metabolic panel [[id number]]  (Abnormal) Collected: 06/24/24 0936    Lab Status: Final result Specimen: Blood from Arm, Right Updated: 06/24/24 1126     Sodium 138 mmol/L      Potassium 4.2 mmol/L      Chloride 106 mmol/L      CO2 27 mmol/L      ANION GAP 5 mmol/L      BUN 11 mg/dL      Creatinine 0.53 mg/dL      Glucose 104 mg/dL      Calcium 10.3 mg/dL      AST 21 U/L      ALT 15 U/L      Alkaline Phosphatase 109 U/L      Total Protein 6.9 g/dL      Albumin 3.9 g/dL      Total Bilirubin 0.30 mg/dL      eGFR 87 ml/min/1.73sq m     Narrative:      National Kid[employer] Disease Foundation guidelines for Chronic Kidney Disease (CKD):     Stage 1 with normal or high GFR (GFR > 90 mL/min/1.73 square meters)    Stage 2 Mild CKD (GFR = 60-89 mL/min/1.73 square meters)    Stage 3A Moderate CKD (GFR = 45-59 mL/min/1.73 square meters)    Stage 3B Moderate CKD (GFR = 30-44 mL/min/1.73 square meters)    Stage 4 Severe CKD (GFR = 15-29 mL/min/1.73 square meters)    Stage 5 End Stage CKD (GFR <15 mL/min/1.73 square meters)  Note: GFR calculation is accurate only with a steady state creatinine    Magnesium [[id number]]  (Normal) Collected: 06/24/24 0936    Lab Status: Final result Specimen: Blood from Arm, Right Updated: 06/24/24 1028     Magnesium 2.3 mg/dL     CBC and differential [[id number]] Collected: 06/24/24 0936    Lab Status: Final result Specimen: Blood from Arm, Right Updated: 06/24/24 0944     WBC 6.07 Thousand/uL      RBC 4.32 Million/uL      Hemoglobin 12.6 g/dL      Hematocrit 38.5 %      MCV 89 fL      MCH 29.2 pg      MCHC 32.7 g/dL      RDW 12.8 %      MPV 9.4 fL      Platelets 309 Thousands/uL      nRBC 0 /100 WBCs      Segmented % 74 %      Immature Grans % 0 %      Lymphocytes % 18 %      Monocytes % 6 %      Eosinophils Relative 1 %      Basophils Relative 1 %      Absolute  Neutrophils 4.48 Thousands/µL      Absolute Immature Grans 0.01 Thousand/uL      Absolute Lymphocytes 1.09 Thousands/µL      Absolute Monocytes 0.39 Thousand/µL      Eosinophils Absolute 0.07 Thousand/µL      Basophils Absolute 0.03 Thousands/µL                    CT head without contrast   Final Result by Rian Arrieta MD (06/24 1058)      No acute intracranial abnormality.      Moderate chronic microangiopathy.                  Workstation performed: [id number]               Procedures  Procedures      ED Course  ED Course as of 06/24/24 1710   Mon Jun 24, 2024   1014 Hemoglobin: 12.6   1015 WBC: 6.07   1015 Platelet Count: 309   1109 CT head without contrast  FINDINGS:     PARENCHYMA:  Decreased attenuation is noted in periventricular and subcortical white matter demonstrating an appearance that is statistically most likely to represent moderate microangiopathic change.     No CT signs of acute infarction.  No intracranial mass, mass effect or midline shift.  No acute parenchymal hemorrhage.     Bilateral basal ganglia calcifications.     VENTRICLES AND EXTRA-AXIAL SPACES:  Normal for the patient's age.     VISUALIZED ORBITS:  Bilateral lens replacements.     PARANASAL SINUSES:  Normal visualized paranasal sinuses.     CALVARIUM AND EXTRACRANIAL SOFT TISSUES:  Normal.     IMPRESSION:     No acute intracranial abnormality.     Moderate chronic microangiopathy.                                            Medical Decision Making  83-year-old female presenting to ED for evaluation of right-sided headache for the past 3 days, left forearm rash for 3 days.      Differentials including but limited to: Physical exam consistent with cellulitis of the left forearm.  Tension headache, brain mass.  Doubt stroke, NIH = 0, no focal neurodeficits on exam.  Doubt temporal arteritis without temporal tenderness or difficulty chewing.    Will obtain CT head, basic labs, Tylenol, Toradol, 500 cc normal saline, 2 g IV  magnesium, Keflex for cellulitis of left forearm.    Symptoms improved with the above medications.  Patient discharged home with prescription for Keflex, strict return precautions.    Advised PCP follow-up for elevated ALP, mild hypercalcemia, verbalized understanding.        Amount and/or Complexity of Data Reviewed  Labs: ordered. Decision-making details documented in ED Course.  Radiology: ordered. Decision-making details documented in ED Course.    Risk  OTC drugs.  Prescription drug management.          Disposition  Final diagnoses:   Headache   Elevated alkaline phosphatase level   Hypercalcemia   Left arm cellulitis     Time reflects when diagnosis was documented in both MDM as applicable and the Disposition within this note       Time User Action Codes Description Comment    6/24/2024 11:26 AM JasmeetdaJunior selfcy Add [R51.9] Headache     6/24/2024 11:26 AM RendaJunior selfcy Add [R74.8] Elevated alkaline phosphatase level     6/24/2024 11:27 AM Rendaclair Heather Add [E83.52] Hypercalcemia     6/24/2024 11:27 AM RendaJunior selfcy Add [L03.114] Left arm cellulitis           ED Disposition       ED Disposition   Discharge    Condition   Stable    Date/Time   Mon Jun 24, 2024 11:26 AM    Comment   Kay Bright discharge to home/self care.                   Follow-up Information       Follow up With Specialties Details Why Contact Info    Abelino Garcia,  Family Medicine Schedule an appointment as soon as possible for a visit today for follow up of elevated ALP and high calcium [address] [place] [id number]  [phone]              Discharge Medication List as of 6/24/2024 11:29 AM        START taking these medications    Details   cephalexin (KEFLEX) 500 mg capsule Take 1 capsule (500 mg total) by mouth every 6 (six) hours for 7 days, Starting Mon 6/24/2024, Until Mon 7/1/2024, Normal           CONTINUE these medications which have NOT CHANGED    Details   acetaminophen (TYLENOL) 325 mg  tablet Take 3 tablets (975 mg total) by mouth every 8 (eight) hours, Starting Thu 3/21/2024, No Print      aspirin 325 mg tablet Take 1 tablet (325 mg total) by mouth 2 (two) times a day, Starting Thu 3/21/2024, Until Thu 5/2/2024, No Print      cholecalciferol (VITAMIN D3) 1,000 units tablet Take 1,000 Units by mouth every morning, Historical Med      Multiple Vitamins-Minerals (CENTRUM SILVER 50+WOMEN PO) Take by mouth every morning, Historical Med      Probiotic Product (PROBIOTIC ADVANCED PO) Take by mouth, Historical Med      senna-docusate sodium (SENOKOT S) 8.6-50 mg per tablet Take 1 tablet by mouth daily at bedtime, Starting Thu 3/21/2024, No Print      sodium chloride (ALESIA 128) 2 % hypertonic ophthalmic solution Administer 1 drop to both eyes 3 (three) times a day Alesia 128 gel at HS , Historical Med      sodium chloride (Alesia 128) 5 % hypertonic ophthalmic ointment Administer 1 drop to both eyes daily at bedtime. Indications: NA, Historical Med           No discharge procedures on file.    PDMP Review       None             ED Provider  Attending physically available and evaluated Kay Bright. I managed the patient along with the ED Attending.    Electronically Signed by           Heather Clemons MD  06/24/24 8525

## 2024-06-24 NOTE — ED ATTENDING ATTESTATION
6/24/2024  IRenato DO, saw and evaluated the patient. I have discussed the patient with the resident/non-physician practitioner and agree with the resident's/non-physician practitioner's findings, Plan of Care, and MDM as documented in the resident's/non-physician practitioner's note, except where noted. All available labs and Radiology studies were reviewed.  I was present for key portions of any procedure(s) performed by the resident/non-physician practitioner and I was immediately available to provide assistance.       At this point I agree with the current assessment done in the Emergency Department.  I have conducted an independent evaluation of this patient a history and physical is as follows: 83-year-old female, past medical history present note, presenting to the emergency department with intermittent headache to the right side of her head extending from the base of the occiput.  Notes that the headache resolved with Tylenol but has repeated itself a few times over the last 2 days. Pt states gradual onset. Denies focal weakness, vision change, fever, neck pain, worse in AM/PM, exertional, personal/familial history AVM/SAH, trauma, or use of blood thinner.  She states that she is asymptomatic at this time.    Vital signs stable.  Patient resting comfortably. AAOX4. CN intact. Gross vision intact all 4 quadrants. Cerebellar signs with finger to nose and heel to shin intact. All extremities 5/5 strength. Gross sensation appreciated face and extremities.  Tenderness to the base of the left occiput which patient states radiates posterior to the right ear.    83-year-old female presenting to the emergency department with intermittent tension headache.  CT scan performed given age and recurrent nature of headache without acute pathology.  Pain never at maximum or crippling to suggest there is a bleed requiring LP.  Labs at acute pathology suggesting against dehydration or electrolyte abnormality as the  cause of the tension headache.  Patient notes asymptomatic in the emergency department and continued to be so status post further supportive care here.  As a pertains of the pain on the side of her head, posterior to the ear suggesting against temporal arteritis. Reviewed all findings both relevant and incidental with the patient at bedside. Pt verbalized understanding of findings, neccesary follow up, return to ED precautions. Pt agreed to review today's findings with their primary care provider. Pt non-toxic appearing upon discharge.         ED Course         Critical Care Time  Procedures

## 2024-06-24 NOTE — TELEPHONE ENCOUNTER
Patient called in to schedule a ER f/u. No available appointments with any provider until September. Please advise thank you.

## 2024-06-24 NOTE — DISCHARGE INSTRUCTIONS
For your headache, can take Tylenol 1000 mg every 6 hours  Return to ED if any worsening symptoms  For the rash on your arm, take Keflex 500 mg every 6 hours for the next 7 days, finish entire duration even if feeling better.  Follow-up with your primary care physician regarding your high calcium and elevated alkaline phosphatase level.

## 2024-07-03 NOTE — TELEPHONE ENCOUNTER
Patient is calling back. She wants to know if she could have a sooner date than 7/24. She states she has been having insomnia and loss of appetite. Please follow up.

## 2024-07-05 ENCOUNTER — HOSPITAL ENCOUNTER (OUTPATIENT)
Dept: RADIOLOGY | Facility: MEDICAL CENTER | Age: 84
Discharge: HOME | End: 2024-07-05
Payer: MEDICARE

## 2024-07-05 ENCOUNTER — APPOINTMENT (OUTPATIENT)
Dept: LAB | Facility: MEDICAL CENTER | Age: 84
End: 2024-07-05
Payer: MEDICARE

## 2024-07-05 ENCOUNTER — TELEPHONE (OUTPATIENT)
Age: 84
End: 2024-07-05

## 2024-07-05 ENCOUNTER — OFFICE VISIT (OUTPATIENT)
Dept: FAMILY MEDICINE CLINIC | Facility: MEDICAL CENTER | Age: 84
End: 2024-07-05
Payer: MEDICARE

## 2024-07-05 VITALS
RESPIRATION RATE: 18 BRPM | DIASTOLIC BLOOD PRESSURE: 86 MMHG | SYSTOLIC BLOOD PRESSURE: 128 MMHG | TEMPERATURE: 97.5 F | WEIGHT: 115.2 LBS | HEART RATE: 87 BPM | OXYGEN SATURATION: 95 % | BODY MASS INDEX: 21.41 KG/M2

## 2024-07-05 DIAGNOSIS — R10.84 GENERALIZED ABDOMINAL PAIN: ICD-10-CM

## 2024-07-05 DIAGNOSIS — Z87.19 HISTORY OF DIVERTICULITIS: ICD-10-CM

## 2024-07-05 DIAGNOSIS — R07.81 RIB PAIN ON RIGHT SIDE: ICD-10-CM

## 2024-07-05 DIAGNOSIS — R63.4 UNINTENDED WEIGHT LOSS: ICD-10-CM

## 2024-07-05 DIAGNOSIS — R07.81 RIB PAIN ON RIGHT SIDE: Primary | ICD-10-CM

## 2024-07-05 DIAGNOSIS — M54.9 UPPER BACK PAIN: ICD-10-CM

## 2024-07-05 DIAGNOSIS — N64.4 BREAST PAIN: ICD-10-CM

## 2024-07-05 LAB
ALBUMIN SERPL BCG-MCNC: 4.2 G/DL (ref 3.5–5)
ALP SERPL-CCNC: 103 U/L (ref 34–104)
ALT SERPL W P-5'-P-CCNC: 15 U/L (ref 7–52)
ANION GAP SERPL CALCULATED.3IONS-SCNC: 9 MMOL/L (ref 4–13)
AST SERPL W P-5'-P-CCNC: 21 U/L (ref 13–39)
BASOPHILS # BLD AUTO: 0.03 THOUSANDS/ÂΜL (ref 0–0.1)
BASOPHILS NFR BLD AUTO: 1 % (ref 0–1)
BILIRUB SERPL-MCNC: 0.35 MG/DL (ref 0.2–1)
BILIRUB UR QL STRIP: NEGATIVE
BUN SERPL-MCNC: 15 MG/DL (ref 5–25)
CALCIUM SERPL-MCNC: 10.7 MG/DL (ref 8.4–10.2)
CHLORIDE SERPL-SCNC: 102 MMOL/L (ref 96–108)
CLARITY UR: CLEAR
CO2 SERPL-SCNC: 28 MMOL/L (ref 21–32)
COLOR UR: COLORLESS
CREAT SERPL-MCNC: 0.6 MG/DL (ref 0.6–1.3)
CRP SERPL QL: <1 MG/L
EOSINOPHIL # BLD AUTO: 0.03 THOUSAND/ÂΜL (ref 0–0.61)
EOSINOPHIL NFR BLD AUTO: 1 % (ref 0–6)
ERYTHROCYTE [DISTWIDTH] IN BLOOD BY AUTOMATED COUNT: 13.2 % (ref 11.6–15.1)
GFR SERPL CREATININE-BSD FRML MDRD: 83 ML/MIN/1.73SQ M
GLUCOSE SERPL-MCNC: 92 MG/DL (ref 65–140)
GLUCOSE UR STRIP-MCNC: NEGATIVE MG/DL
HCT VFR BLD AUTO: 42.3 % (ref 34.8–46.1)
HGB BLD-MCNC: 13.3 G/DL (ref 11.5–15.4)
HGB UR QL STRIP.AUTO: NEGATIVE
IMM GRANULOCYTES # BLD AUTO: 0.02 THOUSAND/UL (ref 0–0.2)
IMM GRANULOCYTES NFR BLD AUTO: 0 % (ref 0–2)
KETONES UR STRIP-MCNC: NEGATIVE MG/DL
LEUKOCYTE ESTERASE UR QL STRIP: NEGATIVE
LYMPHOCYTES # BLD AUTO: 1.41 THOUSANDS/ÂΜL (ref 0.6–4.47)
LYMPHOCYTES NFR BLD AUTO: 27 % (ref 14–44)
MCH RBC QN AUTO: 29 PG (ref 26.8–34.3)
MCHC RBC AUTO-ENTMCNC: 31.4 G/DL (ref 31.4–37.4)
MCV RBC AUTO: 92 FL (ref 82–98)
MONOCYTES # BLD AUTO: 0.22 THOUSAND/ÂΜL (ref 0.17–1.22)
MONOCYTES NFR BLD AUTO: 4 % (ref 4–12)
NEUTROPHILS # BLD AUTO: 3.57 THOUSANDS/ÂΜL (ref 1.85–7.62)
NEUTS SEG NFR BLD AUTO: 67 % (ref 43–75)
NITRITE UR QL STRIP: NEGATIVE
NRBC BLD AUTO-RTO: 0 /100 WBCS
PH UR STRIP.AUTO: 7 [PH]
PLATELET # BLD AUTO: 375 THOUSANDS/UL (ref 149–390)
PMV BLD AUTO: 10.2 FL (ref 8.9–12.7)
POTASSIUM SERPL-SCNC: 4.3 MMOL/L (ref 3.5–5.3)
PROT SERPL-MCNC: 7.5 G/DL (ref 6.4–8.4)
PROT UR STRIP-MCNC: NEGATIVE MG/DL
RBC # BLD AUTO: 4.59 MILLION/UL (ref 3.81–5.12)
SODIUM SERPL-SCNC: 139 MMOL/L (ref 135–147)
SP GR UR STRIP.AUTO: 1.01 (ref 1–1.03)
TSH SERPL DL<=0.05 MIU/L-ACNC: 2.73 UIU/ML (ref 0.45–4.5)
UROBILINOGEN UR STRIP-ACNC: <2 MG/DL
WBC # BLD AUTO: 5.28 THOUSAND/UL (ref 4.31–10.16)

## 2024-07-05 PROCEDURE — 74177 CT ABD & PELVIS W/CONTRAST: CPT

## 2024-07-05 PROCEDURE — 86140 C-REACTIVE PROTEIN: CPT

## 2024-07-05 PROCEDURE — 80053 COMPREHEN METABOLIC PANEL: CPT

## 2024-07-05 PROCEDURE — 85025 COMPLETE CBC W/AUTO DIFF WBC: CPT

## 2024-07-05 PROCEDURE — 71260 CT THORAX DX C+: CPT

## 2024-07-05 PROCEDURE — 84443 ASSAY THYROID STIM HORMONE: CPT

## 2024-07-05 PROCEDURE — 81003 URINALYSIS AUTO W/O SCOPE: CPT

## 2024-07-05 PROCEDURE — G2211 COMPLEX E/M VISIT ADD ON: HCPCS | Performed by: STUDENT IN AN ORGANIZED HEALTH CARE EDUCATION/TRAINING PROGRAM

## 2024-07-05 PROCEDURE — 99214 OFFICE O/P EST MOD 30 MIN: CPT | Performed by: STUDENT IN AN ORGANIZED HEALTH CARE EDUCATION/TRAINING PROGRAM

## 2024-07-05 PROCEDURE — 36415 COLL VENOUS BLD VENIPUNCTURE: CPT

## 2024-07-05 RX ADMIN — IOHEXOL 50 ML: 240 INJECTION, SOLUTION INTRATHECAL; INTRAVASCULAR; INTRAVENOUS; ORAL at 14:42

## 2024-07-05 RX ADMIN — IOHEXOL 85 ML: 350 INJECTION, SOLUTION INTRAVENOUS at 14:42

## 2024-07-05 NOTE — TELEPHONE ENCOUNTER
Pt was told she should have the results of her CT and blood work today and that Dr. Handy would be calling her.  They were wondering if the results were back yet.  They are anxious.  Pts return the call.   Ty

## 2024-07-05 NOTE — PROGRESS NOTES
"  [facility] - Clinic Note  Abelino Garcia DO, 24     Kay Bright MRN: [id number] : 1940 Age: 83 y.o.     Assessment/Plan     1. Rib pain on right side    -Advised about symptoms of shingles in which case to contact promptly should rash develop  - CT chest abdomen pelvis w contrast; Future    2. Generalized abdominal pain    -Patient with complaints today not completely specific, she complains of right rib pain and upper back pain, endorsed breast pain and abdominal pain  -Follow-up stat imaging and lab work  - CT chest abdomen pelvis w contrast; Future  - CBC (Includes Diff/Plt) (Refl); Future  - Comprehensive metabolic panel; Future  - C-reactive protein; Future  - UA w Reflex to Microscopic w Reflex to Culture; Future    3. Upper back pain    - CT chest abdomen pelvis w contrast; Future    4. Breast pain    - Mammo diagnostic bilateral w 3d & cad; Future    5. History of diverticulitis    - Ambulatory Referral to Nutrition Services; Future    6. Unintended weight loss    - 3/8/2024 121 pounds, today 115 pounds 3.2 ounces  -Daughter shares patient has been hesitated about what she can consume since diverticulitis flare, advised patient about high-fiber diet when out of diverticulitis flares, referral also provided to nutritionist  - CT chest abdomen pelvis w contrast; Future  -TSH, 3rd generation with Free T4 reflex; Future  -Keep follow-up and appointment later this month, weight check      Kay Bright and daughter acknowledged understanding of treatment plan, all questions answered.  Advised patient and daughter to proceed promptly to ER if any worsening symptoms or red flags which were discussed.  Subjective      Kay Bright is a 83 y.o. female who presents for acute visit.  She presents with her daughter Jayde. \"March she fell and broke her hip, Monday I see the doctor\". \" she went to the ER she had headache, off and on for a couple days\".  Patient " took Tylenol and Advil which alleviated those symptoms.  No complaint of headache today.  During ER visit daughter states she also mentioned skin infection.  She was diagnosed with cellulitis left upper extremity and prescribed Keflex.  Erythema has resolved.  2 to 3 days into Keflex course patient started to complain of back pain daughter describes.  Patient ultimately completed 5 days of Keflex.  Patient complaining of right sided thoracic pain.  Patient describes the pain awoke her at night and she could not return to sleep.  No chest pain and no shortness of breath.  No fever, no chills.  No dysuria, urinary frequency, hematuria.  Daughter also mentions unintentional weight loss.  Daughter states since patient had bout of diverticulitis she has been worried as to what she can consume.    The following portions of the patient's history were reviewed and updated as appropriate: allergies, current medications, past family history, past medical history, past social history, past surgical history and problem list.     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    Spinal stenosis     Varicose veins of legs     Vitamin D deficiency     Wears glasses        Allergies   Allergen Reactions    Prednisone Swelling     Facial swelling, redness-no dyspnea       Past Surgical History:   Procedure Laterality Date    CATARACT EXTRACTION      MS OPTX FEM SHFT FX W/INSJ IMED IMPLT W/WO SCREW Left 3/19/2024    Procedure: INSERTION NAIL IM FEMUR ANTEGRADE  "(TROCHANTERIC) and all associated procedures;  Surgeon: Poncho Duggan DO;  Location: [facility];  Service: Orthopedics    IL XCAPSL CTRC RMVL INSJ IO LENS PROSTH W/O ECP Right 5/14/2018    Procedure: EXTRACTION EXTRACAPSULAR CATARACT PHACO INTRAOCULAR LENS (IOL);  Surgeon: Rian Benson MD;  Location: [facility] MAIN OR;  Service: Ophthalmology    IL XCAPSL CTRC RMVL INSJ IO LENS PROSTH W/O ECP Left 7/10/2018    Procedure: EXTRACTION EXTRACAPSULAR CATARACT PHACO INTRAOCULAR LENS (IOL);  Surgeon: Rian Benson MD;  Location: [facility] MAIN OR;  Service: Ophthalmology    TUBAL LIGATION         Family History   Problem Relation Age of Onset    Other Mother         corneal transplants, Parathyroid disease    Osteoporosis Mother     Stroke Father     Hypertension Father     Heart disease Father         cardiac disorder    Osteoporosis Father     Other Father         Parathyroid disease    Asthma Sister     Hiatal hernia Sister     Other Brother         parathyroid-removed, Parathyroid disease    Heart disease Brother         \" maker\"       Social History     Socioeconomic History    Marital status:      Spouse name: None    Number of children: None    Years of education: None    Highest education level: None   Occupational History    None   Tobacco Use    Smoking status: Never    Smokeless tobacco: Never   Vaping Use    Vaping status: Never Used   Substance and Sexual Activity    Alcohol use: No    Drug use: No    Sexual activity: None   Other Topics Concern    None   Social History Narrative    Caffeine use, active     Social Determinants of Health     Financial Resource Strain: Low Risk  (10/2/2023)    Overall Financial Resource Strain (CARDIA)     Difficulty of Paying Living Expenses: Not hard at all   Food Insecurity: No Food Insecurity (3/20/2024)    Hunger Vital Sign     Worried About Running Out of Food in the Last Year: Never true     Ran Out of Food in the Last Year: Never true   Transportation Needs: " No Transportation Needs (3/20/2024)    PRAPARE - Transportation     Lack of Transportation (Medical): No     Lack of Transportation (Non-Medical): No   Physical Activity: Not on file   Stress: Not on file   Social Connections: Not on file   Intimate Partner Violence: Not on file   Housing Stability: Low Risk  (3/20/2024)    Housing Stability Vital Sign     Unable to Pay for Housing in the Last Year: No     Number of Times Moved in the Last Year: 1     Homeless in the Last Year: No       Current Outpatient Medications   Medication Sig Dispense Refill    acetaminophen (TYLENOL) 325 mg tablet Take 3 tablets (975 mg total) by mouth every 8 (eight) hours (Patient taking differently: Take 975 mg by mouth every 8 (eight) hours pt takes 500mg PRN)      cholecalciferol (VITAMIN D3) 1,000 units tablet Take 1,000 Units by mouth every morning      Multiple Vitamins-Minerals (CENTRUM SILVER 50+WOMEN PO) Take by mouth every morning      Probiotic Product (PROBIOTIC ADVANCED PO) Take by mouth      sodium chloride (ALESIA 128) 2 % hypertonic ophthalmic solution Administer 1 drop to both eyes 3 (three) times a day Alesia 128 gel at HS       aspirin 325 mg tablet Take 1 tablet (325 mg total) by mouth 2 (two) times a day (Patient not taking: Reported on 5/2/2024)      senna-docusate sodium (SENOKOT S) 8.6-50 mg per tablet Take 1 tablet by mouth daily at bedtime (Patient not taking: Reported on 3/23/2024)      sodium chloride (Alesia 128) 5 % hypertonic ophthalmic ointment Administer 1 drop to both eyes daily at bedtime. Indications: NA (Patient not taking: Reported on 5/2/2024)       No current facility-administered medications for this visit.       Review of Systems     As noted in HPI    Objective      /86 (BP Location: Left arm, Patient Position: Sitting, Cuff Size: Standard)   Pulse 87   Temp 97.5 °F (36.4 °C) (Temporal)   Resp 18   Wt 52.3 kg (115 lb 3.2 oz)   SpO2 95%   BMI 21.41 kg/m²     Physical Exam  Vitals reviewed.  Exam conducted with a chaperone present (ASTER Ly).   Constitutional:       General: She is not in acute distress.     Appearance: Normal appearance. She is not ill-appearing, toxic-appearing or diaphoretic.   HENT:      Head: Normocephalic and atraumatic.      Nose: Nose normal.      Mouth/Throat:      Mouth: Mucous membranes are moist.      Pharynx: Oropharynx is clear.   Eyes:      Extraocular Movements: Extraocular movements intact.      Conjunctiva/sclera: Conjunctivae normal.      Pupils: Pupils are equal, round, and reactive to light.   Cardiovascular:      Rate and Rhythm: Normal rate. Rhythm irregular.      Pulses: Normal pulses.      Heart sounds: Normal heart sounds.   Pulmonary:      Effort: Pulmonary effort is normal.      Breath sounds: Normal breath sounds.   Chest:   Breasts:     Right: Tenderness present. No swelling, bleeding, inverted nipple, mass, nipple discharge or skin change.      Left: Tenderness present. No swelling, bleeding, inverted nipple, mass, nipple discharge or skin change.   Abdominal:      General: Bowel sounds are normal.      Palpations: Abdomen is soft.      Tenderness: There is generalized abdominal tenderness.   Musculoskeletal:         General: Tenderness present.      Cervical back: Neck supple.      Right lower leg: No edema.      Left lower leg: No edema.      Comments: Right lateral rib region   Lymphadenopathy:      Cervical: No cervical adenopathy.      Upper Body:      Right upper body: No supraclavicular, axillary or pectoral adenopathy.      Left upper body: No supraclavicular, axillary or pectoral adenopathy.   Skin:     General: Skin is warm and dry.      Capillary Refill: Capillary refill takes less than 2 seconds.      Comments: Seborrheic keratosis back   Neurological:      Mental Status: She is alert and oriented to person, place, and time.   Psychiatric:         Behavior: Behavior normal.         Thought Content: Thought content normal.             Some  "portions of this record may have been generated with voice recognition software. There may be translation, syntax, or grammatical errors. Occasional wrong word or \"sound-a-like\" substitutions may have occurred due to the inherent limitations of the voice recognition software. Read the chart carefully and recognize, using context, where substations may have occurred. If you have any questions, please contact the dictating provider for clarification or correction, as needed.  "

## 2024-07-05 NOTE — PATIENT INSTRUCTIONS
"Patient Education     High-fiber diet   The Basics   Written by the doctors and editors at [facility]   What is fiber? -- Fiber is a substance found in some fruits, vegetables, and grains. Most fiber passes through your body without being digested. But it can affect how you digest other foods, and it can also improve your bowel movements.  There are 2 kinds of fiber. One kind is called \"soluble fiber\" and is found in fruits, oats, barley, beans, and peas. The other kind is called \"insoluble fiber,\" and is found in wheat, rye, and other grains.  Both kinds of fiber that you eat are called \"dietary fiber.\"  Why is fiber important to my health? -- Fiber can help make your bowel movements softer and more regular. Adding fiber to your diet can help with problems including constipation, hemorrhoids, and diarrhea. Plus, it can help prevent \"accidents\" if you have trouble controlling your bowel movements.  Getting enough fiber can also help lower your risk of heart disease, stroke, and type 2 diabetes. That's because fiber can help lower cholesterol and help control blood sugar.  How much fiber do I need? -- The recommended amount of fiber is 20 to 35 grams a day. The nutrition label on packaged foods can show you how much fiber you are getting in each serving (figure 1).  How can I make sure I'm getting enough fiber? -- To make sure that you're getting enough fiber, eat plenty of the fruits, vegetables, and grains that contain fiber (table 1 and figure 2). Many breakfast cereals also have a lot of fiber.  If you can't get enough fiber from food, you can add wheat bran to the foods you do eat. Or you can take fiber supplements. These come in the form of powders, wafers, or pills. They include psyllium seed (sample brand names: Metamucil, Konsyl), methylcellulose (sample brand name: Citrucel), polycarbophil (sample brand name: FiberCon), and wheat dextrin (sample brand name: Benefiber). If you take a fiber supplement, be sure " "to read the label so you know how much to take. If you're not sure, ask your doctor or nurse.  What are the side effects of fiber? -- When you start eating more fiber, your belly might feel bloated, or you might have gas or cramps. You can avoid these side effects by adding fiber to your diet slowly.  Some people feel worse when they eat more fiber or take fiber supplements. If you feel worse after adding more fiber to your diet, you can try decreasing the amount of fiber to see if that helps.  All topics are updated as new evidence becomes available and our peer review process is complete.  This topic retrieved from [employer] on: Feb 28, 2024.  Topic 81802 Version 10.0  Release: 32.2.4 - C32.58  © 2024 UpToDate, Inc. and/or its affiliates. All rights reserved.  figure 1: Nutrition label - Fiber     This is an example of a nutrition label. To figure out how much fiber is in a food, look for the line that says \"Dietary Fiber.\" It's also important to look at the serving size. This food has 7 grams of fiber in each serving, and each serving is 1 cup.  Graphic 50802 Version 8.0  table 1: Amount of fiber in different foods  Food  Serving  Grams of fiber    Fruits    Apple (with skin) 1 medium apple 4.4   Banana 1 medium banana 3.1   Oranges 1 orange 3.1   Prunes 1 cup, pitted 12.4   Juices    Apple, unsweetened, with added ascorbic acid 1 cup 0.5   Grapefruit, white, canned, sweetened 1 cup 0.2   Grape, unsweetened, with added ascorbic acid 1 cup 0.5   Orange 1 cup 0.7   Vegetables    Cooked   Green beans 1 cup 4.0   Carrots 1/2 cup sliced 2.3   Peas 1 cup 8.8   Potato (baked, with skin) 1 medium potato 3.8   Raw   Cucumber (with peel) 1 cucumber 1.5   Lettuce 1 cup shredded 0.5   Tomato 1 medium tomato 1.5   Spinach 1 cup 0.7   Legumes   Baked beans, canned, no salt added 1 cup 13.9   Kidney beans, canned 1 cup 13.6   Lima beans, canned 1 cup 11.6   Lentils, boiled 1 cup 15.6   Breads, pastas, flours    Bran muffins 1 " medium muffin 5.2   Oatmeal, cooked 1 cup 4.0   White bread 1 slice 0.6   Whole-wheat bread 1 slice 1.9   Pasta and rice, cooked   Macaroni 1 cup 2.5   Rice, brown 1 cup 3.5   Rice, white 1 cup 0.6   Spaghetti (regular) 1 cup 2.5   Nuts    Almonds 1/2 cup 8.7   Peanuts 1/2 cup 7.9   To learn how much fiber and other nutrients are in different foods, visit the [place] Department [employer] ([employer]) Food[employer] Central website.  Graphic 95386 Version 6.0  figure 2: Foods with fiber     Foods with a lot of fiber include prunes, apples, oranges, bananas, peas, green beans, kidney beans, cooked oatmeal, almonds, peanuts, and whole-wheat bread.  Graphic 12497 Version 1.0  Consumer Information Use and Disclaimer   Disclaimer: This generalized information is a limited summary of diagnosis, treatment, and/or medication information. It is not meant to be comprehensive and should be used as a tool to help the user understand and/or assess potential diagnostic and treatment options. It does NOT include all information about conditions, treatments, medications, side effects, or risks that may apply to a specific patient. It is not intended to be medical advice or a substitute for the medical advice, diagnosis, or treatment of a health care provider based on the health care provider's examination and assessment of a patient's specific and unique circumstances. Patients must speak with a health care provider for complete information about their health, medical questions, and treatment options, including any risks or benefits regarding use of medications. This information does not endorse any treatments or medications as safe, effective, or approved for treating a specific patient. Up[employer], Inc. and its affiliates disclaim any warranty or liability relating to this information or the use thereof.The use of this information is governed by the Terms of Use, available at  https://www.woltersRxVantageuwer.com/en/know/clinical-effectiveness-terms. 2024© [employer] and its affiliates and/or licensors. All rights reserved.  Copyright   © 2024 [employer] and/or its affiliates. All rights reserved.

## 2024-07-06 ENCOUNTER — HOSPITAL ENCOUNTER (EMERGENCY)
Facility: HOSPITAL | Age: 84
Discharge: HOME | DRG: 868 | End: 2024-07-07
Attending: EMERGENCY MEDICINE
Payer: MEDICARE

## 2024-07-06 VITALS
SYSTOLIC BLOOD PRESSURE: 124 MMHG | HEART RATE: 63 BPM | HEIGHT: 62 IN | BODY MASS INDEX: 21.1 KG/M2 | DIASTOLIC BLOOD PRESSURE: 66 MMHG | OXYGEN SATURATION: 99 % | WEIGHT: 114.64 LBS | TEMPERATURE: 98.1 F | RESPIRATION RATE: 18 BRPM

## 2024-07-06 DIAGNOSIS — M54.9 BACK PAIN: Primary | ICD-10-CM

## 2024-07-06 LAB
CARDIAC TROPONIN I PNL SERPL HS: 6 NG/L
D DIMER PPP FEU-MCNC: 0.53 UG/ML FEU

## 2024-07-06 PROCEDURE — 99285 EMERGENCY DEPT VISIT HI MDM: CPT | Performed by: EMERGENCY MEDICINE

## 2024-07-06 PROCEDURE — 96374 THER/PROPH/DIAG INJ IV PUSH: CPT

## 2024-07-06 PROCEDURE — 36415 COLL VENOUS BLD VENIPUNCTURE: CPT

## 2024-07-06 PROCEDURE — 93005 ELECTROCARDIOGRAM TRACING: CPT

## 2024-07-06 PROCEDURE — 84484 ASSAY OF TROPONIN QUANT: CPT

## 2024-07-06 PROCEDURE — 96375 TX/PRO/DX INJ NEW DRUG ADDON: CPT

## 2024-07-06 PROCEDURE — 85379 FIBRIN DEGRADATION QUANT: CPT

## 2024-07-06 PROCEDURE — 99283 EMERGENCY DEPT VISIT LOW MDM: CPT

## 2024-07-06 RX ORDER — ACETAMINOPHEN 325 MG/1
650 TABLET ORAL ONCE
Status: COMPLETED | OUTPATIENT
Start: 2024-07-06 | End: 2024-07-06

## 2024-07-06 RX ORDER — HYDROMORPHONE HCL IN WATER/PF 6 MG/30 ML
0.2 PATIENT CONTROLLED ANALGESIA SYRINGE INTRAVENOUS ONCE
Status: COMPLETED | OUTPATIENT
Start: 2024-07-06 | End: 2024-07-06

## 2024-07-06 RX ORDER — LIDOCAINE 50 MG/G
1 PATCH TOPICAL ONCE
Status: DISCONTINUED | OUTPATIENT
Start: 2024-07-06 | End: 2024-07-07 | Stop reason: HOSPADM

## 2024-07-06 RX ORDER — KETOROLAC TROMETHAMINE 30 MG/ML
15 INJECTION, SOLUTION INTRAMUSCULAR; INTRAVENOUS ONCE
Status: COMPLETED | OUTPATIENT
Start: 2024-07-06 | End: 2024-07-06

## 2024-07-06 RX ORDER — DIPHENHYDRAMINE HCL 25 MG
25 TABLET ORAL ONCE
Status: COMPLETED | OUTPATIENT
Start: 2024-07-06 | End: 2024-07-06

## 2024-07-06 RX ADMIN — HYDROMORPHONE HYDROCHLORIDE 0.2 MG: 0.2 INJECTION, SOLUTION INTRAMUSCULAR; INTRAVENOUS; SUBCUTANEOUS at 22:37

## 2024-07-06 RX ADMIN — ACETAMINOPHEN 650 MG: 325 TABLET, FILM COATED ORAL at 22:37

## 2024-07-06 RX ADMIN — KETOROLAC TROMETHAMINE 15 MG: 30 INJECTION, SOLUTION INTRAMUSCULAR; INTRAVENOUS at 23:27

## 2024-07-06 RX ADMIN — LIDOCAINE 5% 1 PATCH: 700 PATCH TOPICAL at 22:37

## 2024-07-06 RX ADMIN — DIPHENHYDRAMINE HYDROCHLORIDE 25 MG: 25 TABLET ORAL at 23:58

## 2024-07-07 NOTE — ED PROVIDER NOTES
History  Chief Complaint   Patient presents with    Back Pain     Patient comes in reporting back pain for the past two weeks. States she saw pcp yesterday and had blood work completed and CT done per family. Rates pain 10/10. Tylenol taken today at 1530, states due for more at 2130. Difficulty sleeping at night due to pain.      The patient is an 83 year old female who presents to ED for evaluation of back pain. Pt states she has had mid back pain for 1-2 weeks. She states the pain is rated 10/10 and is having difficulty sleeping at night due to pain. She was seen by her pcp and had work up including CT scan and labs yesterday but has not gotten results. She has been taking some tylenol with last dose around 330 pm. Denies SOB, chest pain, rash, numbness, weakness        Prior to Admission Medications   Prescriptions Last Dose Informant Patient Reported? Taking?   Multiple Vitamins-Minerals (CENTRUM SILVER 50+WOMEN PO)  Self Yes No   Sig: Take by mouth every morning   Probiotic Product (PROBIOTIC ADVANCED PO)  Self Yes No   Sig: Take by mouth   acetaminophen (TYLENOL) 325 mg tablet  Self No No   Sig: Take 3 tablets (975 mg total) by mouth every 8 (eight) hours   Patient not taking: Reported on 7/9/2024   aspirin 325 mg tablet  Self No No   Sig: Take 1 tablet (325 mg total) by mouth 2 (two) times a day   Patient not taking: Reported on 5/2/2024   cholecalciferol (VITAMIN D3) 1,000 units tablet  Self Yes No   Sig: Take 1,000 Units by mouth every morning   Patient not taking: Reported on 7/9/2024   senna-docusate sodium (SENOKOT S) 8.6-50 mg per tablet  Self No No   Sig: Take 1 tablet by mouth daily at bedtime   Patient not taking: Reported on 3/23/2024   sodium chloride (ALESIA 128) 2 % hypertonic ophthalmic solution  Self Yes No   Sig: Administer 1 drop to both eyes 3 (three) times a day Alesia 128 gel at HS    Patient not taking: Reported on 7/9/2024   sodium chloride (Alesia 128) 5 % hypertonic ophthalmic ointment  Self  Yes No   Sig: Administer 1 drop to both eyes daily at bedtime. 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    Spinal stenosis     Varicose veins of legs     Vitamin D deficiency     Wears glasses        Past Surgical History:   Procedure Laterality Date    CATARACT EXTRACTION      AK OPTX FEM SHFT FX W/INSJ IMED IMPLT W/WO SCREW Left 3/19/2024    Procedure: INSERTION NAIL IM FEMUR ANTEGRADE (TROCHANTERIC) and all associated procedures;  Surgeon: Poncho Duggan DO;  Location: [facility];  Service: Orthopedics    AK XCAPSL CTRC RMVL INSJ IO LENS PROSTH W/O ECP Right 5/14/2018    Procedure: EXTRACTION EXTRACAPSULAR CATARACT PHACO INTRAOCULAR LENS (IOL);  Surgeon: Rian Benson MD;  Location: [facility] MAIN OR;  Service: Ophthalmology    AK XCAPSL CTRC RMVL INSJ IO LENS PROSTH W/O ECP Left 7/10/2018    Procedure: EXTRACTION EXTRACAPSULAR CATARACT PHACO INTRAOCULAR LENS (IOL);  Surgeon: Rian Benson MD;  Location: [facility] MAIN OR;  Service: Ophthalmology    TUBAL LIGATION         Family History   Problem Relation Age of Onset    Other Mother         corneal transplants, Parathyroid disease    Osteoporosis Mother     Stroke Father     Hypertension Father     Heart disease Father         cardiac disorder    Osteoporosis Father     Other Father         Parathyroid disease    Asthma Sister     Hiatal hernia Sister     Other Brother          "parathyroid-removed, Parathyroid disease    Heart disease Brother         \" maker\"     I have reviewed and agree with the history as documented.    E-Cigarette/Vaping    E-Cigarette Use Never User      E-Cigarette/Vaping Substances    Nicotine No     THC No     CBD No     Flavoring No     Other No     Unknown No      Social History     Tobacco Use    Smoking status: Never    Smokeless tobacco: Never   Vaping Use    Vaping status: Never Used   Substance Use Topics    Alcohol use: No    Drug use: No        Review of Systems   Constitutional:  Negative for fever.   Respiratory:  Negative for cough and shortness of breath.    Gastrointestinal:  Negative for abdominal pain.   Genitourinary:  Negative for difficulty urinating, dysuria and hematuria.   Musculoskeletal:  Positive for back pain.   Skin:  Negative for rash and wound.   Neurological:  Negative for dizziness, weakness, numbness and headaches.   Psychiatric/Behavioral:  Positive for sleep disturbance.        Physical Exam  ED Triage Vitals [07/06/24 2106]   Temperature Pulse Respirations Blood Pressure SpO2   98.1 °F (36.7 °C) 78 20 142/78 97 %      Temp src Heart Rate Source Patient Position - Orthostatic VS BP Location FiO2 (%)   -- -- Sitting Right arm --      Pain Score       10 - Worst Possible Pain             Orthostatic Vital Signs  Vitals:    07/06/24 2106 07/06/24 2321   BP: 142/78 124/66   Pulse: 78 63   Patient Position - Orthostatic VS: Sitting Sitting       Physical Exam  Vitals and nursing note reviewed.   Constitutional:       General: She is not in acute distress.     Appearance: Normal appearance. She is not diaphoretic.   HENT:      Head: Normocephalic and atraumatic.   Cardiovascular:      Rate and Rhythm: Normal rate and regular rhythm.      Pulses: Normal pulses.      Heart sounds: Normal heart sounds.   Pulmonary:      Effort: Pulmonary effort is normal. No respiratory distress.      Breath sounds: Normal breath sounds. No stridor. No " wheezing, rhonchi or rales.   Abdominal:      General: Abdomen is flat. Bowel sounds are normal.      Palpations: Abdomen is soft.      Tenderness: There is no abdominal tenderness.   Musculoskeletal:         General: No swelling or deformity. Normal range of motion.      Right lower leg: No edema.      Left lower leg: No edema.   Skin:     General: Skin is warm and dry.      Findings: No lesion or rash.   Neurological:      General: No focal deficit present.      Mental Status: She is alert and oriented to person, place, and time.   Psychiatric:         Mood and Affect: Mood normal.         ED Medications  Medications   acetaminophen (TYLENOL) tablet 650 mg (650 mg Oral Given 7/6/24 2237)   HYDROmorphone HCl (DILAUDID) injection 0.2 mg (0.2 mg Intravenous Given 7/6/24 2237)   ketorolac (TORADOL) injection 15 mg (15 mg Intravenous Given 7/6/24 2327)   diphenhydrAMINE (BENADRYL) tablet 25 mg (25 mg Oral Given 7/6/24 2358)       Diagnostic Studies  Results Reviewed       Procedure Component Value Units Date/Time    HS Troponin 0hr (reflex protocol) [[id number]]  (Normal) Collected: 07/06/24 2234    Lab Status: Final result Specimen: Blood from Arm, Right Updated: 07/06/24 2308     hs TnI 0hr 6 ng/L     D-Dimer [[id number]]  (Abnormal) Collected: 07/06/24 2234    Lab Status: Final result Specimen: Blood from Arm, Right Updated: 07/06/24 2306     D-Dimer, Quant 0.53 ug/ml FEU     Narrative:      In the evaluation for possible pulmonary embolism, in the appropriate (Well's Score of 4 or less) patient, the age adjusted d-dimer cutoff for this patient can be calculated as:    Age x 0.01 (in ug/mL) for Age-adjusted D-dimer exclusion threshold for a patient over 50 years.                   No orders to display         Procedures  ECG 12 Lead Documentation Only    Date/Time: 7/6/2024 10:27 PM    Performed by: Tania Stein MD  Authorized by: Tania Stein MD    Indications / Diagnosis:  Back pain  Patient location:   ED  Previous ECG:     Previous ECG:  Compared to current    Comparison ECG info:  03/18/24 sinus arrythmia  Interpretation:     Interpretation: abnormal    Rate:     ECG rate:  62    ECG rate assessment: normal    Rhythm:     Rhythm: sinus rhythm    Ectopy:     Ectopy: none    QRS:     QRS axis:  Normal    QRS intervals:  Normal  Conduction:     Conduction: normal    ST segments:     ST segments:  Normal  T waves:     T waves: non-specific    ECG 12 Lead Documentation Only    Date/Time: 7/6/2024 9:18 PM    Performed by: Tania Stein MD  Authorized by: Tania Stein MD    Indications / Diagnosis:  Back pain  Patient location:  ED  Quality:     Tracing quality:  Limited by artifact        ED Course  ED Course as of 07/11/24 0827   Sat Jul 06, 2024   2322 D-Dimer, Quant(!): 0.53  WNL with age adjusted criteria                             SBIRT 20yo+      Flowsheet Row Most Recent Value   Initial Alcohol Screen: US AUDIT-C     1. How often do you have a drink containing alcohol? 0 Filed at: 07/06/2024 2106   2. How many drinks containing alcohol do you have on a typical day you are drinking?  0 Filed at: 07/06/2024 2106   3a. Male UNDER 65: How often do you have five or more drinks on one occasion? 0 Filed at: 07/06/2024 2106   3b. FEMALE Any Age, or MALE 65+: How often do you have 4 or more drinks on one occassion? 0 Filed at: 07/06/2024 2106   Audit-C Score 0 Filed at: 07/06/2024 2106   CARINA: How many times in the past year have you...    Used an illegal drug or used a prescription medication for non-medical reasons? Never Filed at: 07/06/2024 2106                  Medical Decision Making  Ddx: musculoskeletal back pain, PE, cardiac arrhythmia, MI, ACS    Pt has thorough work up by PCP yesterday including CT scan without acute abnormality  D-dimer negative with age adjusted criteria.   EKG normal  Troponin negative.   Pt given Toradol, tylenol, lidocaine patch, and dose of dilaudid  Pt concerned for  inability to sleep, given 1 tablet benadryl  Referral to comprehensive spine      Amount and/or Complexity of Data Reviewed  Independent Historian: caregiver     Details: daughter  Labs: ordered. Decision-making details documented in ED Course.    Risk  OTC drugs.  Prescription drug management.          Disposition  Final diagnoses:   Back pain     Time reflects when diagnosis was documented in both MDM as applicable and the Disposition within this note       Time User Action Codes Description Comment    7/6/2024 11:42 PM Tania Stein [M54.9] Back pain           ED Disposition       ED Disposition   Discharge    Condition   Stable    Date/Time   Sat Jul 6, 2024 2351    Comment   Kay M Kaila discharge to home/self care.                   Follow-up Information       Follow up With Specialties Details Why Contact Info Additional Information    Abelino Garcia,  Family Medicine In 1 week  [address] [place] [id number] [phone]       [facility] Physical Therapy In 1 month  [phone]            Discharge Medication List as of 7/6/2024 11:52 PM        START taking these medications    Details   Diclofenac Sodium (VOLTAREN) 1 % Apply 2 g topically 4 (four) times a day, Starting Sat 7/6/2024, Normal           CONTINUE these medications which have NOT CHANGED    Details   acetaminophen (TYLENOL) 325 mg tablet Take 3 tablets (975 mg total) by mouth every 8 (eight) hours, Starting Thu 3/21/2024, No Print      aspirin 325 mg tablet Take 1 tablet (325 mg total) by mouth 2 (two) times a day, Starting Thu 3/21/2024, Until Thu 5/2/2024, No Print      cholecalciferol (VITAMIN D3) 1,000 units tablet Take 1,000 Units by mouth every morning, Historical Med      Multiple Vitamins-Minerals (CENTRUM SILVER 50+WOMEN PO) Take by mouth every morning, Historical Med      Probiotic Product (PROBIOTIC ADVANCED PO) Take by mouth, Historical Med       senna-docusate sodium (SENOKOT S) 8.6-50 mg per tablet Take 1 tablet by mouth daily at bedtime, Starting Thu 3/21/2024, No Print      sodium chloride (ALESIA 128) 2 % hypertonic ophthalmic solution Administer 1 drop to both eyes 3 (three) times a day Alesia 128 gel at HS , Historical Med      sodium chloride (Alesia 128) 5 % hypertonic ophthalmic ointment Administer 1 drop to both eyes daily at bedtime. Indications: NA, Historical Med               PDMP Review       None             ED Provider  Attending physically available and evaluated Kay MIGDALIA Bright. I managed the patient along with the ED Attending.    Electronically Signed by           Tania Stein MD  07/11/24 9130

## 2024-07-07 NOTE — DISCHARGE INSTRUCTIONS
You may use over the counter 4% lidocaine patches on the painful area for up to 12 hours at a time. You can use up to 3 patches at one time. After 12 hours, remove and discard the patch and leave off for 10-12 hours before applying a new patch to the same area.     You may use heat or ice to the area when not wearing a lidocaine patch. Do not use heat or ice to the affected area while wearing a patch as it can affect medication absorption.     You may use Voltaren topical gel to the area when not wearing lidocaine patch. Wipe off the area before applying another lidocaine patch.     Take tylenol 675mg up to every 6 hours as needed. You can take ibuprofen 400mg up to every 8 hours as needed. You can take both together but be mindful of the time administration difference.     You may take benadryl at bedtime to help with sleep. Try 1 tablet (25mg), if this is not helping, you may take 2 tables (50mg).   You may try melatonin for sleep as well.

## 2024-07-07 NOTE — ED ATTENDING ATTESTATION
7/6/2024  IAntwan DO, saw and evaluated the patient. I have discussed the patient with the resident/non-physician practitioner and agree with the resident's/non-physician practitioner's findings, Plan of Care, and MDM as documented in the resident's/non-physician practitioner's note, except where noted. All available labs and Radiology studies were reviewed.  I was present for key portions of any procedure(s) performed by the resident/non-physician practitioner and I was immediately available to provide assistance.       At this point I agree with the current assessment done in the Emergency Department.  I have conducted an independent evaluation of this patient a history and physical is as follows:    82 yo F coming in for eval of a 1-2 week history of mid back pain. Denies trauma. Not sleeping well, pacing at night.  Saw her PCP this week who did labs and CT chest/abd/pelvis w/ contrast, which was overall unremarkable. Denies fevers, cough. States pain is in the middle, mostly on the left, wrapping around to the left flank and abdomen. No urinary symptoms.  Labs and urinalysis yesterday were negative.    PE:  The patient is well appearing, non-toxic, in NAD. Head: normocephalic, atraumatic. HEENT: mucous membranes moist.  Lungs: CTA b/l, no resp distress. Heart: RRR. No M/R/G. Abdomen: NT, ND, no R/R/G. Neuro: CN2-12 intact, GCS 15. Normal strength and sensation, normal speech and gait. Cap refill < 2 sec, skin warm and dry. No rashes or lesions.  MSK: no reproducible back tenderness over the midline or paraspinal musculature.   ED workup - just had CT c/a/p which was negative for fractures, kidney stones, or other acute processes. UA neg for UTI or hematuria.  EKG, troponin, d-dimer to eval for ACS, PE.  These tests resulted WNL (age adjusted d-dimer.)  Unclear etiology of symptoms, but suspect they're musculoskeletal in origin given medical workup negative at this point. Stable for d/c home.    ED  Course         Critical Care Time  Procedures

## 2024-07-08 ENCOUNTER — OFFICE VISIT (OUTPATIENT)
Dept: OBGYN CLINIC | Facility: CLINIC | Age: 84
End: 2024-07-08
Payer: MEDICARE

## 2024-07-08 ENCOUNTER — APPOINTMENT (OUTPATIENT)
Dept: RADIOLOGY | Facility: AMBULARY SURGERY CENTER | Age: 84
DRG: 868 | End: 2024-07-08
Attending: STUDENT IN AN ORGANIZED HEALTH CARE EDUCATION/TRAINING PROGRAM
Payer: MEDICARE

## 2024-07-08 ENCOUNTER — TELEPHONE (OUTPATIENT)
Dept: PHYSICAL THERAPY | Facility: OTHER | Age: 84
End: 2024-07-08

## 2024-07-08 VITALS — HEIGHT: 62 IN | WEIGHT: 114.64 LBS | BODY MASS INDEX: 21.1 KG/M2

## 2024-07-08 DIAGNOSIS — E83.52 SERUM CALCIUM ELEVATED: Primary | ICD-10-CM

## 2024-07-08 DIAGNOSIS — Z98.890 STATUS POST HIP SURGERY: Primary | ICD-10-CM

## 2024-07-08 DIAGNOSIS — J98.4 LUNG ABNORMALITY: ICD-10-CM

## 2024-07-08 DIAGNOSIS — Z98.890 STATUS POST HIP SURGERY: ICD-10-CM

## 2024-07-08 DIAGNOSIS — M54.50 ACUTE BILATERAL LOW BACK PAIN WITHOUT SCIATICA: ICD-10-CM

## 2024-07-08 LAB
ATRIAL RATE: 65 BPM
ATRIAL RATE: 76 BPM
P AXIS: 44 DEGREES
P AXIS: 78 DEGREES
PR INTERVAL: 160 MS
PR INTERVAL: 166 MS
QRS AXIS: -13 DEGREES
QRS AXIS: -34 DEGREES
QRSD INTERVAL: 82 MS
QRSD INTERVAL: 88 MS
QT INTERVAL: 394 MS
QT INTERVAL: 404 MS
QTC INTERVAL: 409 MS
QTC INTERVAL: 448 MS
T WAVE AXIS: -4 DEGREES
T WAVE AXIS: -4 DEGREES
VENTRICULAR RATE: 65 BPM
VENTRICULAR RATE: 74 BPM

## 2024-07-08 PROCEDURE — 93010 ELECTROCARDIOGRAM REPORT: CPT | Performed by: INTERNAL MEDICINE

## 2024-07-08 PROCEDURE — 73502 X-RAY EXAM HIP UNI 2-3 VIEWS: CPT

## 2024-07-08 PROCEDURE — 99213 OFFICE O/P EST LOW 20 MIN: CPT | Performed by: STUDENT IN AN ORGANIZED HEALTH CARE EDUCATION/TRAINING PROGRAM

## 2024-07-08 NOTE — TELEPHONE ENCOUNTER
Call placed to the patient per [facility] program referral.    V/M left for patient to call back. Phone number and hours of business provided.    This is the 1st attempt to reach the patient. Will defer referral per protocol.    Hx of hip fx.

## 2024-07-08 NOTE — PROGRESS NOTES
Orthopaedic Surgery - Office Note  Kay Bright (83 y.o. female)   : 1940   MRN: [id number]  Encounter Date: 2024    Chief Complaint   Patient presents with    Left Hip - Post-op     Past Surgical History:   Procedure Laterality Date    CATARACT EXTRACTION      UT OPTX FEM SHFT FX W/INSJ IMED IMPLT W/WO SCREW Left 3/19/2024    Procedure: INSERTION NAIL IM FEMUR ANTEGRADE (TROCHANTERIC) and all associated procedures;  Surgeon: Poncho Duggan DO;  Location: [facility];  Service: Orthopedics    UT XCAPSL CTRC RMVL INSJ IO LENS PROSTH W/O ECP Right 2018    Procedure: EXTRACTION EXTRACAPSULAR CATARACT PHACO INTRAOCULAR LENS (IOL);  Surgeon: Rian Benson MD;  Location: [facility] MAIN OR;  Service: Ophthalmology    UT XCAPSL CTRC RMVL INSJ IO LENS PROSTH W/O ECP Left 7/10/2018    Procedure: EXTRACTION EXTRACAPSULAR CATARACT PHACO INTRAOCULAR LENS (IOL);  Surgeon: Rian Benson MD;  Location: [facility] MAIN OR;  Service: Ophthalmology    TUBAL LIGATION       Assessment / Plan  1.  Status post left nondisplaced intertrochanteric femur fracture  2.  Status post surgical fixation of left intertrochanteric femur fracture with intramedullary nail, date of surgery 3/19/2024    X-rays reviewed and discussed with patient revealing maintained alignment of patient's previous left nondisplaced intertrochanteric femur fracture.  No signs of hardware failure or loosening.  No acute fracture or dislocation noted.  Pt to be weightbearing as tolerated to left lower extremity  ROM as tolerated to left lower extremity  Pt to has completed physical therapy  Pt to continue at home analgesic regimen with Tylenol   Pt has completed her ASA 325mg for DVT ppx  Patient undergoing workup for low back pain over the last several months.  A formal referral to pain and spine was placed today.  Pt to follow up in 12 weeks for repeat x-ray and re-evaluation      History of Present Illness  Kay Bright is a 83 y.o. female who  "presents 2 weeks status post surgical fixation of left intertrochanteric femur fracture with intramedullary nail, date of surgery 3/19/2024. She presents today ambulating with a walker. She uses a cane at home when ambulating. She states overall she is doing well and denies any significant pain in the left lower extremity.  She endorses some soreness and discomfort with overuse but this is well controlled with Tylenol. She continues with PT and is happy with her progression. She denies any numbness or paresthesias.     Interval history 5/13/2024  Patient presents approximately 8 weeks status post surgical fixation of left intertrochanteric femur fracture with intramedullary nail.  She presents today ambulating with a cane. She continues with PT and is happy with her progression.  She states overall she feels well and denies any significant pain in the left lower extremity.  She denies any numbness or paresthesias of the left lower extremity.    Interval history 7/8/24  Pt presents approximately 3 1/2 months s/p  surgical fixation of left intertrochanteric femur fracture with intramedullary nail.  She presents today ambulating with a cane. She continues to work with PT and is happy with her progression overall. She denies significant pain in the left lower extremity. She states she experiences back pain at night to her only complaint.  She has been followed by her primary care physician for her low back pain and was recently seen in the emergency department on 7/6.  CT scan at that time showed previous old compression fractures but no acute fractures dislocations in the thoracic or lumbar spine.  She has no other complaints. Denies numbness or paresthesias of the left lower extremity.     Review of Systems  Pertinent items are noted in HPI.  All other systems were reviewed and are negative.    Physical Exam  Ht 5' 1.5\" (1.562 m)   Wt 52 kg (114 lb 10.2 oz)   BMI 21.31 kg/m²   Cons: Appears well.  No apparent " distress.  Psych: Alert. Oriented x3.  Mood and affect normal.  Eyes: PERRLA, EOMI  Resp: Normal effort.  No audible wheezing or stridor.  CV: Palpable pulse.  No discernable arrhythmia.    Lymph:  No palpable cervical, axillary, or inguinal lymphadenopathy.  Skin: Warm.  No palpable masses.  No visible lesions.  Neuro: Normal muscle tone.  Normal and symmetric DTR's.     Left lower extremity  The left lower extremity was exposed and inspected. Surgical incisions clean dry intact without signs of erythema or dehiscence.  Visible skin intact without erythema, ecchymosis, effusion or obvious osseous deformity.  No TTP frida-incisionally proximally.  Patient has mild tenderness to palpation over the trochanteric bursa at the site of the helical blade insertion point.  Pt able to passively range hip from 0-120 degrees of flexion. No pain with internal or external rotation. Sensation intact to superficial peroneal, deep peroneal, sural, saphenous, plantar nerve distributions. Motor intact to extensor hallux longus, tibialis anterior, gastrocnemius muscles, extensor mechanism intact. Limb is well perfused. Brisk capillary refill in all 5 digits. Compartments soft and compressible.     Studies Reviewed  X-rays pelvis reveal: Maintained alignment of patient's previous left nondisplaced intertrochanteric femur fracture.  No signs of hardware failure or loosening.  No acute fracture or dislocation noted.     Procedures      Medical, Surgical, Family, and Social History  The patient's medical history, family history, and social history, were reviewed and updated as appropriate.    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    Spinal stenosis     Varicose veins of legs     Vitamin D deficiency     Wears glasses            Family History   Problem Relation Age of Onset    Other Mother         corneal transplants, Parathyroid disease    Osteoporosis Mother     Stroke Father     Hypertension Father     Heart disease Father         cardiac disorder    Osteoporosis Father     Other Father         Parathyroid disease    Asthma Sister     Hiatal hernia Sister     Other Brother         parathyroid-removed, Parathyroid disease    Heart disease Brother         \" maker\"       Social History     Occupational History    Not on file   Tobacco Use    Smoking status: Never    Smokeless tobacco: Never   Vaping Use    Vaping status: Never Used   Substance and Sexual Activity    Alcohol use: No    Drug use: No    Sexual activity: Not on file       Allergies   Allergen Reactions    Prednisone Swelling     Facial swelling, redness-no dyspnea         Current Outpatient Medications:     acetaminophen (TYLENOL) 325 mg tablet, Take 3 tablets (975 mg total) by mouth every 8 (eight) hours (Patient taking differently: Take 975 mg by mouth every 8 (eight) hours pt takes 500mg PRN), Disp: , Rfl:     cholecalciferol (VITAMIN D3) 1,000 units tablet, Take 1,000 Units by mouth every morning, Disp: , Rfl:     Diclofenac Sodium (VOLTAREN) 1 %, Apply 2 g topically 4 (four) times a day, Disp: 50 g, Rfl: 0    Multiple Vitamins-Minerals (CENTRUM SILVER 50+WOMEN PO), Take by mouth every morning, Disp: , Rfl:     Probiotic Product (PROBIOTIC ADVANCED PO), Take by mouth, Disp: , Rfl:     sodium chloride (ALESIA 128) 2 % hypertonic ophthalmic solution, Administer 1 drop to both eyes 3 (three) times a day Alesia 128 gel at HS , Disp: , Rfl:     aspirin 325 mg tablet, Take 1 tablet (325 mg total) by mouth 2 (two) " times a day (Patient not taking: Reported on 5/2/2024), Disp: , Rfl:     senna-docusate sodium (SENOKOT S) 8.6-50 mg per tablet, Take 1 tablet by mouth daily at bedtime (Patient not taking: Reported on 3/23/2024), Disp: , Rfl:     sodium chloride (Alesia 128) 5 % hypertonic ophthalmic ointment, Administer 1 drop to both eyes daily at bedtime. Indications: NA (Patient not taking: Reported on 5/2/2024), Disp: , Rfl:       Nolberto Meza PA-C    Scribe Attestation      I,:   am acting as a scribe while in the presence of the attending physician.:       I,:   personally performed the services described in this documentation    as scribed in my presence.:

## 2024-07-08 NOTE — TELEPHONE ENCOUNTER
Curtis (daughter) called and states patient is a lot of back pain, not sleeping, walking the floors at night due to pain. Was seen in ER 7/06/2024 due to pain. Got upset stomach from Tylenol/Motrin. Would like to know if there is something can have for pain and would like results of labs and CT. Please call curtis at [phone]

## 2024-07-09 ENCOUNTER — HOSPITAL ENCOUNTER (INPATIENT)
Facility: HOSPITAL | Age: 84
LOS: 3 days | Discharge: NURSING, REHAB OR LONG-TERM CARE | DRG: 868 | End: 2024-07-13
Attending: EMERGENCY MEDICINE | Admitting: INTERNAL MEDICINE
Payer: MEDICARE

## 2024-07-09 ENCOUNTER — APPOINTMENT (EMERGENCY)
Dept: CT IMAGING | Facility: HOSPITAL | Age: 84
DRG: 868 | End: 2024-07-09
Payer: MEDICARE

## 2024-07-09 ENCOUNTER — APPOINTMENT (OUTPATIENT)
Dept: MRI IMAGING | Facility: HOSPITAL | Age: 84
DRG: 868 | End: 2024-07-09
Payer: MEDICARE

## 2024-07-09 ENCOUNTER — APPOINTMENT (OUTPATIENT)
Dept: RADIOLOGY | Facility: HOSPITAL | Age: 84
DRG: 868 | End: 2024-07-09
Payer: MEDICARE

## 2024-07-09 DIAGNOSIS — R29.810 FACIAL DROOP: Primary | ICD-10-CM

## 2024-07-09 DIAGNOSIS — R68.89 SUSPECTED LYME DISEASE: ICD-10-CM

## 2024-07-09 PROBLEM — M54.6 LEFT-SIDED THORACIC BACK PAIN: Status: ACTIVE | Noted: 2024-07-09

## 2024-07-09 LAB
25(OH)D3 SERPL-MCNC: 30.6 NG/ML (ref 30–100)
2HR DELTA HS TROPONIN: 0 NG/L
4HR DELTA HS TROPONIN: 1 NG/L
ANION GAP SERPL CALCULATED.3IONS-SCNC: 7 MMOL/L (ref 4–13)
APTT PPP: 26 SECONDS (ref 23–37)
BUN SERPL-MCNC: 14 MG/DL (ref 5–25)
CA-I BLD-SCNC: 1.28 MMOL/L (ref 1.12–1.32)
CALCIUM SERPL-MCNC: 10.7 MG/DL (ref 8.4–10.2)
CARDIAC TROPONIN I PNL SERPL HS: 7 NG/L
CARDIAC TROPONIN I PNL SERPL HS: 7 NG/L
CARDIAC TROPONIN I PNL SERPL HS: 8 NG/L
CHLORIDE SERPL-SCNC: 96 MMOL/L (ref 96–108)
CO2 SERPL-SCNC: 28 MMOL/L (ref 21–32)
CREAT SERPL-MCNC: 0.52 MG/DL (ref 0.6–1.3)
ERYTHROCYTE [DISTWIDTH] IN BLOOD BY AUTOMATED COUNT: 13.2 % (ref 11.6–15.1)
FLUAV RNA RESP QL NAA+PROBE: NEGATIVE
FLUBV RNA RESP QL NAA+PROBE: NEGATIVE
GFR SERPL CREATININE-BSD FRML MDRD: 88 ML/MIN/1.73SQ M
GLUCOSE SERPL-MCNC: 107 MG/DL (ref 65–140)
GLUCOSE SERPL-MCNC: 113 MG/DL (ref 65–140)
HCT VFR BLD AUTO: 39.7 % (ref 34.8–46.1)
HGB BLD-MCNC: 13.3 G/DL (ref 11.5–15.4)
INR PPP: 0.98 (ref 0.84–1.19)
MCH RBC QN AUTO: 29.5 PG (ref 26.8–34.3)
MCHC RBC AUTO-ENTMCNC: 33.5 G/DL (ref 31.4–37.4)
MCV RBC AUTO: 88 FL (ref 82–98)
PLATELET # BLD AUTO: 264 THOUSANDS/UL (ref 149–390)
PLATELET # BLD AUTO: 294 THOUSANDS/UL (ref 149–390)
PMV BLD AUTO: 9.4 FL (ref 8.9–12.7)
PMV BLD AUTO: 9.7 FL (ref 8.9–12.7)
POTASSIUM SERPL-SCNC: 4 MMOL/L (ref 3.5–5.3)
PROTHROMBIN TIME: 13.6 SECONDS (ref 11.6–14.5)
RBC # BLD AUTO: 4.51 MILLION/UL (ref 3.81–5.12)
RSV RNA RESP QL NAA+PROBE: NEGATIVE
SARS-COV-2 RNA RESP QL NAA+PROBE: NEGATIVE
SODIUM SERPL-SCNC: 131 MMOL/L (ref 135–147)
WBC # BLD AUTO: 7.7 THOUSAND/UL (ref 4.31–10.16)

## 2024-07-09 PROCEDURE — 99285 EMERGENCY DEPT VISIT HI MDM: CPT

## 2024-07-09 PROCEDURE — 70551 MRI BRAIN STEM W/O DYE: CPT

## 2024-07-09 PROCEDURE — 85049 AUTOMATED PLATELET COUNT: CPT | Performed by: INTERNAL MEDICINE

## 2024-07-09 PROCEDURE — 84484 ASSAY OF TROPONIN QUANT: CPT | Performed by: INTERNAL MEDICINE

## 2024-07-09 PROCEDURE — 80048 BASIC METABOLIC PNL TOTAL CA: CPT | Performed by: EMERGENCY MEDICINE

## 2024-07-09 PROCEDURE — 85610 PROTHROMBIN TIME: CPT | Performed by: EMERGENCY MEDICINE

## 2024-07-09 PROCEDURE — 82948 REAGENT STRIP/BLOOD GLUCOSE: CPT

## 2024-07-09 PROCEDURE — 99291 CRITICAL CARE FIRST HOUR: CPT | Performed by: PSYCHIATRY & NEUROLOGY

## 2024-07-09 PROCEDURE — 0241U HB NFCT DS VIR RESP RNA 4 TRGT: CPT | Performed by: EMERGENCY MEDICINE

## 2024-07-09 PROCEDURE — 85027 COMPLETE CBC AUTOMATED: CPT | Performed by: EMERGENCY MEDICINE

## 2024-07-09 PROCEDURE — 82397 CHEMILUMINESCENT ASSAY: CPT | Performed by: INTERNAL MEDICINE

## 2024-07-09 PROCEDURE — 36415 COLL VENOUS BLD VENIPUNCTURE: CPT | Performed by: EMERGENCY MEDICINE

## 2024-07-09 PROCEDURE — 83970 ASSAY OF PARATHORMONE: CPT | Performed by: INTERNAL MEDICINE

## 2024-07-09 PROCEDURE — 99223 1ST HOSP IP/OBS HIGH 75: CPT | Performed by: INTERNAL MEDICINE

## 2024-07-09 PROCEDURE — 93005 ELECTROCARDIOGRAM TRACING: CPT

## 2024-07-09 PROCEDURE — 82306 VITAMIN D 25 HYDROXY: CPT | Performed by: INTERNAL MEDICINE

## 2024-07-09 PROCEDURE — 83930 ASSAY OF BLOOD OSMOLALITY: CPT

## 2024-07-09 PROCEDURE — 70498 CT ANGIOGRAPHY NECK: CPT

## 2024-07-09 PROCEDURE — 86617 LYME DISEASE ANTIBODY: CPT | Performed by: INTERNAL MEDICINE

## 2024-07-09 PROCEDURE — 72080 X-RAY EXAM THORACOLMB 2/> VW: CPT

## 2024-07-09 PROCEDURE — 86618 LYME DISEASE ANTIBODY: CPT | Performed by: INTERNAL MEDICINE

## 2024-07-09 PROCEDURE — 84484 ASSAY OF TROPONIN QUANT: CPT | Performed by: EMERGENCY MEDICINE

## 2024-07-09 PROCEDURE — 82330 ASSAY OF CALCIUM: CPT | Performed by: INTERNAL MEDICINE

## 2024-07-09 PROCEDURE — 99285 EMERGENCY DEPT VISIT HI MDM: CPT | Performed by: EMERGENCY MEDICINE

## 2024-07-09 PROCEDURE — 70496 CT ANGIOGRAPHY HEAD: CPT

## 2024-07-09 PROCEDURE — 85730 THROMBOPLASTIN TIME PARTIAL: CPT | Performed by: EMERGENCY MEDICINE

## 2024-07-09 RX ORDER — DOCUSATE SODIUM 100 MG/1
100 CAPSULE, LIQUID FILLED ORAL 2 TIMES DAILY
Status: DISCONTINUED | OUTPATIENT
Start: 2024-07-09 | End: 2024-07-13 | Stop reason: HOSPADM

## 2024-07-09 RX ORDER — ENOXAPARIN SODIUM 100 MG/ML
40 INJECTION SUBCUTANEOUS DAILY
Status: DISCONTINUED | OUTPATIENT
Start: 2024-07-09 | End: 2024-07-13 | Stop reason: HOSPADM

## 2024-07-09 RX ORDER — ACETAMINOPHEN 325 MG/1
975 TABLET ORAL EVERY 8 HOURS SCHEDULED
Status: DISCONTINUED | OUTPATIENT
Start: 2024-07-09 | End: 2024-07-13 | Stop reason: HOSPADM

## 2024-07-09 RX ORDER — CLOPIDOGREL BISULFATE 75 MG/1
75 TABLET ORAL DAILY
Status: DISCONTINUED | OUTPATIENT
Start: 2024-07-10 | End: 2024-07-10

## 2024-07-09 RX ORDER — ATORVASTATIN CALCIUM 40 MG/1
40 TABLET, FILM COATED ORAL EVERY EVENING
Status: DISCONTINUED | OUTPATIENT
Start: 2024-07-09 | End: 2024-07-10

## 2024-07-09 RX ORDER — CLOPIDOGREL BISULFATE 75 MG/1
300 TABLET ORAL ONCE
Status: COMPLETED | OUTPATIENT
Start: 2024-07-09 | End: 2024-07-09

## 2024-07-09 RX ORDER — LIDOCAINE 50 MG/G
1 PATCH TOPICAL ONCE
Status: COMPLETED | OUTPATIENT
Start: 2024-07-09 | End: 2024-07-10

## 2024-07-09 RX ORDER — ASPIRIN 81 MG/1
324 TABLET, CHEWABLE ORAL ONCE
Status: DISCONTINUED | OUTPATIENT
Start: 2024-07-09 | End: 2024-07-09

## 2024-07-09 RX ORDER — ASPIRIN 325 MG
325 TABLET ORAL ONCE
Status: COMPLETED | OUTPATIENT
Start: 2024-07-09 | End: 2024-07-09

## 2024-07-09 RX ADMIN — ENOXAPARIN SODIUM 40 MG: 40 INJECTION SUBCUTANEOUS at 17:13

## 2024-07-09 RX ADMIN — CLOPIDOGREL BISULFATE 300 MG: 75 TABLET ORAL at 14:57

## 2024-07-09 RX ADMIN — ATORVASTATIN CALCIUM 40 MG: 40 TABLET, FILM COATED ORAL at 20:13

## 2024-07-09 RX ADMIN — DOCUSATE SODIUM 100 MG: 100 CAPSULE, LIQUID FILLED ORAL at 20:14

## 2024-07-09 RX ADMIN — ACETAMINOPHEN 975 MG: 325 TABLET, FILM COATED ORAL at 20:15

## 2024-07-09 RX ADMIN — ASPIRIN 325 MG ORAL TABLET 325 MG: 325 PILL ORAL at 14:56

## 2024-07-09 RX ADMIN — IOHEXOL 85 ML: 350 INJECTION, SOLUTION INTRAVENOUS at 12:00

## 2024-07-09 RX ADMIN — DICLOFENAC SODIUM 2 G: 10 GEL TOPICAL at 22:00

## 2024-07-09 RX ADMIN — LIDOCAINE 5% 1 PATCH: 700 PATCH TOPICAL at 14:58

## 2024-07-09 RX ADMIN — DICLOFENAC SODIUM 2 G: 10 GEL TOPICAL at 20:16

## 2024-07-09 NOTE — ASSESSMENT & PLAN NOTE
7/10/2024: Seen again today by neurology is this right-hand-dominant community dwelling 84 y/o female with arthritis, spinal stenosis, who presented as a stroke alert due to R facial droop. Patient also reports feeling like there is a glare in her vision in her R eye. Of note, she was seen in the ED recently for R sided headache, but that has since resolved. BP on presentation 146/79. NIHSS 2. Patient was deemed not a TNK candidate due to being outside of the appropriate time window. Unclear etiology at this time. Will place on formal stroke pathway for further evaluation.  Exam today demonstrated features of a peripheral right facial weakness.  She had a right forehead facial weakness present today that was not present yesterday.  There were no features of central lateralizing weakness.  She is however confused and has cognitive and mental status changes consistent with that of delirium.  Workup:  -CT head: No acute intracranial abnormality. Chronic microangiopathic changes.   -CTA head and neck:  No large vessel occlusion. No significant carotid or vertebral artery stenosis.  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  -MRI done yesterday was negative.  Plan:  This patient was started on the doxycycline today for 10 days 100 mg every 12 hours.  We have held on starting any prednisone as she is currently somewhat confused and delirious already.  Concerning the questionable prednisone allergy the daughter at the bedside reports that this is so remote and she is not even sure that the report of the facial swelling is accurate.  Additionally we have also started eyedrops for lubrication with ointment at at bedtime.  We have also added a gentle dose of p.o. Ativan today to try and facilitate this patient falling asleep today and tonight to help improve her delirium.  Anticipate she will be improved overnight with sleep.  We discussed with the patient's daughter that the majority of Bell's palsy related  to Lyme disease improve.

## 2024-07-09 NOTE — ED ATTENDING ATTESTATION
7/9/2024  I, Landon Rodriguez MD, saw and evaluated the patient. I have discussed the patient with the resident/non-physician practitioner and agree with the resident's/non-physician practitioner's findings, Plan of Care, and MDM as documented in the resident's/non-physician practitioner's note, except where noted. All available labs and Radiology studies were reviewed.  I was present for key portions of any procedure(s) performed by the resident/non-physician practitioner and I was immediately available to provide assistance.       At this point I agree with the current assessment done in the Emergency Department.  I have conducted an independent evaluation of this patient a history and physical is as follows:    S:  Chief Complaint   Patient presents with    CVA/TIA-like Symptoms     Pt presents from home with right sided weakness and facial droop. Right eye droop, headache that began a few days ago.      Kay is an 83 y.o. female who presents with the chief complaint of right sided facial droop.  This was first noticed by her daughter this morning, especially with the inability to close her eye fully. She has had a headache for over a week but states she if feeling better right now.     O:  ED Triage Vitals   Temperature Pulse Respirations Blood Pressure SpO2   07/09/24 1148 07/09/24 1145 07/09/24 1145 07/09/24 1145 07/09/24 1145   97.5 °F (36.4 °C) 93 18 146/79 96 %      Temp Source Heart Rate Source Patient Position - Orthostatic VS BP Location FiO2 (%)   07/09/24 1148 07/09/24 1145 07/09/24 1145 07/09/24 1145 --   Oral Monitor Sitting Right arm       Pain Score       07/09/24 1145       No Pain         Physical Exam  Vitals and nursing note reviewed.   Constitutional:       General: She is not in acute distress.     Appearance: She is well-developed.   HENT:      Head: Normocephalic and atraumatic.   Eyes:      Pupils: Pupils are equal, round, and reactive to light.   Neck:      Vascular: No JVD.    Cardiovascular:      Rate and Rhythm: Normal rate and regular rhythm.      Heart sounds: Normal heart sounds. No murmur heard.     No friction rub. No gallop.   Pulmonary:      Effort: Pulmonary effort is normal. No respiratory distress.      Breath sounds: Normal breath sounds. No wheezing or rales.   Chest:      Chest wall: No tenderness.   Musculoskeletal:         General: No tenderness. Normal range of motion.      Cervical back: Normal range of motion.   Skin:     General: Skin is warm and dry.   Neurological:      General: No focal deficit present.      Mental Status: She is alert and oriented to person, place, and time.      Cranial Nerves: Facial asymmetry (right sided facial droop, able to move the eyebrow but weaker compared to left side) present.      Comments: NIH 2   Psychiatric:         Behavior: Behavior normal.         Thought Content: Thought content normal.         Judgment: Judgment normal.       A/P:  Background: 83 y.o. female presents with right sided facial droop    Differential DX includes but is not limited to: bell's palsy, cva, ich    Plan: stroke alert, possible admission, deficit is minor, symptoms were present upon waking, not a good candidate for TNK    ED Course     Labs Reviewed   BASIC METABOLIC PANEL - Abnormal       Result Value Ref Range Status    Sodium 131 (*) 135 - 147 mmol/L Final    Potassium 4.0  3.5 - 5.3 mmol/L Final    Chloride 96  96 - 108 mmol/L Final    CO2 28  21 - 32 mmol/L Final    ANION GAP 7  4 - 13 mmol/L Final    BUN 14  5 - 25 mg/dL Final    Creatinine 0.52 (*) 0.60 - 1.30 mg/dL Final    Comment: Standardized to IDMS reference method    Glucose 107  65 - 140 mg/dL Final    Comment: If the patient is fasting, the ADA then defines impaired fasting glucose as > 100 mg/dL and diabetes as > or equal to 123 mg/dL.    Calcium 10.7 (*) 8.4 - 10.2 mg/dL Final    eGFR 88  ml/min/1.73sq m Final    Narrative:     verified by repeat analysis.  National Kidney Disease  Foundation guidelines for Chronic Kidney Disease (CKD):     Stage 1 with normal or high GFR (GFR > 90 mL/min/1.73 square meters)    Stage 2 Mild CKD (GFR = 60-89 mL/min/1.73 square meters)    Stage 3A Moderate CKD (GFR = 45-59 mL/min/1.73 square meters)    Stage 3B Moderate CKD (GFR = 30-44 mL/min/1.73 square meters)    Stage 4 Severe CKD (GFR = 15-29 mL/min/1.73 square meters)    Stage 5 End Stage CKD (GFR <15 mL/min/1.73 square meters)  Note: GFR calculation is accurate only with a steady state creatinine   COVID19, INFLUENZA A/B, RSV PCR, [facility] - Normal    SARS-CoV-2 Negative  Negative Final    Comment:      INFLUENZA A PCR Negative  Negative Final    Comment:      INFLUENZA B PCR Negative  Negative Final    Comment:      RSV PCR Negative  Negative Final    Comment:      Narrative:     FOR PEDIATRIC PATIENTS - copy/paste COVID Guidelines URL to browser: [email or web address]    SARS-CoV-2 assay is a Nucleic Acid Amplification assay intended for the  qualitative detection of nucleic acid from SARS-CoV-2 in nasopharyngeal  swabs. Results are for the presumptive identification of SARS-CoV-2 RNA.    Positive results are indicative of infection with SARS-CoV-2, the virus  causing COVID-19, but do not rule out bacterial infection or co-infection  with other viruses. Laboratories within the [place] and its  territories are required to report all positive results to the appropriate  public health authorities. Negative results do not preclude SARS-CoV-2  infection and should not be used as the sole basis for treatment or other  patient management decisions. Negative results must be combined with  clinical observations, patient history, and epidemiological information.  This test has not been FDA cleared or approved.    This test has been authorized by FDA under an Emergency Use Authorization  (EUA). This test is only authorized for the duration of time  "the  declaration that circumstances exist justifying the authorization of the  emergency use of an in vitro diagnostic tests for detection of SARS-CoV-2  virus and/or diagnosis of COVID-19 infection under section 564(b)(1) of  the Act, 21 U.S.C. 360bbb-3(b)(1), unless the authorization is terminated  or revoked sooner. The test has been validated but independent review by FDA  and CLIA is pending.    Test performed using [employer]: This RT-PCR assay targets N2,  a region unique to SARS-CoV-2. A conserved region in the E-gene was chosen  for pan-Sarbecovirus detection which includes SARS-CoV-2.    According to CMS-2020-01-R, this platform meets the definition of high-throughput technology.   CBC AND PLATELET - Normal    WBC 7.70  4.31 - 10.16 Thousand/uL Final    RBC 4.51  3.81 - 5.12 Million/uL Final    Hemoglobin 13.3  11.5 - 15.4 g/dL Final    Hematocrit 39.7  34.8 - 46.1 % Final    MCV 88  82 - 98 fL Final    MCH 29.5  26.8 - 34.3 pg Final    MCHC 33.5  31.4 - 37.4 g/dL Final    RDW 13.2  11.6 - 15.1 % Final    Platelets 294  149 - 390 Thousands/uL Final    MPV 9.7  8.9 - 12.7 fL Final   PROTIME-INR - Normal    Protime 13.6  11.6 - 14.5 seconds Final    INR 0.98  0.84 - 1.19 Final   APTT - Normal    PTT 26  23 - 37 seconds Final    Comment: Therapeutic Heparin Range =  60-90 seconds   HS TROPONIN I 0HR - Normal    hs TnI 0hr 7  \"Refer to ACS Flowchart\"- see link ng/L Final    Comment:                                              Initial (time 0) result  If >=50 ng/L, Myocardial injury suggested ;  Type of myocardial injury and treatment strategy  to be determined.  If 5-49 ng/L, a delta result at 2 hours and or 4 hours will be needed to further evaluate.  If <4 ng/L, and chest pain has been >3 hours since onset, patient may qualify for discharge based on the HEART score in the ED.  If <5 ng/L and <3hours since onset of chest pain, a delta result at 2 hours will be needed to further evaluate.    HS " Troponin 99th Percentile URL of a Health Population=12 ng/L with a 95% Confidence Interval of 8-18 ng/L.    Second Troponin (time 2 hours)  If calculated delta >= 20 ng/L,  Myocardial injury suggested ; Type of myocardial injury and treatment strategy to be determined.  If 5-49 ng/L and the calculated delta is 5-19 ng/L, consult medical service for evaluation.  Continue evaluation for ischemia on ecg and other possible etiology and repeat hs troponin at 4 hours.  If delta is <5 ng/L at 2 hours, consider discharge based on risk stratification via the HEART score (if in ED), or CASTILLO risk score in IP/Observation.    HS Troponin 99th Percentile URL of a Health Population=12 ng/L with a 95% Confidence Interval of 8-18 ng/L.   POCT GLUCOSE - Normal    POC Glucose 113  65 - 140 mg/dl Final   HS TROPONIN I 2HR   HS TROPONIN I 4HR     CTA stroke alert (head/neck)   Final Result      No large vessel occlusion. No significant carotid or vertebral artery stenosis.      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 Recommend consultation with the [facility], a division of [facility] at [phone]..         Findings were directly discussed with Deven Ruiz at 12:14 p.m. on 7/9/2024.      Workstation performed: [id number]         CT stroke alert brain   Final Result      No acute intracranial abnormality.  Chronic microangiopathic changes.      Findings were directly discussed with Deven Ruiz at approximately 12:14 p.m. on 7/9/2024.      Workstation performed: [id number]           Medications   clopidogrel (PLAVIX) tablet 300 mg (has no administration in time range)   lidocaine (LIDODERM) 5 % patch 1 patch (has no administration in time range)   aspirin tablet 325 mg (has no administration in time range)   iohexol (OMNIPAQUE) 350 MG/ML injection (MULTI-DOSE) 85 mL (85 mL Intravenous Given 7/9/24 1200)         Critical Care Time  Procedures    Time reflects when diagnosis  was documented in both MDM as applicable and the Disposition within this note       Time User Action Codes Description Comment    7/9/2024 11:52 AM Nancy Hayes Add [R29.810] Facial droop           ED Disposition       ED Disposition   Admit    Condition   Stable    Date/Time   Tue Jul 9, 2024  1:19 PM    Comment   Case was discussed with nguyễn and the patient's admission status was agreed to be Admission Status: observation status to the service of Dr. Perez .               Follow-up Information    None

## 2024-07-09 NOTE — TELEPHONE ENCOUNTER
Abelino Garcia,   7/8/2024  4:43 PM EDT       Please inform patient and daughter CT scan shows no acute findings in abdomen or pelvis.  There is a potential finding right middle lobe of the lungs could be mild bronchiolitis or other infection.  I would like to check sputum culture, order placed.  Referral also placed to pulmonology.  There is increase stool burden.  Recommend adequate hydration.  Recommend daily fiber supplement Metamucil.  Can take Colace 100 mg p.o. twice daily stool softener.  Take MiraLAX 1 dose for up to 3 days as needed until appreciable bowel movement.

## 2024-07-09 NOTE — CONSULTS
Duplicate consult order. Please see original neurology/stroke alert consult note completed by myself and Dr. Ruiz earlier this afternoon.

## 2024-07-09 NOTE — ASSESSMENT & PLAN NOTE
Calcium 10.7 on admission.  More history of primary hyperparathyroidism with hypercalcemia however not placed on any medications in the past.  Recently has been complaining of back pain.  Follow-up on ionized calcium, PTH, PTH RP vitamin D panel.

## 2024-07-09 NOTE — CONSULTS
Consultation - Stroke   Kay Bright 83 y.o. female MRN: [id number]  Unit/Bed#: ED-29 Encounter: [id number]      Assessment & Plan     Facial droop  Assessment & Plan  84 y/o female with arthritis, spinal stenosis, who presented as a stroke alert due to R facial droop. Patient also reports feeling like there is a glare in her vision in her R eye. Of note, she was seen in the ED recently for R sided headache, but that has since resolved. BP on presentation 146/79. NIHSS 2. Patient was deemed not a TNK candidate due to being outside of the appropriate time window. Unclear etiology at this time. Will place on formal stroke pathway for further evaluation.    Workup:  -CT head: No acute intracranial abnormality. Chronic microangiopathic changes.   -CTA head and neck:  No large vessel occlusion. No significant carotid or vertebral artery stenosis.  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    Plan:  - Stroke pathway  MRI brain  Echo  Lipid Panel  Hemoglobin A1c  Load with aspirin 325 mg and Plavix 300 mg x 1, then start aspirin 81 mg and Plavix 75 mg tomorrow AM  Atorvastatin 40 mg  Permissive HTN, labetalol if SBP >200  Continue telemetry  PT/OT/ST  Frequent neuro checks. Continue to monitor and notify neurology with any changes.   - Medical management and supportive care per primary team. Correction of any metabolic or infectious disturbances.       Thrombolytic Decision: Patient not a candidate. Unclear time of onset outside appropriate time window.      Recommendations for outpatient neurological follow up have yet to be determined.    Case and treatment plan reviewed with attending neurologist, Dr. Ruiz. Please see attending attestation for any further recommendations.      History of Present Illness     Reason for Consult / Principal Problem: Stroke-like symptoms  Patient last known well: 7/8 10 pm  Stroke alert called: 11:53 am  Neurology time of arrival: 11:58 am  HPI: Kay Bright  is a 83 y.o.  female with arthritis, spinal stenosis, who presents with stroke-like symptoms.    Patient reports she went to bed last night and was normal. She woke up at approximately 4 am today due to back pain that has been ongoing. She didn't notice anything different but her daughter went to go pick her up at approximately 10 am and noted that the patient's R eye wasn't closing properly so patient was brought to the ED for further evaluation. Patient denies any weakness, numbness/tingling, dizziness/lightheadedness, headache, nausea/vomiting, speech difficulty. She reports feeling like there is a glare in her vision through her right eye. Of note, patient was seen on 6/24 in the ED for R sided headache and L forearm rash. Patient was given migraine cocktail with improvement in headache and she was discharged on Keflex for L arm cellulitis. Patient reports her headache has since resolved but since starting the Keflex, she has been having back pain that is mid back and radiates to her L side, which is intermittent. Patient is not on AC/AP at baseline.    Consult to Neurology  Consult performed by: ROGELIO Byers  Consult ordered by: Landon Rodriguez MD          Review of Systems  A 12 system ROS was completed. Other than the above mentioned complaints in the HPI and those commented on below, all remaining systems were negative.      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    Spinal stenosis     Varicose veins of  "legs     Vitamin D deficiency     Wears glasses      Past Surgical History:   Procedure Laterality Date    CATARACT EXTRACTION      SD OPTX FEM SHFT FX W/INSJ IMED IMPLT W/WO SCREW Left 3/19/2024    Procedure: INSERTION NAIL IM FEMUR ANTEGRADE (TROCHANTERIC) and all associated procedures;  Surgeon: Poncho Duggan DO;  Location:  Main OR;  Service: Orthopedics    SD XCAPSL CTRC RMVL INSJ IO LENS PROSTH W/O ECP Right 5/14/2018    Procedure: EXTRACTION EXTRACAPSULAR CATARACT PHACO INTRAOCULAR LENS (IOL);  Surgeon: Rian Benson MD;  Location: [facility] MAIN OR;  Service: Ophthalmology    SD XCAPSL CTRC RMVL INSJ IO LENS PROSTH W/O ECP Left 7/10/2018    Procedure: EXTRACTION EXTRACAPSULAR CATARACT PHACO INTRAOCULAR LENS (IOL);  Surgeon: Rian Benson MD;  Location: [facility] MAIN OR;  Service: Ophthalmology    TUBAL LIGATION       Social History   Social History     Substance and Sexual Activity   Alcohol Use No     Social History     Substance and Sexual Activity   Drug Use No     E-Cigarette/Vaping    E-Cigarette Use Never User      E-Cigarette/Vaping Substances    Nicotine No     THC No     CBD No     Flavoring No     Other No     Unknown No      Social History     Tobacco Use   Smoking Status Never   Smokeless Tobacco Never     Family History:   Family History   Problem Relation Age of Onset    Other Mother         corneal transplants, Parathyroid disease    Osteoporosis Mother     Stroke Father     Hypertension Father     Heart disease Father         cardiac disorder    Osteoporosis Father     Other Father         Parathyroid disease    Asthma Sister     Hiatal hernia Sister     Other Brother         parathyroid-removed, Parathyroid disease    Heart disease Brother         \" maker\"       Review of previous medical records was completed.     Meds/Allergies   all current active meds have been reviewed, current meds:   No current facility-administered medications for this encounter.   , and PTA meds:   Prior to " Admission Medications   Prescriptions Last Dose Informant Patient Reported? Taking?   Diclofenac Sodium (VOLTAREN) 1 % 7/8/2024 Self No Yes   Sig: Apply 2 g topically 4 (four) times a day   Multiple Vitamins-Minerals (CENTRUM SILVER 50+WOMEN PO) 7/9/2024 Self Yes Yes   Sig: Take by mouth every morning   Probiotic Product (PROBIOTIC ADVANCED PO) 7/9/2024 Self Yes Yes   Sig: Take by mouth   acetaminophen (TYLENOL) 325 mg tablet Not Taking Self No No   Sig: Take 3 tablets (975 mg total) by mouth every 8 (eight) hours   Patient not taking: Reported on 7/9/2024   aspirin 325 mg tablet  Self No No   Sig: Take 1 tablet (325 mg total) by mouth 2 (two) times a day   Patient not taking: Reported on 5/2/2024   cholecalciferol (VITAMIN D3) 1,000 units tablet Not Taking Self Yes No   Sig: Take 1,000 Units by mouth every morning   Patient not taking: Reported on 7/9/2024   senna-docusate sodium (SENOKOT S) 8.6-50 mg per tablet Not Taking Self No No   Sig: Take 1 tablet by mouth daily at bedtime   Patient not taking: Reported on 3/23/2024   sodium chloride (ALESIA 128) 2 % hypertonic ophthalmic solution Not Taking Self Yes No   Sig: Administer 1 drop to both eyes 3 (three) times a day Alesia 128 gel at HS    Patient not taking: Reported on 7/9/2024   sodium chloride (Alesia 128) 5 % hypertonic ophthalmic ointment Not Taking Self Yes No   Sig: Administer 1 drop to both eyes daily at bedtime. Indications: NA   Patient not taking: Reported on 5/2/2024      Facility-Administered Medications: None       Allergies   Allergen Reactions    Prednisone Swelling     Facial swelling, redness-no dyspnea       Objective   Vitals:Blood pressure 157/76, pulse 72, temperature 97.5 °F (36.4 °C), temperature source Oral, resp. rate 18, weight 53.2 kg (117 lb 4.6 oz), SpO2 98%.,Body mass index is 21.8 kg/m².  No intake or output data in the 24 hours ending 07/09/24 1240    Invasive Devices:   Invasive Devices       Peripheral Intravenous Line  Duration              Peripheral IV 07/09/24 Left;Proximal;Ventral (anterior) Forearm <1 day    Peripheral IV 07/09/24 Proximal;Right;Ventral (anterior) Forearm <1 day                    Physical Exam  Vitals and nursing note reviewed.   Constitutional:       General: She is not in acute distress.     Appearance: She is not ill-appearing or diaphoretic.   HENT:      Head: Normocephalic.      Mouth/Throat:      Mouth: Mucous membranes are moist.      Pharynx: Oropharynx is clear.   Eyes:      General: No scleral icterus.        Right eye: No discharge.         Left eye: No discharge.      Extraocular Movements: Extraocular movements intact and EOM normal.      Conjunctiva/sclera: Conjunctivae normal.   Cardiovascular:      Rate and Rhythm: Normal rate.   Pulmonary:      Effort: Pulmonary effort is normal. No respiratory distress.   Skin:     General: Skin is warm and dry.      Coloration: Skin is not jaundiced or pale.   Neurological:      Mental Status: She is alert and oriented to person, place, and time.      Coordination: Finger-Nose-Finger Test normal.   Psychiatric:         Mood and Affect: Mood normal.       Neurologic Exam     Mental Status   Oriented to person, place, and time.   Level of consciousness: alert  Able to follow commands appropriately. No aphasia or dysarthria noted.     Cranial Nerves     CN II   Right visual field deficit: none  Left visual field deficit: none     CN III, IV, VI   Extraocular motions are normal.     CN V   Facial sensation intact.     CN VIII   Hearing: intact    CN IX, X   Palate: symmetric    CN XI   CN XI normal.     CN XII   CN XII normal.   R facial weakness at rest, with smile, R eyelid closure  Symmetric forehead wrinkling     Motor Exam   Muscle bulk: normal  Able to raise bilateral UE and hold antigravity x 10 seconds without drift noted  Able to raise bilateral LE and hold antigravity x 5 seconds without drift noted     Sensory Exam   Light touch normal.   No extinction noted      Gait, Coordination, and Reflexes     Coordination   Finger to nose coordination: normal    Tremor   Resting tremor: absent  Intention tremor: absent      NIHSS:  1a.Level of Consciousness: 0 = Alert   1b. LOC Questions: 0 = Answers both correctly   1c. LOC Commands: 0 = Obeys both correctly   2. Best Gaze: 0 = Normal   3. Visual: 0 = No visual field loss   4. Facial Palsy: 2=Partial paralysis (total or near total paralysis of the lower face)   5a. Motor Right Arm: 0=No drift, limb holds 90 (or 45) degrees for full 10 seconds   5b. Motor Left Arm: 0=No drift, limb holds 90 (or 45) degrees for full 10 seconds   6a. Motor Right Le=No drift, limb holds 90 (or 45) degrees for full 10 seconds   6b. Motor Left Le=No drift, limb holds 90 (or 45) degrees for full 10 seconds   7. Limb Ataxia:  0=Absent   8. Sensory: 0=Normal; no sensory loss   9. Best Language:  0=No aphasia, normal   10. Dysarthria: 0=Normal articulation   11. Extinction and Inattention (formerly Neglect): 0=No abnormality   Total Score: 2     Time NIHSS was completed: 12:06 pm    Modified Bollinger Score:  1 (No significant disability. Able to carry out all usual activities, despite some symptoms)    Lab Results: I have personally reviewed pertinent reports.  , CBC:   Results from last 7 days   Lab Units 24  1155 24  1056   WBC Thousand/uL 7.70 5.28   RBC Million/uL 4.51 4.59   HEMOGLOBIN g/dL 13.3 13.3   HEMATOCRIT % 39.7 42.3   MCV fL 88 92   PLATELETS Thousands/uL 294 375   , BMP/CMP:   Results from last 7 days   Lab Units 24  1056   SODIUM mmol/L 139   POTASSIUM mmol/L 4.3   CHLORIDE mmol/L 102   CO2 mmol/L 28   BUN mg/dL 15   CREATININE mg/dL 0.60   CALCIUM mg/dL 10.7*   AST U/L 21   ALT U/L 15   ALK PHOS U/L 103   EGFR ml/min/1.73sq m 83   , Vitamin B12:   , HgBA1C:   , TSH:   Results from last 7 days   Lab Units 24  1056   TSH 3RD GENERATON uIU/mL 2.732   , Coagulation:   Results from last 7 days   Lab Units  "07/09/24  1155   INR  0.98   , Lipid Profile:   , Ammonia:   , Urinalysis:   Results from last 7 days   Lab Units 07/05/24  1056   COLOR UA  Colorless   CLARITY UA  Clear   SPEC GRAV UA  1.007   PH UA  7.0   LEUKOCYTES UA  Negative   NITRITE UA  Negative   GLUCOSE UA mg/dl Negative   KETONES UA mg/dl Negative   BILIRUBIN UA  Negative   BLOOD UA  Negative   , Drug Screen:   , Medication Drug Levels:       Invalid input(s): \"CARBAMAZEPINE\", \"OXCARBAZEPINE\"    Imaging Studies: I have personally reviewed pertinent reports.   and I have personally reviewed pertinent films in PACS  EKG, Pathology, and Other Studies: I have personally reviewed pertinent reports.    VTE Prophylaxis: Reason for no pharmacologic prophylaxis Currently in ED    Code Status: Prior  Advance Directive and Living Will:      Power of : Yes  POLST:      Counseling / Coordination of Care  Total Critical Care time spent 33 minutes excluding procedures, teaching and family updates.      "

## 2024-07-09 NOTE — TELEPHONE ENCOUNTER
Jayde called very concerned about patient, not sleeping, eating and is in a lot of pain. Gave results of CT and advised to call spine and pain management. States will do and advised to try Lidocaine patches OTC. Jayde states will see when can get patient into spine and pain if long time out may take her back to ER feels not doing well and something is being missed.

## 2024-07-09 NOTE — ED NOTES
Pt transported to [facility]     Gayle Maloney, CHANTELLE  07/09/24 [id number]

## 2024-07-09 NOTE — CASE MANAGEMENT
Case Management Assessment    Patient name Kay Bright  Location ED-29/ED-29 MRN [id number]  : 1940 Date 2024       Current Admission Date: 2024  Current Admission Diagnosis:Facial droop   Patient Active Problem List    Diagnosis Date Noted Date Diagnosed    Facial droop 2024     Left-sided thoracic back pain 2024     Fracture of greater trochanter of left femur (HCC) 2024     Fall 2024     History of diverticulitis 11/15/2023     Varicose veins of both lower extremities with inflammation 10/25/2023     White coat syndrome without diagnosis of hypertension 2023     Multiple thyroid nodules 2022     Hyperparathyroidism (HCC) 2020     Leukocytosis 2019     Vitamin D deficiency 2019     Hyperlipidemia 2019     Osteoporosis 2019     Stress fracture of calcaneus 2019     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     Aortic valve regurgitation 2018     Nodular thyroid disease 2018     Increased PTH level 2018     Left foot drop 2018     History of shingles 2018     Hypercalcemia 2017     Degenerative lumbar spinal stenosis 2017     Varicose veins of bilateral lower extremities with other complications 2017     Tendinitis of right rotator cuff 2017     Irregular heartbeat 2017     Premature atrial contraction 2017       LOS (days): 0  Geometric Mean LOS (GMLOS) (days):   Days to GMLOS:     OBJECTIVE:              Current admission status: Observation       Preferred Pharmacy:   [facility]/pharm[facility] #[id number] - [place] [address]  [place] [address]  Phone: [phone] Fax: [phone]    Primary Care Provider: Abelino Garcia DO    Primary Insurance: MEDICARE  Secondary Insurance: AARP    ASSESSMENT:  Active Health Care Proxies    There are no active Health Care Proxies on file.            Obs Notice Signed: 24    Readmission  Root Cause  30 Day Readmission: No    Patient Information  Admitted from:: Home  Mental Status: Alert  During Assessment patient was accompanied by: Son, Friend  Assessment information provided by:: Patient, Son  Primary Caregiver: Self  Support Systems: Self, Son, Family members  County of Residence: [place]  What city do you live in?: [place]  Home entry access options. Select all that apply.: Stairs  Number of steps to enter home.: 4  Do the steps have railings?: Yes  Type of Current Residence: 2 story home  Upon entering residence, is there a bedroom on the main floor (no further steps)?: No  A bedroom is located on the following floor levels of residence (select all that apply):: 2nd Floor  Upon entering residence, is there a bathroom on the main floor (no further steps)?: Yes  Number of steps to 2nd floor from main floor: 6  Living Arrangements: Lives w/ Son    Activities of Daily Living Prior to Admission  Functional Status: Independent  Completes ADLs independently?: Yes  Ambulates independently?: Yes  Does patient use assisted devices?: Yes  Assisted Devices (DME) used: Walker, Shower Chair, Rollator, Bedside Commode  Does patient currently own DME?: Yes  What DME does the patient currently own?: Rollator, Shower Chair, Walker  Does patient have a history of Outpatient Therapy (PT/OT)?: Yes (pt unable to remember)  Does the patient have a history of Short-Term Rehab?: No  Does patient have a history of HHC?: Yes (pt does not remember what facility she used)  Does patient currently have HHC?: No         Patient Information Continued  Income Source: Pension/halfway  Does patient have prescription coverage?: Yes  Does patient receive dialysis treatments?: No  Does patient have a history of substance abuse?: No  Does patient have a history of Mental Health Diagnosis?: No         Means of Transportation  Means of Transport to Appts:: Drives Self      Social Determinants of Health (SDOH)      Flowsheet Row  Most Recent Value   Housing Stability    In the last 12 months, was there a time when you were not able to pay the mortgage or rent on time? N   At any time in the past 12 months, were you homeless or living in a shelter (including now)? N   Transportation Needs    In the past 12 months, has lack of transportation kept you from medical appointments or from getting medications? no   In the past 12 months, has lack of transportation kept you from meetings, work, or from getting things needed for daily living? No   Food Insecurity    Within the past 12 months, you worried that your food would run out before you got the money to buy more. Never true   Within the past 12 months, the food you bought just didn't last and you didn't have money to get more. Never true   Utilities    In the past 12 months has the electric, gas, oil, or water company threatened to shut off services in your home? No

## 2024-07-09 NOTE — ASSESSMENT & PLAN NOTE
Patient presents with right-sided facial droop.  Noticed this morning while taking a shower.  Last known well was last night  Also reported some double vision  Denies any speech or swallowing difficulty, numbness or weakness of any extremity.  Stroke alert was called in the emergency room  NIHSS 2   CTA H/N:No large vessel occlusion. No significant carotid or vertebral artery stenosis.   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  Placed on stroke pathway.  Continue with dual antiplatelet therapy with Aspirin 325 mg and Plavix 300 mg x 1 dose followed by aspirin 81 mg and Plavix 75 mg daily.  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

## 2024-07-09 NOTE — ED PROVIDER NOTES
History  Chief Complaint   Patient presents with    CVA/TIA-like Symptoms     Pt presents from home with right sided weakness and facial droop. Right eye droop, headache that began a few days ago.      HPI    83-year-old female presents for evaluation of stroke-like symptoms.  She presented with right-sided facial droop that was first noticed this morning by her daughter.  She was at baseline of health when she went to sleep last night.  She is also unable to close her R eye fully.  Also reports a week long headache, which has improved at the time of examination.  Denies fever, chills, blurry vision.    Prior to Admission Medications   Prescriptions Last Dose Informant Patient Reported? Taking?   Diclofenac Sodium (VOLTAREN) 1 % 7/8/2024 Self No Yes   Sig: Apply 2 g topically 4 (four) times a day   Multiple Vitamins-Minerals (CENTRUM SILVER 50+WOMEN PO) 7/9/2024 Self Yes Yes   Sig: Take by mouth every morning   Probiotic Product (PROBIOTIC ADVANCED PO) 7/9/2024 Self Yes Yes   Sig: Take by mouth   acetaminophen (TYLENOL) 325 mg tablet Not Taking Self No No   Sig: Take 3 tablets (975 mg total) by mouth every 8 (eight) hours   Patient not taking: Reported on 7/9/2024   aspirin 325 mg tablet  Self No No   Sig: Take 1 tablet (325 mg total) by mouth 2 (two) times a day   Patient not taking: Reported on 5/2/2024   cholecalciferol (VITAMIN D3) 1,000 units tablet Not Taking Self Yes No   Sig: Take 1,000 Units by mouth every morning   Patient not taking: Reported on 7/9/2024   senna-docusate sodium (SENOKOT S) 8.6-50 mg per tablet Not Taking Self No No   Sig: Take 1 tablet by mouth daily at bedtime   Patient not taking: Reported on 3/23/2024   sodium chloride (ALESIA 128) 2 % hypertonic ophthalmic solution Not Taking Self Yes No   Sig: Administer 1 drop to both eyes 3 (three) times a day Alesia 128 gel at HS    Patient not taking: Reported on 7/9/2024   sodium chloride (Alesia 128) 5 % hypertonic ophthalmic ointment Not Taking Self  Yes No   Sig: Administer 1 drop to both eyes daily at bedtime. 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    Spinal stenosis     Varicose veins of legs     Vitamin D deficiency     Wears glasses        Past Surgical History:   Procedure Laterality Date    CATARACT EXTRACTION      IL OPTX FEM SHFT FX W/INSJ IMED IMPLT W/WO SCREW Left 3/19/2024    Procedure: INSERTION NAIL IM FEMUR ANTEGRADE (TROCHANTERIC) and all associated procedures;  Surgeon: Poncho Duggan DO;  Location: [facility];  Service: Orthopedics    IL XCAPSL CTRC RMVL INSJ IO LENS PROSTH W/O ECP Right 5/14/2018    Procedure: EXTRACTION EXTRACAPSULAR CATARACT PHACO INTRAOCULAR LENS (IOL);  Surgeon: Rian Benson MD;  Location: [facility] MAIN OR;  Service: Ophthalmology    IL XCAPSL CTRC RMVL INSJ IO LENS PROSTH W/O ECP Left 7/10/2018    Procedure: EXTRACTION EXTRACAPSULAR CATARACT PHACO INTRAOCULAR LENS (IOL);  Surgeon: Rian Benson MD;  Location: [facility] MAIN OR;  Service: Ophthalmology    TUBAL LIGATION         Family History   Problem Relation Age of Onset    Other Mother         corneal transplants, Parathyroid disease    Osteoporosis Mother     Stroke Father     Hypertension Father     Heart disease Father         cardiac disorder    Osteoporosis Father     Other Father         Parathyroid disease    Asthma Sister     Hiatal hernia  "Sister     Other Brother         parathyroid-removed, Parathyroid disease    Heart disease Brother         \" maker\"     I have reviewed and agree with the history as documented.    E-Cigarette/Vaping    E-Cigarette Use Never User      E-Cigarette/Vaping Substances    Nicotine No     THC No     CBD No     Flavoring No     Other No     Unknown No      Social History     Tobacco Use    Smoking status: Never    Smokeless tobacco: Never   Vaping Use    Vaping status: Never Used   Substance Use Topics    Alcohol use: No    Drug use: No        Review of Systems   Constitutional:  Negative for fever.   HENT:  Negative for ear pain and sore throat.    Respiratory:  Negative for cough and shortness of breath.    Cardiovascular:  Negative for chest pain and palpitations.   Gastrointestinal:  Negative for abdominal pain, nausea and vomiting.   Musculoskeletal:  Positive for back pain. Negative for arthralgias.   Skin:  Negative for color change and rash.   Neurological:  Positive for facial asymmetry and headaches. Negative for seizures and syncope.   All other systems reviewed and are negative.      Physical Exam  ED Triage Vitals   Temperature Pulse Respirations Blood Pressure SpO2   07/09/24 1148 07/09/24 1145 07/09/24 1145 07/09/24 1145 07/09/24 1145   97.5 °F (36.4 °C) 93 18 146/79 96 %      Temp Source Heart Rate Source Patient Position - Orthostatic VS BP Location FiO2 (%)   07/09/24 1148 07/09/24 1145 07/09/24 1145 07/09/24 1145 --   Oral Monitor Sitting Right arm       Pain Score       07/09/24 1145       No Pain             Orthostatic Vital Signs  Vitals:    07/11/24 0734 07/11/24 1122 07/11/24 1550 07/11/24 2123   BP: 170/91 117/74 125/82 141/90   Pulse: 80 68 75 72   Patient Position - Orthostatic VS:           Physical Exam  Vitals and nursing note reviewed.   Constitutional:       General: She is not in acute distress.     Appearance: She is well-developed.   HENT:      Head: Normocephalic and atraumatic.    "   Mouth/Throat:      Mouth: Mucous membranes are moist.      Pharynx: Oropharynx is clear.   Eyes:      General: No visual field deficit.     Extraocular Movements: Extraocular movements intact.      Conjunctiva/sclera: Conjunctivae normal.      Pupils: Pupils are equal, round, and reactive to light.   Cardiovascular:      Rate and Rhythm: Normal rate and regular rhythm.      Pulses: Normal pulses.      Heart sounds: Normal heart sounds.   Pulmonary:      Effort: Pulmonary effort is normal. No respiratory distress.      Breath sounds: Normal breath sounds.   Abdominal:      Palpations: Abdomen is soft.      Tenderness: There is no abdominal tenderness.   Skin:     General: Skin is warm and dry.      Capillary Refill: Capillary refill takes less than 2 seconds.   Neurological:      General: No focal deficit present.      Mental Status: She is alert and oriented to person, place, and time.      Cranial Nerves: Facial asymmetry (Right-sided facial droop) present. No cranial nerve deficit or dysarthria.      Sensory: No sensory deficit.      Motor: No weakness.      Coordination: Heel to Shin Test normal.   Psychiatric:         Mood and Affect: Mood normal.         ED Medications  Medications   acetaminophen (TYLENOL) tablet 975 mg ( Oral Canceled Entry 7/12/24 0504)   Cholecalciferol (VITAMIN D3) tablet 1,000 Units (1,000 Units Oral Given 7/11/24 0936)   Diclofenac Sodium (VOLTAREN) 1 % topical gel 2 g (2 g Topical Not Given 7/11/24 2113)   docusate sodium (COLACE) capsule 100 mg (100 mg Oral Not Given 7/11/24 1902)   enoxaparin (LOVENOX) subcutaneous injection 40 mg (40 mg Subcutaneous Given 7/11/24 0936)   Artificial Tears ophthalmic solution 2 drop ( Right Eye Canceled Entry 7/12/24 0504)     And   artificial tear ophthalmic ointment (1 Application Right Eye Given 7/11/24 2233)   doxycycline hyclate (VIBRAMYCIN) capsule 100 mg (100 mg Oral Given 7/11/24 2113)   atorvastatin (LIPITOR) tablet 10 mg (10 mg Oral Given  "7/11/24 1901)   QUEtiapine (SEROquel) tablet 12.5 mg (12.5 mg Oral Not Given 7/11/24 2110)   iohexol (OMNIPAQUE) 350 MG/ML injection (MULTI-DOSE) 85 mL (85 mL Intravenous Given 7/9/24 1200)   clopidogrel (PLAVIX) tablet 300 mg (300 mg Oral Given 7/9/24 1457)   lidocaine (LIDODERM) 5 % patch 1 patch (1 patch Topical Patch Removed 7/10/24 0214)   aspirin tablet 325 mg (325 mg Oral Given 7/9/24 1456)   potassium chloride 20 mEq IVPB (premix) (20 mEq Intravenous New Bag 7/10/24 1240)   LORazepam (ATIVAN) tablet 0.5 mg (0.5 mg Oral Given 7/10/24 2146)       Diagnostic Studies  Results Reviewed       Procedure Component Value Units Date/Time    Osmolality-\"If this is regarding a toxic alcohol, STOP. Test is not routinely indicated. Please consult medical  on call for further guidance.\" [[id number]]  (Normal) Collected: 07/09/24 1714    Lab Status: Final result Specimen: Blood from Arm, Left Updated: 07/10/24 1143     Osmolality Serum 287 mmol/KG     Hemoglobin A1c w/EAG Estimation [[id number]]  (Abnormal) Collected: 07/10/24 0507    Lab Status: Final result Specimen: Blood from Arm, Left Updated: 07/10/24 1018     Hemoglobin A1C 5.7 %       mg/dl     Lipid Panel with Direct LDL reflex [[id number]]  (Abnormal) Collected: 07/10/24 0507    Lab Status: Final result Specimen: Blood from Arm, Left Updated: 07/10/24 0634     Cholesterol 223 mg/dL      Triglycerides 62 mg/dL      HDL, Direct 77 mg/dL      LDL Calculated 134 mg/dL     Comprehensive metabolic panel [[id number]]  (Abnormal) Collected: 07/10/24 0507    Lab Status: Final result Specimen: Blood from Arm, Left Updated: 07/10/24 0634     Sodium 130 mmol/L      Potassium 3.3 mmol/L      Chloride 96 mmol/L      CO2 25 mmol/L      ANION GAP 9 mmol/L      BUN 11 mg/dL      Creatinine 0.47 mg/dL      Glucose 120 mg/dL      Glucose, Fasting 120 mg/dL      Calcium 9.9 mg/dL      AST 20 U/L      ALT 14 U/L      Alkaline Phosphatase 92 U/L      Total Protein 6.8 " g/dL      Albumin 4.0 g/dL      Total Bilirubin 0.59 mg/dL      eGFR 90 ml/min/1.73sq m     Narrative:      National Kidney Disease Foundation guidelines for Chronic Kidney Disease (CKD):     Stage 1 with normal or high GFR (GFR > 90 mL/min/1.73 square meters)    Stage 2 Mild CKD (GFR = 60-89 mL/min/1.73 square meters)    Stage 3A Moderate CKD (GFR = 45-59 mL/min/1.73 square meters)    Stage 3B Moderate CKD (GFR = 30-44 mL/min/1.73 square meters)    Stage 4 Severe CKD (GFR = 15-29 mL/min/1.73 square meters)    Stage 5 End Stage CKD (GFR <15 mL/min/1.73 square meters)  Note: GFR calculation is accurate only with a steady state creatinine    CBC and differential [[id number]]  (Abnormal) Collected: 07/10/24 0507    Lab Status: Final result Specimen: Blood from Arm, Left Updated: 07/10/24 0615     WBC 5.98 Thousand/uL      RBC 4.61 Million/uL      Hemoglobin 13.5 g/dL      Hematocrit 39.8 %      MCV 86 fL      MCH 29.3 pg      MCHC 33.9 g/dL      RDW 13.2 %      MPV 10.2 fL      Platelets 290 Thousands/uL      nRBC 0 /100 WBCs      Segmented % 79 %      Immature Grans % 0 %      Lymphocytes % 15 %      Monocytes % 6 %      Eosinophils Relative 0 %      Basophils Relative 0 %      Absolute Neutrophils 4.65 Thousands/µL      Absolute Immature Grans 0.02 Thousand/uL      Absolute Lymphocytes 0.92 Thousands/µL      Absolute Monocytes 0.36 Thousand/µL      Eosinophils Absolute 0.01 Thousand/µL      Basophils Absolute 0.02 Thousands/µL     PTH, intact [[id number]]  (Normal) Collected: 07/09/24 1155    Lab Status: Final result Specimen: Blood from Arm, Right Updated: 07/10/24 0342     PTH 48.1 pg/mL     Narrative:      verified by repeat analysis.    LYME TOTAL AB W REFLEX TO IGM/IGG [[id number]]  (Abnormal) Collected: 07/09/24 1515    Lab Status: Final result Specimen: Blood from Arm, Right Updated: 07/10/24 0102    Narrative:      The following orders were created for panel order LYME TOTAL AB W REFLEX TO IGM/IGG.  Procedure                                Abnormality         Status                     ---------                               -----------         ------                     Lyme Total AB W Reflex t...[[id number]]  Abnormal            Final result                 Please view results for these tests on the individual orders.    Lyme Total AB W Reflex to IGM/IGG [[id number]]  (Abnormal) Collected: 07/09/24 1515    Lab Status: Final result Specimen: Blood from Arm, Right Updated: 07/10/24 0102     Lyme Total Antibodies Positive    Lyme IGM (Reflex Only-Do Not Order) [[id number]]  (Abnormal) Collected: 07/09/24 1515    Lab Status: Final result Specimen: Blood from Arm, Right Updated: 07/10/24 0102     Lyme IGM Positive    Lyme IGG (Reflex Only-Do Not Order) [[id number]]  (Abnormal) Collected: 07/09/24 1515    Lab Status: Final result Specimen: Blood from Arm, Right Updated: 07/10/24 0102     Lyme IGG Positive    Vitamin D 25 hydroxy [[id number]]  (Normal) Collected: 07/09/24 1714    Lab Status: Final result Specimen: Blood from Arm, Left Updated: 07/09/24 2348     Vit D, 25-Hydroxy 30.6 ng/mL     HS Troponin I 4hr [[id number]]  (Normal) Collected: 07/09/24 1714    Lab Status: Final result Specimen: Blood from Arm, Left Updated: 07/09/24 1743     hs TnI 4hr 8 ng/L      Delta 4hr hsTnI 1 ng/L     Calcium, ionized [[id number]]  (Normal) Collected: 07/09/24 1714    Lab Status: Final result Specimen: Blood from Arm, Left Updated: 07/09/24 1723     Calcium, Ionized 1.28 mmol/L     Platelet count [[id number]]  (Normal) Collected: 07/09/24 1714    Lab Status: Final result Specimen: Blood from Arm, Left Updated: 07/09/24 1720     Platelets 264 Thousands/uL      MPV 9.4 fL     HS Troponin I 2hr [[id number]]  (Normal) Collected: 07/09/24 1515    Lab Status: Final result Specimen: Blood from Arm, Right Updated: 07/09/24 1548     hs TnI 2hr 7 ng/L      Delta 2hr hsTnI 0 ng/L     PTH-related peptide [[id number]] Collected: 07/09/24 1515    Lab Status: In  process Specimen: Blood from Arm, Right Updated: 07/09/24 1519    FLU/RSV/COVID - if FLU/RSV clinically relevant [[id number]]  (Normal) Collected: 07/09/24 1155    Lab Status: Final result Specimen: Nares from Nose Updated: 07/09/24 1248     SARS-CoV-2 Negative     INFLUENZA A PCR Negative     INFLUENZA B PCR Negative     RSV PCR Negative    Narrative:      FOR PEDIATRIC PATIENTS - copy/paste COVID Guidelines URL to browser: [email or web address]    SARS-CoV-2 assay is a Nucleic Acid Amplification assay intended for the  qualitative detection of nucleic acid from SARS-CoV-2 in nasopharyngeal  swabs. Results are for the presumptive identification of SARS-CoV-2 RNA.    Positive results are indicative of infection with SARS-CoV-2, the virus  causing COVID-19, but do not rule out bacterial infection or co-infection  with other viruses. Laboratories within the [place] and its  territories are required to report all positive results to the appropriate  public health authorities. Negative results do not preclude SARS-CoV-2  infection and should not be used as the sole basis for treatment or other  patient management decisions. Negative results must be combined with  clinical observations, patient history, and epidemiological information.  This test has not been FDA cleared or approved.    This test has been authorized by FDA under an Emergency Use Authorization  (EUA). This test is only authorized for the duration of time the  declaration that circumstances exist justifying the authorization of the  emergency use of an in vitro diagnostic tests for detection of SARS-CoV-2  virus and/or diagnosis of COVID-19 infection under section 564(b)(1) of  the Act, 21 U.S.C. 360bbb-3(b)(1), unless the authorization is terminated  or revoked sooner. The test has been validated but independent review by FDA  and CLIA is pending.    Test performed using [employer]: This  RT-PCR assay targets N2,  a region unique to SARS-CoV-2. A conserved region in the E-gene was chosen  for pan-Sarbecovirus detection which includes SARS-CoV-2.    According to CMS-2020-01-R, this platform meets the definition of high-throughput technology.    Basic metabolic panel [[id number]]  (Abnormal) Collected: 07/09/24 1155    Lab Status: Final result Specimen: Blood from Arm, Right Updated: 07/09/24 1241     Sodium 131 mmol/L      Potassium 4.0 mmol/L      Chloride 96 mmol/L      CO2 28 mmol/L      ANION GAP 7 mmol/L      BUN 14 mg/dL      Creatinine 0.52 mg/dL      Glucose 107 mg/dL      Calcium 10.7 mg/dL      eGFR 88 ml/min/1.73sq m     Narrative:      verified by repeat analysis.  National Kidney Disease Foundation guidelines for Chronic Kidney Disease (CKD):     Stage 1 with normal or high GFR (GFR > 90 mL/min/1.73 square meters)    Stage 2 Mild CKD (GFR = 60-89 mL/min/1.73 square meters)    Stage 3A Moderate CKD (GFR = 45-59 mL/min/1.73 square meters)    Stage 3B Moderate CKD (GFR = 30-44 mL/min/1.73 square meters)    Stage 4 Severe CKD (GFR = 15-29 mL/min/1.73 square meters)    Stage 5 End Stage CKD (GFR <15 mL/min/1.73 square meters)  Note: GFR calculation is accurate only with a steady state creatinine    HS Troponin 0hr (reflex protocol) [[id number]]  (Normal) Collected: 07/09/24 1155    Lab Status: Final result Specimen: Blood from Arm, Right Updated: 07/09/24 1227     hs TnI 0hr 7 ng/L     Protime-INR [[id number]]  (Normal) Collected: 07/09/24 1155    Lab Status: Final result Specimen: Blood from Arm, Right Updated: 07/09/24 1220     Protime 13.6 seconds      INR 0.98    APTT [[id number]]  (Normal) Collected: 07/09/24 1155    Lab Status: Final result Specimen: Blood from Arm, Right Updated: 07/09/24 1220     PTT 26 seconds     CBC and Platelet [[id number]]  (Normal) Collected: 07/09/24 1155    Lab Status: Final result Specimen: Blood from Arm, Right Updated: 07/09/24 1205     WBC 7.70 Thousand/uL       RBC 4.51 Million/uL      Hemoglobin 13.3 g/dL      Hematocrit 39.7 %      MCV 88 fL      MCH 29.5 pg      MCHC 33.5 g/dL      RDW 13.2 %      Platelets 294 Thousands/uL      MPV 9.7 fL     Fingerstick Glucose (POCT) [[id number]]  (Normal) Collected: 07/09/24 1145    Lab Status: Final result Specimen: Blood Updated: 07/09/24 1145     POC Glucose 113 mg/dl                    MRI brain wo contrast   Final Result by Yoseph Hankins MD (07/10 0414)      No MR evidence for acute ischemia as clinically questioned.      Moderate chronic microangiopathic changes within the brain.      Punctate scattered parenchymal chronic microhemorrhages, many of which appear to be at the cortical subcortical junction raising suspicion for underlying cerebral amyloid angiopathy. Clinical correlation and further work-up recommended.      Workstation performed: [id number]         XR spine thoracolumbar 2 vw   Final Result by Tahir Ceron MD (07/10 1102)      Chronic degenerative changes. Limited study. No acute abnormality appreciated      Workstation performed: [id number]         CTA stroke alert (head/neck)   Final Result by Pedrito Carlson DO (07/09 1214)      No large vessel occlusion. No significant carotid or vertebral artery stenosis.      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 Recommend consultation with the [facility], a division of [facility] at [phone]..         Findings were directly discussed with Deven Ruiz at 12:14 p.m. on 7/9/2024.      Workstation performed: [id number]         CT stroke alert brain   Final Result by Pedrito Carlson DO (07/09 1215)      No acute intracranial abnormality.  Chronic microangiopathic changes.      Findings were directly discussed with Deven Ruiz at approximately 12:14 p.m. on 7/9/2024.      Workstation performed: [id number]               Procedures  Procedures      ED Course                             SBIRT  22yo+      Flowsheet Row Most Recent Value   Initial Alcohol Screen: US AUDIT-C     1. How often do you have a drink containing alcohol? 0 Filed at: 07/09/2024 1536   2. How many drinks containing alcohol do you have on a typical day you are drinking?  0 Filed at: 07/09/2024 1536   3b. FEMALE Any Age, or MALE 65+: How often do you have 4 or more drinks on one occassion? 0 Filed at: 07/09/2024 1536   Audit-C Score 0 Filed at: 07/09/2024 1536   CARINA: How many times in the past year have you...    Used an illegal drug or used a prescription medication for non-medical reasons? Never Filed at: 07/09/2024 1536                  Medical Decision Making  83-year-old female presents for evaluation of strokelike symptoms.    On initial evaluation, patient was in no acute distress.  VSS.  NIHSS of 2 for right-sided facial droop.  Stroke alert was called, and patient subsequently received CT and CTA head and neck, in addition to labs.  Neurology was also consulted.  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  Neurology recommended aspirin and Plavix load, and admission for follow-up on stroke protocol.  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  Aspirin 325 mg and Plavix 300 mg given.    Amount and/or Complexity of Data Reviewed  Labs: ordered.  Radiology: ordered.    Risk  OTC drugs.  Prescription drug management.  Decision regarding hospitalization.          Disposition  Final diagnoses:   Facial droop     Time reflects when diagnosis was documented in both MDM as applicable and the Disposition within this note       Time User Action Codes Description Comment    7/9/2024 11:52 AM Nancy Hayes Add [R29.810] Facial droop           ED Disposition       ED Disposition   Admit    Condition   Stable    Date/Time   Tue Jul 9, 2024 1319    Comment   Case was discussed with nguyễn and the patient's admission status was agreed to be Admission Status:  observation status to the service of Dr. Perez .               Follow-up Information       Follow up With Specialties Details Why Contact Info Additional Information    [facility] Neurology [facility] Neurology Follow up  [address]  [place] [facility] [address] [phone] [facility], [address], [place], [place], [address] [phone]            Current Discharge Medication List        CONTINUE these medications which have NOT CHANGED    Details   Diclofenac Sodium (VOLTAREN) 1 % Apply 2 g topically 4 (four) times a day  Qty: 50 g, Refills: 0    Associated Diagnoses: Back pain      Multiple Vitamins-Minerals (CENTRUM SILVER 50+WOMEN PO) Take by mouth every morning      Probiotic Product (PROBIOTIC ADVANCED PO) Take by mouth      acetaminophen (TYLENOL) 325 mg tablet Take 3 tablets (975 mg total) by mouth every 8 (eight) hours    Associated Diagnoses: Closed fracture of left hip, initial encounter ([facility])      aspirin 325 mg tablet Take 1 tablet (325 mg total) by mouth 2 (two) times a day    Associated Diagnoses: Closed fracture of left hip, initial encounter ([facility])      cholecalciferol (VITAMIN D3) 1,000 units tablet Take 1,000 Units by mouth every morning      senna-docusate sodium (SENOKOT S) 8.6-50 mg per tablet Take 1 tablet by mouth daily at bedtime    Associated Diagnoses: Closed fracture of left hip, initial encounter ([facility])      sodium chloride (ALESIA 128) 2 % hypertonic ophthalmic solution Administer 1 drop to both eyes 3 (three) times a day Alesia 128 gel at HS       sodium chloride (Alesia 128) 5 % hypertonic ophthalmic ointment Administer 1 drop to both eyes daily at bedtime. Indications: NA    Comments: Self Reported. Prescribed years ago and no longer prescription, is available OTC.            No discharge procedures on file.    PDMP Review       None             ED Provider  Attending physically available and evaluated Kay NEGRON  Kaila. I managed the patient along with the ED Attending.    Electronically Signed by           Darnell Palmer MD  07/12/24 0723       Darnell Palmer MD  07/12/24 0738

## 2024-07-09 NOTE — H&P
[facility]  H&P  Name: Kay Bright 83 y.o. female I MRN: [id number]  Unit/Bed#: ED-29 I Date of Admission: 7/9/2024   Date of Service: 7/9/2024 I Hospital Day: 0      Assessment & Plan   * Facial droop  Assessment & Plan  Patient presents with right-sided facial droop.  Noticed this morning while taking a shower.  Last known well was last night  Also reported some double vision  Denies any speech or swallowing difficulty, numbness or weakness of any extremity.  Stroke alert was called in the emergency room  NIHSS 2   CTA H/N:No large vessel occlusion. No significant carotid or vertebral artery stenosis.   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  Placed on stroke pathway.  Continue with dual antiplatelet therapy with Aspirin 325 mg and Plavix 300 mg x 1 dose followed by aspirin 81 mg and Plavix 75 mg daily.  Start Lipitor 40 mg daily  Follow-up on TSH, lipid panel, hemoglobin A1c  Follow-up on MRI of the brain and transthoracic echocardiogram  PT OT and speech evaluation will be obtained  Follow-up on Lyme's antibody(forehead  sparing ?Elaine palsy)        Left-sided thoracic back pain  Assessment & Plan  Patient has been experiencing thoracic midline and left-sided back pain without any traumatic injury.  Hypercalcemia with calcium of 10.7 on admission.  Will start workup with thoracolumbar spine x-ray.  Continue with Voltaren gel and Lidoderm patch.  May need further testing based on imaging results.    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    Hypercalcemia  Assessment & Plan  Calcium 10.7 on admission.  More history of primary hyperparathyroidism with hypercalcemia however not placed on any medications in the past.  Recently has been complaining of back pain.  Follow-up on ionized calcium, PTH, PTH RP  "vitamin D panel.           VTE Pharmacologic Prophylaxis: VTE Score: 8 High Risk (Score >/= 5) - Pharmacological DVT Prophylaxis Ordered: enoxaparin (Lovenox). Sequential Compression Devices Ordered.  Code Status: Prior Full Code   Discussion with family: Updated  (daughter) at bedside.    Anticipated Length of Stay: Patient will be admitted on an observation basis with an anticipated length of stay of less than 2 midnights secondary to Stroke marrufo.    Total Time Spent on Date of Encounter in care of patient: 70 mins. This time was spent on one or more of the following: performing physical exam; counseling and coordination of care; obtaining or reviewing history; documenting in the medical record; reviewing/ordering tests, medications or procedures; communicating with other healthcare professionals and discussing with patient's family/caregivers.    Chief Complaint: Facial droop     History of Present Illness:  Kay Bright is a 83 y.o. female with a PMH of primary hyperparathyroidism hypercalcemia not on any treatment, osteoporosis, recent ORIF of left hip fracture who presents with stroke alert in the emergency room with right facial droop.  Patient states that this morning when she woke up last night she had a rough night because of back pain which has been bothering her for the last few weeks.  Today while she was in the shower she noticed facial asymmetry in her right eye which was not closing properly.  Daughter noticed that as well at around 9 to 10 AM.  Patient denied any speech or swallowing difficulty but noticed intermittent double vision.  Denied any headache numbness or weakness of any extremity.  Family reports that her speech was somewhat \"tremulous\" and patient was shaky.  No overt seizure-like activity was reported...    Review of Systems:  Review of Systems   Constitutional:  Positive for activity change.   HENT: Negative.     Eyes:  Positive for visual disturbance.   Respiratory: " Negative.     Cardiovascular: Negative.    Gastrointestinal: Negative.    Endocrine: Negative.    Genitourinary: Negative.    Musculoskeletal:  Positive for back pain.   Neurological:  Positive for facial asymmetry and speech difficulty.   Hematological: Negative.    Psychiatric/Behavioral: Negative.         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    Spinal stenosis     Varicose veins of legs     Vitamin D deficiency     Wears glasses        Past Surgical History:   Procedure Laterality Date    CATARACT EXTRACTION      OK OPTX FEM SHFT FX W/INSJ IMED IMPLT W/WO SCREW Left 3/19/2024    Procedure: INSERTION NAIL IM FEMUR ANTEGRADE (TROCHANTERIC) and all associated procedures;  Surgeon: Poncho Duggan DO;  Location: [facility];  Service: Orthopedics    OK XCAPSL CTRC RMVL INSJ IO LENS PROSTH W/O ECP Right 5/14/2018    Procedure: EXTRACTION EXTRACAPSULAR CATARACT PHACO INTRAOCULAR LENS (IOL);  Surgeon: Rian Benson MD;  Location: [facility] MAIN OR;  Service: Ophthalmology    OK XCAPSL CTRC RMVL INSJ IO LENS PROSTH W/O ECP Left 7/10/2018    Procedure: EXTRACTION EXTRACAPSULAR CATARACT PHACO INTRAOCULAR LENS (IOL);  Surgeon: Rian Benson MD;  Location: [facility] MAIN OR;  Service: Ophthalmology    TUBAL LIGATION         Meds/Allergies:  Prior to Admission medications    Medication Sig Start Date End Date Taking? Authorizing Provider   Diclofenac Sodium (VOLTAREN) 1 % Apply 2 g topically 4 (four) times a day 7/6/24   Yes Tania Carri Stein MD   Multiple Vitamins-Minerals (CENTRUM SILVER 50+WOMEN PO) Take by mouth every morning   Yes Historical Provider, MD   Probiotic Product (PROBIOTIC ADVANCED PO) Take by mouth   Yes Historical Provider, MD   acetaminophen (TYLENOL) 325 mg tablet Take 3 tablets (975 mg total) by mouth every 8 (eight) hours  Patient not taking: Reported on 7/9/2024 3/21/24   Divina Berg PA-C   aspirin 325 mg tablet Take 1 tablet (325 mg total) by mouth 2 (two) times a day  Patient not taking: Reported on 5/2/2024 3/21/24 5/2/24  Divina Berg PA-C   cholecalciferol (VITAMIN D3) 1,000 units tablet Take 1,000 Units by mouth every morning  Patient not taking: Reported on 7/9/2024    Historical Provider, MD   senna-docusate sodium (SENOKOT S) 8.6-50 mg per tablet Take 1 tablet by mouth daily at bedtime  Patient not taking: Reported on 3/23/2024 3/21/24   Divina Berg PA-C   sodium chloride (ALESIA 128) 2 % hypertonic ophthalmic solution Administer 1 drop to both eyes 3 (three) times a day Alesia 128 gel at HS   Patient not taking: Reported on 7/9/2024    Historical Provider, MD   sodium chloride (Alesia 128) 5 % hypertonic ophthalmic ointment Administer 1 drop to both eyes daily at bedtime. Indications: NA  Patient not taking: Reported on 5/2/2024    Historical Provider, MD     I have reviewed home medications with patient personally.    Allergies:   Allergies   Allergen Reactions    Prednisone Swelling     Facial swelling, redness-no dyspnea       Social History:  Marital Status:      Substance Use History:   Social History     Substance and Sexual Activity   Alcohol Use No     Social History     Tobacco Use   Smoking Status Never   Smokeless Tobacco Never     Social History     Substance and Sexual Activity   Drug Use No       Family History:  Family History   Problem Relation Age of Onset    Other Mother         corneal transplants, Parathyroid disease    Osteoporosis Mother     Stroke  "Father     Hypertension Father     Heart disease Father         cardiac disorder    Osteoporosis Father     Other Father         Parathyroid disease    Asthma Sister     Hiatal hernia Sister     Other Brother         parathyroid-removed, Parathyroid disease    Heart disease Brother         \" maker\"       Physical Exam:     Vitals:   Blood Pressure: 153/82 (07/09/24 1315)  Pulse: 67 (07/09/24 1315)  Temperature: 97.5 °F (36.4 °C) (07/09/24 1148)  Temp Source: Oral (07/09/24 1148)  Respirations: 18 (07/09/24 1315)  Weight - Scale: 53.2 kg (117 lb 4.6 oz) (07/09/24 1146)  SpO2: 99 % (07/09/24 1315)    Physical Exam  Constitutional:       Appearance: She is ill-appearing.   HENT:      Head: Normocephalic.      Nose: Nose normal.      Mouth/Throat:      Mouth: Mucous membranes are moist.   Eyes:      Extraocular Movements: Extraocular movements intact.      Pupils: Pupils are equal, round, and reactive to light.   Cardiovascular:      Rate and Rhythm: Normal rate and regular rhythm.   Pulmonary:      Effort: Pulmonary effort is normal.      Breath sounds: Normal breath sounds.   Abdominal:      General: Abdomen is flat. Bowel sounds are normal.      Palpations: Abdomen is soft.   Musculoskeletal:      Cervical back: Neck supple.      Right lower leg: No edema.      Left lower leg: No edema.      Comments: Tenderness thoracic spine and left scapula.  No skin rash   Neurological:      Mental Status: She is alert and oriented to person, place, and time.      Comments: Right sided  facial asymmetry with facial droop and facial numbness noted.  Sparing of the forehead.  Muscle strength equal and symmetrical all extremities.  No pronator drift.  Sensation intact bilateral upper and lower extremities.  Has chronic left foot drop.  Speech tremulous  but no dysarthria or expressive aphasia.          Additional Data:     Lab Results:  Results from last 7 days   Lab Units 07/09/24  1155 07/05/24  1056   WBC Thousand/uL 7.70 " "5.28   HEMOGLOBIN g/dL 13.3 13.3   HEMATOCRIT % 39.7 42.3   PLATELETS Thousands/uL 294 375   SEGS PCT %  --  67   LYMPHO PCT %  --  27   MONO PCT %  --  4   EOS PCT %  --  1     Results from last 7 days   Lab Units 07/09/24  1155 07/05/24  1056   SODIUM mmol/L 131* 139   POTASSIUM mmol/L 4.0 4.3   CHLORIDE mmol/L 96 102   CO2 mmol/L 28 28   BUN mg/dL 14 15   CREATININE mg/dL 0.52* 0.60   ANION GAP mmol/L 7 9   CALCIUM mg/dL 10.7* 10.7*   ALBUMIN g/dL  --  4.2   TOTAL BILIRUBIN mg/dL  --  0.35   ALK PHOS U/L  --  103   ALT U/L  --  15   AST U/L  --  21   GLUCOSE RANDOM mg/dL 107 92     Results from last 7 days   Lab Units 07/09/24  1155   INR  0.98     Results from last 7 days   Lab Units 07/09/24  1145   POC GLUCOSE mg/dl 113     No results found for: \"HGBA1C\"        Lines/Drains:  Invasive Devices       Peripheral Intravenous Line  Duration             Peripheral IV 07/09/24 Left;Proximal;Ventral (anterior) Forearm <1 day    Peripheral IV 07/09/24 Proximal;Right;Ventral (anterior) Forearm <1 day                        Imaging: Reviewed radiology reports from this admission including: CT head  CTA stroke alert (head/neck)   Final Result by Pedrito Carlson DO (07/09 1214)      No large vessel occlusion. No significant carotid or vertebral artery stenosis.      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 Recommend consultation with [facility] at [phone]..         Findings were directly discussed with Deven Ruiz at 12:14 p.m. on 7/9/2024.      Workstation performed: [id number]         CT stroke alert brain   Final Result by Pedrito Carlson DO (07/09 1215)      No acute intracranial abnormality.  Chronic microangiopathic changes.      Findings were directly discussed with Deven Ruiz at approximately 12:14 p.m. on 7/9/2024.      Workstation performed: [id number]             EKG and Other Studies Reviewed on " Admission:   EKG: NSR. HR 80.    ** Please Note: This note has been constructed using a voice recognition system. **

## 2024-07-09 NOTE — SPEECH THERAPY NOTE
Speech Language/Pathology    Speech/Language Pathology Screen Note    Patient Name: Kay Bright  Today's Date: 7/9/2024     Consult received for speech/swallow eval on stroke pathway. Pt is a 82 y/o female with arthritis, spinal stenosis, who presented as a stroke alert due to R facial droop. Patient also reports feeling like there is a glare in her vision in her R eye. Of note, she was seen in the ED recently for R sided headache, but that has since resolved. BP on presentation 146/79.  Pt passed nsg swallow screen; tolerating regular diet w/o s/s dysphagia or aspiration. No speech/language deficits reported.       NIH score is 2.    CT stroke alert brain: No acute intracranial abnormality. Chronic microangiopathic changes.     No need for formal speech/swallow eval at this time. Reconsult if needed.      Maryc Arevalo MS, CCC-SLP  Speech Pathologist  Available via [employer]

## 2024-07-09 NOTE — ASSESSMENT & PLAN NOTE
Patient has been experiencing thoracic midline and left-sided back pain without any traumatic injury.  Hypercalcemia with calcium of 10.7 on admission.  Will start workup with thoracolumbar spine x-ray.  Continue with Voltaren gel and Lidoderm patch.  May need further testing based on imaging results.

## 2024-07-10 ENCOUNTER — APPOINTMENT (OUTPATIENT)
Dept: NON INVASIVE DIAGNOSTICS | Facility: HOSPITAL | Age: 84
DRG: 868 | End: 2024-07-10
Payer: MEDICARE

## 2024-07-10 PROBLEM — R68.89 SUSPECTED LYME DISEASE: Status: ACTIVE | Noted: 2024-07-09

## 2024-07-10 PROBLEM — I67.1 ANEURYSM OF MIDDLE CEREBRAL ARTERY: Status: ACTIVE | Noted: 2024-07-10

## 2024-07-10 PROBLEM — E87.8 ELECTROLYTE ABNORMALITY: Status: ACTIVE | Noted: 2024-07-10

## 2024-07-10 PROBLEM — E87.1 HYPONATREMIA: Status: ACTIVE | Noted: 2024-07-10

## 2024-07-10 LAB
ALBUMIN SERPL BCG-MCNC: 4 G/DL (ref 3.5–5)
ALP SERPL-CCNC: 92 U/L (ref 34–104)
ALT SERPL W P-5'-P-CCNC: 14 U/L (ref 7–52)
ANION GAP SERPL CALCULATED.3IONS-SCNC: 9 MMOL/L (ref 4–13)
AORTIC ROOT: 3.1 CM
APICAL FOUR CHAMBER EJECTION FRACTION: 63 %
ASCENDING AORTA: 3.1 CM
AST SERPL W P-5'-P-CCNC: 20 U/L (ref 13–39)
ATRIAL RATE: 78 BPM
AV LVOT MEAN GRADIENT: 4 MMHG
AV LVOT PEAK GRADIENT: 8 MMHG
B BURGDOR IGG SERPL QL IA: POSITIVE
B BURGDOR IGG+IGM SER QL IA: POSITIVE
B BURGDOR IGM SERPL QL IA: POSITIVE
BASOPHILS # BLD AUTO: 0.02 THOUSANDS/ÂΜL (ref 0–0.1)
BASOPHILS NFR BLD AUTO: 0 % (ref 0–1)
BILIRUB SERPL-MCNC: 0.59 MG/DL (ref 0.2–1)
BILIRUB UR QL STRIP: NEGATIVE
BSA FOR ECHO PROCEDURE: 1.5 M2
BUN SERPL-MCNC: 11 MG/DL (ref 5–25)
CALCIUM SERPL-MCNC: 9.9 MG/DL (ref 8.4–10.2)
CHLORIDE SERPL-SCNC: 96 MMOL/L (ref 96–108)
CHOLEST SERPL-MCNC: 223 MG/DL
CLARITY UR: ABNORMAL
CO2 SERPL-SCNC: 25 MMOL/L (ref 21–32)
COLOR UR: ABNORMAL
CREAT SERPL-MCNC: 0.47 MG/DL (ref 0.6–1.3)
DOP CALC LVOT PEAK VEL VTI: 28.82 CM
DOP CALC LVOT PEAK VEL: 1.44 M/S
E WAVE DECELERATION TIME: 156 MS
E/A RATIO: 1.28
EOSINOPHIL # BLD AUTO: 0.01 THOUSAND/ÂΜL (ref 0–0.61)
EOSINOPHIL NFR BLD AUTO: 0 % (ref 0–6)
ERYTHROCYTE [DISTWIDTH] IN BLOOD BY AUTOMATED COUNT: 13.2 % (ref 11.6–15.1)
EST. AVERAGE GLUCOSE BLD GHB EST-MCNC: 117 MG/DL
FRACTIONAL SHORTENING: 32 (ref 28–44)
GFR SERPL CREATININE-BSD FRML MDRD: 90 ML/MIN/1.73SQ M
GLUCOSE P FAST SERPL-MCNC: 120 MG/DL (ref 65–99)
GLUCOSE SERPL-MCNC: 120 MG/DL (ref 65–140)
GLUCOSE UR STRIP-MCNC: NEGATIVE MG/DL
HBA1C MFR BLD: 5.7 %
HCT VFR BLD AUTO: 39.8 % (ref 34.8–46.1)
HDLC SERPL-MCNC: 77 MG/DL
HGB BLD-MCNC: 13.5 G/DL (ref 11.5–15.4)
HGB UR QL STRIP.AUTO: NEGATIVE
IMM GRANULOCYTES # BLD AUTO: 0.02 THOUSAND/UL (ref 0–0.2)
IMM GRANULOCYTES NFR BLD AUTO: 0 % (ref 0–2)
INTERVENTRICULAR SEPTUM IN DIASTOLE (PARASTERNAL SHORT AXIS VIEW): 1.4 CM
INTERVENTRICULAR SEPTUM: 1.4 CM (ref 0.6–1.1)
KETONES UR STRIP-MCNC: ABNORMAL MG/DL
LAAS-AP2: 14.4 CM2
LAAS-AP4: 16.5 CM2
LDLC SERPL CALC-MCNC: 134 MG/DL (ref 0–100)
LEFT ATRIUM SIZE: 3.8 CM
LEFT ATRIUM VOLUME (MOD BIPLANE): 35 ML
LEFT ATRIUM VOLUME INDEX (MOD BIPLANE): 22.9 ML/M2
LEFT INTERNAL DIMENSION IN SYSTOLE: 2.5 CM (ref 2.1–4)
LEFT VENTRICULAR INTERNAL DIMENSION IN DIASTOLE: 3.7 CM (ref 3.5–6)
LEFT VENTRICULAR POSTERIOR WALL IN END DIASTOLE: 1 CM
LEFT VENTRICULAR STROKE VOLUME: 34 ML
LEUKOCYTE ESTERASE UR QL STRIP: NEGATIVE
LVSV (TEICH): 34 ML
LYMPHOCYTES # BLD AUTO: 0.92 THOUSANDS/ÂΜL (ref 0.6–4.47)
LYMPHOCYTES NFR BLD AUTO: 15 % (ref 14–44)
MCH RBC QN AUTO: 29.3 PG (ref 26.8–34.3)
MCHC RBC AUTO-ENTMCNC: 33.9 G/DL (ref 31.4–37.4)
MCV RBC AUTO: 86 FL (ref 82–98)
MONOCYTES # BLD AUTO: 0.36 THOUSAND/ÂΜL (ref 0.17–1.22)
MONOCYTES NFR BLD AUTO: 6 % (ref 4–12)
MV E'TISSUE VEL-LAT: 11 CM/S
MV E'TISSUE VEL-SEP: 14 CM/S
MV PEAK A VEL: 0.76 M/S
MV PEAK E VEL: 97 CM/S
MV STENOSIS PRESSURE HALF TIME: 45 MS
MV VALVE AREA P 1/2 METHOD: 4.89
NEUTROPHILS # BLD AUTO: 4.65 THOUSANDS/ÂΜL (ref 1.85–7.62)
NEUTS SEG NFR BLD AUTO: 79 % (ref 43–75)
NITRITE UR QL STRIP: NEGATIVE
NRBC BLD AUTO-RTO: 0 /100 WBCS
OSMOLALITY UR/SERPL-RTO: 287 MMOL/KG (ref 282–298)
OSMOLALITY UR: 461 MMOL/KG
P AXIS: 68 DEGREES
PH UR STRIP.AUTO: 7 [PH]
PLATELET # BLD AUTO: 290 THOUSANDS/UL (ref 149–390)
PMV BLD AUTO: 10.2 FL (ref 8.9–12.7)
POTASSIUM SERPL-SCNC: 3.3 MMOL/L (ref 3.5–5.3)
PR INTERVAL: 172 MS
PROT SERPL-MCNC: 6.8 G/DL (ref 6.4–8.4)
PROT UR STRIP-MCNC: NEGATIVE MG/DL
PTH-INTACT SERPL-MCNC: 48.1 PG/ML (ref 12–88)
QRS AXIS: -16 DEGREES
QRSD INTERVAL: 96 MS
QT INTERVAL: 402 MS
QTC INTERVAL: 458 MS
RA PRESSURE ESTIMATED: 10 MMHG
RBC # BLD AUTO: 4.61 MILLION/UL (ref 3.81–5.12)
RIGHT ATRIUM AREA SYSTOLE A4C: 11.4 CM2
RV PSP: 31 MMHG
SL CV LEFT ATRIUM LENGTH A2C: 5.4 CM
SL CV LV EF: 65
SL CV PED ECHO LEFT VENTRICLE DIASTOLIC VOLUME (MOD BIPLANE) 2D: 56 ML
SL CV PED ECHO LEFT VENTRICLE SYSTOLIC VOLUME (MOD BIPLANE) 2D: 23 ML
SODIUM 24H UR-SCNC: 86 MOL/L
SODIUM SERPL-SCNC: 130 MMOL/L (ref 135–147)
SP GR UR STRIP.AUTO: 1.02 (ref 1–1.03)
T WAVE AXIS: 15 DEGREES
TR MAX PG: 21 MMHG
TR PEAK VELOCITY: 2.3 M/S
TRICUSPID VALVE PEAK REGURGITATION VELOCITY: 2.31 M/S
TRIGL SERPL-MCNC: 62 MG/DL
UROBILINOGEN UR STRIP-ACNC: <2 MG/DL
VENTRICULAR RATE: 78 BPM
WBC # BLD AUTO: 5.98 THOUSAND/UL (ref 4.31–10.16)

## 2024-07-10 PROCEDURE — 85025 COMPLETE CBC W/AUTO DIFF WBC: CPT | Performed by: INTERNAL MEDICINE

## 2024-07-10 PROCEDURE — 99232 SBSQ HOSP IP/OBS MODERATE 35: CPT | Performed by: INTERNAL MEDICINE

## 2024-07-10 PROCEDURE — 97116 GAIT TRAINING THERAPY: CPT

## 2024-07-10 PROCEDURE — 99233 SBSQ HOSP IP/OBS HIGH 50: CPT | Performed by: PSYCHIATRY & NEUROLOGY

## 2024-07-10 PROCEDURE — 81003 URINALYSIS AUTO W/O SCOPE: CPT

## 2024-07-10 PROCEDURE — 93306 TTE W/DOPPLER COMPLETE: CPT

## 2024-07-10 PROCEDURE — 84300 ASSAY OF URINE SODIUM: CPT

## 2024-07-10 PROCEDURE — 83935 ASSAY OF URINE OSMOLALITY: CPT

## 2024-07-10 PROCEDURE — 93306 TTE W/DOPPLER COMPLETE: CPT | Performed by: INTERNAL MEDICINE

## 2024-07-10 PROCEDURE — 93010 ELECTROCARDIOGRAM REPORT: CPT | Performed by: INTERNAL MEDICINE

## 2024-07-10 PROCEDURE — 97167 OT EVAL HIGH COMPLEX 60 MIN: CPT

## 2024-07-10 PROCEDURE — 80053 COMPREHEN METABOLIC PANEL: CPT | Performed by: INTERNAL MEDICINE

## 2024-07-10 PROCEDURE — 80061 LIPID PANEL: CPT | Performed by: INTERNAL MEDICINE

## 2024-07-10 PROCEDURE — 92610 EVALUATE SWALLOWING FUNCTION: CPT

## 2024-07-10 PROCEDURE — 97163 PT EVAL HIGH COMPLEX 45 MIN: CPT

## 2024-07-10 PROCEDURE — 83036 HEMOGLOBIN GLYCOSYLATED A1C: CPT | Performed by: INTERNAL MEDICINE

## 2024-07-10 RX ORDER — LORAZEPAM 0.5 MG/1
0.5 TABLET ORAL 2 TIMES DAILY
Status: COMPLETED | OUTPATIENT
Start: 2024-07-10 | End: 2024-07-10

## 2024-07-10 RX ORDER — PREDNISONE 20 MG/1
60 TABLET ORAL
Status: DISCONTINUED | OUTPATIENT
Start: 2024-07-11 | End: 2024-07-10

## 2024-07-10 RX ORDER — DOXYCYCLINE HYCLATE 100 MG/1
100 CAPSULE ORAL EVERY 12 HOURS SCHEDULED
Status: DISCONTINUED | OUTPATIENT
Start: 2024-07-10 | End: 2024-07-13 | Stop reason: HOSPADM

## 2024-07-10 RX ORDER — MINERAL OIL AND PETROLATUM 150; 830 MG/G; MG/G
OINTMENT OPHTHALMIC
Status: DISCONTINUED | OUTPATIENT
Start: 2024-07-10 | End: 2024-07-13 | Stop reason: HOSPADM

## 2024-07-10 RX ORDER — POTASSIUM CHLORIDE 29.8 MG/ML
40 INJECTION INTRAVENOUS ONCE
Status: DISCONTINUED | OUTPATIENT
Start: 2024-07-10 | End: 2024-07-10

## 2024-07-10 RX ORDER — ATORVASTATIN CALCIUM 10 MG/1
10 TABLET, FILM COATED ORAL EVERY EVENING
Status: DISCONTINUED | OUTPATIENT
Start: 2024-07-10 | End: 2024-07-13 | Stop reason: HOSPADM

## 2024-07-10 RX ORDER — POTASSIUM CHLORIDE 14.9 MG/ML
20 INJECTION INTRAVENOUS
Status: COMPLETED | OUTPATIENT
Start: 2024-07-10 | End: 2024-07-10

## 2024-07-10 RX ORDER — ASPIRIN 81 MG/1
81 TABLET, CHEWABLE ORAL DAILY
Status: DISCONTINUED | OUTPATIENT
Start: 2024-07-10 | End: 2024-07-11

## 2024-07-10 RX ADMIN — GLYCERIN 2 DROP: .002; .002; .01 SOLUTION/ DROPS OPHTHALMIC at 12:20

## 2024-07-10 RX ADMIN — DOXYCYCLINE 100 MG: 100 CAPSULE ORAL at 12:20

## 2024-07-10 RX ADMIN — Medication 1000 UNITS: at 09:00

## 2024-07-10 RX ADMIN — CLOPIDOGREL BISULFATE 75 MG: 75 TABLET ORAL at 08:59

## 2024-07-10 RX ADMIN — DICLOFENAC SODIUM 2 G: 10 GEL TOPICAL at 12:21

## 2024-07-10 RX ADMIN — DOXYCYCLINE 100 MG: 100 CAPSULE ORAL at 21:46

## 2024-07-10 RX ADMIN — LORAZEPAM 0.5 MG: 0.5 TABLET ORAL at 21:46

## 2024-07-10 RX ADMIN — POTASSIUM CHLORIDE 20 MEQ: 14.9 INJECTION, SOLUTION INTRAVENOUS at 12:40

## 2024-07-10 RX ADMIN — DOCUSATE SODIUM 100 MG: 100 CAPSULE, LIQUID FILLED ORAL at 17:31

## 2024-07-10 RX ADMIN — ENOXAPARIN SODIUM 40 MG: 40 INJECTION SUBCUTANEOUS at 08:59

## 2024-07-10 RX ADMIN — DICLOFENAC SODIUM 2 G: 10 GEL TOPICAL at 09:00

## 2024-07-10 RX ADMIN — LORAZEPAM 0.5 MG: 0.5 TABLET ORAL at 12:20

## 2024-07-10 RX ADMIN — POTASSIUM CHLORIDE 20 MEQ: 14.9 INJECTION, SOLUTION INTRAVENOUS at 10:14

## 2024-07-10 RX ADMIN — ACETAMINOPHEN 975 MG: 325 TABLET, FILM COATED ORAL at 13:45

## 2024-07-10 RX ADMIN — ATORVASTATIN CALCIUM 10 MG: 10 TABLET, FILM COATED ORAL at 17:32

## 2024-07-10 RX ADMIN — GLYCERIN 2 DROP: .002; .002; .01 SOLUTION/ DROPS OPHTHALMIC at 22:35

## 2024-07-10 RX ADMIN — DOCUSATE SODIUM 100 MG: 100 CAPSULE, LIQUID FILLED ORAL at 08:59

## 2024-07-10 RX ADMIN — ACETAMINOPHEN 975 MG: 325 TABLET, FILM COATED ORAL at 21:46

## 2024-07-10 RX ADMIN — ASPIRIN 81 MG 81 MG: 81 TABLET ORAL at 10:21

## 2024-07-10 NOTE — PROGRESS NOTES
[facility]  Progress Note  Name: Kay Bright I  MRN: [id number]  Unit/Bed#: [facility] SCHAEFFER Date of Admission: 7/9/2024   Date of Service: 7/10/2024 I Hospital Day: 0    Assessment & Plan   * Facial droop secondary to Bell's Palsy  Assessment & Plan  Patient presents with right-sided facial droop.  Noticed the morning of 7/9 while taking a shower.   Stroke alert was called in the emergency room. NIHSS 2.   7/10 CTA H/N: No large vessel occlusion. No significant carotid or vertebral artery stenosis. 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  7/10 CT head: No acute intracranial abnormality  Patient admitted for stroke workup and placed on stroke pathway.  7/10 MRI brain: No MR evidence for acute ischemia as clinically questioned. Moderate chronic microangiopathic changes within the brain. Punctate scattered parenchymal chronic microhemorrhages, many of which appear to be at the cortical subcortical junction raising suspicion for underlying cerebral amyloid angiopathy.   Lipid panel: high cholesterol, high LDL  Lyme: IGG positive, IGM positive, total Ab positive    Patient endorses double vision and difficulty swallowing. Patient is tremulous and disoriented to place and reason for being at the hospital. She seems to have difficulty answering questions, but does follow directions well for examination.  HbA1c 5.7%    Speech evaluation (7/10): Recommending dysphagia 3 diet with thin liquids. Maintain aspiration precautions, upright ~90 degree positioning during ALL PO intake and at least 30 minutes post PO meals, small bites, slow rate of intake, and alternate liquids/solids. Medications as tolerated.     Plan:  Dual antiplatelet therapy with Aspirin 325 mg and Plavix 300 mg x 1 dose followed by aspirin 81 mg and Plavix 75 mg daily  Start Lipitor 40 mg daily  Doxycycline 100 mg Q12  Follow-up on transthoracic echocardiogram, pending read  PT/OT evaluation  Neurology  recommendations appreciated  Infectious Disease recommendations appreciated  Consider lumbar puncture to determine etiology  Ophthalmic drops due to patient inability to close right eye.    Electrolyte abnormality: Hyponatremia, Hypokalemia  Assessment & Plan  Serum sodium 131 on admission (7/9). Serum sodium currently 130 (7/10).    Serum osmolality 287 (7/9)  Potassium: 3.3    Plan:  Follow up on urine studies: urine sodium, urinary analysis with microscopic and culture, urine osmolality  Give IV fluids to replete sodium  Kcl 40mg IV    Left-sided thoracic back pain  Assessment & Plan  Patient has been experiencing thoracic midline and left-sided back pain without any traumatic injury.  Hypercalcemia with calcium of 10.7 on admission. Calcium 9.9 on 7/10.     7/10 XR spine thoracolumbar: Chronic degenerative changes. Limited study. No acute abnormality appreciated.     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    Plan:  Monitor for changes    Hypercalcemia  Assessment & Plan  Calcium 10.7 on admission. History of primary hyperparathyroidism with hypercalcemia however not placed on any medications in the past.  Recently has been complaining of back pain.    Ionized Ca 2+, Vitamin D and PTH within normal limits    7/10 Ca 2+ 9.9    Plan:  Follow-up on PTHrp  Continue Vitamin D3 1000 units daily         VTE Pharmacologic Prophylaxis: VTE Score: 8 High Risk (Score >/= 5) - Pharmacological DVT Prophylaxis Ordered: apixaban (Eliquis). Sequential Compression Devices Ordered.    Mobility:   Basic Mobility Inpatient Raw Score: 19  JH-HLM Goal: 6: Walk 10 steps or more  JH-HLM Achieved: 5: Stand (1 or more minutes)  JH-HLM Goal achieved. Continue to encourage appropriate mobility.    Patient Centered Rounds: I performed bedside rounds with nursing staff today.   Discussions with Specialists or Other Care Team Provider:  Neurology, Speech    Education and Discussions with Family / Patient: Updated  (daughter) at bedside.    Current Length of Stay: 0 day(s)  Current Patient Status: Observation   Discharge Plan: Anticipate discharge in 24-48 hrs to discharge location to be determined pending rehab evaluations.     Code Status: Level 1 - Full Code    Subjective:   Patient lying comfortably in bed. No acute distress, however patient appeared tremulous. Patient was oriented only to person and time. She had some difficulty answering questions but stated that she was not currently in pain and not experiencing double vision at the time. She stated that she felt different than she usually does, and was feeling confused. The patient's daughter was at bedside and endorsed that the patient is oriented x3 at baseline and is normally not tremulous. Questions and concerns were addressed.    Objective:     Vitals:   Temp (24hrs), Av.2 °F (36.8 °C), Min:97.5 °F (36.4 °C), Max:98.6 °F (37 °C)    Temp:  [97.5 °F (36.4 °C)-98.6 °F (37 °C)] 98.5 °F (36.9 °C)  HR:  [] 70  Resp:  [16-18] 16  BP: (118-176)/(61-96) 118/61  SpO2:  [95 %-99 %] 96 %  Body mass index is 22.11 kg/m².     Input and Output Summary (last 24 hours):     Intake/Output Summary (Last 24 hours) at 7/10/2024 1339  Last data filed at 7/10/2024 1014  Gross per 24 hour   Intake 120 ml   Output 500 ml   Net -380 ml       Physical Exam:   Physical Exam  Vitals reviewed.   Constitutional:       General: She is not in acute distress.     Appearance: She is ill-appearing. She is not diaphoretic.   HENT:      Head: Normocephalic.      Mouth/Throat:      Mouth: Mucous membranes are dry.   Eyes:      General:         Right eye: No discharge.         Left eye: No discharge.   Cardiovascular:      Rate and Rhythm: Normal rate and regular rhythm.      Pulses: Normal pulses.      Heart sounds: Normal heart sounds. No murmur heard.  Pulmonary:      Effort: Pulmonary effort is  normal. No respiratory distress.      Breath sounds: Normal breath sounds.   Abdominal:      General: Abdomen is flat. Bowel sounds are normal.      Palpations: Abdomen is soft.   Musculoskeletal:      Cervical back: Neck supple.      Right lower leg: No edema.      Left lower leg: No edema.      Comments: Tenderness thoracic spine and left scapula.  No skin rash   Skin:     General: Skin is warm and dry.   Neurological:      Mental Status: She is alert. She is disoriented and confused.      Cranial Nerves: Cranial nerve deficit and facial asymmetry present. No dysarthria.      Sensory: Sensory deficit present.      Motor: Tremor present. No weakness.      Comments: Upper and lower right sided facial asymmetry with facial droop and facial numbness noted. Patient unable to close right eye or raise right eyebrow.    Muscle strength equal and symmetrical all extremities. Chronic left foot drop.  Speech tremulous but no dysarthria or expressive aphasia.   Psychiatric:         Mood and Affect: Mood normal.         Additional Data:     Labs:  Results from last 7 days   Lab Units 07/10/24  0507   WBC Thousand/uL 5.98   HEMOGLOBIN g/dL 13.5   HEMATOCRIT % 39.8   PLATELETS Thousands/uL 290   SEGS PCT % 79*   LYMPHO PCT % 15   MONO PCT % 6   EOS PCT % 0     Results from last 7 days   Lab Units 07/10/24  0507   SODIUM mmol/L 130*   POTASSIUM mmol/L 3.3*   CHLORIDE mmol/L 96   CO2 mmol/L 25   BUN mg/dL 11   CREATININE mg/dL 0.47*   ANION GAP mmol/L 9   CALCIUM mg/dL 9.9   ALBUMIN g/dL 4.0   TOTAL BILIRUBIN mg/dL 0.59   ALK PHOS U/L 92   ALT U/L 14   AST U/L 20   GLUCOSE RANDOM mg/dL 120     Results from last 7 days   Lab Units 07/09/24  1155   INR  0.98     Results from last 7 days   Lab Units 07/09/24  1145   POC GLUCOSE mg/dl 113     Results from last 7 days   Lab Units 07/10/24  0507   HEMOGLOBIN A1C % 5.7*           Lines/Drains:  Invasive Devices       Peripheral Intravenous Line  Duration             Peripheral IV  07/09/24 Proximal;Right;Ventral (anterior) Forearm 1 day                      Telemetry:  Telemetry Orders (From admission, onward)               24 Hour Telemetry Monitoring  Continuous x 24 Hours (Telem)        Expiring   Question:  Reason for 24 Hour Telemetry  Answer:  TIA/Suspected CVA/ Confirmed CVA                     Telemetry Reviewed: Normal Sinus Rhythm  Indication for Continued Telemetry Use: No indication for continued use. Will discontinue.              Imaging: Reviewed radiology reports from this admission including: CT head, MRI brain, and xray(s)    Recent Cultures (last 7 days):         Last 24 Hours Medication List:   Current Facility-Administered Medications   Medication Dose Route Frequency Provider Last Rate    acetaminophen  975 mg Oral Q8H DINO Diann Pedroza MD      Artificial Tears  2 drop Right Eye Q2H While awake ROGELIO Kowalski      And    artificial tear   Right Eye HS Keisha Kessler CRNP      aspirin  81 mg Oral Daily Cheryle Molina MD      atorvastatin  10 mg Oral QPM JERRY KowalskiNP      cholecalciferol  1,000 Units Oral QAM Diann Pedroza MD      Diclofenac Sodium  2 g Topical 4x Daily Diann Pedroza MD      docusate sodium  100 mg Oral BID Diann Pedroza MD      doxycycline hyclate  100 mg Oral Q12H DINO JERRY KowalskiNP      enoxaparin  40 mg Subcutaneous Daily Diann Pedroza MD      LORazepam  0.5 mg Oral BID ROGELIO Kowalski      potassium chloride  20 mEq Intravenous Q2H Cheryle Molina MD 20 mEq (07/10/24 1240)        Today, Patient Was Seen By: Delmer Barriga MD    **Please Note: This note may have been constructed using a voice recognition system.**

## 2024-07-10 NOTE — ASSESSMENT & PLAN NOTE
Patient has been experiencing thoracic midline and left-sided back pain without any traumatic injury.  Hypercalcemia with calcium of 10.7 on admission. Calcium 9.9 on 7/10.     7/10 XR spine thoracolumbar: Chronic degenerative changes. Limited study. No acute abnormality appreciated.     Plan:  Voltaren gel  Lidoderm patch

## 2024-07-10 NOTE — SPEECH THERAPY NOTE
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    Spinal stenosis     Varicose veins of legs     Vitamin D deficiency     Wears glasses        Past Surgical History  Past Surgical History:   Procedure Laterality Date    CATARACT EXTRACTION      NV OPTX FEM SHFT FX W/INSJ IMED IMPLT W/WO SCREW Left 3/19/2024    Procedure: INSERTION NAIL IM FEMUR ANTEGRADE (TROCHANTERIC) and all associated procedures;  Surgeon: Poncho Duggan DO;  Location: [facility];  Service: Orthopedics    NV XCAPSL CTRC RMVL INSJ IO LENS PROSTH W/O ECP Right 5/14/2018    Procedure: EXTRACTION EXTRACAPSULAR CATARACT PHACO INTRAOCULAR LENS (IOL);  Surgeon: Rian Benson MD;  Location: [facility] MAIN OR;  Service: Ophthalmology    NV XCAPSL CTRC RMVL INSJ IO LENS PROSTH W/O ECP Left 7/10/2018    Procedure: EXTRACTION EXTRACAPSULAR CATARACT PHACO INTRAOCULAR LENS (IOL);  Surgeon: Rian Benson MD;  Location: [facility] MAIN OR;  Service: Ophthalmology    TUBAL LIGATION         Summary    Pt presents with s/s mild oropharyngeal dysphagia c/b right sided labial weakness, lingual discoordination 2/2 tongue fasciculations, decreased bolus manipulation/formation, right sided  "buccal pocketing, disorganized oral process, delayed A-P transport.     Recommendations:   Diet: soft/level 3 diet   Meds: whole with liquid; as tolerated  Frequent Oral care: 2x/day  Aspiration precautions and compensatory swallowing strategies: upright posture, slow rate of feeding, small bites/sips, and alternating bites and sips  Other Recommendations/ considerations: Lingual sweep into right buccal cavity to assure clearance of pocketed residual, verbal reminders to swallow before adding more bolus into oral cavity.       Current Medical Status  Pt is a 83 y.o. female who presented to [facility] with a PMH of primary hyperparathyroidism hypercalcemia not on any treatment, osteoporosis, recent ORIF of left hip fracture who presents with stroke alert in the emergency room with right facial droop.  Patient states that this morning when she woke up last night she had a rough night because of back pain which has been bothering her for the last few weeks.  Today while she was in the shower she noticed facial asymmetry in her right eye which was not closing properly.  Daughter noticed that as well at around 9 to 10 AM.  Patient denied any speech or swallowing difficulty but noticed intermittent double vision.  Denied any headache numbness or weakness of any extremity.  Family reports that her speech was somewhat \"tremulous\" and patient was shaky.  No overt seizure-like activity was reported.    Past medical history:   Please see H&P for details    Special Studies:  -MRI brain wo contrast 07/09/24: No MR evidence for acute ischemia as clinically questioned. Moderate chronic microangiopathic changes within the brain. Punctate scattered parenchymal chronic microhemorrhages, many of which appear to be at the cortical subcortical junction raising suspicion for underlying cerebral amyloid angiopathy. Clinical correlation and further work-up recommended.  -CTA stroke alert (head/neck) 07/09/24: No large vessel " occlusion. No significant carotid or vertebral artery stenosis. 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 Recommend consultation with the [facility], a division of [facility] for Neuroscience at [phone].  -CTA stroke alert brain 07/09/24: No acute intracranial abnormality. Chronic microangiopathic changes.     Social/Education/Vocational Hx:  Pt lives with family.    Swallow Information   Prior speech/swallowing tx: No  Current Risks for Dysphagia & Aspiration: AMS  Current Symptoms/Concerns: apparent choking incident on turkey sandwich brought in by daughter, followed by emesis.  Current Diet: mechanically altered/level 2 diet and thin liquids   Baseline Diet: regular diet and thin liquids  Takes pills-whole with thin liquids    Baseline Assessment   Behavior/Cognition: alert and decreased attention  Speech/Language Status: able to participate in conversation and able to follow commands  Patient Positioning: upright in bed     Swallow Mechanism Exam   Facial: right facial droop  Labial: decreased ROM right side, decreased strength, and decreased coordination  Lingual: decreased coordination  Velum: unable to visualize  Mandible: adequate ROM  Dentition: adequate  Vocal quality:clear/adequate   Volitional Cough: strong/productive   Respiratory: WNL    Consistencies Assessed and Performance   Consistencies Administered: thin liquids, puree, soft solids, and hard solids    Oral Stage: Pt with fair utensil stripping/bolus retrieval/labial seal with thin liquids consistencies and pureed solids, soft solids, and regular solids via spoon and cup sip. Labial/lingual/jaw ROM/coordination/strength decreased. Natural teeth intact.. Decreased, yet fair oral control/bolus manipulation with soft solids and regular solids with mildly increased mastication cycle. Mild right sided buccal pocketing noted. Mild lingual residue remained s/p swallow of soft solids and regular solids. Pt with  fair suck-swallow technique during straw sip trials with thin liquids. decreased A-P transport with all trialed consistencies.      Pharyngeal Stage: Swallow trigger noted as timely as evidenced via visual/tactile assessment of hyolaryngeal elevation/excursion, suspected adequate hyolaryngeal elevation/excursion upon palpation. No overt clinical s/s pen/asp observed. No coughing/throat clearing observed during PO intake. Vocal quality WFL s/p swallow.      Esophageal Concerns: none reported      Results Reviewed with: patient, RN, MD, and family   Dysphagia Goals: Pt will tolerate the safest, LRD wo s/s pen/asp x1-3x.    Speech Therapy Prognosis   Prognosis: good    Prognosis Considerations: therapeutic potential      Marcy Arevalo MS, CCC-SLP  Speech Pathologist  Available via [employer]

## 2024-07-10 NOTE — ASSESSMENT & PLAN NOTE
Serum sodium 131 on admission (7/9). Serum sodium currently 130 (7/10).    Serum osmolality 287 (7/9)  Potassium: 3.3    Plan:  Follow up on urine studies: urine sodium, urinary analysis with microscopic and culture, urine osmolality  Give IV fluids to replete sodium  Kcl 40mg IV

## 2024-07-10 NOTE — ASSESSMENT & PLAN NOTE
Incidentally noted on CTA done to investigate a right facial palsy subsequently found to be Bell's palsy related to acute Lyme disease.  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 Recommend nonemergent consultation with the [facility], a division of [facility] for Neuroscience at [phone]..

## 2024-07-10 NOTE — PROGRESS NOTES
[facility]  Progress Note  Name: Kay Bright I  MRN: [id number]  Unit/Bed#: S -01 I Date of Admission: 7/9/2024   Date of Service: 7/10/2024 I Hospital Day: 0    Assessment & Plan   * Right facial Bell's palsy (3 facial divisions) related to suspected Lyme disease  Assessment & Plan  7/10/2024: Seen again today by neurology is this right-hand-dominant community dwelling 84 y/o female with arthritis, spinal stenosis, who presented as a stroke alert due to R facial droop. Patient also reports feeling like there is a glare in her vision in her R eye. Of note, she was seen in the ED recently for R sided headache, but that has since resolved. BP on presentation 146/79. NIHSS 2. Patient was deemed not a TNK candidate due to being outside of the appropriate time window. Unclear etiology at this time. Will place on formal stroke pathway for further evaluation.  Exam today demonstrated features of a peripheral right facial weakness.  She had a right forehead facial weakness present today that was not present yesterday.  There were no features of central lateralizing weakness.  She is however confused and has cognitive and mental status changes consistent with that of delirium, also her sodium is noted to be approximately 130 and she is on normally around 137.  Workup:  -CT head: No acute intracranial abnormality. Chronic microangiopathic changes.   -CTA head and neck:  No large vessel occlusion. No significant carotid or vertebral artery stenosis.  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  -MRI done yesterday was negative.  Plan:  This patient was started on the doxycycline today for 10 days 100 mg every 12 hours.  We have held on starting any prednisone as she is currently somewhat confused and delirious already.  Concerning the questionable prednisone allergy the daughter at the bedside reports that this is so remote and she is not even sure that the report of the  facial swelling is accurate.  Additionally we have also started eyedrops for lubrication with ointment at at bedtime.  We have also added a gentle dose of p.o. Ativan today to try and facilitate this patient falling asleep today and tonight to help improve her delirium.  Anticipate she will be improved overnight with sleep.  We discussed with the patient's daughter that the majority of Bell's palsy related to Lyme disease improve.    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  Assessment & Plan  Incidentally noted on CTA done to investigate a right facial palsy subsequently found to be Bell's palsy related to acute Lyme disease.  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 Recommend nonemergent consultation with the [facility], a division of [facility] for Neuroscience at [phone]..           This patient can follow-up nonurgently with neurology as an outpatient after discharge.  She should be seen by her PCP as well post discharge.    Subjective/Objective     Subjective: Patient is confused she is unable to offer her at this time    ROS: She reports that she is comfortable and not cold.  Otherwise she is poorly reliable.  Her daughter who is at the bedside reports that she is clearly not at her mental baseline.  She reports that she is chatty she is tangential and restless in the bed.  She reports she did not sleep last night in the ED.    Current Facility-Administered Medications   Medication Dose Route Frequency    acetaminophen  975 mg Oral Q8H DINO    Artificial Tears  2 drop Right Eye Q2H While awake    And    artificial tear   Right Eye HS    aspirin  81 mg Oral Daily    atorvastatin  10 mg Oral QPM    cholecalciferol  1,000 Units Oral QAM    Diclofenac Sodium  2 g Topical 4x Daily    docusate sodium  100 mg Oral BID    doxycycline hyclate  100 mg Oral Q12H DINO    enoxaparin  40 mg Subcutaneous Daily    LORazepam  0.5 mg Oral BID         Vitals: Blood  "pressure 118/61, pulse 70, temperature 98.5 °F (36.9 °C), temperature source Axillary, resp. rate 16, height 5' 1\" (1.549 m), weight 53.1 kg (117 lb), SpO2 96%.,Body mass index is 22.11 kg/m².        Physical Exam:     Pat seen in: In the ED, the patient is daughters at the bedside.  General appearance: alert, restless  Neck, Lungs, Heart, & abdomen: WNL  Extremities: atraumatic, no cyanosis or edema    Neurologic:   Mental status: Somewhat groggy, oriented to self and her daughter at the bedside.  She is confused that she is in the hospital.  She was unable to report that it was July 2024 the day of the week.  She was unable to report the circumstances of her admission.  She was able to follow commands.  Her speech is clear without dysarthria there is no aphasia but she is tangential and rambling.  CN: exam EOM's I, Gaze conjugate.  Right lateralizing motor, 3 zones on exam, she has forehead flattening on the right today which was not present yesterday.. Reminder CNVIII-XII normal.   Motor: full power age appropriate x 4 limbs  Sensory: grossly intact  X 4 limbs, PP not tested  Cerebellar: no ataxia or past pointing w pronation from a modified Romberg position.   Gait: She was not gated on today's exam  DTR's: Age appropriate,  Plantars: downgoing       Lab Results: I have personally reviewed pertinent reports.  , CBC:   Results from last 7 days   Lab Units 07/10/24  0507 07/09/24  1714 07/09/24  1155 07/05/24  1056   WBC Thousand/uL 5.98  --  7.70 5.28   RBC Million/uL 4.61  --  4.51 4.59   HEMOGLOBIN g/dL 13.5  --  13.3 13.3   HEMATOCRIT % 39.8  --  39.7 42.3   MCV fL 86  --  88 92   PLATELETS Thousands/uL 290 264 294 375   , BMP/CMP:   Results from last 7 days   Lab Units 07/10/24  0507 07/09/24  1155 07/05/24  1056   SODIUM mmol/L 130* 131* 139   POTASSIUM mmol/L 3.3* 4.0 4.3   CHLORIDE mmol/L 96 96 102   CO2 mmol/L 25 28 28   BUN mg/dL 11 14 15   CREATININE mg/dL 0.47* 0.52* 0.60   CALCIUM mg/dL 9.9 10.7* " 10.7*   AST U/L 20  --  21   ALT U/L 14  --  15   ALK PHOS U/L 92  --  103   EGFR ml/min/1.73sq m 90 88 83   , Vitamin B12:   , HgBA1C:   Results from last 7 days   Lab Units 07/10/24  0507   HEMOGLOBIN A1C % 5.7*   , TSH:   Results from last 7 days   Lab Units 07/05/24  1056   TSH 3RD GENERATON uIU/mL 2.732   , Coagulation:   Results from last 7 days   Lab Units 07/09/24  1155   INR  0.98   , Lipid Profile:   Results from last 7 days   Lab Units 07/10/24  0507   HDL mg/dL 77   LDL CALC mg/dL 134*   TRIGLYCERIDES mg/dL 62        Imaging Studies: I have personally reviewed pertinent films in PACS and note that her MRI was already done prior to our follow-up visit today.  There is no stroke appreciated there is some minimal small vessel disease.  No large vessel occlusion. No significant carotid or vertebral artery stenosis.  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 Recommend consultation with the [facility], a division of [facility] for Neuroscience at [phone]..       Counseling / Coordination of Care  Total time spent today 40-50 minutes. Greater than 50% of total time was spent with the patient and / or family counseling and / or coordination of care. A description of the counseling / coordination of care: The above was discussed with the patient and her daughter at the bedside predominantly.  The daughter had several questions all of which were answered I believe to her satisfaction at this point.

## 2024-07-10 NOTE — ASSESSMENT & PLAN NOTE
7/10/2024: Seen again today by neurology is this right-hand-dominant community dwelling 82 y/o female with arthritis, spinal stenosis, who presented as a stroke alert due to R facial droop. Patient also reports feeling like there is a glare in her vision in her R eye. Of note, she was seen in the ED recently for R sided headache, but that has since resolved. BP on presentation 146/79. NIHSS 2. Patient was deemed not a TNK candidate due to being outside of the appropriate time window. Unclear etiology at this time. Will place on formal stroke pathway for further evaluation.  Exam today demonstrated features of a peripheral right facial weakness.  She had a right forehead facial weakness present today that was not present yesterday.  There were no features of central lateralizing weakness.  She is however confused and has cognitive and mental status changes consistent with that of delirium, also her sodium is noted to be approximately 130 and she is on normally around 137.  Workup:  -CT head: No acute intracranial abnormality. Chronic microangiopathic changes.   -CTA head and neck:  No large vessel occlusion. No significant carotid or vertebral artery stenosis.  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  -MRI done yesterday was negative.  Plan:  This patient was started on the doxycycline today for 10 days 100 mg every 12 hours.  We have held on starting any prednisone as she is currently somewhat confused and delirious already.  Concerning the questionable prednisone allergy the daughter at the bedside reports that this is so remote and she is not even sure that the report of the facial swelling is accurate.  Additionally we have also started eyedrops for lubrication with ointment at at bedtime.  We have also added a gentle dose of p.o. Ativan today to try and facilitate this patient falling asleep today and tonight to help improve her delirium.  Anticipate she will be improved overnight with sleep.   We discussed with the patient's daughter that the majority of Bell's palsy related to Lyme disease improve.

## 2024-07-10 NOTE — OCCUPATIONAL THERAPY NOTE
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    Spinal stenosis     Varicose veins of legs     Vitamin D deficiency     Wears glasses      Past Surgical History  Past Surgical History:   Procedure Laterality Date    CATARACT EXTRACTION      NC OPTX FEM SHFT FX W/INSJ IMED IMPLT W/WO SCREW Left 3/19/2024    Procedure: INSERTION NAIL IM FEMUR ANTEGRADE (TROCHANTERIC) and all associated procedures;  Surgeon: Poncho Duggan DO;  Location: [facility];  Service: Orthopedics    NC XCAPSL CTRC RMVL INSJ IO LENS PROSTH W/O ECP Right 5/14/2018    Procedure: EXTRACTION EXTRACAPSULAR CATARACT PHACO INTRAOCULAR LENS (IOL);  Surgeon: Rian Benson MD;  Location: [facility] MAIN OR;  Service: Ophthalmology    NC XCAPSL CTRC RMVL INSJ IO LENS PROSTH W/O ECP Left 7/10/2018    Procedure: EXTRACTION EXTRACAPSULAR CATARACT PHACO INTRAOCULAR LENS (IOL);  Surgeon: Rian Benson MD;  Location: [facility] MAIN OR;  Service: Ophthalmology    TUBAL LIGATION          07/10/24 1541   OT Last Visit   OT Visit Date  07/10/24   Note Type   Note type Evaluation   Pain Assessment   Pain Assessment Tool FLACC   Pain Rating: FLACC (Rest) - Face 0   Pain Rating: FLACC (Rest) - Legs 0   Pain Rating: FLACC (Rest) - Activity 0   Pain Rating: FLACC (Rest) - Cry 0   Pain Rating: FLACC (Rest) - Consolability 0   Score: FLACC (Rest) 0   Pain Rating: FLACC (Activity) - Face 0   Pain Rating: FLACC (Activity) - Legs 0   Pain Rating: FLACC (Activity) - Activity 0   Pain Rating: FLACC (Activity) - Cry 0   Pain Rating: FLACC (Activity) - Consolability 0   Score: FLACC (Activity) 0   Restrictions/Precautions   Weight Bearing Precautions Per Order No   Other Precautions Cognitive;Chair Alarm;Bed Alarm;Multiple lines;Fall Risk;Pain;Visual impairment  (R eye patch donned upon arrival to the room)   Home Living   Type of Home House   Home Layout Two level;Laundry in basement;Bed/bath upstairs  ((4 HERBERT c HR, 4 steps to living room + bedrooms (able to maintain this level on d/c). FF to basement/family room))   Bathroom Shower/Tub Tub/shower unit   Bathroom Toilet Standard   Bathroom Equipment Grab bars in shower;Grab bars around toilet;Shower chair  (pt does not use shower chair at baseline)   Bathroom Accessibility Accessible   Home Equipment Walker;Cane;Reacher;Sock aid;Grab bars  (RW)   Additional Comments Pt independently ambulating without AD in the home vs use of SPC for community distances PTA   Prior Function   Level of [place] Independent with ADLs;Independent with functional mobility;Independent with IADLS   Lives With Son  (who works during the day)   Receives Help From Family   IADLs Independent with driving;Independent with meal prep;Independent with medication management   Falls in the last 6 months 1 to 4  (1, 3/2024 with resultant left hip fracture)   Vocational Retired   Lifestyle   Autonomy PTA pt living with son in 2SH, pt (I) with ADLs and shares IADLs, (+)fall, no use of AD at baseline   Reciprocal Relationships Supportive  family   Service to Others Retired   Intrinsic Gratification Per chart review, enjoys going to [employer], walking and spending time with 12 great grandchildren   General   Additional Pertinent History Admit with R sided weakness and facial droop. Brought to OR on 3/19 for IM nail of L femur. Found to have Bell's Palsy; CTH and MRI negative for acute intracranial abnormality. PMH: L foot drop, CTH & CTA were unremarkable for any acute pathology   Family/Caregiver Present Yes  (supportive daughter at bedside)   Subjective   Subjective pt with nonsensical speech throuhgout session, expresses one-two word phrases that are clear, intermittently   ADL   Eating Assistance 3  Moderate Assistance   Grooming Assistance 3  Moderate Assistance   UB Bathing Assistance 2  Maximal Assistance   LB Bathing Assistance 2  Maximal Assistance   UB Dressing Assistance 2  Maximal Assistance   LB Dressing Assistance 2  Maximal Assistance   LB Dressing Deficit   (total assist to doff bilateral sneakers sitting at the EOB)   Toileting Assistance  2  Maximal Assistance   Toileting Deficit Clothing management up;Clothing management down;Perineal hygiene;Setup;Steadying;Supervison/safety;Increased time to complete;Verbal cueing  (VC to initiate and sequence through task; able to complete pericare. Assist for clothing managment + steadying)   Additional Comments Unable to formally assess bathing, UB dressing or grooming at time of eval. The above levels of assistance are anticipated based on functional performance deficits with use of clinical judgement.   Bed Mobility   Sit to Supine 5  Supervision   Additional items Increased time required;Verbal cues  (flat bed)   Additional Comments Supervision - CGA to maintain static sitting balance at the EOB. P   Transfers   Sit to Stand 4  Minimal assistance   Additional items Assist x 1;Increased time required;Verbal cues   Stand to Sit 4  Minimal assistance   Additional items Assist x 1;Increased time  required;Verbal cues   Toilet transfer 3  Moderate assistance   Additional items Assist x 1;Increased time required;Verbal cues;Standard toilet  (use of grab bar, tactile cues for optimal hand placement and for surface proximity)   Additional Comments Additional STS from EOB w/ AKASH x 1 person. Use of RW vs HHA. Verbal, tactiel and visual cues for optimal hand placement and body mechanics for safe transfers.   Functional Mobility   Functional Mobility 4  Minimal assistance   Additional Comments Ax1 for functional household distance to/from the bathroom with use of RW. EXTENSIVE verbal, tactile cues for body awareness, surface proximity, RW managment and to navigate around unfamiliar enviorment.   Additional items Rolling walker   Balance   Static Sitting Fair   Dynamic Sitting Fair -   Static Standing Poor +   Dynamic Standing Poor   Activity Tolerance   Activity Tolerance Patient limited by fatigue;Treatment limited secondary to medical complications (Comment)  (cognition, R sided inattention, cognition)   Medical Staff Made Aware Spoke with CM, PT, SLIM   RUE Assessment   RUE Assessment WFL  (appears WFL during functional tasks ~ unable to formally assess MMT d/t impaired cognition)   LUE Assessment   LUE Assessment WFL  (appears WFL during functional tasks ~ unable to formally assess MMT d/t impaired cognition)   Hand Function   Gross Motor Coordination Impaired   Fine Motor Coordination Impaired   Hand Function Comments Pt is R handed at baseline   Vision-Basic Assessment   Current Vision Wears glasses all the time   Patient Visual Report   (eye patch to R eye - placed by SLIM to prevent dry eye as patient was unable to fully close eyelid)   Vision - Complex Assessment   Additional Comments Able to identify different colors appropriately   Perception   Inattention/Neglect Cues to attend right visual field;Cues to attend to right side of body   Motor Planning Cues to use objects appropriately   Perseveration  Perseverates during ADLs   Cognition   Overall Cognitive Status Impaired   Arousal/Participation Cooperative   Attention Difficulty attending to directions   Orientation Level Disoriented to place;Disoriented to time;Disoriented to situation  (able to state full name)   Memory Unable to assess   Following Commands Follows one step commands inconsistently   Comments Pt presents with overall flat affect, minimally engaged throughout session w/ nonsensical speech throughout. Pt perseverates on telemetry wires and is difficult to redirect at times. Pt would benefit from formal cognitive evaluation to aide in safe discharge planning.   Assessment   Limitation Decreased ADL status;Decreased UE ROM;Decreased UE strength;Decreased Safe judgement during ADL;Decreased cognition;Decreased endurance;Decreased sensation;Visual deficit;Decreased fine motor control;Decreased high-level ADLs;Decreased self-care trans   Prognosis Fair   Assessment Patient is a 83 y.o. female seen for OT evaluation at [facility] following admission on 7/9/2024  s/p Suspected Lyme disease. Please see above for comprehensive list of comorbidities and significant PMHx impacting functional performance.  At baseline, living with son in 2SH, pt (I) with ADLs and shares IADLs, (+)fall, no use of AD at baseline. Upon initial evaluation, pt appears to be performing below baseline functional status. Pt requires MAXA for UB ADLs, MAXA for LB ADLs, supervision for bed mobility, MODA  for transfers and AKASH for functional mobility household distance with RW. The AM-PAC & Barthel Index outcome tools were used to assist in determining pt safety w/self care /mobility and appropriate d/c recommendations, see above for score. Occupational performance is affected by the following deficits:  decreased muscular strength , motor planning, decreased balance , impaired judgement and problem solving , impaired sequencing , orientation , impaired safety  awareness , impaired learning ability d/t current cognitive status , impaired global mental status, and direction following. Personal/Environmental factors impacting D/C include: (+) Hx of falls , steps to enter/navigate the home, Assistance needed for ADL/IADLs, Assistance needed for ADLs and functional mobility, High fall risk , decreased insight toward deficits , and decreased recall of precautions . Supporting factors include: able to maintain FFSU, support system available, and attitude towards recovery Patient would benefit from OT services within the acute care setting to maximize level of functional independence in the following areas fall prevention , self-care transfers, bed mobility , functional mobility, formal cognitive evaluation, and ADLs.  From OT standpoint, recommendation at time of D/C would be level II (moderate resource intensity).   Goals   Patient Goals no direct rehab goals stated at this time; decrease burden of care   LTG Time Frame 10-14   Long Term Goal See below   Plan   Treatment Interventions ADL retraining;Functional transfer training;UE strengthening/ROM;Endurance training;Cognitive reorientation;Patient/family training;Equipment evaluation/education;Neuromuscular reeducation;Fine motor coordination activities;Compensatory technique education;Energy conservation;Activityengagement  (cognitive assessments)   Goal Expiration Date 07/20/24   OT Treatment Day 0   OT Frequency 3-5x/wk   Discharge Recommendation   Rehab Resource Intensity Level, OT II (Moderate Resource Intensity)   Additional Comments  The patient's raw score on the AM-PAC Daily Activity Inpatient Short Form is 12. A raw score of less than 19 suggests the patient may benefit from discharge to post-acute rehabilitation services. Please refer to the recommendation of the Occupational Therapist for safe discharge planning.   AM-PAC Daily Activity Inpatient   Lower Body Dressing 2   Bathing 2   Toileting 2   Upper Body  Dressing 2   Grooming 2   Eating 2   Daily Activity Raw Score 12   Daily Activity Standardized Score (Calc for Raw Score >=11) 30.6   AM-PAC Applied Cognition Inpatient   Following a Speech/Presentation 1   Understanding Ordinary Conversation 2   Taking Medications 1   Remembering Where Things Are Placed or Put Away 1   Remembering List of 4-5 Errands 1   Taking Care of Complicated Tasks 1   Applied Cognition Raw Score 7   Applied Cognition Standardized Score 15.17   Barthel Index   Feeding 5   Bathing 0   Grooming Score 0   Dressing Score 0   Bladder Score 10   Bowels Score 10   Toilet Use Score 5   Transfers (Bed/Chair) Score 5   Mobility (Level Surface) Score 0   Stairs Score 0   Barthel Index Score 35   End of Consult   Education Provided Yes;Family or social support of family present for education by provider   Patient Position at End of Consult Supine;Bed/Chair alarm activated;All needs within reach   Nurse Communication Nurse aware of consult     Goals established on initial evaluation in order to achieve pt's goal of n/a decreasing burden of care.      Pt will complete UB ADLs Mod independent  for increased ADL independence within 10 days.     Pt will complete LB ADLs Mod independent  for increased ADL independence within 10 days.     Pt will complete toileting Mod independent  with use of DME for increased ADL independence within 10 days.     Pt will demonstrate proper body mechanics to complete self-care transfers and functional mobility with Mod independent  and use of LRAD for increased safety and functional independence within 10 days.     Pt will perform grooming tasks standing at the sink with Mod independent  in order to increase overall independence and return to PLOF.     Pt will demonstrate standing tolerance of 3 min with Mod independent  and use of LRAD for increased activity tolerance during ADL/IADL tasks within 10 days.     Pt will complete bed mobility Mod independent  for increased  independence in repositioning, pressure offloading, and managing comfort.     Pt will demonstrate proper body mechanics and fall prevention strategies during 100% of tx sessions for increased safety awareness during ADL/IADLs    Pt will improve functional activity tolerance to 25 mins of sustained functional tasks to increase participation in basic self-care tasks and minimize assistance level.     Pt will attend to treatment task or activity for 10 minutes without need for redirection to improve activity engagement within 10 days.     Pt will consistently follow multi step directions during ADL performances w/ less than 1 cue and/or prompt to maximize independence in ad safety with ADL performance.     Pt will participate in ongoing cognitive assessments to assist with safe D/C planning and supervision/assistance recommendations.     Pt will demonstrate OOB sitting tolerance of 2-4 hr/day for increased activity tolerance and engagement in leisure activities within 10 days.     Patient will participate in 15mins of UE therex to increase overall stamina/activity tolerance for purposeful tasks.     Pt benefited from co-session of skilled OT and PT therapists in order to most appropriately address functional deficits d/t regression from functioning level prior to admission  and decreased activity tolerance.  OT/PT objectives were addressed separately; please see PT note for specific goal areas targeted.    Alicia Felix MS OTR/L   [place] Licensure# [id number]

## 2024-07-10 NOTE — ASSESSMENT & PLAN NOTE
Calcium 10.7 on admission. History of primary hyperparathyroidism with hypercalcemia however not placed on any medications in the past.  Recently has been complaining of back pain.    Ionized Ca 2+, Vitamin D and PTH within normal limits    7/10 Ca 2+ 9.9    Plan:  Follow-up on PTHrp  Continue Vitamin D3 1000 units daily

## 2024-07-10 NOTE — PLAN OF CARE
Problem: OCCUPATIONAL THERAPY ADULT  Goal: Performs self-care activities at highest level of function for planned discharge setting.  See evaluation for individualized goals.  Description: Treatment Interventions: ADL retraining, Functional transfer training, UE strengthening/ROM, Endurance training, Cognitive reorientation, Patient/family training, Equipment evaluation/education, Neuromuscular reeducation, Fine motor coordination activities, Compensatory technique education, Energy conservation, Activityengagement (cognitive assessments)          See flowsheet documentation for full assessment, interventions and recommendations.   Note: Limitation: Decreased ADL status, Decreased UE ROM, Decreased UE strength, Decreased Safe judgement during ADL, Decreased cognition, Decreased endurance, Decreased sensation, Visual deficit, Decreased fine motor control, Decreased high-level ADLs, Decreased self-care trans  Prognosis: Fair  Assessment: Patient is a 83 y.o. female seen for OT evaluation at [facility] following admission on 7/9/2024  s/p Suspected Lyme disease. Please see above for comprehensive list of comorbidities and significant PMHx impacting functional performance.  At baseline, living with son in 2SH, pt (I) with ADLs and shares IADLs, (+)fall, no use of AD at baseline. Upon initial evaluation, pt appears to be performing below baseline functional status. Pt requires MAXA for UB ADLs, MAXA for LB ADLs, supervision for bed mobility, MODA  for transfers and AKASH for functional mobility household distance with RW. The AM-PAC & Barthel Index outcome tools were used to assist in determining pt safety w/self care /mobility and appropriate d/c recommendations, see above for score. Occupational performance is affected by the following deficits:  decreased muscular strength , motor planning, decreased balance , impaired judgement and problem solving , impaired sequencing , orientation , impaired safety  awareness , impaired learning ability d/t current cognitive status , impaired global mental status, and direction following. Personal/Environmental factors impacting D/C include: (+) Hx of falls , steps to enter/navigate the home, Assistance needed for ADL/IADLs, Assistance needed for ADLs and functional mobility, High fall risk , decreased insight toward deficits , and decreased recall of precautions . Supporting factors include: able to maintain FFSU, support system available, and attitude towards recovery Patient would benefit from OT services within the acute care setting to maximize level of functional independence in the following areas fall prevention , self-care transfers, bed mobility , functional mobility, formal cognitive evaluation, and ADLs.  From OT standpoint, recommendation at time of D/C would be level II (moderate resource intensity).     Rehab Resource Intensity Level, OT: II (Moderate Resource Intensity)     Alicia Felix MS OTR/L   [place] Licensure# [id number]

## 2024-07-10 NOTE — PHYSICAL THERAPY NOTE
PHYSICAL THERAPY EVALUATION/TREATMENT    NAME:  Kay Bright  AGE:   83 y.o.  Mrn:   [id number]  Length Of Stay: 0    ADMIT DX:  Facial droop [R29.810]  Stroke-like symptoms [R29.90]    Past Medical History:   Diagnosis Date    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-mother also has had the corneal disease    Arthritis     Foot drop, left foot 05/2017    S/P shingles that attacked the nerves-wears a brace prn,uses a cane for long distance    Irregular heart beat     regular, irregular-Dr. 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 MAIN OR;  Service: Ophthalmology    SD XCAPSL CTRC RMVL INSJ IO LENS PROSTH W/O ECP Left 7/10/2018    Procedure: EXTRACTION EXTRACAPSULAR CATARACT PHACO INTRAOCULAR LENS (IOL);  Surgeon: Rian Benson MD;  Location: [facility] MAIN OR;  Service: Ophthalmology    TUBAL LIGATION        07/10/24 1530   PT Last Visit   PT Visit Date 07/10/24   Note Type   Note type Evaluation   Pain Assessment   Pain Assessment Tool Fischer-Baker FACES   Fischer-Baker FACES Pain Rating 0   Restrictions/Precautions   Other Precautions Cognitive;Fall Risk;Bed Alarm;Chair Alarm;Telemetry  (R eye patch)   Home Living   Type of Home House   Home Layout Two  level;Bed/bath upstairs;Stairs to enter with rails  (Bi-level; 4 steps with rail to enter; 6 steps with rail to living area, bedroom and bathroom)   Bathroom Shower/Tub Tub/shower unit   Bathroom Toilet Standard   Bathroom Equipment Grab bars in shower;Grab bars around toilet;Shower chair  (Patient has a shower chair but does not use)   Bathroom Accessibility Accessible   Home Equipment Cane;Reacher;Sock aid  (RW)   Prior Function   Level of [place] Independent with ADLs;Independent with functional mobility;Independent with IADLS   Lives With Son  (Son works during the day)   Receives Help From Family   IADLs Independent with driving;Independent with meal prep;Independent with medication management   Falls in the last 6 months 1 to 4  (1, 3/2024 with resultant left hip fracture)   Comments Patient normally ambulates without an assistive device inside uses a cane outside   General   Additional Pertinent History Patient is admitted with right-sided facial droop, confusion and double vision. CT - No acute intracranial abnormality.  Chronic microangiopathic changes. MRI - No MR evidence for acute ischemia as clinically questioned. Moderate chronic microangiopathic changes within the brain. Punctate scattered parenchymal chronic microhemorrhages, many of which appear to be at the cortical subcortical junction raising suspicion for underlying cerebral amyloid angiopathy.  Patient's daughter reports that patient was just here this previous Saturday seen in the ED with back pain.  Patient's daughter also mention that patient was having difficulty with headaches 2 weeks ago.  Patient is status post left femur fracture 3/2024.   Family/Caregiver Present Yes  (Patient's daughter Luca)   Cognition   Overall Cognitive Status Impaired   Arousal/Participation Persistent stimuli required   Orientation Level Disoriented X4   Memory Unable to assess   Following Commands Follows one step commands inconsistently   Comments  "Initially, patient is oriented x 4 and not able to speak a sentence that made sense or say her name.  Toward end of PT session, patient able to state her first and last name.   Subjective   Subjective \"Tired\"   RLE Assessment   RLE Assessment WFL  (Functionally 3-3/5)   LLE Assessment   LLE Assessment WFL  (Functionally 3-3/5 except patient with left foot drop which was present prior to admission)   Vision-Basic Assessment   Current Vision Wears glasses all the time  (Patient with eye patch currently right eye)   Coordination   Movements are Fluid and Coordinated 0   Coordination and Movement Description Patient is very shaky x 4 extremities   Heel to Cid Other (Comment):  (Unable to assess)   Rapid Alternating Movements Other (Comment):  (Unable to assess)   Bed Mobility   Supine to Sit 5  Supervision   Additional items Assist x 1;Verbal cues;Increased time required;Bedrails   Transfers   Sit to Stand 3  Moderate assistance   Additional items Assist x 1;Verbal cues;Increased time required   Stand to Sit 3  Moderate assistance   Additional items Assist x 1;Verbal cues;Increased time required   Additional Comments Pt stood with moderate handhold assist of 1 x 30 seconds but was unable to take any steps -patient stood with left lower extremity and excessive external rotation, questionable right-sided neglect.   Ambulation/Elevation   Gait pattern Improper Weight shift;Decreased foot clearance;Inconsistent tobias;Steppage;Short stride;Redundant gait at times;Step to;Decreased heel strike  (Left foot drop)   Gait Assistance 4  Minimal assist   Additional items Assist x 1;Verbal cues;Tactile cues   Assistive Device Rolling walker   Distance 15 feet with change in direction-min assist for patient support but min/mod assist for RW placement, control and direction   Balance   Static Sitting Fair   Static Standing Poor +  (With roller walker)   Ambulatory Poor  (With roller walker)   Endurance Deficit   Endurance Deficit " Yes   Activity Tolerance   Activity Tolerance Patient limited by fatigue;Treatment limited secondary to medical complications (Comment)  (Impaired cognition; shakiness; weakness; right-sided neglect)   Medical Staff Made Aware SNEHA Vargas   Assessment   Problem List Decreased strength;Decreased endurance;Impaired balance;Decreased mobility;Decreased coordination;Decreased cognition;Impaired judgement;Decreased safety awareness;Impaired vision   Assessment Patient seen for Physical Therapy evaluation. Patient admitted with Suspected Lyme disease.  Comorbidities affecting patient's physical performance include: Left foot drop, history of shingles, osteoporosis, HLD, left femur fracture.  Personal factors affecting patient at time of initial evaluation include: lives in two story house, ambulating with assistive device, stairs to enter home, inability to navigate community distances, inability to navigate level surfaces without external assistance, inability to perform dynamic tasks in community, decreased cognition, and positive fall history. Prior to admission, patient was independent with functional mobility with cane, independent with functional mobility without assistive device, independent with ADLS, independent with IADLS, living with son in a two level home with 4+6 steps to enter, ambulating household distance, and ambulating community distances.  Please find objective findings from Physical Therapy assessment regarding body systems outlined above with impairments and limitations including weakness, decreased ROM, impaired balance, decreased endurance, impaired coordination, gait deviations, decreased activity tolerance, decreased functional mobility tolerance, decreased safety awareness, impaired judgement, fall risk, impaired tone, and decreased cognition.  The Barthel Index was used as a functional outcome tool presenting with a score of Barthel Index Score: 25 today indicating marked limitations of  functional mobility and ADLS.  Patient's clinical presentation is currently unstable/unpredictable as seen in patient's presentation of vital sign response, changing level of pain, varying levels of cognitive performance, increased fall risk, new onset of impairment of functional mobility, decreased endurance, and new onset of weakness. Pt would benefit from continued Physical Therapy treatment to address deficits as defined above and maximize level of functional mobility. As demonstrated by objective findings, the assigned level of complexity for this evaluation is high.    The patient's AM-PAC Basic Mobility Inpatient Short Form Raw Score is 14. A Raw score of less than or equal to 16 suggests the patient may benefit from discharge to post-acute rehabilitation services. Please also refer to the recommendation of the Physical Therapist for safe discharge planning.   Goals   Patient Goals Patient unable to state due to impaired cognition   STG Expiration Date 07/20/24   Short Term Goal #1 Patient will: Increase bilateral LE strength 1 grade to facilitate independent mobility, Perform all bed mobility tasks independently to improve pt's independence w/ repositioning for decrease risk of skin breakdown, Perform all transfers independently consistently from various height surfaces in order to improve I w/ engagement w/ real-world environments/situations, Ambulate at least 150 ft. with least restrictive assistive device independently w/o LOB to facilitate return and engagement w/ previous living environment, Navigate 4+6 stairs w/ minx1 with unilateral handrail to either improve independence w/ entering home and/or so patient can fully access living areas in home, Increase all balance 1 grade to decrease risk for falls, Tolerate at least 10 consecutive minutes of activity to demonstrate improved activity tolerance and endurance, and Tolerate 3 hr OOB to faciliate upright tolerance   Plan   Treatment/Interventions ADL  retraining;Functional transfer training;LE strengthening/ROM;Elevations;Therapeutic exercise;Endurance training;Cognitive reorientation;Patient/family training;Equipment eval/education;Bed mobility;Gait training;Compensatory technique education;Spoke to case management;OT;Family   PT Frequency 3-5x/wk   Discharge Recommendation   Rehab Resource Intensity Level, PT II (Moderate Resource Intensity)   AM-PAC Basic Mobility Inpatient   Turning in Flat Bed Without Bedrails 3   Lying on Back to Sitting on Edge of Flat Bed Without Bedrails 3   Moving Bed to Chair 3   Standing Up From Chair Using Arms 2   Walk in Room 2   Climb 3-5 Stairs With Railing 1   Basic Mobility Inpatient Raw Score 14   Basic Mobility Standardized Score 35.55   [facility] Highest Level Of Mobility   -[facility] Goal 4: Move to chair/commode   -[facility] Achieved 6: Walk 10 steps or more   Barthel Index   Feeding 0   Bathing 0   Grooming Score 0   Dressing Score 5   Bladder Score 5   Bowels Score 5   Toilet Use Score 5   Transfers (Bed/Chair) Score 5   Mobility (Level Surface) Score 0   Stairs Score 0   Barthel Index Score 25   Additional Treatment Session   Start Time 1530   End Time 1542   Treatment Assessment Patient agreeable to additional trial of ambulation.  Patient transfers sit to stand with min assist and cues for safe hand placement and ambulates with a roller walker 10 feet with min assist and change in direction but needs min/mod assist for RW placement, control and direction.  Patient transfers stand to sit with min assist of 1.  Rest.  Patient transferred to second time with min assist and was asked to ambulate without the roller walker but patient unable to initiate ambulation.  Patient returned to sitting edge of bed with min assist of 1 and to supine with min assist of 1.  A: patient with limited tolerance to PT evaluation and treatment 2/2 impaired cognition, very shaky, weak and deconditioned, inconsistent mobility with varying levels of  assist.  Patient is normally independent without an assistive device indoors and uses a cane outdoors.  While admitted, patient will continue to benefit from skilled physical therapy services to increase her strength, balance, endurance, safe mobility and gait.  Patient will benefit from continued ambulation with a roller walker at this time with progression as able/tolerated.  Based on today's session, patient is appropriate for Level II Moderate Resource Intensity.  Pending medical optimization, progress with PT and length of stay patient may be able to progress to Level III Minimum Resource Intensity as patient was independent prior to admission.  Will reassess discharge plan with subsequent PT visits.   End of Consult   Patient Position at End of Consult Supine;All needs within reach;Bed/Chair alarm activated   Licensure   [place] License Number  Lexi L Jeni PT   Portions of the documentation may have been created using voice recognition software. Occasional wrong word or sound alike substitutions may have occurred due to the inherent limitation of the voice recognition software. Read the chart carefully and recognize, using context, where substitutions have occurred.

## 2024-07-10 NOTE — PLAN OF CARE
Recommending dysphagia 3 diet with thin liquids. Maintain aspiration precautions, upright ~90 degree positioning during ALL PO intake and at least 30 minutes post PO meals, small bites, slow rate of intake, and alternate liquids/solids. Medications as tolerated.

## 2024-07-10 NOTE — ASSESSMENT & PLAN NOTE
Patient presents with right-sided facial droop.  Noticed the morning of 7/9 while taking a shower.   Stroke alert was called in the emergency room. NIHSS 2.   7/10 CTA H/N: No large vessel occlusion. No significant carotid or vertebral artery stenosis. 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  7/10 CT head: No acute intracranial abnormality  Patient admitted for stroke workup and placed on stroke pathway.  7/10 MRI brain: No MR evidence for acute ischemia as clinically questioned. Moderate chronic microangiopathic changes within the brain. Punctate scattered parenchymal chronic microhemorrhages, many of which appear to be at the cortical subcortical junction raising suspicion for underlying cerebral amyloid angiopathy.   Lipid panel: high cholesterol, high LDL  Lyme: IGG positive, IGM positive, total Ab positive    Patient endorses double vision and difficulty swallowing. Patient is tremulous and disoriented to place and reason for being at the hospital. She seems to have difficulty answering questions, but does follow directions well for examination.  HbA1c 5.7%    Speech evaluation (7/10): Recommending dysphagia 3 diet with thin liquids. Maintain aspiration precautions, upright ~90 degree positioning during ALL PO intake and at least 30 minutes post PO meals, small bites, slow rate of intake, and alternate liquids/solids. Medications as tolerated.     Plan:  Dual antiplatelet therapy with Aspirin 325 mg and Plavix 300 mg x 1 dose followed by aspirin 81 mg and Plavix 75 mg daily  Start Lipitor 40 mg daily  Doxycycline 100 mg Q12  Follow-up on transthoracic echocardiogram, pending read  PT/OT evaluation  Neurology recommendations appreciated  Infectious Disease recommendations appreciated  Consider lumbar puncture to determine etiology  Ophthalmic drops due to patient inability to close right eye.

## 2024-07-10 NOTE — PLAN OF CARE
Problem: PHYSICAL THERAPY ADULT  Goal: Performs mobility at highest level of function for planned discharge setting.  See evaluation for individualized goals.  Description: Treatment/Interventions: ADL retraining, Functional transfer training, LE strengthening/ROM, Elevations, Therapeutic exercise, Endurance training, Cognitive reorientation, Patient/family training, Equipment eval/education, Bed mobility, Gait training, Compensatory technique education, Spoke to case management, OT, Family          See flowsheet documentation for full assessment, interventions and recommendations.  Note:    Problem List: Decreased strength, Decreased endurance, Impaired balance, Decreased mobility, Decreased coordination, Decreased cognition, Impaired judgement, Decreased safety awareness, Impaired vision  Assessment: Patient seen for Physical Therapy evaluation. Patient admitted with Suspected Lyme disease.  Comorbidities affecting patient's physical performance include: Left foot drop, history of shingles, osteoporosis, HLD, left femur fracture.  Personal factors affecting patient at time of initial evaluation include: lives in two story house, ambulating with assistive device, stairs to enter home, inability to navigate community distances, inability to navigate level surfaces without external assistance, inability to perform dynamic tasks in community, decreased cognition, and positive fall history. Prior to admission, patient was independent with functional mobility with cane, independent with functional mobility without assistive device, independent with ADLS, independent with IADLS, living with son in a two level home with 4+6 steps to enter, ambulating household distance, and ambulating community distances.  Please find objective findings from Physical Therapy assessment regarding body systems outlined above with impairments and limitations including weakness, decreased ROM, impaired balance, decreased endurance, impaired  coordination, gait deviations, decreased activity tolerance, decreased functional mobility tolerance, decreased safety awareness, impaired judgement, fall risk, impaired tone, and decreased cognition.  The Barthel Index was used as a functional outcome tool presenting with a score of Barthel Index Score: 25 today indicating marked limitations of functional mobility and ADLS.  Patient's clinical presentation is currently unstable/unpredictable as seen in patient's presentation of vital sign response, changing level of pain, varying levels of cognitive performance, increased fall risk, new onset of impairment of functional mobility, decreased endurance, and new onset of weakness. Pt would benefit from continued Physical Therapy treatment to address deficits as defined above and maximize level of functional mobility. As demonstrated by objective findings, the assigned level of complexity for this evaluation is high.The patient's AM-[facility] Basic Mobility Inpatient Short Form Raw Score is 14. A Raw score of less than or equal to 16 suggests the patient may benefit from discharge to post-acute rehabilitation services. Please also refer to the recommendation of the Physical Therapist for safe discharge planning.        Rehab Resource Intensity Level, PT: II (Moderate Resource Intensity)    See flowsheet documentation for full assessment.

## 2024-07-10 NOTE — ASSESSMENT & PLAN NOTE
Incidentally noted on CTA done to investigate a right facial palsy subsequently found to be Bell's palsy related to acute Lyme disease.  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 Recommend nonemergent consultation with [facility] at [phone]..

## 2024-07-11 PROBLEM — M54.6 LEFT-SIDED THORACIC BACK PAIN: Status: RESOLVED | Noted: 2024-07-09 | Resolved: 2024-07-11

## 2024-07-11 PROBLEM — E22.2 SIADH (SYNDROME OF INAPPROPRIATE ADH PRODUCTION) (HCC): Status: ACTIVE | Noted: 2024-07-10

## 2024-07-11 LAB
ANION GAP SERPL CALCULATED.3IONS-SCNC: 6 MMOL/L (ref 4–13)
BILIRUB UR QL STRIP: NEGATIVE
BUN SERPL-MCNC: 11 MG/DL (ref 5–25)
CALCIUM SERPL-MCNC: 10.2 MG/DL (ref 8.4–10.2)
CHLORIDE SERPL-SCNC: 97 MMOL/L (ref 96–108)
CLARITY UR: CLEAR
CO2 SERPL-SCNC: 28 MMOL/L (ref 21–32)
COLOR UR: NORMAL
CREAT SERPL-MCNC: 0.5 MG/DL (ref 0.6–1.3)
ERYTHROCYTE [DISTWIDTH] IN BLOOD BY AUTOMATED COUNT: 13.1 % (ref 11.6–15.1)
GFR SERPL CREATININE-BSD FRML MDRD: 89 ML/MIN/1.73SQ M
GLUCOSE SERPL-MCNC: 109 MG/DL (ref 65–140)
GLUCOSE UR STRIP-MCNC: NEGATIVE MG/DL
HCT VFR BLD AUTO: 41.4 % (ref 34.8–46.1)
HGB BLD-MCNC: 14 G/DL (ref 11.5–15.4)
HGB UR QL STRIP.AUTO: NEGATIVE
KETONES UR STRIP-MCNC: NEGATIVE MG/DL
LEUKOCYTE ESTERASE UR QL STRIP: NEGATIVE
MCH RBC QN AUTO: 29.2 PG (ref 26.8–34.3)
MCHC RBC AUTO-ENTMCNC: 33.8 G/DL (ref 31.4–37.4)
MCV RBC AUTO: 86 FL (ref 82–98)
NITRITE UR QL STRIP: NEGATIVE
PH UR STRIP.AUTO: 6.5 [PH]
PLATELET # BLD AUTO: 296 THOUSANDS/UL (ref 149–390)
PMV BLD AUTO: 9.9 FL (ref 8.9–12.7)
POTASSIUM SERPL-SCNC: 3.5 MMOL/L (ref 3.5–5.3)
PROT UR STRIP-MCNC: NEGATIVE MG/DL
RBC # BLD AUTO: 4.8 MILLION/UL (ref 3.81–5.12)
SODIUM SERPL-SCNC: 131 MMOL/L (ref 135–147)
SP GR UR STRIP.AUTO: 1.01 (ref 1–1.03)
UROBILINOGEN UR STRIP-ACNC: <2 MG/DL
WBC # BLD AUTO: 7.94 THOUSAND/UL (ref 4.31–10.16)

## 2024-07-11 PROCEDURE — 81003 URINALYSIS AUTO W/O SCOPE: CPT

## 2024-07-11 PROCEDURE — 92526 ORAL FUNCTION THERAPY: CPT

## 2024-07-11 PROCEDURE — 85027 COMPLETE CBC AUTOMATED: CPT

## 2024-07-11 PROCEDURE — 99232 SBSQ HOSP IP/OBS MODERATE 35: CPT | Performed by: INTERNAL MEDICINE

## 2024-07-11 PROCEDURE — 80048 BASIC METABOLIC PNL TOTAL CA: CPT

## 2024-07-11 RX ORDER — QUETIAPINE FUMARATE 25 MG/1
12.5 TABLET, FILM COATED ORAL
Status: DISCONTINUED | OUTPATIENT
Start: 2024-07-11 | End: 2024-07-13 | Stop reason: HOSPADM

## 2024-07-11 RX ORDER — SODIUM CHLORIDE 9 MG/ML
75 INJECTION, SOLUTION INTRAVENOUS CONTINUOUS
Status: DISCONTINUED | OUTPATIENT
Start: 2024-07-11 | End: 2024-07-11

## 2024-07-11 RX ADMIN — ACETAMINOPHEN 975 MG: 325 TABLET, FILM COATED ORAL at 21:13

## 2024-07-11 RX ADMIN — DICLOFENAC SODIUM 2 G: 10 GEL TOPICAL at 19:06

## 2024-07-11 RX ADMIN — GLYCERIN 2 DROP: .002; .002; .01 SOLUTION/ DROPS OPHTHALMIC at 20:27

## 2024-07-11 RX ADMIN — GLYCERIN 2 DROP: .002; .002; .01 SOLUTION/ DROPS OPHTHALMIC at 08:00

## 2024-07-11 RX ADMIN — DOXYCYCLINE 100 MG: 100 CAPSULE ORAL at 09:36

## 2024-07-11 RX ADMIN — GLYCERIN 2 DROP: .002; .002; .01 SOLUTION/ DROPS OPHTHALMIC at 22:33

## 2024-07-11 RX ADMIN — ENOXAPARIN SODIUM 40 MG: 40 INJECTION SUBCUTANEOUS at 09:36

## 2024-07-11 RX ADMIN — Medication 1000 UNITS: at 09:36

## 2024-07-11 RX ADMIN — GLYCERIN 2 DROP: .002; .002; .01 SOLUTION/ DROPS OPHTHALMIC at 16:35

## 2024-07-11 RX ADMIN — GLYCERIN 2 DROP: .002; .002; .01 SOLUTION/ DROPS OPHTHALMIC at 09:35

## 2024-07-11 RX ADMIN — WHITE PETROLATUM 57.7 %-MINERAL OIL 31.9 % EYE OINTMENT 1 APPLICATION: at 22:33

## 2024-07-11 RX ADMIN — DOXYCYCLINE 100 MG: 100 CAPSULE ORAL at 21:13

## 2024-07-11 RX ADMIN — ATORVASTATIN CALCIUM 10 MG: 10 TABLET, FILM COATED ORAL at 19:01

## 2024-07-11 RX ADMIN — DICLOFENAC SODIUM 2 G: 10 GEL TOPICAL at 13:00

## 2024-07-11 RX ADMIN — GLYCERIN 2 DROP: .002; .002; .01 SOLUTION/ DROPS OPHTHALMIC at 12:00

## 2024-07-11 RX ADMIN — GLYCERIN 2 DROP: .002; .002; .01 SOLUTION/ DROPS OPHTHALMIC at 13:00

## 2024-07-11 RX ADMIN — GLYCERIN 2 DROP: .002; .002; .01 SOLUTION/ DROPS OPHTHALMIC at 18:02

## 2024-07-11 NOTE — CASE MANAGEMENT
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 07/10/2024     Right facial Bell's palsy (3 facial divisions) related to suspected Lyme disease 2024     Left-sided thoracic back pain 2024     Fracture of greater trochanter of left femur (HCC) 2024     Fall 2024     History of diverticulitis 11/15/2023     Varicose veins of both lower extremities with inflammation 10/25/2023     White coat syndrome without diagnosis of hypertension 2023     Multiple thyroid nodules 2022     Hyperparathyroidism (HCC) 2020     Leukocytosis 2019     Vitamin D deficiency 2019     Hyperlipidemia 2019     Osteoporosis 2019     Stress fracture of calcaneus 2019     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     Aortic valve regurgitation 2018     Nodular thyroid disease 2018     Increased PTH level 2018     Left foot drop 2018     History of shingles 2018     Hypercalcemia 2017     Degenerative lumbar spinal stenosis 2017     Varicose veins of bilateral lower extremities with other complications 2017     Tendinitis of right rotator cuff 2017     Irregular heartbeat 2017     Premature atrial contraction 2017       LOS (days): 1  Geometric Mean LOS (GMLOS) (days): 3.7  Days to GMLOS:2.8     OBJECTIVE:  Risk of Unplanned Readmission Score: 13.62         Current admission status: Inpatient   Preferred Pharmacy:   [id number] [place] -  [address]  [place] [address]  Phone: [phone] Fax: [phone]    Primary Care Provider: Abelino Garcia DO    Primary Insurance: MEDICARE  Secondary Insurance: AARP    DISCHARGE DETAILS:    Discharge planning discussed with:: patient and pt's daughter Jayde at bedside  Freedom of Choice: Yes  Comments - Freedom of Choice: CM met w/ pt and dtr at bedside re: PT/OT rcmd for STR. Dtr relayed no preference for STR but does not want [employer]. STR blanket referral sent via aidin, awaiting responses.  CM contacted family/caregiver?: Yes  Were Treatment Team discharge recommendations reviewed with patient/caregiver?: Yes  Did patient/caregiver verbalize understanding of patient care needs?: N/A- going to facility  Were patient/caregiver advised of the risks associated with not following Treatment Team discharge recommendations?: Yes    Contacts  Patient Contacts: Jayde (dtr)  Relationship to Patient:: Family  Contact Method: In Person  Reason/Outcome: Continuity of Care, Emergency Contact, Referral, Discharge Planning    Requested Home Health Care         Is the patient interested in HHC at discharge?: No    DME Referral Provided  Referral made for DME?: No    Other Referral/Resources/Interventions Provided:  Interventions: Short Term Rehab  Referral Comments: PT/OT rcmd STR - blanket referral sent via aidin ([employer] [place] excluded at pt/dtr request), awaiting responses.         Treatment Team Recommendation: Short Term Rehab  Discharge Destination Plan:: Short Term Rehab

## 2024-07-11 NOTE — CASE MANAGEMENT
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 07/10/2024     Right facial Bell's palsy (3 facial divisions) related to suspected Lyme disease 2024     Left-sided thoracic back pain 2024     Fracture of greater trochanter of left femur (HCC) 2024     Fall 2024     History of diverticulitis 11/15/2023     Varicose veins of both lower extremities with inflammation 10/25/2023     White coat syndrome without diagnosis of hypertension 2023     Multiple thyroid nodules 2022     Hyperparathyroidism (HCC) 2020     Leukocytosis 2019     Vitamin D deficiency 2019     Hyperlipidemia 2019     Osteoporosis 2019     Stress fracture of calcaneus 2019     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     Aortic valve regurgitation 2018     Nodular thyroid disease 2018     Increased PTH level 2018     Left foot drop 2018     History of shingles 2018     Hypercalcemia 2017     Degenerative lumbar spinal stenosis 2017     Varicose veins of bilateral lower extremities with other complications 2017     Tendinitis of right rotator cuff 2017     Irregular heartbeat 2017     Premature atrial contraction 2017       LOS (days): 1  Geometric Mean LOS (GMLOS) (days): 3.7  Days to GMLOS:2.8     OBJECTIVE:  Risk of Unplanned Readmission Score: 13.65         Current admission status: Inpatient   Preferred Pharmacy:   [id number] [place] -  [address]  [place] [address]  Phone: [phone] Fax: [phone]    Primary Care Provider: Abelino Garcia DO    Primary Insurance: MEDICARE  Secondary Insurance: AARP    DISCHARGE DETAILS:    Discharge planning discussed with:: patient, daughter Jayde, and family at bedside  Freedom of Choice: Yes  Comments - Freedom of Choice: CM met with patient and family at bedside re: STR options available, pt choice list provided. Dtr relayed choice for [facility] as had family member that lived there previously. [facility] reserved in [facility]. CM to continue to follow for coordination of pt transport to [facility] when ready for d/c.  CM contacted family/caregiver?: Yes  Were Treatment Team discharge recommendations reviewed with patient/caregiver?: Yes  Did patient/caregiver verbalize understanding of patient care needs?: N/A- going to facility  Were patient/caregiver advised of the risks associated with not following Treatment Team discharge recommendations?: Yes    Contacts  Patient Contacts: Jayde (dtr)  Relationship to Patient:: Family  Contact Method: In Person  Reason/Outcome: Continuity of Care, Emergency Contact, Referral, Discharge Planning              Other Referral/Resources/Interventions Provided:  Interventions: Short Term Rehab  Referral Comments: [facility] reserved in [facility].         Treatment Team Recommendation: Short Term Rehab  Discharge Destination Plan:: Short Term Rehab

## 2024-07-11 NOTE — ASSESSMENT & PLAN NOTE
Serum sodium 131 on admission (7/9). Serum sodium currently 130 (7/10).    Serum osmolality 287 (7/9)  Urine sodium 86, Urine Osmolality 461    Plan:  Labs indicative of SIADH  Fluid restriction to counter hyponatremia secondary to SIADH

## 2024-07-11 NOTE — SPEECH THERAPY NOTE
Speech Language/Pathology    Speech/Language Pathology Progress Note    Patient Name: Kay Bright  Today's Date: 7/11/2024     Subjective:  Pt seen for dysphagia tx and was alert and oriented upon arrival with some fatigue, sitting upright in bed with breakfast tray at bedside Family at bedside.       Objective:  Pt observed with dysphagia 3 diet with thin liquids. Pt with fair bite strength/utensil stripping on  toast with jelly , with mildly increased mastication cycle noted, fair bolus manipulation/formation. No buccal pocketing observed. Pt with mildly decreased A-P propulsion and swallow trigger on both solids/liquids with Mild residue s/p swallow. Vocal quality WFL s/p swallow. No coughing/throat clearing noted on solids/liquids. Pt w mildly decreased hyolaryngeal elevation/excursion as noted on palpation with all trialed consistencies. Pt w fair suck-swallow technique via straw sips of thin liquids with improvement noted given lingual exercises/compensatory strategies for straw drinking. No coughing/throat clearing noted on thin liquids via cup or straw sip.      Assessment:  Pt exhibited no overt s/s pen/asp on current diet consistency. Education was given on the importance of utilizing compensatory swallow strategies for safe and adequate swallow functioning as well as education on meeting nutrition/hydration standards for overall QOL. Pt and pt's family expressed understanding. Pt and pt's family acknowledged and utilized compensatory strategies for safe and adequate swallow function given safe swallow education.      Plan/Recommendations:  Pt currently tolerating dysphagia 3 diet with thin liquids. Continue current diet consistency. Maintain aspiration precautions, upright ~90 degree positioning during ALL PO intake and at least 30 minutes post PO meals, small bites, slow rate of intake, and alternate liquids/solids. Medications as tolerated. Will continue to follow.      Marcy Arevalo MS,  CCC-SLP  Speech Pathologist  Available via [employer]

## 2024-07-11 NOTE — ASSESSMENT & PLAN NOTE
Calcium 10.7 on admission. History of primary hyperparathyroidism with hypercalcemia however not placed on any medications in the past.  Recently has been complaining of back pain.    Ionized Ca 2+, Vitamin D and PTH within normal limits    7/10 Ca 2+ 9.9  PTH-rp <1.1    Plan:  Continue Vitamin D3 1000 units daily

## 2024-07-11 NOTE — PROGRESS NOTES
[facility]  Progress Note  Name: Kay Bright I  MRN: [id number]  Unit/Bed#: S -01 I Date of Admission: 7/9/2024   Date of Service: 7/11/2024 I Hospital Day: 1    Assessment & Plan   * Right facial Bell's palsy (3 facial divisions) related to suspected Lyme disease  Assessment & Plan  Patient presents with right-sided facial droop.  Noticed the morning of 7/9 while taking a shower.   Stroke alert was called in the emergency room. NIHSS 2.   7/10 CTA H/N: No large vessel occlusion. No significant carotid or vertebral artery stenosis. 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  7/10 CT head: No acute intracranial abnormality  Patient admitted for stroke workup and placed on stroke pathway.  7/10 MRI brain: No MR evidence for acute ischemia as clinically questioned. Moderate chronic microangiopathic changes within the brain. Punctate scattered parenchymal chronic microhemorrhages, many of which appear to be at the cortical subcortical junction raising suspicion for underlying cerebral amyloid angiopathy.   Lipid panel: high cholesterol, high LDL  Lyme: IGG positive, IGM positive, total Ab positive    Patient endorses double vision and difficulty swallowing. Patient is tremulous and disoriented to place and reason for being at the hospital. She seems to have difficulty answering questions, but does follow directions well for examination.  HbA1c 5.7%    Speech evaluation (7/10): Recommending dysphagia 3 diet with thin liquids. Maintain aspiration precautions, upright ~90 degree positioning during ALL PO intake and at least 30 minutes post PO meals, small bites, slow rate of intake, and alternate liquids/solids. Medications as tolerated.   ECHO: EF 60%    Plan:  C/w  Lipitor 40 mg daily  Doxycycline 100 mg Q12 set to complete July 23  PT/OT appreciated  Consider lumbar puncture to determine etiology  Ophthalmic drops due to patient inability to close right eye.  Tape  right eye at bedtime to protect from abrasions.    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  Assessment & Plan  Recommend [facility] with [facility]     SIADH (syndrome of inappropriate ADH production) (formerly [facility])  Assessment & Plan  Serum sodium 131 on admission (7/9). Serum sodium currently 130 (7/10).    Serum osmolality 287 (7/9)  Urine sodium 86, Urine Osmolality 461    Plan:  Labs indicative of SIADH  Fluid restriction to counter hyponatremia secondary to SIADH    Hypercalcemia  Assessment & Plan  Calcium 10.7 on admission. History of primary hyperparathyroidism with hypercalcemia however not placed on any medications in the past.  Recently has been complaining of back pain.    Ionized Ca 2+, Vitamin D and PTH within normal limits    7/10 Ca 2+ 9.9  PTH-rp <1.1    Plan:  Continue Vitamin D3 1000 units daily    Left-sided thoracic back pain-resolved as of 7/11/2024  Assessment & Plan  Patient has been experiencing thoracic midline and left-sided back pain without any traumatic injury.  Hypercalcemia with calcium of 10.7 on admission. Calcium 9.9 on 7/10.     7/10 XR spine thoracolumbar: Chronic degenerative changes. Limited study. No acute abnormality appreciated.     Plan:  Voltaren gel  Lidoderm patch               VTE Pharmacologic Prophylaxis: VTE Score: 8 High Risk (Score >/= 5) - Pharmacological DVT Prophylaxis Ordered: enoxaparin (Lovenox). Sequential Compression Devices Ordered.    Mobility:   Basic Mobility Inpatient Raw Score: 14  JH-HLM Goal: 4: Move to chair/commode  JH-HLM Achieved: 4: Move to chair/commode  JH-HLM Goal achieved. Continue to encourage appropriate mobility.    Patient Centered Rounds: I performed bedside rounds with nursing staff today.   Discussions with Specialists or Other Care Team Provider: Attending Dr. Cisneros    Education and Discussions with Family / Patient: Updated  (daughter) at bedside.Spoke in regards to patient condition  and current management.  Topics included patient episodes of confusion, abnormal sodium labs and aneurysm found on CTA.    Total Time Spent on Date of Encounter in care of patient: 60 mins. This time was spent on one or more of the following: performing physical exam; counseling and coordination of care; obtaining or reviewing history; documenting in the medical record; reviewing/ordering tests, medications or procedures; communicating with other healthcare professionals and discussing with patient's family/caregivers.    Current Length of Stay: 1 day(s)  Current Patient Status: Inpatient   Certification Statement: The patient will continue to require additional inpatient hospital stay due to continued monitoring  Discharge Plan: Anticipate discharge tomorrow to home.    Code Status: Level 1 - Full Code    Subjective:   Patient endorses feeling better though on visual inspection still has right sided bells palsy and resting tremor.  Denies: Headache, nausea, vomit, change in vision, loss of smell, chest pain, difficulty breathing, diarrhea, constipation.    Objective:     Vitals:   Temp (24hrs), Av.2 °F (36.2 °C), Min:96.8 °F (36 °C), Max:97.5 °F (36.4 °C)    Temp:  [96.8 °F (36 °C)-97.5 °F (36.4 °C)] 97.5 °F (36.4 °C)  HR:  [68-80] 68  Resp:  [16-20] 16  BP: (117-170)/(74-94) 117/74  SpO2:  [95 %-99 %] 99 %  Body mass index is 22.11 kg/m².     Input and Output Summary (last 24 hours):     Intake/Output Summary (Last 24 hours) at 2024 1503  Last data filed at 2024 1326  Gross per 24 hour   Intake 0 ml   Output 1500 ml   Net -1500 ml       Physical Exam:   Physical Exam  Constitutional:       General: She is not in acute distress.     Comments: Frail appearing elderly female in no acute distress with visible tremors in upper extremities.  Aox2 Person and time.   HENT:      Head: Normocephalic.   Cardiovascular:      Rate and Rhythm: Normal rate and regular rhythm.      Heart sounds: No murmur  heard.  Pulmonary:      Effort: Pulmonary effort is normal. No respiratory distress.      Breath sounds: No wheezing.   Abdominal:      General: Abdomen is flat.      Palpations: Abdomen is soft.      Tenderness: There is no abdominal tenderness.   Neurological:      Mental Status: She is alert.          Additional Data:     Labs:  Results from last 7 days   Lab Units 07/11/24  0452 07/10/24  0507   WBC Thousand/uL 7.94 5.98   HEMOGLOBIN g/dL 14.0 13.5   HEMATOCRIT % 41.4 39.8   PLATELETS Thousands/uL 296 290   SEGS PCT %  --  79*   LYMPHO PCT %  --  15   MONO PCT %  --  6   EOS PCT %  --  0     Results from last 7 days   Lab Units 07/11/24  0452 07/10/24  0507   SODIUM mmol/L 131* 130*   POTASSIUM mmol/L 3.5 3.3*   CHLORIDE mmol/L 97 96   CO2 mmol/L 28 25   BUN mg/dL 11 11   CREATININE mg/dL 0.50* 0.47*   ANION GAP mmol/L 6 9   CALCIUM mg/dL 10.2 9.9   ALBUMIN g/dL  --  4.0   TOTAL BILIRUBIN mg/dL  --  0.59   ALK PHOS U/L  --  92   ALT U/L  --  14   AST U/L  --  20   GLUCOSE RANDOM mg/dL 109 120     Results from last 7 days   Lab Units 07/09/24  1155   INR  0.98     Results from last 7 days   Lab Units 07/09/24  1145   POC GLUCOSE mg/dl 113     Results from last 7 days   Lab Units 07/10/24  0507   HEMOGLOBIN A1C % 5.7*           Lines/Drains:  Invasive Devices       Peripheral Intravenous Line  Duration             Peripheral IV 07/09/24 Proximal;Right;Ventral (anterior) Forearm 2 days                          Imaging: Reviewed radiology reports from this admission including: CT head    Recent Cultures (last 7 days):         Last 24 Hours Medication List:   Current Facility-Administered Medications   Medication Dose Route Frequency Provider Last Rate    acetaminophen  975 mg Oral Q8H DINO Diann Pedroza MD      Artificial Tears  2 drop Right Eye Q2H While awake ROGELIO Kowalski      And    artificial tear   Right Eye HS ROGELIO Kowalski      atorvastatin  10 mg Oral QPM ROGELIO Kowalski       cholecalciferol  1,000 Units Oral QAM Diann Pedroza MD      Diclofenac Sodium  2 g Topical 4x Daily Diann Pedroza MD      docusate sodium  100 mg Oral BID Diann Pedroza MD      doxycycline hyclate  100 mg Oral Q12H [facility] Delmer Barriga MD      enoxaparin  40 mg Subcutaneous Daily Diann Pedroza MD      QUEtiapine  12.5 mg Oral HS Delmer Barriga MD          Today, Patient Was Seen By: Delmer Barriga MD    **Please Note: This note may have been constructed using a voice recognition system.**

## 2024-07-11 NOTE — PLAN OF CARE
Pt currently tolerating dysphagia 3 diet with thin liquids. Continue current diet consistency. Maintain aspiration precautions, upright ~90 degree positioning during ALL PO intake and at least 30 minutes post PO meals, small bites, slow rate of intake, and alternate liquids/solids. Medications as tolerated. Will continue to follow..

## 2024-07-11 NOTE — PLAN OF CARE
Problem: Potential for Falls  Goal: Patient will remain free of falls  Description: INTERVENTIONS:  - Educate patient/family on patient safety including physical limitations  - Instruct patient to call for assistance with activity   - Consult OT/PT to assist with strengthening/mobility   - Keep Call bell within reach  - Keep bed low and locked with side rails adjusted as appropriate  - Keep care items and personal belongings within reach  - Initiate and maintain comfort rounds  - Make Fall Risk Sign visible to staff  - Offer Toileting every  Hours, in advance of need  - Initiate/Maintain alarm  - Obtain necessary fall risk management equipment:   - Apply yellow socks and bracelet for high fall risk patients  - Consider moving patient to room near nurses station  Outcome: Progressing     Problem: PAIN - ADULT  Goal: Verbalizes/displays adequate comfort level or baseline comfort level  Description: Interventions:  - Encourage patient to monitor pain and request assistance  - Assess pain using appropriate pain scale  - Administer analgesics based on type and severity of pain and evaluate response  - Implement non-pharmacological measures as appropriate and evaluate response  - Consider cultural and social influences on pain and pain management  - Notify physician/advanced practitioner if interventions unsuccessful or patient reports new pain  Outcome: Progressing     Problem: INFECTION - ADULT  Goal: Absence or prevention of progression during hospitalization  Description: INTERVENTIONS:  - Assess and monitor for signs and symptoms of infection  - Monitor lab/diagnostic results  - Monitor all insertion sites, i.e. indwelling lines, tubes, and drains  - Monitor endotracheal if appropriate and nasal secretions for changes in amount and color  - [place] appropriate cooling/warming therapies per order  - Administer medications as ordered  - Instruct and encourage patient and family to use good hand hygiene technique  -  Identify and instruct in appropriate isolation precautions for identified infection/condition  Outcome: Progressing  Goal: Absence of fever/infection during neutropenic period  Description: INTERVENTIONS:  - Monitor WBC    Outcome: Progressing     Problem: SAFETY ADULT  Goal: Patient will remain free of falls  Description: INTERVENTIONS:  - Educate patient/family on patient safety including physical limitations  - Instruct patient to call for assistance with activity   - Consult OT/PT to assist with strengthening/mobility   - Keep Call bell within reach  - Keep bed low and locked with side rails adjusted as appropriate  - Keep care items and personal belongings within reach  - Initiate and maintain comfort rounds  - Make Fall Risk Sign visible to staff  - Offer Toileting every  Hours, in advance of need  - Initiate/Maintain alarm  - Obtain necessary fall risk management equipment:   - Apply yellow socks and bracelet for high fall risk patients  - Consider moving patient to room near nurses station  Outcome: Progressing  Goal: Maintain or return to baseline ADL function  Description: INTERVENTIONS:  -  Assess patient's ability to carry out ADLs; assess patient's baseline for ADL function and identify physical deficits which impact ability to perform ADLs (bathing, care of mouth/teeth, toileting, grooming, dressing, etc.)  - Assess/evaluate cause of self-care deficits   - Assess range of motion  - Assess patient's mobility; develop plan if impaired  - Assess patient's need for assistive devices and provide as appropriate  - Encourage maximum independence but intervene and supervise when necessary  - Involve family in performance of ADLs  - Assess for home care needs following discharge   - Consider OT consult to assist with ADL evaluation and planning for discharge  - Provide patient education as appropriate  Outcome: Progressing  Goal: Maintains/Returns to pre admission functional level  Description:  INTERVENTIONS:  - Perform AM-PAC 6 Click Basic Mobility/ Daily Activity assessment daily.  - Set and communicate daily mobility goal to care team and patient/family/caregiver.   - Collaborate with rehabilitation services on mobility goals if consulted  - Perform Range of Motion times a day.  - Reposition patient every hours.  - Dangle patient  times a day  - Stand patient  times a day  - Ambulate patient  times a day  - Out of bed to chair times a day   - Out of bed for meals  times a day  - Out of bed for toileting  - Record patient progress and toleration of activity level   Outcome: Progressing     Problem: Knowledge Deficit  Goal: Patient/family/caregiver demonstrates understanding of disease process, treatment plan, medications, and discharge instructions  Description: Complete learning assessment and assess knowledge base.  Interventions:  - Provide teaching at level of understanding  - Provide teaching via preferred learning methods  Outcome: Progressing

## 2024-07-11 NOTE — ASSESSMENT & PLAN NOTE
Patient presents with right-sided facial droop.  Noticed the morning of 7/9 while taking a shower.   Stroke alert was called in the emergency room. NIHSS 2.   7/10 CTA H/N: No large vessel occlusion. No significant carotid or vertebral artery stenosis. 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  7/10 CT head: No acute intracranial abnormality  Patient admitted for stroke workup and placed on stroke pathway.  7/10 MRI brain: No MR evidence for acute ischemia as clinically questioned. Moderate chronic microangiopathic changes within the brain. Punctate scattered parenchymal chronic microhemorrhages, many of which appear to be at the cortical subcortical junction raising suspicion for underlying cerebral amyloid angiopathy.   Lipid panel: high cholesterol, high LDL  Lyme: IGG positive, IGM positive, total Ab positive    Patient endorses double vision and difficulty swallowing. Patient is tremulous and disoriented to place and reason for being at the hospital. She seems to have difficulty answering questions, but does follow directions well for examination.  HbA1c 5.7%    Speech evaluation (7/10): Recommending dysphagia 3 diet with thin liquids. Maintain aspiration precautions, upright ~90 degree positioning during ALL PO intake and at least 30 minutes post PO meals, small bites, slow rate of intake, and alternate liquids/solids. Medications as tolerated.   ECHO: EF 60%    Plan:  C/w  Lipitor 40 mg daily  Doxycycline 100 mg Q12 set to complete July 23  PT/OT appreciated  Consider lumbar puncture to determine etiology  Ophthalmic drops due to patient inability to close right eye.  Tape right eye at bedtime to protect from abrasions.

## 2024-07-12 PROBLEM — R33.9 URINARY RETENTION: Status: ACTIVE | Noted: 2024-07-12

## 2024-07-12 LAB
ANION GAP SERPL CALCULATED.3IONS-SCNC: 5 MMOL/L (ref 4–13)
BUN SERPL-MCNC: 19 MG/DL (ref 5–25)
CALCIUM SERPL-MCNC: 10.6 MG/DL (ref 8.4–10.2)
CHLORIDE SERPL-SCNC: 96 MMOL/L (ref 96–108)
CO2 SERPL-SCNC: 31 MMOL/L (ref 21–32)
CREAT SERPL-MCNC: 0.53 MG/DL (ref 0.6–1.3)
GFR SERPL CREATININE-BSD FRML MDRD: 87 ML/MIN/1.73SQ M
GLUCOSE SERPL-MCNC: 99 MG/DL (ref 65–140)
POTASSIUM SERPL-SCNC: 3.4 MMOL/L (ref 3.5–5.3)
SODIUM SERPL-SCNC: 132 MMOL/L (ref 135–147)

## 2024-07-12 PROCEDURE — 80048 BASIC METABOLIC PNL TOTAL CA: CPT

## 2024-07-12 PROCEDURE — 99232 SBSQ HOSP IP/OBS MODERATE 35: CPT | Performed by: INTERNAL MEDICINE

## 2024-07-12 RX ORDER — POTASSIUM CHLORIDE 14.9 MG/ML
20 INJECTION INTRAVENOUS
Status: DISCONTINUED | OUTPATIENT
Start: 2024-07-12 | End: 2024-07-12

## 2024-07-12 RX ORDER — POTASSIUM CHLORIDE 20 MEQ/1
40 TABLET, EXTENDED RELEASE ORAL ONCE
Status: COMPLETED | OUTPATIENT
Start: 2024-07-12 | End: 2024-07-12

## 2024-07-12 RX ADMIN — DICLOFENAC SODIUM 2 G: 10 GEL TOPICAL at 09:51

## 2024-07-12 RX ADMIN — DOCUSATE SODIUM 100 MG: 100 CAPSULE, LIQUID FILLED ORAL at 17:31

## 2024-07-12 RX ADMIN — GLYCERIN 2 DROP: .002; .002; .01 SOLUTION/ DROPS OPHTHALMIC at 15:54

## 2024-07-12 RX ADMIN — DOXYCYCLINE 100 MG: 100 CAPSULE ORAL at 21:34

## 2024-07-12 RX ADMIN — GLYCERIN 2 DROP: .002; .002; .01 SOLUTION/ DROPS OPHTHALMIC at 11:57

## 2024-07-12 RX ADMIN — ACETAMINOPHEN 975 MG: 325 TABLET, FILM COATED ORAL at 14:00

## 2024-07-12 RX ADMIN — ATORVASTATIN CALCIUM 10 MG: 10 TABLET, FILM COATED ORAL at 17:31

## 2024-07-12 RX ADMIN — WHITE PETROLATUM 57.7 %-MINERAL OIL 31.9 % EYE OINTMENT 1 APPLICATION: at 21:35

## 2024-07-12 RX ADMIN — GLYCERIN 2 DROP: .002; .002; .01 SOLUTION/ DROPS OPHTHALMIC at 04:55

## 2024-07-12 RX ADMIN — GLYCERIN 2 DROP: .002; .002; .01 SOLUTION/ DROPS OPHTHALMIC at 19:30

## 2024-07-12 RX ADMIN — GLYCERIN 2 DROP: .002; .002; .01 SOLUTION/ DROPS OPHTHALMIC at 14:01

## 2024-07-12 RX ADMIN — Medication 1000 UNITS: at 09:50

## 2024-07-12 RX ADMIN — DOXYCYCLINE 100 MG: 100 CAPSULE ORAL at 09:50

## 2024-07-12 RX ADMIN — GLYCERIN 2 DROP: .002; .002; .01 SOLUTION/ DROPS OPHTHALMIC at 17:31

## 2024-07-12 RX ADMIN — ACETAMINOPHEN 975 MG: 325 TABLET, FILM COATED ORAL at 04:55

## 2024-07-12 RX ADMIN — QUETIAPINE FUMARATE 12.5 MG: 25 TABLET ORAL at 23:55

## 2024-07-12 RX ADMIN — ENOXAPARIN SODIUM 40 MG: 40 INJECTION SUBCUTANEOUS at 09:50

## 2024-07-12 RX ADMIN — ACETAMINOPHEN 975 MG: 325 TABLET, FILM COATED ORAL at 21:34

## 2024-07-12 RX ADMIN — POTASSIUM CHLORIDE 40 MEQ: 1500 TABLET, EXTENDED RELEASE ORAL at 11:01

## 2024-07-12 RX ADMIN — POTASSIUM CHLORIDE 20 MEQ: 14.9 INJECTION, SOLUTION INTRAVENOUS at 09:50

## 2024-07-12 RX ADMIN — GLYCERIN 2 DROP: .002; .002; .01 SOLUTION/ DROPS OPHTHALMIC at 21:35

## 2024-07-12 RX ADMIN — DICLOFENAC SODIUM 2 G: 10 GEL TOPICAL at 11:57

## 2024-07-12 RX ADMIN — GLYCERIN 2 DROP: .002; .002; .01 SOLUTION/ DROPS OPHTHALMIC at 09:51

## 2024-07-12 NOTE — ASSESSMENT & PLAN NOTE
Sodium   Date Value Ref Range Status   07/12/2024 132 (L) 135 - 147 mmol/L Final     Sodium, Ur   Date Value Ref Range Status   07/10/2024 86 Reference range not established. Final       Serum osmolality 287 (7/9)  Urine sodium 86, Urine Osmolality 461    Plan:  Labs indicative of SIADH  Fluid restriction to counter hyponatremia secondary to SIADH

## 2024-07-12 NOTE — PLAN OF CARE
Problem: Potential for Falls  Goal: Patient will remain free of falls  Description: INTERVENTIONS:  - Educate patient/family on patient safety including physical limitations  - Instruct patient to call for assistance with activity   - Consult OT/PT to assist with strengthening/mobility   - Keep Call bell within reach  - Keep bed low and locked with side rails adjusted as appropriate  - Keep care items and personal belongings within reach  - Initiate and maintain comfort rounds  - Make Fall Risk Sign visible to staff  - Offer Toileting every  Hours, in advance of need  - Initiate/Maintain alarm  - Obtain necessary fall risk management equipment:   - Apply yellow socks and bracelet for high fall risk patients  - Consider moving patient to room near nurses station  Outcome: Progressing     Problem: PAIN - ADULT  Goal: Verbalizes/displays adequate comfort level or baseline comfort level  Description: Interventions:  - Encourage patient to monitor pain and request assistance  - Assess pain using appropriate pain scale  - Administer analgesics based on type and severity of pain and evaluate response  - Implement non-pharmacological measures as appropriate and evaluate response  - Consider cultural and social influences on pain and pain management  - Notify physician/advanced practitioner if interventions unsuccessful or patient reports new pain  Outcome: Progressing     Problem: INFECTION - ADULT  Goal: Absence or prevention of progression during hospitalization  Description: INTERVENTIONS:  - Assess and monitor for signs and symptoms of infection  - Monitor lab/diagnostic results  - Monitor all insertion sites, i.e. indwelling lines, tubes, and drains  - Monitor endotracheal if appropriate and nasal secretions for changes in amount and color  - Robert appropriate cooling/warming therapies per order  - Administer medications as ordered  - Instruct and encourage patient and family to use good hand hygiene technique  -  Identify and instruct in appropriate isolation precautions for identified infection/condition  Outcome: Progressing  Goal: Absence of fever/infection during neutropenic period  Description: INTERVENTIONS:  - Monitor WBC    Outcome: Progressing     Problem: SAFETY ADULT  Goal: Patient will remain free of falls  Description: INTERVENTIONS:  - Educate patient/family on patient safety including physical limitations  - Instruct patient to call for assistance with activity   - Consult OT/PT to assist with strengthening/mobility   - Keep Call bell within reach  - Keep bed low and locked with side rails adjusted as appropriate  - Keep care items and personal belongings within reach  - Initiate and maintain comfort rounds  - Make Fall Risk Sign visible to staff  - Offer Toileting every  Hours, in advance of need  - Initiate/Maintain alarm  - Obtain necessary fall risk management equipment:   - Apply yellow socks and bracelet for high fall risk patients  - Consider moving patient to room near nurses station  Outcome: Progressing  Goal: Maintain or return to baseline ADL function  Description: INTERVENTIONS:  -  Assess patient's ability to carry out ADLs; assess patient's baseline for ADL function and identify physical deficits which impact ability to perform ADLs (bathing, care of mouth/teeth, toileting, grooming, dressing, etc.)  - Assess/evaluate cause of self-care deficits   - Assess range of motion  - Assess patient's mobility; develop plan if impaired  - Assess patient's need for assistive devices and provide as appropriate  - Encourage maximum independence but intervene and supervise when necessary  - Involve family in performance of ADLs  - Assess for home care needs following discharge   - Consider OT consult to assist with ADL evaluation and planning for discharge  - Provide patient education as appropriate  Outcome: Progressing  Goal: Maintains/Returns to pre admission functional level  Description:  INTERVENTIONS:  - Perform AM-PAC 6 Click Basic Mobility/ Daily Activity assessment daily.  - Set and communicate daily mobility goal to care team and patient/family/caregiver.   - Collaborate with rehabilitation services on mobility goals if consulted  - Perform Range of Motion  times a day.  - Reposition patient every  hours.  - Dangle patient  times a day  - Stand patient  times a day  - Ambulate patient  times a day  - Out of bed to chair  times a day   - Out of bed for meals  times a day  - Out of bed for toileting  - Record patient progress and toleration of activity level   Outcome: Progressing     Problem: DISCHARGE PLANNING  Goal: Discharge to home or other facility with appropriate resources  Description: INTERVENTIONS:  - Identify barriers to discharge w/patient and caregiver  - Arrange for needed discharge resources and transportation as appropriate  - Identify discharge learning needs (meds, wound care, etc.)  - Arrange for interpretive services to assist at discharge as needed  - Refer to Case Management Department for coordinating discharge planning if the patient needs post-hospital services based on physician/advanced practitioner order or complex needs related to functional status, cognitive ability, or social support system  Outcome: Progressing     Problem: Knowledge Deficit  Goal: Patient/family/caregiver demonstrates understanding of disease process, treatment plan, medications, and discharge instructions  Description: Complete learning assessment and assess knowledge base.  Interventions:  - Provide teaching at level of understanding  - Provide teaching via preferred learning methods  Outcome: Progressing     Problem: Prexisting or High Potential for Compromised Skin Integrity  Goal: Skin integrity is maintained or improved  Description: INTERVENTIONS:  - Identify patients at risk for skin breakdown  - Assess and monitor skin integrity  - Assess and monitor nutrition and hydration status  -  Monitor labs   - Assess for incontinence   - Turn and reposition patient  - Assist with mobility/ambulation  - Relieve pressure over bony prominences  - Avoid friction and shearing  - Provide appropriate hygiene as needed including keeping skin clean and dry  - Evaluate need for skin moisturizer/barrier cream  - Collaborate with interdisciplinary team   - Patient/family teaching  - Consider wound care consult   Outcome: Progressing     Problem: Nutrition/Hydration-ADULT  Goal: Nutrient/Hydration intake appropriate for improving, restoring or maintaining nutritional needs  Description: Monitor and assess patient's nutrition/hydration status for malnutrition. Collaborate with interdisciplinary team and initiate plan and interventions as ordered.  Monitor patient's weight and dietary intake as ordered or per policy. Utilize nutrition screening tool and intervene as necessary. Determine patient's food preferences and provide high-protein, high-caloric foods as appropriate.     INTERVENTIONS:  - Monitor oral intake, urinary output, labs, and treatment plans  - Assess nutrition and hydration status and recommend course of action  - Evaluate amount of meals eaten  - Assist patient with eating if necessary   - Allow adequate time for meals  - Recommend/ encourage appropriate diets, oral nutritional supplements, and vitamin/mineral supplements  - Order, calculate, and assess calorie counts as needed  - Recommend, monitor, and adjust tube feedings and TPN/PPN based on assessed needs  - Assess need for intravenous fluids  - Provide specific nutrition/hydration education as appropriate  - Include patient/family/caregiver in decisions related to nutrition  Outcome: Progressing

## 2024-07-12 NOTE — ASSESSMENT & PLAN NOTE
Calcium 10.7 on admission. History of primary hyperparathyroidism with hypercalcemia however not placed on any medications in the past.  Recently has been complaining of back pain.    Ionized Ca 2+, Vitamin D and PTH within normal limits  PTH-rp <1.1  Calcium   Date Value Ref Range Status   07/12/2024 10.6 (H) 8.4 - 10.2 mg/dL Final     Comment:     verified by repeat analysis.     Calcium, Ionized   Date Value Ref Range Status   07/09/2024 1.28 1.12 - 1.32 mmol/L Final        Plan:  Continue Vitamin D3 1000 units daily

## 2024-07-12 NOTE — ASSESSMENT & PLAN NOTE
Patient presents with right-sided facial droop.  Noticed the morning of 7/9 while taking a shower.   Stroke alert was called in the emergency room. NIHSS 2.   7/10 CTA H/N: No large vessel occlusion. No significant carotid or vertebral artery stenosis. 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  7/10 CT head: No acute intracranial abnormality  Patient admitted for stroke workup and placed on stroke pathway.  7/10 MRI brain: No MR evidence for acute ischemia as clinically questioned. Moderate chronic microangiopathic changes within the brain. Punctate scattered parenchymal chronic microhemorrhages, many of which appear to be at the cortical subcortical junction raising suspicion for underlying cerebral amyloid angiopathy.   Lipid panel: high cholesterol, high LDL  Lyme: IGG positive, IGM positive, total Ab positive    Patient endorses double vision and difficulty swallowing. Patient is tremulous and disoriented to place and reason for being at the hospital. She seems to have difficulty answering questions, but does follow directions well for examination.  HbA1c 5.7%    Speech evaluation (7/10): Recommending dysphagia 3 diet with thin liquids. Maintain aspiration precautions, upright ~90 degree positioning during ALL PO intake and at least 30 minutes post PO meals, small bites, slow rate of intake, and alternate liquids/solids. Medications as tolerated.   ECHO: EF 60%    Plan:  Patient potential discharge tomorrow; transport at 11:45 pending COVID test  C/w  Lipitor 40 mg daily  Continue Doxycycline 100 mg Q12 set to complete July 23  Ophthalmic drops due to patient inability to close right eye.  Tape right eye at bedtime to protect from abrasions.

## 2024-07-12 NOTE — CASE MANAGEMENT
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     Aortic valve regurgitation 2018     Nodular thyroid disease 2018     Increased PTH level 2018     Left foot drop 2018     History of shingles 2018     Hypercalcemia 2017     Degenerative lumbar spinal stenosis 2017     Varicose veins of bilateral lower extremities with other complications 2017     Tendinitis of right rotator cuff 2017     Irregular heartbeat 2017     Premature atrial contraction 2017       LOS (days): 2  Geometric Mean LOS (GMLOS) (days): 3.7  Days to GMLOS:1.9     OBJECTIVE:  Risk of Unplanned Readmission Score: 14.23         Current admission status: Inpatient   Preferred Pharmacy:   [facility]/pharmacy #[id number] - [place] [address]  [place]  [address]  Phone: [phone] Fax: [phone]    Primary Care Provider: Abelino Garcia DO    Primary Insurance: MEDICARE  [facility]    DISCHARGE DETAILS:                                                                                     IMM reviewed with patient and caregiver, patient and caregiver agrees with discharge determination.  IMM Given (Date):: 07/12/24  IMM Given to:: Family  Family notified:: Patient, pt's dtr Jayde and dtr Luca

## 2024-07-12 NOTE — PROGRESS NOTES
[facility]  Progress Note  Name: Kay Bright I  MRN: [id number]  Unit/Bed#: S -01 I Date of Admission: 7/9/2024   Date of Service: 7/12/2024 I Hospital Day: 2    Assessment & Plan   * Right facial Bell's palsy (3 facial divisions) related to suspected Lyme disease  Assessment & Plan  Patient presents with right-sided facial droop.  Noticed the morning of 7/9 while taking a shower.   Stroke alert was called in the emergency room. NIHSS 2.   7/10 CTA H/N: No large vessel occlusion. No significant carotid or vertebral artery stenosis. 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  7/10 CT head: No acute intracranial abnormality  Patient admitted for stroke workup and placed on stroke pathway.  7/10 MRI brain: No MR evidence for acute ischemia as clinically questioned. Moderate chronic microangiopathic changes within the brain. Punctate scattered parenchymal chronic microhemorrhages, many of which appear to be at the cortical subcortical junction raising suspicion for underlying cerebral amyloid angiopathy.   Lipid panel: high cholesterol, high LDL  Lyme: IGG positive, IGM positive, total Ab positive    Patient endorses double vision and difficulty swallowing. Patient is tremulous and disoriented to place and reason for being at the hospital. She seems to have difficulty answering questions, but does follow directions well for examination.  HbA1c 5.7%    Speech evaluation (7/10): Recommending dysphagia 3 diet with thin liquids. Maintain aspiration precautions, upright ~90 degree positioning during ALL PO intake and at least 30 minutes post PO meals, small bites, slow rate of intake, and alternate liquids/solids. Medications as tolerated.   ECHO: EF 60%    Plan:  Patient potential discharge tomorrow; transport at 11:45 pending COVID test  C/w  Lipitor 40 mg daily  Continue Doxycycline 100 mg Q12 set to complete July 23  Ophthalmic drops due to patient inability to close  right eye.  Tape right eye at bedtime to protect from abrasions.    Urinary retention  Assessment & Plan  Bladder scan showed >700 mL 7/11  Repeat Bladder Scan today showed near 100 mL    PLAN  -Continue monitoring for Urinary retention    SIADH (syndrome of inappropriate ADH production) ([facility])  Assessment & Plan  Sodium   Date Value Ref Range Status   07/12/2024 132 (L) 135 - 147 mmol/L Final     Sodium, Ur   Date Value Ref Range Status   07/10/2024 86 Reference range not established. Final       Serum osmolality 287 (7/9)  Urine sodium 86, Urine Osmolality 461    Plan:  Labs indicative of SIADH  Fluid restriction to counter hyponatremia secondary to SIADH    Hypercalcemia  Assessment & Plan  Calcium 10.7 on admission. History of primary hyperparathyroidism with hypercalcemia however not placed on any medications in the past.  Recently has been complaining of back pain.    Ionized Ca 2+, Vitamin D and PTH within normal limits  PTH-rp <1.1  Calcium   Date Value Ref Range Status   07/12/2024 10.6 (H) 8.4 - 10.2 mg/dL Final     Comment:     verified by repeat analysis.     Calcium, Ionized   Date Value Ref Range Status   07/09/2024 1.28 1.12 - 1.32 mmol/L Final        Plan:  Continue Vitamin D3 1000 units daily         VTE Pharmacologic Prophylaxis: VTE Score: 8 High Risk (Score >/= 5) - Pharmacological DVT Prophylaxis Ordered: enoxaparin (Lovenox). Sequential Compression Devices Ordered.    Mobility:   Basic Mobility Inpatient Raw Score: 14  JH-HLM Goal: 4: Move to chair/commode  JH-HLM Achieved: 3: Sit at edge of bed  JH-HLM Goal NOT achieved. Continue with multidisciplinary rounding and encourage appropriate mobility to improve upon JH-HLM goals.    Patient Centered Rounds: I performed bedside rounds with nursing staff today.   Discussions with Specialists or Other Care Team Provider: Attending Dr. Cisneros    Education and Discussions with Family / Patient: Updated  (daughter) at bedside. Spoke in  regards to improvement in patient mental status and about potential discharge to Physical Rehab.    Total Time Spent on Date of Encounter in care of patient: 40 mins. This time was spent on one or more of the following: performing physical exam; counseling and coordination of care; obtaining or reviewing history; documenting in the medical record; reviewing/ordering tests, medications or procedures; communicating with other healthcare professionals and discussing with patient's family/caregivers.    Current Length of Stay: 2 day(s)  Current Patient Status: Inpatient   Certification Statement: The patient will continue to require additional inpatient hospital stay due to continued monitoring  Discharge Plan: Anticipate discharge tomorrow to rehab facility.    Code Status: Level 1 - Full Code    Subjective:   Patient is endorsing continued improvement.  Endorsing left sided back pain and redness in right eye.  Patient denies headache, vomiting, diarrhea, constipation, chest pain, shortness of breath.  Patient endorsed Nausea    Objective:     Vitals:   Temp (24hrs), Av.5 °F (36.4 °C), Min:97.5 °F (36.4 °C), Max:97.5 °F (36.4 °C)    Temp:  [97.5 °F (36.4 °C)] 97.5 °F (36.4 °C)  HR:  [72-82] 82  Resp:  [16-18] 16  BP: (125-146)/(81-90) 146/81  SpO2:  [96 %-97 %] 97 %  Body mass index is 22.11 kg/m².     Input and Output Summary (last 24 hours):     Intake/Output Summary (Last 24 hours) at 2024 1414  Last data filed at 2024  Gross per 24 hour   Intake --   Output 100 ml   Net -100 ml       Physical Exam:   Physical Exam  Constitutional:       General: She is not in acute distress.     Appearance: She is not ill-appearing.   HENT:      Head:        Comments: Bell's Palsy Right sided  Eyes:      Comments: Patient right eyelid mildly red and puffy on examination in morning; repeat examination showed resolution   Cardiovascular:      Rate and Rhythm: Normal rate and regular rhythm.   Pulmonary:       Effort: No respiratory distress.      Breath sounds: Normal breath sounds.   Abdominal:      General: Abdomen is flat.      Palpations: Abdomen is soft.      Tenderness: There is no abdominal tenderness.   Neurological:      Mental Status: She is alert.          Additional Data:     Labs:  Results from last 7 days   Lab Units 07/11/24  0452 07/10/24  0507   WBC Thousand/uL 7.94 5.98   HEMOGLOBIN g/dL 14.0 13.5   HEMATOCRIT % 41.4 39.8   PLATELETS Thousands/uL 296 290   SEGS PCT %  --  79*   LYMPHO PCT %  --  15   MONO PCT %  --  6   EOS PCT %  --  0     Results from last 7 days   Lab Units 07/12/24  0458 07/11/24  0452 07/10/24  0507   SODIUM mmol/L 132*   < > 130*   POTASSIUM mmol/L 3.4*   < > 3.3*   CHLORIDE mmol/L 96   < > 96   CO2 mmol/L 31   < > 25   BUN mg/dL 19   < > 11   CREATININE mg/dL 0.53*   < > 0.47*   ANION GAP mmol/L 5   < > 9   CALCIUM mg/dL 10.6*   < > 9.9   ALBUMIN g/dL  --   --  4.0   TOTAL BILIRUBIN mg/dL  --   --  0.59   ALK PHOS U/L  --   --  92   ALT U/L  --   --  14   AST U/L  --   --  20   GLUCOSE RANDOM mg/dL 99   < > 120    < > = values in this interval not displayed.     Results from last 7 days   Lab Units 07/09/24  1155   INR  0.98     Results from last 7 days   Lab Units 07/09/24  1145   POC GLUCOSE mg/dl 113     Results from last 7 days   Lab Units 07/10/24  0507   HEMOGLOBIN A1C % 5.7*           Lines/Drains:  Invasive Devices       Peripheral Intravenous Line  Duration             Peripheral IV 07/09/24 Proximal;Right;Ventral (anterior) Forearm 3 days                          Imaging: No pertinent imaging reviewed.No new imaging performed    Recent Cultures (last 7 days):         Last 24 Hours Medication List:   Current Facility-Administered Medications   Medication Dose Route Frequency Provider Last Rate    acetaminophen  975 mg Oral Q8H [facility] Diann Pedroza MD      Artificial Tears  2 drop Right Eye Q2H While awake RGOELIO oKwalski      And    artificial tear   Right Eye HS  ROGELIO Kowalski      atorvastatin  10 mg Oral QPM ROGELIO Kowalski      cholecalciferol  1,000 Units Oral QAM Diann Pedroza MD      Diclofenac Sodium  2 g Topical 4x Daily Diann Pedroza MD      docusate sodium  100 mg Oral BID Diann Pedroza MD      doxycycline hyclate  100 mg Oral Q12H DINO Delmer Barriga MD      enoxaparin  40 mg Subcutaneous Daily Diann Pedroza MD      QUEtiapine  12.5 mg Oral HS Delmer Barriga MD          Today, Patient Was Seen By: Delmer Barriga MD    **Please Note: This note may have been constructed using a voice recognition system.**

## 2024-07-12 NOTE — ASSESSMENT & PLAN NOTE
Bladder scan showed >700 mL 7/11  Repeat Bladder Scan today showed near 100 mL    PLAN  -Continue monitoring for Urinary retention

## 2024-07-12 NOTE — CASE MANAGEMENT
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     Aortic valve regurgitation 2018     Nodular thyroid disease 2018     Increased PTH level 2018     Left foot drop 2018     History of shingles 2018     Hypercalcemia 2017     Degenerative lumbar spinal stenosis 2017     Varicose veins of bilateral lower extremities with other complications 2017     Tendinitis of right rotator cuff 2017     Irregular heartbeat 2017     Premature atrial contraction 2017       LOS (days): 2  Geometric Mean LOS (GMLOS) (days): 3.7  Days to GMLOS:1.8     OBJECTIVE:  Risk of Unplanned Readmission Score: 14.23         Current admission status: Inpatient   Preferred Pharmacy:   [facility]/pharmacy #[id number] - [place] [address]  [place]  [address]  Phone: [phone] Fax: [phone]    Primary Care Provider: Abelino Garcia DO    Primary Insurance: MEDICARE  Secondary Insurance: AARP    DISCHARGE DETAILS:    Discharge planning discussed with:: patient, dtr Jayde, dtr Luca, and family at bedside  Freedom of Choice: Yes  Comments - Freedom of Choice: Per SLIM - pt likely ready for d/c tomorrow. CM f/u w/ pt and family re: same - pt and family remains in agreement for [facility]. CM f/u w/ [facility] via [facility] - facility requesting 1000 or after for transport for tomorrow. [facility] referral [facility]. All parties notified of same.  CM contacted family/caregiver?: Yes  Were Treatment Team discharge recommendations reviewed with patient/caregiver?: Yes  Did patient/caregiver verbalize understanding of patient care needs?: N/A- going to facility  Were patient/caregiver advised of the risks associated with not following Treatment Team discharge recommendations?: Yes    Contacts  Patient Contacts: Jayde (dtr), Luca (dtr) and family  Relationship to Patient:: Family  Contact Method: In Person  Reason/Outcome: Continuity of Care, Emergency Contact, Referral, Discharge Planning              Other Referral/Resources/Interventions Provided:  Interventions: Short Term Rehab, Transportation  Referral Comments: Transport referral placed to [facility], confirmed for 1145 via [facility] tomorrow morning. All parties notified of same.         Treatment Team Recommendation: Short Term Rehab  Discharge Destination Plan:: Short Term Rehab  Transport at Discharge : Wheelchair van        Transported by (Company and Unit #): [facility]  ETA of Transport (Date): 07/13/24  ETA of Transport (Time): 1145                            Accepting Facility Name, City & State : [facility]  Receiving Facility/Agency Phone Number: [phone]  Facility/Agency Fax Number: [phone]

## 2024-07-13 ENCOUNTER — TELEPHONE (OUTPATIENT)
Dept: OTHER | Facility: OTHER | Age: 84
End: 2024-07-13

## 2024-07-13 VITALS
RESPIRATION RATE: 17 BRPM | SYSTOLIC BLOOD PRESSURE: 123 MMHG | BODY MASS INDEX: 22.09 KG/M2 | HEIGHT: 61 IN | HEART RATE: 82 BPM | OXYGEN SATURATION: 96 % | WEIGHT: 117 LBS | TEMPERATURE: 97.4 F | DIASTOLIC BLOOD PRESSURE: 86 MMHG

## 2024-07-13 PROBLEM — R33.9 URINARY RETENTION: Status: RESOLVED | Noted: 2024-07-12 | Resolved: 2024-07-13

## 2024-07-13 LAB
FLUAV RNA RESP QL NAA+PROBE: NEGATIVE
FLUBV RNA RESP QL NAA+PROBE: NEGATIVE
RSV RNA RESP QL NAA+PROBE: NEGATIVE
SARS-COV-2 RNA RESP QL NAA+PROBE: NEGATIVE

## 2024-07-13 PROCEDURE — 99239 HOSP IP/OBS DSCHRG MGMT >30: CPT | Performed by: INTERNAL MEDICINE

## 2024-07-13 PROCEDURE — 0241U HB NFCT DS VIR RESP RNA 4 TRGT: CPT

## 2024-07-13 RX ORDER — MINERAL OIL AND PETROLATUM 150; 830 MG/G; MG/G
OINTMENT OPHTHALMIC
Start: 2024-07-13

## 2024-07-13 RX ORDER — POLYVINYL ALCOHOL 14 MG/ML
2 SOLUTION/ DROPS OPHTHALMIC AS NEEDED
Start: 2024-07-13

## 2024-07-13 RX ORDER — DOXYCYCLINE HYCLATE 100 MG/1
100 CAPSULE ORAL EVERY 12 HOURS SCHEDULED
Qty: 22 CAPSULE | Refills: 0
Start: 2024-07-13 | End: 2024-07-24

## 2024-07-13 RX ORDER — LANOLIN ALCOHOL/MO/W.PET/CERES
3 CREAM (GRAM) TOPICAL
Status: DISCONTINUED | OUTPATIENT
Start: 2024-07-13 | End: 2024-07-13 | Stop reason: HOSPADM

## 2024-07-13 RX ADMIN — DICLOFENAC SODIUM 2 G: 10 GEL TOPICAL at 09:12

## 2024-07-13 RX ADMIN — GLYCERIN 2 DROP: .002; .002; .01 SOLUTION/ DROPS OPHTHALMIC at 09:12

## 2024-07-13 RX ADMIN — Medication 1000 UNITS: at 09:11

## 2024-07-13 RX ADMIN — GLYCERIN 2 DROP: .002; .002; .01 SOLUTION/ DROPS OPHTHALMIC at 11:21

## 2024-07-13 RX ADMIN — DICLOFENAC SODIUM 2 G: 10 GEL TOPICAL at 11:21

## 2024-07-13 RX ADMIN — ENOXAPARIN SODIUM 40 MG: 40 INJECTION SUBCUTANEOUS at 09:11

## 2024-07-13 RX ADMIN — Medication 3 MG: at 01:45

## 2024-07-13 RX ADMIN — DOXYCYCLINE 100 MG: 100 CAPSULE ORAL at 09:11

## 2024-07-13 NOTE — DISCHARGE SUMMARY
Formerly [facility]  Discharge- Kay Bright 1940, 83 y.o. female MRN: [id number]  Unit/Bed#: S -01 Encounter: [id number]  Primary Care Provider: Abelino Garcia DO   Date and time admitted to hospital: 7/9/2024 11:42 AM    * Right facial Bell's palsy (3 facial divisions) related to suspected Lyme disease  Assessment & Plan  Patient presents with right-sided facial droop.  Noticed the morning of 7/9 while taking a shower.   Stroke alert was called in the emergency room. NIHSS 2.   7/10 CTA H/N: No large vessel occlusion. No significant carotid or vertebral artery stenosis. 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  7/10 CT head: No acute intracranial abnormality  Patient admitted for stroke workup and placed on stroke pathway.  7/10 MRI brain: No MR evidence for acute ischemia as clinically questioned. Moderate chronic microangiopathic changes within the brain. Punctate scattered parenchymal chronic microhemorrhages, many of which appear to be at the cortical subcortical junction raising suspicion for underlying cerebral amyloid angiopathy.   Lipid panel: high cholesterol, high LDL  Lyme: IGG positive, IGM positive, total Ab positive    Patient endorses double vision and difficulty swallowing. Patient is tremulous and disoriented to place and reason for being at the hospital. She seems to have difficulty answering questions, but does follow directions well for examination.  HbA1c 5.7%    Speech evaluation (7/10): Recommending dysphagia 3 diet with thin liquids. Maintain aspiration precautions, upright ~90 degree positioning during ALL PO intake and at least 30 minutes post PO meals, small bites, slow rate of intake, and alternate liquids/solids. Medications as tolerated.   ECHO: EF 60%    Plan:  Patient discharge today; transport at 11:45am  Continue Doxycycline 100 mg Q12 set to complete July 23  Ophthalmic drops due to patient inability to close right eye.  Tape  right eye at bedtime to protect from abrasions.    SIADH (syndrome of inappropriate ADH production) ([facility])  Assessment & Plan  Sodium   Date Value Ref Range Status   07/12/2024 132 (L) 135 - 147 mmol/L Final     Sodium, Ur   Date Value Ref Range Status   07/10/2024 86 Reference range not established. Final       Serum osmolality 287 (7/9)  Urine sodium 86, Urine Osmolality 461  Labs indicative of SIADH    Plan:  Fluid restriction to counter hyponatremia secondary to SIADH    Hypercalcemia  Assessment & Plan  Calcium 10.7 on admission. History of primary hyperparathyroidism with hypercalcemia however not placed on any medications in the past.  Recently has been complaining of back pain.    Ionized Ca 2+, Vitamin D and PTH within normal limits  PTH-rp <1.1  Calcium   Date Value Ref Range Status   07/12/2024 10.6 (H) 8.4 - 10.2 mg/dL Final     Comment:     verified by repeat analysis.     Calcium, Ionized   Date Value Ref Range Status   07/09/2024 1.28 1.12 - 1.32 mmol/L Final        Plan:  Continue Vitamin D3 1000 units daily    Urinary retention-resolved as of 7/13/2024  Assessment & Plan  Bladder scan showed >700 mL 7/11  Repeat Bladder Scan today showed near 100 mL    PLAN  -Continue monitoring for Urinary retention      Medical Problems       Resolved Problems  Date Reviewed: 7/5/2024            Resolved    Urinary retention 7/13/2024     Resolved by  Delmer Barriga MD        Discharging Resident: Delmer Barriga MD  Discharging Attending: Simeon Cisneros MD  PCP: Abelino Garcia DO  Admission Date:   Admission Orders (From admission, onward)       Ordered        07/10/24 1442  INPATIENT ADMISSION  Once            07/09/24 1320  Place in Observation  Once                          Discharge Date: 07/13/24    Consultations During Hospital Stay:  Neurology: Load aspirin and plavix for stroke workup vs Bell's Palsy    Procedures Performed:   None    Significant Findings / Test Results:   CTA showed no large  "vessel occlusion, no significant carotid or vertebral artery stenosis.     Incidental Findings:   2.5 mm Aneurysm on CTA   I reviewed the above mentioned incidental findings with the patient and/or family and they expressed understanding.    Test Results Pending at Discharge (will require follow up):  None     Outpatient Tests Requested:  Basic Metabolic Profile    Complications:  None    Reason for Admission: Bell's Palsy    Hospital Course:   Kay Bright is a 83 y.o. female patient who originally presented to the hospital on 7/9/2024 due to Bell's Palsy.  Patient initially worked up for potential stroke vs Bell's palsy; CTA and MRI of the brain did not indicate stroke.  Lab testing was positive for Lyme IgM.  Doxycycline started in hospital.  During the patient stay here she experienced Sundowning; explanation given to patient family.  Per discussion with family the patient is to be discharged to Physical Rehab on Doxycycline to finish course of 14 day antibiotics.        Please see above list of diagnoses and related plan for additional information.     Condition at Discharge: fair    Discharge Day Visit / Exam:   Subjective:  Patient endorses feeling better than yesterday.   Denies headache, chest pain, shortness of breath, diarrhea   Endorsed Nausea, Constipation  Vitals: Blood Pressure: 123/86 (07/13/24 0253)  Pulse: 82 (07/13/24 0253)  Temperature: (!) 97.4 °F (36.3 °C) (07/13/24 0253)  Temp Source: Axillary (07/10/24 2149)  Respirations: 17 (07/12/24 2327)  Height: 5' 1\" (154.9 cm) (07/10/24 0814)  Weight - Scale: 53.1 kg (117 lb) (07/10/24 0814)  SpO2: 96 % (07/13/24 0253)  Exam:   Physical Exam  Constitutional:       General: She is not in acute distress.     Appearance: She is not ill-appearing.   HENT:      Head: Normocephalic.   Cardiovascular:      Rate and Rhythm: Normal rate and regular rhythm.      Heart sounds: No murmur heard.  Pulmonary:      Effort: No respiratory distress.      Breath " sounds: No wheezing.   Abdominal:      General: Abdomen is flat. There is no distension.      Tenderness: There is no abdominal tenderness.   Neurological:      Mental Status: She is alert.      Comments: Alert oriented x2  Continued right sided bell's palsy  Improved ability to close right eye; not fully.          Discussion with Family: Updated  (daughter) at bedside.Content included continued Sundowning and likely worsening due to unfamiliar environment.  Family is agreeable to Discharge to physical rehab.    Discharge instructions/Information to patient and family:   See after visit summary for information provided to patient and family.      Provisions for Follow-Up Care:  See after visit summary for information related to follow-up care and any pertinent home health orders.      Mobility at time of Discharge:   Basic Mobility Inpatient Raw Score: 16  JH-HLM Goal: 5: Stand one or more mins  JH-HLM Achieved: 4: Move to chair/commode  HLM Goal NOT achieved. Continue to encourage mobility in post discharge setting.     Disposition:   Acute Rehab at [facility]    Planned Readmission: No    Discharge Medications:  See after visit summary for reconciled discharge medications provided to patient and/or family.      **Please Note: This note may have been constructed using a voice recognition system**

## 2024-07-13 NOTE — ASSESSMENT & PLAN NOTE
Patient presents with right-sided facial droop.  Noticed the morning of 7/9 while taking a shower.   Stroke alert was called in the emergency room. NIHSS 2.   7/10 CTA H/N: No large vessel occlusion. No significant carotid or vertebral artery stenosis. 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  7/10 CT head: No acute intracranial abnormality  Patient admitted for stroke workup and placed on stroke pathway.  7/10 MRI brain: No MR evidence for acute ischemia as clinically questioned. Moderate chronic microangiopathic changes within the brain. Punctate scattered parenchymal chronic microhemorrhages, many of which appear to be at the cortical subcortical junction raising suspicion for underlying cerebral amyloid angiopathy.   Lipid panel: high cholesterol, high LDL  Lyme: IGG positive, IGM positive, total Ab positive    Patient endorses double vision and difficulty swallowing. Patient is tremulous and disoriented to place and reason for being at the hospital. She seems to have difficulty answering questions, but does follow directions well for examination.  HbA1c 5.7%    Speech evaluation (7/10): Recommending dysphagia 3 diet with thin liquids. Maintain aspiration precautions, upright ~90 degree positioning during ALL PO intake and at least 30 minutes post PO meals, small bites, slow rate of intake, and alternate liquids/solids. Medications as tolerated.   ECHO: EF 60%    Plan:  Patient discharge today; transport at 11:45am  Continue Doxycycline 100 mg Q12 set to complete July 23  Ophthalmic drops due to patient inability to close right eye.  Tape right eye at bedtime to protect from abrasions.

## 2024-07-13 NOTE — ASSESSMENT & PLAN NOTE
Sodium   Date Value Ref Range Status   07/12/2024 132 (L) 135 - 147 mmol/L Final     Sodium, Ur   Date Value Ref Range Status   07/10/2024 86 Reference range not established. Final       Serum osmolality 287 (7/9)  Urine sodium 86, Urine Osmolality 461  Labs indicative of SIADH    Plan:  Fluid restriction to counter hyponatremia secondary to SIADH

## 2024-07-13 NOTE — TELEPHONE ENCOUNTER
"Mary from [facility] stated, \" I have a new admit and I am calling to verify orders.\"     Paged on call VIA EPIC   "

## 2024-07-13 NOTE — CASE MANAGEMENT
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 07/10/2024     Right facial Bell's palsy (3 facial divisions) related to suspected Lyme disease 2024     Fracture of greater trochanter of left femur ([facility]) 2024     Fall 2024     History of diverticulitis 11/15/2023     Varicose veins of both lower extremities with inflammation 10/25/2023     White coat syndrome without diagnosis of hypertension 2023     Multiple thyroid nodules 2022     Hyperparathyroidism ([facility]) 2020     Leukocytosis 2019     Vitamin D deficiency 2019     Hyperlipidemia 2019     Osteoporosis 2019     Stress fracture of calcaneus 2019     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     Aortic valve regurgitation 2018     Nodular thyroid disease 2018     Increased PTH level 2018     Left foot drop 2018     History of shingles 2018     Hypercalcemia 2017     Degenerative lumbar spinal stenosis 2017     Varicose veins of bilateral lower extremities with other complications 2017     Tendinitis of right rotator cuff 2017     Irregular heartbeat 2017     Premature atrial contraction 2017       LOS (days): 3  Geometric Mean LOS (GMLOS) (days): 3.7  Days to GMLOS:0.9     OBJECTIVE:  Risk of Unplanned Readmission Score: 14.6         Current admission status: Inpatient   Preferred Pharmacy:   [id number] - [place] - [address]   [place] [address]  Phone: [phone] Fax: [phone]    Primary Care Provider: Abelino Garcia DO    Primary Insurance: MEDICARE  Secondary Insurance: AARP    DISCHARGE DETAILS:    Discharge planning discussed with:: daughter Jayde  Freedom of Choice: Yes  Comments - Freedom of Choice: agreeable to discharge to [facility] today  CM contacted family/caregiver?: Yes  Were Treatment Team discharge recommendations reviewed with patient/caregiver?: Yes  Did patient/caregiver verbalize understanding of patient care needs?: N/A- going to facility  Were patient/caregiver advised of the risks associated with not following Treatment Team discharge recommendations?: Yes    Contacts  Patient Contacts: Jayde Rodriguez  Relationship to Patient:: Family  Contact Method: Phone  Phone Number: [phone]  Reason/Outcome: Continuity of Care, Discharge Planning    Requested Home Health Care         Is the patient interested in HHC at discharge?: No    DME Referral Provided  Referral made for DME?: No    Other Referral/Resources/Interventions Provided:  Interventions: Short Term Rehab         Treatment Team Recommendation: Short Term Rehab  Discharge Destination Plan:: Short Term Rehab  Transport at Discharge : Wheelchair van         Patient already scheduled for 1145am wcv  time with [facility] today going to [facility]. CM confirmed that LOS, RN, SNF and daughter are aware of transport time.

## 2024-07-13 NOTE — DISCHARGE INSTR - AVS FIRST PAGE
Dear Kay Bright,     It was our pleasure to care for you here at [facility].  It is our hope that we were always able to exceed the expected standards for your care during your stay.  You were hospitalized due to Bell's Palsy.  You were cared for on the 3rd floor by Delmer Barriga MD under the service of Simeon Cisneros MD with the [facility] who covers for your primary care physician (PCP), Abelino Garcia DO, while you were hospitalized.  If you have any questions or concerns related to this hospitalization, you may contact us at .  For follow up as well as any medication refills, we recommend that you follow up with your primary care physician.  A registered nurse will reach out to you by phone within a few days after your discharge to answer any additional questions that you may have after going home.  However, at this time we provide for you here, the most important instructions / recommendations at discharge:     Notable Medication Adjustments -   START Doxycycline 100 mg, take one tablet every 12 hours for 11 days starting tonight at 9PM (until July 23rd).  START Ophthalmic drops; 2 drops as needed for eye dryness  START Ophthalmic ointment at bedtime on right eye  No other medication changes at this time  Testing Required after Discharge -   Basic Metabolic Panel within one week of discharge.  Important follow up information -   Please follow up with your Primary Care Provider within 1 week of discharge.  You were found to have a 2.5 mm aneurysm in the brain.  We recommend consultation with the [facility], a division of [facility].  Call to make an appointment at [phone].   Other Instructions -   If you notice worsening of your current symptoms after completion of antibiotic course such as joint pain, weakness, pain and tingling in the hands or feet, etc; please return for  reevaluation and management.  Please review this entire after visit summary as additional general instructions including medication list, appointments, activity, diet, any pertinent wound care, and other additional recommendations from your care team that may be provided for you.      Sincerely,     Delmer Barriga MD

## 2024-07-13 NOTE — PLAN OF CARE
Problem: Potential for Falls  Goal: Patient will remain free of falls  Description: INTERVENTIONS:  - Educate patient/family on patient safety including physical limitations  - Instruct patient to call for assistance with activity   - Consult OT/PT to assist with strengthening/mobility   - Keep Call bell within reach  - Keep bed low and locked with side rails adjusted as appropriate  - Keep care items and personal belongings within reach  - Initiate and maintain comfort rounds  - Make Fall Risk Sign visible to staff  - Offer Toileting every  Hours, in advance of need  - Initiate/Maintain alarm  - Obtain necessary fall risk management equipment:   - Apply yellow socks and bracelet for high fall risk patients  - Consider moving patient to room near nurses station  Outcome: Progressing     Problem: PAIN - ADULT  Goal: Verbalizes/displays adequate comfort level or baseline comfort level  Description: Interventions:  - Encourage patient to monitor pain and request assistance  - Assess pain using appropriate pain scale  - Administer analgesics based on type and severity of pain and evaluate response  - Implement non-pharmacological measures as appropriate and evaluate response  - Consider cultural and social influences on pain and pain management  - Notify physician/advanced practitioner if interventions unsuccessful or patient reports new pain  Outcome: Progressing     Problem: INFECTION - ADULT  Goal: Absence or prevention of progression during hospitalization  Description: INTERVENTIONS:  - Assess and monitor for signs and symptoms of infection  - Monitor lab/diagnostic results  - Monitor all insertion sites, i.e. indwelling lines, tubes, and drains  - Monitor endotracheal if appropriate and nasal secretions for changes in amount and color  - [place] appropriate cooling/warming therapies per order  - Administer medications as ordered  - Instruct and encourage patient and family to use good hand hygiene technique  -  Identify and instruct in appropriate isolation precautions for identified infection/condition  Outcome: Progressing  Goal: Absence of fever/infection during neutropenic period  Description: INTERVENTIONS:  - Monitor WBC    Outcome: Progressing     Problem: SAFETY ADULT  Goal: Patient will remain free of falls  Description: INTERVENTIONS:  - Educate patient/family on patient safety including physical limitations  - Instruct patient to call for assistance with activity   - Consult OT/PT to assist with strengthening/mobility   - Keep Call bell within reach  - Keep bed low and locked with side rails adjusted as appropriate  - Keep care items and personal belongings within reach  - Initiate and maintain comfort rounds  - Make Fall Risk Sign visible to staff  - Offer Toileting every  Hours, in advance of need  - Initiate/Maintain alarm  - Obtain necessary fall risk management equipment:   - Apply yellow socks and bracelet for high fall risk patients  - Consider moving patient to room near nurses station  Outcome: Progressing  Goal: Maintain or return to baseline ADL function  Description: INTERVENTIONS:  -  Assess patient's ability to carry out ADLs; assess patient's baseline for ADL function and identify physical deficits which impact ability to perform ADLs (bathing, care of mouth/teeth, toileting, grooming, dressing, etc.)  - Assess/evaluate cause of self-care deficits   - Assess range of motion  - Assess patient's mobility; develop plan if impaired  - Assess patient's need for assistive devices and provide as appropriate  - Encourage maximum independence but intervene and supervise when necessary  - Involve family in performance of ADLs  - Assess for home care needs following discharge   - Consider OT consult to assist with ADL evaluation and planning for discharge  - Provide patient education as appropriate  Outcome: Progressing  Goal: Maintains/Returns to pre admission functional level  Description:  INTERVENTIONS:  - Perform AM-PAC 6 Click Basic Mobility/ Daily Activity assessment daily.  - Set and communicate daily mobility goal to care team and patient/family/caregiver.   - Collaborate with rehabilitation services on mobility goals if consulted  - Perform Range of Motion  times a day.  - Reposition patient every  hours.  - Dangle patient  times a day  - Stand patient  times a day  - Ambulate patient  times a day  - Out of bed to chair  times a day   - Out of bed for meals  times a day  - Out of bed for toileting  - Record patient progress and toleration of activity level   Outcome: Progressing     Problem: DISCHARGE PLANNING  Goal: Discharge to home or other facility with appropriate resources  Description: INTERVENTIONS:  - Identify barriers to discharge w/patient and caregiver  - Arrange for needed discharge resources and transportation as appropriate  - Identify discharge learning needs (meds, wound care, etc.)  - Arrange for interpretive services to assist at discharge as needed  - Refer to Case Management Department for coordinating discharge planning if the patient needs post-hospital services based on physician/advanced practitioner order or complex needs related to functional status, cognitive ability, or social support system  Outcome: Progressing     Problem: Knowledge Deficit  Goal: Patient/family/caregiver demonstrates understanding of disease process, treatment plan, medications, and discharge instructions  Description: Complete learning assessment and assess knowledge base.  Interventions:  - Provide teaching at level of understanding  - Provide teaching via preferred learning methods  Outcome: Progressing     Problem: Prexisting or High Potential for Compromised Skin Integrity  Goal: Skin integrity is maintained or improved  Description: INTERVENTIONS:  - Identify patients at risk for skin breakdown  - Assess and monitor skin integrity  - Assess and monitor nutrition and hydration status  -  Monitor labs   - Assess for incontinence   - Turn and reposition patient  - Assist with mobility/ambulation  - Relieve pressure over bony prominences  - Avoid friction and shearing  - Provide appropriate hygiene as needed including keeping skin clean and dry  - Evaluate need for skin moisturizer/barrier cream  - Collaborate with interdisciplinary team   - Patient/family teaching  - Consider wound care consult   Outcome: Progressing     Problem: Nutrition/Hydration-ADULT  Goal: Nutrient/Hydration intake appropriate for improving, restoring or maintaining nutritional needs  Description: Monitor and assess patient's nutrition/hydration status for malnutrition. Collaborate with interdisciplinary team and initiate plan and interventions as ordered.  Monitor patient's weight and dietary intake as ordered or per policy. Utilize nutrition screening tool and intervene as necessary. Determine patient's food preferences and provide high-protein, high-caloric foods as appropriate.     INTERVENTIONS:  - Monitor oral intake, urinary output, labs, and treatment plans  - Assess nutrition and hydration status and recommend course of action  - Evaluate amount of meals eaten  - Assist patient with eating if necessary   - Allow adequate time for meals  - Recommend/ encourage appropriate diets, oral nutritional supplements, and vitamin/mineral supplements  - Order, calculate, and assess calorie counts as needed  - Recommend, monitor, and adjust tube feedings and TPN/PPN based on assessed needs  - Assess need for intravenous fluids  - Provide specific nutrition/hydration education as appropriate  - Include patient/family/caregiver in decisions related to nutrition  Outcome: Progressing

## 2024-07-13 NOTE — PLAN OF CARE
Problem: Potential for Falls  Goal: Patient will remain free of falls  Description: INTERVENTIONS:  - Educate patient/family on patient safety including physical limitations  - Instruct patient to call for assistance with activity   - Consult OT/PT to assist with strengthening/mobility   - Keep Call bell within reach  - Keep bed low and locked with side rails adjusted as appropriate  - Keep care items and personal belongings within reach  - Initiate and maintain comfort rounds  - Make Fall Risk Sign visible to staff  - Offer Toileting every  Hours, in advance of need  - Initiate/Maintain alarm  - Obtain necessary fall risk management equipment:   - Apply yellow socks and bracelet for high fall risk patients  - Consider moving patient to room near nurses station  Outcome: Progressing     Problem: PAIN - ADULT  Goal: Verbalizes/displays adequate comfort level or baseline comfort level  Description: Interventions:  - Encourage patient to monitor pain and request assistance  - Assess pain using appropriate pain scale  - Administer analgesics based on type and severity of pain and evaluate response  - Implement non-pharmacological measures as appropriate and evaluate response  - Consider cultural and social influences on pain and pain management  - Notify physician/advanced practitioner if interventions unsuccessful or patient reports new pain  Outcome: Progressing     Problem: INFECTION - ADULT  Goal: Absence or prevention of progression during hospitalization  Description: INTERVENTIONS:  - Assess and monitor for signs and symptoms of infection  - Monitor lab/diagnostic results  - Monitor all insertion sites, i.e. indwelling lines, tubes, and drains  - Monitor endotracheal if appropriate and nasal secretions for changes in amount and color  - [place] appropriate cooling/warming therapies per order  - Administer medications as ordered  - Instruct and encourage patient and family to use good hand hygiene technique  -  Identify and instruct in appropriate isolation precautions for identified infection/condition  Outcome: Progressing  Goal: Absence of fever/infection during neutropenic period  Description: INTERVENTIONS:  - Monitor WBC    Outcome: Progressing     Problem: INFECTION - ADULT  Goal: Absence of fever/infection during neutropenic period  Description: INTERVENTIONS:  - Monitor WBC    Outcome: Progressing     Problem: SAFETY ADULT  Goal: Patient will remain free of falls  Description: INTERVENTIONS:  - Educate patient/family on patient safety including physical limitations  - Instruct patient to call for assistance with activity   - Consult OT/PT to assist with strengthening/mobility   - Keep Call bell within reach  - Keep bed low and locked with side rails adjusted as appropriate  - Keep care items and personal belongings within reach  - Initiate and maintain comfort rounds  - Make Fall Risk Sign visible to staff  - Offer Toileting every  Hours, in advance of need  - Initiate/Maintain alarm  - Obtain necessary fall risk management equipment:   - Apply yellow socks and bracelet for high fall risk patients  - Consider moving patient to room near nurses station  Outcome: Progressing  Goal: Maintain or return to baseline ADL function  Description: INTERVENTIONS:  -  Assess patient's ability to carry out ADLs; assess patient's baseline for ADL function and identify physical deficits which impact ability to perform ADLs (bathing, care of mouth/teeth, toileting, grooming, dressing, etc.)  - Assess/evaluate cause of self-care deficits   - Assess range of motion  - Assess patient's mobility; develop plan if impaired  - Assess patient's need for assistive devices and provide as appropriate  - Encourage maximum independence but intervene and supervise when necessary  - Involve family in performance of ADLs  - Assess for home care needs following discharge   - Consider OT consult to assist with ADL evaluation and planning for  discharge  - Provide patient education as appropriate  Outcome: Progressing  Goal: Maintains/Returns to pre admission functional level  Description: INTERVENTIONS:  - Perform AM-PAC 6 Click Basic Mobility/ Daily Activity assessment daily.  - Set and communicate daily mobility goal to care team and patient/family/caregiver.   - Collaborate with rehabilitation services on mobility goals if consulted  - Perform Range of Motion  times a day.  - Reposition patient every  hours.  - Dangle patient  times a day  - Stand patient  times a day  - Ambulate patient  times a day  - Out of bed to chair  times a day   - Out of bed for meals  times a day  - Out of bed for toileting  - Record patient progress and toleration of activity level   Outcome: Progressing     Problem: SAFETY ADULT  Goal: Maintain or return to baseline ADL function  Description: INTERVENTIONS:  -  Assess patient's ability to carry out ADLs; assess patient's baseline for ADL function and identify physical deficits which impact ability to perform ADLs (bathing, care of mouth/teeth, toileting, grooming, dressing, etc.)  - Assess/evaluate cause of self-care deficits   - Assess range of motion  - Assess patient's mobility; develop plan if impaired  - Assess patient's need for assistive devices and provide as appropriate  - Encourage maximum independence but intervene and supervise when necessary  - Involve family in performance of ADLs  - Assess for home care needs following discharge   - Consider OT consult to assist with ADL evaluation and planning for discharge  - Provide patient education as appropriate  Outcome: Progressing     Problem: SAFETY ADULT  Goal: Maintains/Returns to pre admission functional level  Description: INTERVENTIONS:  - Perform AM-PAC 6 Click Basic Mobility/ Daily Activity assessment daily.  - Set and communicate daily mobility goal to care team and patient/family/caregiver.   - Collaborate with rehabilitation services on mobility goals if  consulted  - Perform Range of Motion  times a day.  - Reposition patient every  hours.  - Dangle patient  times a day  - Stand patient  times a day  - Ambulate patient  times a day  - Out of bed to chair  times a day   - Out of bed for meals  times a day  - Out of bed for toileting  - Record patient progress and toleration of activity level   Outcome: Progressing

## 2024-07-13 NOTE — PLAN OF CARE
Problem: Potential for Falls  Goal: Patient will remain free of falls  Description: INTERVENTIONS:  - Educate patient/family on patient safety including physical limitations  - Instruct patient to call for assistance with activity   - Consult OT/PT to assist with strengthening/mobility   - Keep Call bell within reach  - Keep bed low and locked with side rails adjusted as appropriate  - Keep care items and personal belongings within reach  - Initiate and maintain comfort rounds  - Make Fall Risk Sign visible to staff  - Offer Toileting every  Hours, in advance of need  - Initiate/Maintain alarm  - Obtain necessary fall risk management equipment:   - Apply yellow socks and bracelet for high fall risk patients  - Consider moving patient to room near nurses station  7/13/2024 1151 by Regine Luciano RN  Outcome: Completed  7/13/2024 1141 by Regine Luciano RN  Outcome: Progressing     Problem: PAIN - ADULT  Goal: Verbalizes/displays adequate comfort level or baseline comfort level  Description: Interventions:  - Encourage patient to monitor pain and request assistance  - Assess pain using appropriate pain scale  - Administer analgesics based on type and severity of pain and evaluate response  - Implement non-pharmacological measures as appropriate and evaluate response  - Consider cultural and social influences on pain and pain management  - Notify physician/advanced practitioner if interventions unsuccessful or patient reports new pain  7/13/2024 1151 by Regine Luciano RN  Outcome: Completed  7/13/2024 1141 by Regine Luciano RN  Outcome: Progressing     Problem: INFECTION - ADULT  Goal: Absence or prevention of progression during hospitalization  Description: INTERVENTIONS:  - Assess and monitor for signs and symptoms of infection  - Monitor lab/diagnostic results  - Monitor all insertion sites, i.e. indwelling lines, tubes, and drains  - Monitor endotracheal if appropriate and nasal secretions for changes in  amount and color  - [place] appropriate cooling/warming therapies per order  - Administer medications as ordered  - Instruct and encourage patient and family to use good hand hygiene technique  - Identify and instruct in appropriate isolation precautions for identified infection/condition  7/13/2024 1151 by Regine Luciano RN  Outcome: Completed  7/13/2024 1141 by Regine Luciano RN  Outcome: Progressing  Goal: Absence of fever/infection during neutropenic period  Description: INTERVENTIONS:  - Monitor WBC    7/13/2024 1151 by Regine Luciano RN  Outcome: Completed  7/13/2024 1141 by Regine Luciano RN  Outcome: Progressing     Problem: SAFETY ADULT  Goal: Patient will remain free of falls  Description: INTERVENTIONS:  - Educate patient/family on patient safety including physical limitations  - Instruct patient to call for assistance with activity   - Consult OT/PT to assist with strengthening/mobility   - Keep Call bell within reach  - Keep bed low and locked with side rails adjusted as appropriate  - Keep care items and personal belongings within reach  - Initiate and maintain comfort rounds  - Make Fall Risk Sign visible to staff  - Offer Toileting every  Hours, in advance of need  - Initiate/Maintain alarm  - Obtain necessary fall risk management equipment:   - Apply yellow socks and bracelet for high fall risk patients  - Consider moving patient to room near nurses station  7/13/2024 1151 by Regine Luciano RN  Outcome: Completed  7/13/2024 1141 by Regine Luciano RN  Outcome: Progressing  Goal: Maintain or return to baseline ADL function  Description: INTERVENTIONS:  -  Assess patient's ability to carry out ADLs; assess patient's baseline for ADL function and identify physical deficits which impact ability to perform ADLs (bathing, care of mouth/teeth, toileting, grooming, dressing, etc.)  - Assess/evaluate cause of self-care deficits   - Assess range of motion  - Assess patient's mobility; develop  plan if impaired  - Assess patient's need for assistive devices and provide as appropriate  - Encourage maximum independence but intervene and supervise when necessary  - Involve family in performance of ADLs  - Assess for home care needs following discharge   - Consider OT consult to assist with ADL evaluation and planning for discharge  - Provide patient education as appropriate  7/13/2024 1151 by Regine Luciano RN  Outcome: Completed  7/13/2024 1141 by Regine Luciano RN  Outcome: Progressing  Goal: Maintains/Returns to pre admission functional level  Description: INTERVENTIONS:  - Perform AM-PAC 6 Click Basic Mobility/ Daily Activity assessment daily.  - Set and communicate daily mobility goal to care team and patient/family/caregiver.   - Collaborate with rehabilitation services on mobility goals if consulted  - Perform Range of Motion  times a day.  - Reposition patient every  hours.  - Dangle patient  times a day  - Stand patient  times a day  - Ambulate patient  times a day  - Out of bed to chair  times a day   - Out of bed for meals  times a day  - Out of bed for toileting  - Record patient progress and toleration of activity level   7/13/2024 1151 by Regine Luciano RN  Outcome: Completed  7/13/2024 1141 by Regine Luciano RN  Outcome: Progressing     Problem: DISCHARGE PLANNING  Goal: Discharge to home or other facility with appropriate resources  Description: INTERVENTIONS:  - Identify barriers to discharge w/patient and caregiver  - Arrange for needed discharge resources and transportation as appropriate  - Identify discharge learning needs (meds, wound care, etc.)  - Arrange for interpretive services to assist at discharge as needed  - Refer to Case Management Department for coordinating discharge planning if the patient needs post-hospital services based on physician/advanced practitioner order or complex needs related to functional status, cognitive ability, or social support system  7/13/2024  1151 by Regine Luciano RN  Outcome: Completed  7/13/2024 1141 by Regine Luciano RN  Outcome: Progressing     Problem: Knowledge Deficit  Goal: Patient/family/caregiver demonstrates understanding of disease process, treatment plan, medications, and discharge instructions  Description: Complete learning assessment and assess knowledge base.  Interventions:  - Provide teaching at level of understanding  - Provide teaching via preferred learning methods  7/13/2024 1151 by Regine Luciano RN  Outcome: Completed  7/13/2024 1141 by Regine Luciano RN  Outcome: Progressing     Problem: Prexisting or High Potential for Compromised Skin Integrity  Goal: Skin integrity is maintained or improved  Description: INTERVENTIONS:  - Identify patients at risk for skin breakdown  - Assess and monitor skin integrity  - Assess and monitor nutrition and hydration status  - Monitor labs   - Assess for incontinence   - Turn and reposition patient  - Assist with mobility/ambulation  - Relieve pressure over bony prominences  - Avoid friction and shearing  - Provide appropriate hygiene as needed including keeping skin clean and dry  - Evaluate need for skin moisturizer/barrier cream  - Collaborate with interdisciplinary team   - Patient/family teaching  - Consider wound care consult   7/13/2024 1151 by Regine Luciano RN  Outcome: Completed  7/13/2024 1141 by Regine Luciano RN  Outcome: Progressing     Problem: Nutrition/Hydration-ADULT  Goal: Nutrient/Hydration intake appropriate for improving, restoring or maintaining nutritional needs  Description: Monitor and assess patient's nutrition/hydration status for malnutrition. Collaborate with interdisciplinary team and initiate plan and interventions as ordered.  Monitor patient's weight and dietary intake as ordered or per policy. Utilize nutrition screening tool and intervene as necessary. Determine patient's food preferences and provide high-protein, high-caloric foods as  appropriate.     INTERVENTIONS:  - Monitor oral intake, urinary output, labs, and treatment plans  - Assess nutrition and hydration status and recommend course of action  - Evaluate amount of meals eaten  - Assist patient with eating if necessary   - Allow adequate time for meals  - Recommend/ encourage appropriate diets, oral nutritional supplements, and vitamin/mineral supplements  - Order, calculate, and assess calorie counts as needed  - Recommend, monitor, and adjust tube feedings and TPN/PPN based on assessed needs  - Assess need for intravenous fluids  - Provide specific nutrition/hydration education as appropriate  - Include patient/family/caregiver in decisions related to nutrition  7/13/2024 1151 by Regine Luciano RN  Outcome: Completed  7/13/2024 1141 by Regine Luciano RN  Outcome: Progressing

## 2024-07-15 ENCOUNTER — NURSING HOME VISIT (OUTPATIENT)
Dept: GERIATRICS | Facility: OTHER | Age: 84
End: 2024-07-15
Payer: MEDICARE

## 2024-07-15 ENCOUNTER — PATIENT OUTREACH (OUTPATIENT)
Dept: CASE MANAGEMENT | Facility: OTHER | Age: 84
End: 2024-07-15

## 2024-07-15 DIAGNOSIS — R68.89 SUSPECTED LYME DISEASE: ICD-10-CM

## 2024-07-15 DIAGNOSIS — R13.10 DYSPHAGIA, UNSPECIFIED TYPE: ICD-10-CM

## 2024-07-15 DIAGNOSIS — E22.2 SIADH (SYNDROME OF INAPPROPRIATE ADH PRODUCTION) (HCC): ICD-10-CM

## 2024-07-15 DIAGNOSIS — I67.1 ANEURYSM OF MIDDLE CEREBRAL ARTERY: ICD-10-CM

## 2024-07-15 DIAGNOSIS — K59.01 SLOW TRANSIT CONSTIPATION: ICD-10-CM

## 2024-07-15 DIAGNOSIS — E83.52 HYPERCALCEMIA: ICD-10-CM

## 2024-07-15 DIAGNOSIS — A69.20 LYME DISEASE: Primary | ICD-10-CM

## 2024-07-15 PROBLEM — D72.829 LEUKOCYTOSIS: Status: RESOLVED | Noted: 2019-12-02 | Resolved: 2024-07-15
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
## 2024-07-15 PROCEDURE — 99306 1ST NF CARE HIGH MDM 50: CPT | Performed by: FAMILY MEDICINE

## 2024-07-15 NOTE — ASSESSMENT & PLAN NOTE
Noted in CTA H/N from 7/10: No large vessel occlusion. No significant carotid or vertebral artery stenosis. 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    [facility]ult [facility] for assessment

## 2024-07-15 NOTE — ASSESSMENT & PLAN NOTE
Pt presenting with right sided facial droop and inability to close her right eye. Evaluated on 7/9 for same complaints after she noticed the droop while showering. Stroke alert was called. 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 Also noted to have punctate scattered parenchymal chronic microhemorrhages, many at the cortical subcortical junction raising suspicion for underlying cerebral amyloid angiopathy.     Continue Doxycycline 100 mg, PO, Q12H (until July 23)  Ophthalmic drops and ointment on right eye  Tape right eye at bed to protect from abrasions

## 2024-07-15 NOTE — PROGRESS NOTES
Outpatient Care Management [facility]. Discharged 7/13/24 to [facility]. Email sent to facility to inform them the patient is on the [facility] Path[facility] and I will be following them during their skilled stay.  This Admin Coordinator will continue to monitor via chart review.

## 2024-07-15 NOTE — ASSESSMENT & PLAN NOTE
Patient endorsed difficulty swallowing during hospitalization. She was evaluated per speech therapy on 7/10. They recommended Dysphagia 3 diet with thin liquids.     Continue Dysphagia 3 diet with thin liquids  Continue aspiration precautions  Maintain upright ~90 degree positioning during ALL PO intake and at least 30 minutes post PO meals  Take small bites, slow rate of intake, and alternate liquids/solids.   Medications as tolerated

## 2024-07-15 NOTE — PROGRESS NOTES
[facility]  History and Physical  POS: [facility] 31    Records Reviewed include: Hospital records  Able to obtain from history from patient     Chief Complaint/ Reason for Admission: Right sided facial droop    History of Present Illness:      Kay Bright is a 83 y.o. F patient who presented to [facility] on 7/9 for complaints of right sided facial droop noted while showering in the morning of that day. She was also complaining of double vision, problems swallowing, and she was tremulous and disoriented to place. Stroke alert was called for suspected stroke vs Bell's Palsy. 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 Also noted to have punctate scattered parenchymal chronic microhemorrhages, many at the cortical subcortical junction raising suspicion for underlying cerebral amyloid angiopathy. Neurology was consulted and she was given loading doses of Aspirin and Plavix. She also was started on Doxycycline whole hospitalized. Pt had some episodes of sundowning while hospitalized. PT recommended pt should go to acute rehab.     Pt refers feeling better today. AAO x3 and sitting in chair eating breakfast. States she has not had any BM in the past 2 days. Had complaints of mild ache in her legs.     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-mother also has had the corneal disease    Arthritis     Foot drop, left foot 05/2017    S/P shingles that attacked the nerves-wears a brace prn,uses a cane for long distance    Irregular heart beat     regular, irregular-  "Radha-ECHO-negative,no heart disease    Left-sided thoracic back pain 07/09/2024    Murmur     had ECHO-on 6/28/18    Rotator cuff injury     right-slight tear-healed with therapy    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    Spinal stenosis     Urinary retention 07/12/2024    Varicose veins of legs     Vitamin D deficiency     Wears glasses       Past Surgical History:   Procedure Laterality Date    CATARACT EXTRACTION      [place] OPTX FEM SHFT FX W/INSJ IMED IMPLT W/WO SCREW Left 3/19/2024    Procedure: INSERTION NAIL IM FEMUR ANTEGRADE (TROCHANTERIC) and all associated procedures;  Surgeon: Poncho Duggan DO;  [facility];  Service: Orthopedics    IL XCAPSL CTRC RMVL INSJ IO LENS PROSTH W/O ECP Right 5/14/2018    Procedure: EXTRACTION EXTRACAPSULAR CATARACT PHACO INTRAOCULAR LENS (IOL);  Surgeon: Rian Benson MD;  Location: [facility] MAIN OR;  Service: Ophthalmology    IL XCAPSL CTRC RMVL INSJ IO LENS PROSTH W/O ECP Left 7/10/2018    Procedure: EXTRACTION EXTRACAPSULAR CATARACT PHACO INTRAOCULAR LENS (IOL);  Surgeon: Rian Benson MD;  Location: [facility] MAIN OR;  Service: Ophthalmology    TUBAL LIGATION        Family History   Problem Relation Age of Onset    Other Mother         corneal transplants, Parathyroid disease    Osteoporosis Mother     Stroke Father     Hypertension Father     Heart disease Father         cardiac disorder    Osteoporosis Father     Other Father         Parathyroid disease    Asthma Sister     Hiatal hernia Sister     Other Brother         parathyroid-removed, Parathyroid disease    Heart disease Brother         \" maker\"      Social History    Tobacco Use: Low Risk  (7/9/2024)    Patient History     Smoking Tobacco Use: Never     Smokeless Tobacco Use: Never     Passive Exposure: Not on file      Physical Exam    Weight: 107.8 lb Temp: 97.7 F BP: 104/65 mmHg - 141/71 mmHg Pulse: 91 bpm Resp: 17 bpm O2 Sat: 97.7 %    Physical Exam  Vitals and nursing note " reviewed.   Constitutional:       General: She is not in acute distress.     Appearance: Normal appearance. She is normal weight. She is not ill-appearing or toxic-appearing.   HENT:      Head: Normocephalic and atraumatic.      Right Ear: External ear normal.      Left Ear: External ear normal.      Nose: Nose normal.      Mouth/Throat:      Mouth: Mucous membranes are moist.      Comments: Right side mouth droop  Eyes:      General: No scleral icterus.     Conjunctiva/sclera: Conjunctivae normal.   Cardiovascular:      Rate and Rhythm: Normal rate and regular rhythm.      Pulses: Normal pulses.      Heart sounds: Normal heart sounds.   Pulmonary:      Effort: Pulmonary effort is normal. No respiratory distress.      Breath sounds: Normal breath sounds. No wheezing or rales.   Abdominal:      General: Abdomen is flat. There is no distension.      Palpations: Abdomen is soft.      Tenderness: There is no abdominal tenderness.   Musculoskeletal:         General: Normal range of motion.      Cervical back: Normal range of motion.      Right lower leg: No edema.      Left lower leg: No edema.   Skin:     General: Skin is warm and dry.      Findings: No lesion or rash.   Neurological:      Mental Status: She is alert and oriented to person, place, and time. Mental status is at baseline.      Comments: Right sided facial droop noted. Oriented to person, place and time (July 2024)   Psychiatric:         Mood and Affect: Mood normal.         Behavior: Behavior normal.         Thought Content: Thought content normal.         Judgment: Judgment normal.     Review of Systems:    Review of Systems   Constitutional:  Negative for chills and fever.   HENT:  Negative for trouble swallowing and voice change.    Respiratory:  Negative for cough and shortness of breath.    Cardiovascular:  Negative for chest pain and leg swelling.   Gastrointestinal:  Positive for constipation. Negative for abdominal distention, abdominal pain,  diarrhea, nausea and vomiting.   Musculoskeletal:  Positive for back pain and myalgias.   Neurological:  Negative for dizziness and headaches.   Psychiatric/Behavioral:  Negative for agitation and confusion.       List of Current Medications: Medication list reviewed and updated in Epic to reflect most current SNF orders    Labs/Diagnostics (reviewed by this provider): Hospital Paperwork    7/11/25:    WBC: 7.94  Hgb: 14.0  Plt: 296  Na: 132 (L)  K: 3.4 (L)  Cr: 0.53 (L)   BUN: 19  Glucose: 99     Lipid panel: high cholesterol, high LDL  Lyme: IGG positive, IGM positive, total Ab positive  HbA1c: 5.7 %     Imaging Reviewed:  .  7/10 CTA H/N: No large vessel occlusion. No significant carotid or vertebral artery stenosis. 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  7/10 CT head: No acute intracranial abnormality  Patient admitted for stroke workup and placed on stroke pathway.  7/10 MRI brain: No MR evidence for acute ischemia as clinically questioned. Moderate chronic microangiopathic changes within the brain. Punctate scattered parenchymal chronic microhemorrhages, many of which appear to be at the cortical subcortical junction raising suspicion for underlying cerebral amyloid angiopathy.   ECHO: EF 60 %    Assessment/Plan:    Right facial Bell's palsy (3 facial divisions) related to suspected Lyme disease    Pt presenting with right sided facial droop and inability to close her right eye. Alert and oriented x3.      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 No significant carotid or vertebral artery stenosis. 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     Consult  Neurovascular Center for assessment     Dysphagia, unspecified type    Patient endorsed difficulty swallowing during hospitalization. She was evaluated per speech therapy on 7/10. They recommended Dysphagia 3 diet with thin liquids.      Continue Dysphagia 3 diet with thin liquids  Continue aspiration precautions  Maintain upright ~90 degree positioning during ALL PO intake and at least 30 minutes post PO meals  Take small bites, slow rate of intake, and alternate liquids/solids.   Medications as tolerated    Constipation    Pt complaining of difficulty with passing BM. Last BM was 2 days ago.     Start Senna 8.6 mg, PO, QD   Hold for loose stools     Hypercalcemia    Ca 10.7 to 10.6 during hospitalization. Ca 10.5 today.     Calcium 10.7 on admission. History of primary hyperparathyroidism with hypercalcemia however not placed on any medications in the past. Recently has been complaining of back pain.    Ionized Ca 2+, Vitamin D and PTH within normal limit. PTH-rp <1.1    Continue Vitamin D3 1000 units, PO, daily  Repeat BMP in a week    SIADH    Na 138 today on BMP. Stable.     Repeat BMP prior to discharge in one week  No need for fluid restriction at the moment    Advanced Directives: Yes   Code status:Full Code  PCP: Abelino Garcia,     It was a pleasure to be of service for Kay Bright.     Torrie Villela MD - PGY4  [facility]

## 2024-07-16 LAB — PTH RELATED PROT SERPL-SCNC: <2 PMOL/L

## 2024-07-19 NOTE — TELEPHONE ENCOUNTER
Call placed to the patient per [facility] referral.     V/M left for patient to call back. Phone number and hours of business provided.     This is the 2nd attempt to reach the patient. Will defer referral per protocol.     Hx of hip fx.

## 2024-07-22 ENCOUNTER — NURSING HOME VISIT (OUTPATIENT)
Dept: GERIATRICS | Facility: OTHER | Age: 84
End: 2024-07-22
Payer: MEDICARE

## 2024-07-22 ENCOUNTER — PATIENT OUTREACH (OUTPATIENT)
Dept: CASE MANAGEMENT | Facility: OTHER | Age: 84
End: 2024-07-22

## 2024-07-22 DIAGNOSIS — R68.89 SUSPECTED LYME DISEASE: Primary | ICD-10-CM

## 2024-07-22 DIAGNOSIS — R13.11 ORAL PHASE DYSPHAGIA: ICD-10-CM

## 2024-07-22 DIAGNOSIS — E22.2 SIADH (SYNDROME OF INAPPROPRIATE ADH PRODUCTION) (HCC): ICD-10-CM

## 2024-07-22 DIAGNOSIS — E83.52 HYPERCALCEMIA: ICD-10-CM

## 2024-07-22 DIAGNOSIS — I67.1 ANEURYSM OF MIDDLE CEREBRAL ARTERY: ICD-10-CM

## 2024-07-22 PROCEDURE — 99309 SBSQ NF CARE MODERATE MDM 30: CPT | Performed by: NURSE PRACTITIONER

## 2024-07-23 ENCOUNTER — TELEPHONE (OUTPATIENT)
Dept: PULMONOLOGY | Facility: CLINIC | Age: 84
End: 2024-07-23

## 2024-07-23 NOTE — TELEPHONE ENCOUNTER
Called patient to schedule a consult appointment from the Referral List and left a voicemail message.

## 2024-07-23 NOTE — PROGRESS NOTES
[facility] at [address]  [place]  [address]  [phone]    SNF Rehab: POS 31  Progress Note     ASSESSMENT AND PLAN:  1. Right facial Bell's palsy (3 facial divisions) related to suspected Lyme disease  Assessment & Plan:  Currently stable  Continue Doxycycline 100 po every 12 hours until 24  Continue ophthalmic eye drops and ointment to the right eye  Continue PT/OT services   2. Oral phase dysphagia  Assessment & Plan:  Difficulty swallowing during recent hospitalization   Continue modified diet with thin liquids   Continue ST services  Maintain aspiration precautions     3. 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  Assessment & Plan:  CTA 07/10 inpatient reveals  no large vessel occlusion. No significant carotid or vertebral artery stenosis. 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  Follow-up with Neurology/Neurovascular center outpatient  4. SIADH (syndrome of inappropriate ADH production) ([facility])  Assessment & Plan:  Labs indicative for SIADH inpatient  Sodium level currently stable at 141 today 2024  5. Hypercalcemia  Assessment & Plan:  Calcium 10.7 on inpatient admission and remains at 10.7 today in SNF.   History of hyperparathyroidism with hypercalcemia and placed on medications in the past.  Ionized Ca 2+, vitamin D and PTH within normal limits inpatient.  Continue vitamin D3 1000 units daily.  Adequate PO fluid hydration encouraged  Follow-up outpatient with PCP.          Name: Kay Bright                 : 1940               Sex: female    HPI:  Patient evaluated today in SNF rehab to follow-up on acute and chronic medical conditions. Upon examination, patient is awake, alert and in no acute distress.  Refer to assessment and plan for further HPI. The following portions of the patient's history were reviewed and updated as appropriate: allergies, current medications, past family history,  past medical history, past social history, past surgical history and problem list.    ROS: Review of Systems   Constitutional:  Negative for chills, diaphoresis, fatigue and fever.   Respiratory:  Negative for cough, chest tightness, shortness of breath and wheezing.    Cardiovascular:  Negative for chest pain, palpitations and leg swelling.   Gastrointestinal:  Negative for abdominal pain.   Genitourinary:  Negative for difficulty urinating and dysuria.   Musculoskeletal:  Negative for arthralgias, back pain and myalgias.   Skin:  Negative for rash and wound.   Neurological:  Negative for dizziness and headaches.   Psychiatric/Behavioral:  Negative for confusion and dysphoric mood. The patient is not nervous/anxious.        Allergies   Allergen Reactions    Prednisone Swelling     Facial swelling, redness-no dyspnea       Medications:    Current Outpatient Medications on File Prior to Visit   Medication Sig Dispense Refill    Diclofenac Sodium (VOLTAREN) 1 % Apply 2 g topically 4 (four) times a day 50 g 0    Multiple Vitamins-Minerals (CENTRUM SILVER 50+WOMEN PO) Take by mouth every morning      polyvinyl alcohol (LIQUIFILM TEARS) 1.4 % ophthalmic solution Administer 2 drops to the right eye as needed for dry eyes      Probiotic Product (PROBIOTIC ADVANCED PO) Take by mouth      White Petrolatum-Mineral Oil (artificial tear) 83-15 % ophthalmic ointment Administer to the right eye daily at bedtime       No current facility-administered medications on file prior to visit.       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    Spinal stenosis     Urinary retention 07/12/2024    Varicose veins of legs     Vitamin D deficiency     Wears glasses      Past Surgical History:   Procedure Laterality Date    CATARACT EXTRACTION      DE OPTX FEM SHFT FX W/INSJ IMED IMPLT W/WO SCREW Left 3/19/2024    Procedure: INSERTION NAIL IM FEMUR ANTEGRADE (TROCHANTERIC) and all associated procedures;  Surgeon: Poncho Duggan DO;  Location: [facility];  Service: Orthopedics    DE XCAPSL CTRC RMVL INSJ IO LENS PROSTH W/O ECP Right 5/14/2018    Procedure: EXTRACTION EXTRACAPSULAR CATARACT PHACO INTRAOCULAR LENS (IOL);  Surgeon: Rian Benson MD;  Location: [facility] MAIN OR;  Service: Ophthalmology    DE XCAPSL CTRC RMVL INSJ IO LENS PROSTH W/O ECP Left 7/10/2018    Procedure: EXTRACTION EXTRACAPSULAR CATARACT PHACO INTRAOCULAR LENS (IOL);  Surgeon: Rian Benson MD;  Location: [facility] MAIN OR;  Service: Ophthalmology    TUBAL LIGATION       Family History   Problem Relation Age of Onset    Other Mother         corneal transplants, Parathyroid disease    Osteoporosis Mother     Stroke Father     Hypertension Father     Heart disease Father         cardiac disorder    Osteoporosis Father     Other Father         Parathyroid disease    Asthma Sister     Hiatal hernia Sister     Other Brother         parathyroid-removed, Parathyroid disease    Heart disease Brother         \" maker\"     Social History     Socioeconomic History    Marital status:      Spouse name: Not on file    Number of children: Not on file    Years of education: Not on file    Highest education level: Not on file   Occupational History    Not on file   Tobacco Use    Smoking status: Never    Smokeless tobacco: Never   Vaping Use    Vaping status: Never Used   Substance and Sexual Activity    Alcohol use: No    Drug use: No    Sexual activity: Not on file "   Other Topics Concern    Not on file   Social History Narrative    Caffeine use, active     Social Determinants of Health     Financial Resource Strain: Low Risk  (10/2/2023)    Overall Financial Resource Strain (CARDIA)     Difficulty of Paying Living Expenses: Not hard at all   Food Insecurity: No Food Insecurity (7/9/2024)    Hunger Vital Sign     Worried About Running Out of Food in the Last Year: Never true     Ran Out of Food in the Last Year: Never true   Transportation Needs: No Transportation Needs (7/9/2024)    PRAPARE - Transportation     Lack of Transportation (Medical): No     Lack of Transportation (Non-Medical): No   Physical Activity: Not on file   Stress: Not on file   Social Connections: Not on file   Intimate Partner Violence: Not on file   Housing Stability: Low Risk  (7/9/2024)    Housing Stability Vital Sign     Unable to Pay for Housing in the Last Year: No     Number of Times Moved in the Last Year: 1     Homeless in the Last Year: No     Past Surgical History:   Procedure Laterality Date    CATARACT EXTRACTION      [place] OPTX FEM SHFT FX W/INSJ IMED IMPLT W/WO SCREW Left 3/19/2024    Procedure: INSERTION NAIL IM FEMUR ANTEGRADE (TROCHANTERIC) and all associated procedures;  Surgeon: Poncho Duggan DO;  [facility];  Service: Orthopedics    VA XCAPSL CTRC RMVL INSJ IO LENS PROSTH W/O ECP Right 5/14/2018    Procedure: EXTRACTION EXTRACAPSULAR CATARACT PHACO INTRAOCULAR LENS (IOL);  Surgeon: Rian Benson MD;  Location: [facility] MAIN OR;  Service: Ophthalmology    VA XCAPSL CTRC RMVL INSJ IO LENS PROSTH W/O ECP Left 7/10/2018    Procedure: EXTRACTION EXTRACAPSULAR CATARACT PHACO INTRAOCULAR LENS (IOL);  Surgeon: Rian Benson MD;  Location: [facility] MAIN OR;  Service: Ophthalmology    TUBAL LIGATION         OBJECTIVE:  Vital Signs:  /73, Temp 97.3, HR 83, RR 20,SpO2 95% RA  Physical Exam  Constitutional:       General: She is not in acute distress.     Appearance: Normal appearance.  "She is not ill-appearing, toxic-appearing or diaphoretic.   HENT:      Head: Normocephalic and atraumatic.      Right Ear: External ear normal.      Left Ear: External ear normal.   Cardiovascular:      Rate and Rhythm: Normal rate.      Pulses: Normal pulses.   Pulmonary:      Effort: Pulmonary effort is normal. No respiratory distress.      Breath sounds: Normal breath sounds. No stridor. No wheezing or rales.   Abdominal:      General: Bowel sounds are normal. There is no distension.      Palpations: Abdomen is soft. There is no mass.      Tenderness: There is no abdominal tenderness. There is no guarding.   Musculoskeletal:         General: No tenderness. Normal range of motion.      Right lower leg: No edema.      Left lower leg: No edema.   Skin:     General: Skin is warm and dry.   Neurological:      Mental Status: She is alert and oriented to person, place, and time. Mental status is at baseline.      Sensory: No sensory deficit.      Motor: No weakness.      Comments: Right sided facial droop   Psychiatric:         Mood and Affect: Mood normal.         Behavior: Behavior normal.         Thought Content: Thought content normal.         Judgment: Judgment normal.         Labs & Imaging:  Lab Results   Component Value Date    WBC 7.94 07/11/2024    HGB 14.0 07/11/2024    HCT 41.4 07/11/2024    MCV 86 07/11/2024     07/11/2024     Lab Results   Component Value Date    SODIUM 132 (L) 07/12/2024    K 3.4 (L) 07/12/2024    CL 96 07/12/2024    CO2 31 07/12/2024    BUN 19 07/12/2024    CREATININE 0.53 (L) 07/12/2024    GLUC 99 07/12/2024    CALCIUM 10.6 (H) 07/12/2024     No results found for: \"[id number]\"  Lab Results   Component Value Date    YEE3ROLKSCTG 2.732 07/05/2024     Lab Results   Component Value Date    TWJO81LSZQEZ 30.6 07/09/2024        ROGELIO Gordon  Geriatric Medicine  07/22/2024  "

## 2024-07-24 ENCOUNTER — NURSING HOME VISIT (OUTPATIENT)
Dept: GERIATRICS | Facility: OTHER | Age: 84
End: 2024-07-24
Payer: MEDICARE

## 2024-07-24 DIAGNOSIS — E22.2 SIADH (SYNDROME OF INAPPROPRIATE ADH PRODUCTION) (HCC): ICD-10-CM

## 2024-07-24 DIAGNOSIS — R13.11 ORAL PHASE DYSPHAGIA: ICD-10-CM

## 2024-07-24 DIAGNOSIS — E83.52 HYPERCALCEMIA: ICD-10-CM

## 2024-07-24 DIAGNOSIS — R68.89 SUSPECTED LYME DISEASE: Primary | ICD-10-CM

## 2024-07-24 DIAGNOSIS — I67.1 ANEURYSM OF MIDDLE CEREBRAL ARTERY: ICD-10-CM

## 2024-07-24 PROCEDURE — 99316 NF DSCHRG MGMT 30 MIN+: CPT | Performed by: NURSE PRACTITIONER

## 2024-07-24 NOTE — PROGRESS NOTES
[facility] at [address]  [place]  [address]  [phone]    SNF Rehab: POS 31  Discharge Summary    ASSESSMENT AND PLAN:  1. Right facial Bell's palsy (3 facial divisions) related to suspected Lyme disease  Assessment & Plan:  Currently stable  Status post  Doxycycline 100 po every 12 hours   Continue ophthalmic eye drops and ointment to the right eye  Continue PT/OT services outpatient  2. SIADH (syndrome of inappropriate ADH production) ([facility])  Assessment & Plan:  Labs indicative for SIADH inpatient  Sodium level currently stable at 141 2024  3. Oral phase dysphagia  Assessment & Plan:  Difficulty swallowing during recent hospitalization   Continue modified diet with thin liquids   Continue ST services outpatient   Maintain aspiration precautions     4. 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  Assessment & Plan:  CTA 07/10 inpatient reveals  no large vessel occlusion. No significant carotid or vertebral artery stenosis. 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  Neurology/Neurovascular follow-up outpatient  5. Hypercalcemia  Assessment & Plan:  Calcium 10.7 on inpatient admission and remains at 10.7 today in SNF.   History of hyperparathyroidism with hypercalcemia and placed on medications in the past.  Ionized Ca 2+, vitamin D and PTH within normal limits inpatient.  Continue vitamin D3 1000 units daily.  Adequate PO fluid hydration encouraged  Follow-up blood work per PCP  outpatient        Name: Kay Driscolllexie                 : 1940               Sex: female    HPI:    83-year-old female patient seen and examined today for discharge planning from SNF rehab at [facility]. She is status post hospitalization at [facility] on  for complaint of right sided facial droop, double vision and swallowing difficulty. Stroke alert was called for suspected stroke vs Bell's Palsy. She was  noted to have positive IGG and IGM. 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 She was seen by Neurology and given loading doses of ASA and Plavix. She was started on Doxycycline inpatient for Lyme's disease.     Patient received comprehensive rehab services in SNF rehab with an overall improvement in her functional status.       Name: Kay Bright    Date of Admission:  24   : 1940    Payer: Med A  Date of Eval:  Diagnosis: Bell's palsy/Lyme's Cognition:A + O x 2 D/C Plan: home LOS: 11 days Projected LCD:  3 weeks LCD: 8/3 projected    PT   Transfer Status:  sup/SBA  Bed mobility:  sup     Ambulation: 250 feet with RW with CG outdoors     Steps: 8 steps one HR CG     Pain: none     TEST  DATE:   DATE: DATE: DATE: DATE:       TUG                 GAIT SPEED                 OT Functional Mobility:     Transfer: sup/SBA      ADL Status:     UB: set up LB: SBA    Toilet : sup      Feeding:   set-up        TEST DATE:  DATE: DATE: DATE: DATE:        Gabriele Cognitive  4.4/5.8                                   Cognition:                  ST  Cognition:       Speech:      Diet: mechanical soft regular diet/thin liquids      TEST DATE: 7/15 DATE:  DATE: DATE: DATE:       MOCA                CLGT                Family/Contact              Barriers  tremor, facial droop, has brace for left foot drop( she is refusing brace)    PLOF Was independent with SPC, family assists with all IADL's    Home Eval  continuing with family         Home Setup bilevel with 4+6 steps to enter         DME RW,SPC, shower chair, toilet safety rails    Recommendations Gabriele 4.4/5.8= 34% cognitive assit and 8% physical assist. Needs to live with someone.                          The following portions of the patient's history were reviewed and updated as appropriate: allergies, current medications, past family history, past medical history, past social history, past surgical history and problem list.    ROS: Review of Systems   Constitutional:  Negative for activity change, appetite change, diaphoresis, fatigue, fever and unexpected weight change.   Respiratory:  Negative for cough, chest tightness, shortness of breath and wheezing.    Cardiovascular:  Negative for chest pain and palpitations.   Gastrointestinal:  Negative for abdominal distention, abdominal pain, constipation, diarrhea, nausea and vomiting.   Genitourinary:  Negative for difficulty urinating and dysuria.   Musculoskeletal:  Positive for gait problem. Negative for arthralgias, back pain and myalgias.   Skin:  Negative for color change, rash and wound.   Neurological:  Positive for facial asymmetry. Negative for dizziness, weakness and headaches.   Psychiatric/Behavioral:  Negative for behavioral problems, confusion and dysphoric mood. The patient is not nervous/anxious.        Allergies   Allergen Reactions    Prednisone Swelling     Facial swelling, redness-no dyspnea       Medications:    Current Outpatient Medications on File Prior to Visit   Medication Sig Dispense Refill    Diclofenac Sodium (VOLTAREN) 1 % Apply 2 g topically 4 (four) times a day 50 g 0    Multiple Vitamins-Minerals (CENTRUM SILVER 50+WOMEN PO) Take by mouth every morning      polyvinyl alcohol (LIQUIFILM TEARS) 1.4 % ophthalmic solution Administer 2 drops to the right eye as needed for dry eyes      Probiotic Product (PROBIOTIC ADVANCED PO) Take by mouth      White Petrolatum-Mineral Oil (artificial tear) 83-15 % ophthalmic ointment Administer to the right eye daily at bedtime       No current facility-administered medications on file prior to visit.       History:  Past Medical History:   Diagnosis Date    Abnormality of cornea      "film behind the cornea both eyes-mother also has had the corneal disease    Arthritis     Foot drop, left foot 05/2017    S/P shingles that attacked the nerves-wears a brace prn,uses a cane for long distance    Irregular heart beat     regular, irregular-Dr. 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    Spinal stenosis     Urinary retention 07/12/2024    Varicose veins of legs     Vitamin D deficiency     Wears glasses      Past Surgical History:   Procedure Laterality Date    CATARACT EXTRACTION      FL OPTX FEM SHFT FX W/INSJ IMED IMPLT W/WO SCREW Left 3/19/2024    Procedure: INSERTION NAIL IM FEMUR ANTEGRADE (TROCHANTERIC) and all associated procedures;  Surgeon: Poncho Duggan DO;  Location: [facility];  Service: Orthopedics    FL XCAPSL CTRC RMVL INSJ IO LENS PROSTH W/O ECP Right 5/14/2018    Procedure: EXTRACTION EXTRACAPSULAR CATARACT PHACO INTRAOCULAR LENS (IOL);  Surgeon: Rian Benson MD;  Location: [facility] MAIN OR;  Service: Ophthalmology    FL XCAPSL CTRC RMVL INSJ IO LENS PROSTH W/O ECP Left 7/10/2018    Procedure: EXTRACTION EXTRACAPSULAR CATARACT PHACO INTRAOCULAR LENS (IOL);  Surgeon: Rian Benson MD;  Location: [facility] MAIN OR;  Service: Ophthalmology    TUBAL LIGATION       Family History   Problem Relation Age of Onset    Other Mother         corneal transplants, Parathyroid disease    Osteoporosis Mother     Stroke Father     Hypertension Father     Heart disease Father         cardiac disorder    Osteoporosis Father     Other Father         Parathyroid disease    Asthma Sister     Hiatal hernia Sister     Other Brother         parathyroid-removed, Parathyroid disease    Heart disease Brother         \" maker\"     Social History     Socioeconomic History    Marital status:      Spouse name: " Not on file    Number of children: Not on file    Years of education: Not on file    Highest education level: Not on file   Occupational History    Not on file   Tobacco Use    Smoking status: Never    Smokeless tobacco: Never   Vaping Use    Vaping status: Never Used   Substance and Sexual Activity    Alcohol use: No    Drug use: No    Sexual activity: Not on file   Other Topics Concern    Not on file   Social History Narrative    Caffeine use, active     Social Determinants of Health     Financial Resource Strain: Low Risk  (10/2/2023)    Overall Financial Resource Strain (CARDIA)     Difficulty of Paying Living Expenses: Not hard at all   Food Insecurity: No Food Insecurity (7/9/2024)    Hunger Vital Sign     Worried About Running Out of Food in the Last Year: Never true     Ran Out of Food in the Last Year: Never true   Transportation Needs: No Transportation Needs (7/9/2024)    PRAPARE - Transportation     Lack of Transportation (Medical): No     Lack of Transportation (Non-Medical): No   Physical Activity: Not on file   Stress: Not on file   Social Connections: Not on file   Intimate Partner Violence: Not on file   Housing Stability: Low Risk  (7/9/2024)    Housing Stability Vital Sign     Unable to Pay for Housing in the Last Year: No     Number of Times Moved in the Last Year: 1     Homeless in the Last Year: No     Past Surgical History:   Procedure Laterality Date    CATARACT EXTRACTION      MO OPTX FEM SHFT FX W/INSJ IMED IMPLT W/WO SCREW Left 3/19/2024    Procedure: INSERTION NAIL IM FEMUR ANTEGRADE (TROCHANTERIC) and all associated procedures;  Surgeon: Poncho Duggan DO;  Location: [facility];  Service: Orthopedics    MO XCAPSL CTRC RMVL INSJ IO LENS PROSTH W/O ECP Right 5/14/2018    Procedure: EXTRACTION EXTRACAPSULAR CATARACT PHACO INTRAOCULAR LENS (IOL);  Surgeon: Rian Benson MD;  Location: [facility] MAIN OR;  Service: Ophthalmology    MO XCAPSL CTRC RMVL INSJ IO LENS PROSTH W/O ECP Left 7/10/2018  "   Procedure: EXTRACTION EXTRACAPSULAR CATARACT PHACO INTRAOCULAR LENS (IOL);  Surgeon: Rian Benson MD;  Location: [facility] MAIN OR;  Service: Ophthalmology    TUBAL LIGATION         OBJECTIVE:  There were no vitals filed for this visit.  There is no height or weight on file to calculate BMI.  Physical Exam  Constitutional:       General: She is not in acute distress.     Appearance: Normal appearance. She is not ill-appearing, toxic-appearing or diaphoretic.   Cardiovascular:      Rate and Rhythm: Normal rate.      Pulses: Normal pulses.   Pulmonary:      Effort: Pulmonary effort is normal. No respiratory distress.      Breath sounds: Normal breath sounds. No wheezing, rhonchi or rales.   Abdominal:      General: Bowel sounds are normal. There is no distension.      Palpations: Abdomen is soft. There is no mass.      Tenderness: There is no abdominal tenderness. There is no guarding.   Musculoskeletal:         General: Deformity present. No tenderness. Normal range of motion.      Comments: Left foot drop   Skin:     General: Skin is warm and dry.      Coloration: Skin is not jaundiced.      Findings: No bruising, erythema or lesion.   Neurological:      Mental Status: She is alert. Mental status is at baseline.      Motor: No weakness.      Gait: Gait abnormal.      Comments: Right facial droop   Psychiatric:         Mood and Affect: Mood normal.         Behavior: Behavior normal.         Thought Content: Thought content normal.         Labs & Imaging:  Lab Results   Component Value Date    WBC 7.94 07/11/2024    HGB 14.0 07/11/2024    HCT 41.4 07/11/2024    MCV 86 07/11/2024     07/11/2024     Lab Results   Component Value Date    SODIUM 132 (L) 07/12/2024    K 3.4 (L) 07/12/2024    CL 96 07/12/2024    CO2 31 07/12/2024    BUN 19 07/12/2024    CREATININE 0.53 (L) 07/12/2024    GLUC 99 07/12/2024    CALCIUM 10.6 (H) 07/12/2024     No results found for: \"EMACGXVL72\"  Lab Results   Component Value Date    " WIV7GMDOTMEO 2.732 07/05/2024     Lab Results   Component Value Date    ROBO17HWPVTW 30.6 07/09/2024      Discussion with patient/family and further instructions:  -Fall precautions  -Aspiration precautions  -Bleeding precautions  -Monitor for signs/symptoms of infection  -Medication list was reviewed and signed  -DME form was completed     Follow-up Recommendations: Please follow-up with your primary care physician within 7-10 days of discharge to review medication changes and current status.      Problem List Follow-up Recommendations:  I have spent 40 minutes with Patient /Family today in which greater than 50% of this time was spent in counseling/coordination of care.      ROGELIO Gordon  Geriatric Medicine  07/24/2024

## 2024-07-29 ENCOUNTER — TELEPHONE (OUTPATIENT)
Age: 84
End: 2024-07-29

## 2024-07-29 ENCOUNTER — HOME CARE VISIT (OUTPATIENT)
Dept: HOME HEALTH SERVICES | Facility: HOME HEALTHCARE | Age: 84
End: 2024-07-29

## 2024-07-29 ENCOUNTER — TRANSITIONAL CARE MANAGEMENT (OUTPATIENT)
Dept: FAMILY MEDICINE CLINIC | Facility: MEDICAL CENTER | Age: 84
End: 2024-07-29

## 2024-07-29 ENCOUNTER — PATIENT OUTREACH (OUTPATIENT)
Dept: FAMILY MEDICINE CLINIC | Facility: MEDICAL CENTER | Age: 84
End: 2024-07-29

## 2024-07-29 DIAGNOSIS — Z71.89 COMPLEX CARE COORDINATION: Primary | ICD-10-CM

## 2024-07-29 NOTE — ASSESSMENT & PLAN NOTE
Currently stable  Status post  Doxycycline 100 po every 12 hours   Continue ophthalmic eye drops and ointment to the right eye  Continue PT/OT services outpatient

## 2024-07-29 NOTE — ASSESSMENT & PLAN NOTE
Calcium 10.7 on inpatient admission and remains at 10.7 today in SNF.   History of hyperparathyroidism with hypercalcemia and placed on medications in the past.  Ionized Ca 2+, vitamin D and PTH within normal limits inpatient.  Continue vitamin D3 1000 units daily.  Adequate PO fluid hydration encouraged  Follow-up blood work per PCP  outpatient

## 2024-07-29 NOTE — ASSESSMENT & PLAN NOTE
Calcium 10.7 on inpatient admission and remains at 10.7 today in SNF.   History of hyperparathyroidism with hypercalcemia and placed on medications in the past.  Ionized Ca 2+, vitamin D and PTH within normal limits inpatient.  Continue vitamin D3 1000 units daily.  Adequate PO fluid hydration encouraged  Follow-up outpatient with PCP.

## 2024-07-29 NOTE — ASSESSMENT & PLAN NOTE
Difficulty swallowing during recent hospitalization   Continue modified diet with thin liquids   Continue ST services  Maintain aspiration precautions

## 2024-07-29 NOTE — ASSESSMENT & PLAN NOTE
CTA 07/10 inpatient reveals  no large vessel occlusion. No significant carotid or vertebral artery stenosis. 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  Follow-up with Neurology/Neurovascular center outpatient

## 2024-07-29 NOTE — ASSESSMENT & PLAN NOTE
Difficulty swallowing during recent hospitalization   Continue modified diet with thin liquids   Continue ST services outpatient   Maintain aspiration precautions

## 2024-07-29 NOTE — TELEPHONE ENCOUNTER
Patient has been released [facility]. She was there because of lyme disease.  Fabiola Zavala  If she needs to be seen sooner please call her back.

## 2024-07-29 NOTE — PROGRESS NOTES
Update obtained from PCC the patient discharged 7/27/24 to Home with [facility]. I have removed myself off of the care team and sent an inbasket to the appropriate care  to notifying them of the SNF discharge and [facility]/SNF Pathway. Ambulatory referral placed for complex care management.    A email was sent to the facility requesting discharge instructions. When Admin Coordinator has received the Discharge paperwork  Admin Coordinator will attach to this encounter.   Discharged paperwork attached to this encounter.

## 2024-07-29 NOTE — ASSESSMENT & PLAN NOTE
Currently stable  Continue Doxycycline 100 po every 12 hours until 07/23/24  Continue ophthalmic eye drops and ointment to the right eye  Continue PT/OT services

## 2024-07-30 ENCOUNTER — PATIENT OUTREACH (OUTPATIENT)
Dept: CASE MANAGEMENT | Facility: OTHER | Age: 84
End: 2024-07-30

## 2024-07-30 DIAGNOSIS — Z71.89 COMPLEX CARE COORDINATION: Primary | ICD-10-CM

## 2024-07-30 NOTE — PROGRESS NOTES
Outpatient Care Management Note:    New SNF/NIR[facility] referral received. BMP was ordered per guidelines. CM noted that epic note from 7/29/24 lists patient as a non admit for [facility]: patient and son denied any needs or concerns and reports patient is at her functional baseline.     CM called Kay. She answered, said hello, and hung up. CM called back and Kay answered. She states that she is doing very well. She denies any needs and declined ongoing outreach.  She states her facial weakness is 95% improved. CM attempted to review needed follow up appts. Kay requested I call her daughter as she just got out of the shower.     CM called her daughter, Rupali, introduced myself. Rupali is listed on her mother's communication form.  We reviewed needed follow up appts: 1. Neurology: I gave her the contact information to call. 2. Pulmonary: left a voice mail message for Kay on 7/23 to schedule.  Kay does not think that appt is needed. She will check with Dr Garcia. 3. Spine/pain: Rupali states it was decided that her pain was related to lyme disease. She no longer has the pain since lyme was treated, so she does not feel it is needed.     CM reviewed that a BMP was ordered as part of NIRAJ follow up and is due to be completed in 5 days. We reviewed that it will also check her electrolytes.  Destiney will get it completed.  We did review that patient should avoid NSAIDS. Common medications in this category reviewed. We discussed proper hydration, noting fevers/edema.     Med review completed. No discrepancies noted.     ALAN discussed that Kay did not want ongoing outreach.  Destiney agreed that it is not needed.  I gave her my contact information. She knows that my phone goes through my computer and I do not get messages after hours or on weekends. She is aware to call her PCP office directly with any immediate questions.

## 2024-08-07 ENCOUNTER — OFFICE VISIT (OUTPATIENT)
Dept: FAMILY MEDICINE CLINIC | Facility: MEDICAL CENTER | Age: 84
End: 2024-08-07
Payer: MEDICARE

## 2024-08-07 VITALS
WEIGHT: 109.2 LBS | DIASTOLIC BLOOD PRESSURE: 82 MMHG | BODY MASS INDEX: 20.62 KG/M2 | OXYGEN SATURATION: 98 % | TEMPERATURE: 98.3 F | HEART RATE: 81 BPM | HEIGHT: 61 IN | SYSTOLIC BLOOD PRESSURE: 138 MMHG | RESPIRATION RATE: 14 BRPM

## 2024-08-07 DIAGNOSIS — A69.20 LYME DISEASE: ICD-10-CM

## 2024-08-07 DIAGNOSIS — R68.89 SUSPECTED LYME DISEASE: Primary | ICD-10-CM

## 2024-08-07 DIAGNOSIS — K59.00 CONSTIPATION, UNSPECIFIED CONSTIPATION TYPE: ICD-10-CM

## 2024-08-07 PROCEDURE — 99495 TRANSJ CARE MGMT MOD F2F 14D: CPT | Performed by: INTERNAL MEDICINE

## 2024-08-07 NOTE — PROGRESS NOTES
Transition of Care Visit  Name: Kay Bright      : 1940      MRN: [id number]  Encounter Provider: Nicole Rush MD  Encounter Date: 2024   Encounter department: [facility]    Assessment & Plan   1. Right facial Bell's palsy (3 facial divisions) related to suspected Lyme disease  Doing much better now and trying to eat better.  Was encouraged to eat a healthy diet.    2. Lyme disease  Has taken 2 weeks of doxycycline and is feeling better.    3. Constipation, unspecified constipation type  Was encouraged to drink more fluids and fiber and take Metamucil and stool softeners.  She was told to avoid taking laxatives.        Depression Screening and Follow-up Plan: Patient was screened for depression during today's encounter. They screened negative with a PHQ-2 score of 0.    Falls Plan of Care: balance, strength, and gait training instructions were provided.         History of Present Illness     Transitional Care Management Review:   Kay Bright is a 84 y.o. female here for TCM follow up.     During the TCM phone call patient stated:  TCM Call       Date and time call was made  2024  2:43 PM    Hospital care reviewed  Records reviewed    Patient was hospitialized at  [facility]; Other (comment)    Comment  [facility]    Date of Admission  24    Date of discharge  24    Diagnosis  Right facial Bell's palsy (3 facial divisions) related to suspected Lyme disease    Disposition  Rehabilitation center    Were the patients medications reviewed and updated  Yes    Current Symptoms  None          TCM Call       Post hospital issues  None    Should patient be enrolled in anticoag monitoring?  No    Scheduled for follow up?  Yes    Patients specialists  Other (comment); Cardiologist    Other specialists contcat #  ortho/trauma    Did you obtain your prescribed medications  Yes    Do you need help managing your prescriptions or medications  No     Is transportation to your appointment needed  No    I have advised the patient to call PCP with any new or worsening symptoms  Cierra Villela MA    Living Arrangements  Children; Family members    Support System  Family    The type of support provided  Physical; Emotional    Are you recieving any outpatient services  No    Are you recieving home care services  No    Types of home care services  Home health aid; Home PT    Are you using any community resources  No    Current waiver services  No    Have you fallen in the last 12 months  Yes    How many times  1    Interperter language line needed  No    Counseling  Patient    Counseling topics  Importance of RX compliance          HPI  Patient is here after her hospital stay for Bell's palsy and following that she had a 2-week stay at the rehab.  She is not doing much better and is eating better but did develop constipation and is worried about it.  Otherwise her symptoms of Bell's palsy have almost resolved.    Review of Systems   Constitutional:  Negative for chills, diaphoresis, fatigue and fever.   HENT:  Negative for congestion, ear discharge, ear pain, hearing loss, postnasal drip, rhinorrhea, sinus pressure, sinus pain, sneezing, sore throat and voice change.    Eyes:  Negative for pain, discharge, redness and visual disturbance.   Respiratory:  Negative for cough, chest tightness, shortness of breath and wheezing.    Cardiovascular:  Negative for chest pain, palpitations and leg swelling.   Gastrointestinal:  Positive for constipation. Negative for abdominal distention, abdominal pain, blood in stool, diarrhea, nausea and vomiting.   Endocrine: Negative for cold intolerance, heat intolerance, polydipsia, polyphagia and polyuria.   Genitourinary:  Negative for dysuria, flank pain, frequency, hematuria and urgency.   Musculoskeletal:  Negative for arthralgias, back pain, gait problem, joint swelling, myalgias, neck pain and neck stiffness.   Skin:  Negative  "for rash.   Neurological:  Positive for facial asymmetry and weakness. Negative for dizziness, tremors, syncope, speech difficulty, light-headedness, numbness and headaches.   Hematological:  Does not bruise/bleed easily.   Psychiatric/Behavioral:  Negative for behavioral problems, confusion and sleep disturbance. The patient is not nervous/anxious.      Objective     /82 (BP Location: Left arm, Patient Position: Sitting, Cuff Size: Standard)   Pulse 81   Temp 98.3 °F (36.8 °C) (Temporal)   Resp 14   Ht 5' 1\" (1.549 m)   Wt 49.5 kg (109 lb 3.2 oz)   SpO2 98%   BMI 20.63 kg/m²     Physical Exam  Constitutional:       General: She is not in acute distress.     Appearance: She is well-developed. She is not diaphoretic.   HENT:      Head: Normocephalic and atraumatic.      Right Ear: External ear normal.      Left Ear: External ear normal.      Nose: Nose normal.   Eyes:      General: No scleral icterus.        Right eye: No discharge.         Left eye: No discharge.      Conjunctiva/sclera: Conjunctivae normal.   Cardiovascular:      Rate and Rhythm: Normal rate and regular rhythm.      Heart sounds: Normal heart sounds. No murmur heard.     No friction rub. No gallop.   Pulmonary:      Effort: Pulmonary effort is normal. No respiratory distress.      Breath sounds: Normal breath sounds. No wheezing or rales.   Abdominal:      General: Bowel sounds are normal. There is no distension.      Palpations: Abdomen is soft.      Tenderness: There is no abdominal tenderness. There is no guarding or rebound.   Musculoskeletal:         General: No tenderness.   Skin:     General: Skin is warm and dry.      Findings: No erythema or rash.   Neurological:      Mental Status: She is alert and oriented to person, place, and time.      Cranial Nerves: No cranial nerve deficit.      Sensory: No sensory deficit.      Motor: No abnormal muscle tone.   Psychiatric:         Behavior: Behavior normal.       Medications have " been reviewed by provider in current encounter    Administrative Statements

## 2024-08-23 ENCOUNTER — TELEPHONE (OUTPATIENT)
Age: 84
End: 2024-08-23

## 2024-08-23 ENCOUNTER — APPOINTMENT (OUTPATIENT)
Dept: RADIOLOGY | Facility: MEDICAL CENTER | Age: 84
End: 2024-08-23
Payer: MEDICARE

## 2024-08-23 ENCOUNTER — OFFICE VISIT (OUTPATIENT)
Dept: URGENT CARE | Facility: MEDICAL CENTER | Age: 84
End: 2024-08-23
Payer: MEDICARE

## 2024-08-23 VITALS
HEART RATE: 92 BPM | DIASTOLIC BLOOD PRESSURE: 76 MMHG | OXYGEN SATURATION: 98 % | RESPIRATION RATE: 18 BRPM | SYSTOLIC BLOOD PRESSURE: 143 MMHG | WEIGHT: 111 LBS | TEMPERATURE: 98.1 F | BODY MASS INDEX: 20.97 KG/M2

## 2024-08-23 DIAGNOSIS — J20.9 ACUTE BRONCHITIS, UNSPECIFIED ORGANISM: Primary | ICD-10-CM

## 2024-08-23 DIAGNOSIS — R05.1 ACUTE COUGH: ICD-10-CM

## 2024-08-23 PROCEDURE — 71046 X-RAY EXAM CHEST 2 VIEWS: CPT

## 2024-08-23 PROCEDURE — 99214 OFFICE O/P EST MOD 30 MIN: CPT | Performed by: STUDENT IN AN ORGANIZED HEALTH CARE EDUCATION/TRAINING PROGRAM

## 2024-08-23 PROCEDURE — G0463 HOSPITAL OUTPT CLINIC VISIT: HCPCS | Performed by: STUDENT IN AN ORGANIZED HEALTH CARE EDUCATION/TRAINING PROGRAM

## 2024-08-23 NOTE — TELEPHONE ENCOUNTER
Pt calling. She states that she has a dry throat and is feeling hoarse.  She wants to know what to what she can use for this.  RN recommended hot tea with honey, humidification, hydration and saline nasal spray.  Pt states that she uses Claritin at times and that she might try that, as well.  Pt wants to know what she can use OTC if she gets worse.  Please advise what she can use for cold symptoms.

## 2024-08-23 NOTE — PROGRESS NOTES
[facility]        NAME: Kay Bright is a 84 y.o. female  : 1940    MRN: [id number]  DATE: 2024  TIME: 2:32 PM    Assessment and Orders   Acute bronchitis, unspecified organism [J20.9]  1. Acute bronchitis, unspecified organism  amoxicillin-clavulanate (AUGMENTIN) 875-125 mg per tablet      2. Acute cough  XR chest pa & lateral            Plan and Discussion      Patient presents with cough x 4 days that is worsening.  No fevers or bodyaches.  On chest x-ray, I do appreciate some haziness in the right lung on both AP and lateral view however it does not appear to be a consolidation consistent with pneumonia.  Given patient has not had any other symptoms besides productive cough, not indicated to treat with dual antibiotic therapy.  Will instead treat with 5 days of Augmentin to cover for developing bacterial pneumonia.  Strict ED precautions given.    Risks and benefits discussed. Patient understands and agrees with the plan.     PATIENT INSTRUCTIONS    If tests have been performed at [facility], our office will contact you with results if changes need to be made to the care plan discussed with you at the visit.  You can review your full results on [facility] MyChart.    Follow up with PCP.     If any of the following occur, please report to your nearest ED for evaluation or call 911.   Difficultly breathing or shortness of breath  Chest pain  Acutely worsening symptoms.         Chief Complaint     Chief Complaint   Patient presents with    Cough     Cough began 4 days ago. No fevers.          History of Present Illness       Patient is an 84-year-old female who presents with cough x 4 days.  Patient states that the cough feels deep in her chest.  She states that it is progressively been getting worse.  She denies weakness of fevers.  She denies body aches.  She denies runny nose and ear pain.  She states that she was hospitalized for pneumonia once before when she states this is how  it started.    Cough        Review of Systems   Review of Systems   Respiratory:  Positive for cough.          Current Medications       Current Outpatient Medications:     amoxicillin-clavulanate (AUGMENTIN) 875-125 mg per tablet, Take 1 tablet by mouth every 12 (twelve) hours for 5 days, Disp: 10 tablet, Rfl: 0    Diclofenac Sodium (VOLTAREN) 1 %, Apply 2 g topically 4 (four) times a day, Disp: 50 g, Rfl: 0    Multiple Vitamins-Minerals (CENTRUM SILVER 50+WOMEN PO), Take by mouth every morning, Disp: , Rfl:     polyvinyl alcohol (LIQUIFILM TEARS) 1.4 % ophthalmic solution, Administer 2 drops to the right eye as needed for dry eyes, Disp: , Rfl:     Probiotic Product (PROBIOTIC ADVANCED PO), Take by mouth, Disp: , Rfl:     White Petrolatum-Mineral Oil (artificial tear) 83-15 % ophthalmic ointment, Administer to the right eye daily at bedtime, Disp: , Rfl:     Current Allergies     Allergies as of 08/23/2024 - Reviewed 08/23/2024   Allergen Reaction Noted    Prednisone Swelling 04/26/2017            The following portions of the patient's history were reviewed and updated as appropriate: allergies, current medications, past family history, past medical history, past social history, past surgical history and problem list.     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    Spinal stenosis     Urinary retention 07/12/2024    Varicose veins of legs     Vitamin D deficiency     Wears  "glasses        Past Surgical History:   Procedure Laterality Date    CATARACT EXTRACTION      KS OPTX FEM SHFT FX W/INSJ IMED IMPLT W/WO SCREW Left 3/19/2024    Procedure: INSERTION NAIL IM FEMUR ANTEGRADE (TROCHANTERIC) and all associated procedures;  Surgeon: Poncho Duggan DO;  Location: [facility];  Service: Orthopedics    KS XCAPSL CTRC RMVL INSJ IO LENS PROSTH W/O ECP Right 5/14/2018    Procedure: EXTRACTION EXTRACAPSULAR CATARACT PHACO INTRAOCULAR LENS (IOL);  Surgeon: Rian Benson MD;  Location: [facility] MAIN OR;  Service: Ophthalmology    KS XCAPSL CTRC RMVL INSJ IO LENS PROSTH W/O ECP Left 7/10/2018    Procedure: EXTRACTION EXTRACAPSULAR CATARACT PHACO INTRAOCULAR LENS (IOL);  Surgeon: Rian Benson MD;  Location: [facility] MAIN OR;  Service: Ophthalmology    TUBAL LIGATION         Family History   Problem Relation Age of Onset    Other Mother         corneal transplants, Parathyroid disease    Osteoporosis Mother     Stroke Father     Hypertension Father     Heart disease Father         cardiac disorder    Osteoporosis Father     Other Father         Parathyroid disease    Asthma Sister     Hiatal hernia Sister     Other Brother         parathyroid-removed, Parathyroid disease    Heart disease Brother         \" maker\"         Medications have been verified.        Objective   /76   Pulse 92   Temp 98.1 °F (36.7 °C)   Resp 18   Wt 50.3 kg (111 lb)   SpO2 98%   BMI 20.97 kg/m²   No LMP recorded. Patient is postmenopausal.       Physical Exam     Physical Exam  Constitutional:       Appearance: She is normal weight.   Cardiovascular:      Rate and Rhythm: Normal rate.   Pulmonary:      Effort: Pulmonary effort is normal. No respiratory distress.      Breath sounds: Rhonchi (Very mild crackles noted in the right base) present. No wheezing.   Abdominal:      General: Abdomen is flat.   Neurological:      Gait: Gait abnormal (Using cane).   Psychiatric:         Mood and Affect: Mood normal.    "      Behavior: Behavior normal.               Aranza Rodriguez DO

## 2024-08-23 NOTE — TELEPHONE ENCOUNTER
Patient called in and was given recommendation for cepacol.  Patient has cough and would like to be seen.  Attempted warm transfer to office.  Advised that there were no appointments available and the patient should be seen in a UC.  Patient advised and given closest UC location.  She will get a ride and go.

## 2024-08-26 ENCOUNTER — TELEPHONE (OUTPATIENT)
Age: 84
End: 2024-08-26

## 2024-08-26 NOTE — TELEPHONE ENCOUNTER
Patient has been taking only half a pill every 12 hrs of the ABX . She got really sick with the first full dose of 1 tablet and so she just took half after 12 hrs and seemed to tolerate it better. Daughter wants to know if she should be doing something else instead. Update on patient is that her cough is not as deep it seemed to have  moved to her throat now and overall doing better.  Fabiola Zavala

## 2024-08-26 NOTE — TELEPHONE ENCOUNTER
Pt is aware she can stop medication but the cough is now in her throat. Per dr raímrez she can use Cepacol cough drops and hot tea and honey. Pt agreed. Will contact the office if her symptoms worsen.

## 2024-09-26 ENCOUNTER — RA CDI HCC (OUTPATIENT)
Dept: OTHER | Facility: HOSPITAL | Age: 84
End: 2024-09-26

## 2024-10-03 ENCOUNTER — TELEPHONE (OUTPATIENT)
Age: 84
End: 2024-10-03

## 2024-10-03 ENCOUNTER — OFFICE VISIT (OUTPATIENT)
Dept: FAMILY MEDICINE CLINIC | Facility: MEDICAL CENTER | Age: 84
End: 2024-10-03
Payer: MEDICARE

## 2024-10-03 VITALS
OXYGEN SATURATION: 96 % | WEIGHT: 113.6 LBS | HEIGHT: 61 IN | TEMPERATURE: 97.4 F | SYSTOLIC BLOOD PRESSURE: 128 MMHG | BODY MASS INDEX: 21.45 KG/M2 | HEART RATE: 74 BPM | DIASTOLIC BLOOD PRESSURE: 80 MMHG

## 2024-10-03 DIAGNOSIS — Z00.00 MEDICARE ANNUAL WELLNESS VISIT, SUBSEQUENT: Primary | ICD-10-CM

## 2024-10-03 DIAGNOSIS — E83.52 HYPERCALCEMIA: ICD-10-CM

## 2024-10-03 DIAGNOSIS — E21.3 HYPERPARATHYROIDISM (HCC): ICD-10-CM

## 2024-10-03 DIAGNOSIS — Z23 ENCOUNTER FOR IMMUNIZATION: ICD-10-CM

## 2024-10-03 DIAGNOSIS — M81.0 AGE-RELATED OSTEOPOROSIS WITHOUT CURRENT PATHOLOGICAL FRACTURE: ICD-10-CM

## 2024-10-03 PROCEDURE — G0008 ADMIN INFLUENZA VIRUS VAC: HCPCS

## 2024-10-03 PROCEDURE — G0439 PPPS, SUBSEQ VISIT: HCPCS | Performed by: STUDENT IN AN ORGANIZED HEALTH CARE EDUCATION/TRAINING PROGRAM

## 2024-10-03 PROCEDURE — 90662 IIV NO PRSV INCREASED AG IM: CPT

## 2024-10-03 NOTE — TELEPHONE ENCOUNTER
Patient seen for appointment today with Dr. Garcia and she thinks she left her glasses in the exam room.  She states she was at [facility] exam room on the left.    Tried calling both clerical and clinical lines at office and not available.  Please see if patients glasses have turned up and call to let her know.    Thank you!

## 2024-10-03 NOTE — PROGRESS NOTES
Ambulatory Visit  Name: Kay Bright      : 1940      MRN: [id number]  Encounter Provider: Abelino Garcia DO  Encounter Date: 10/3/2024   Encounter department: [facility]    Assessment & Plan  Medicare annual wellness visit, subsequent  Immunizations reviewed  Counseled about influenza vaccine, agreeable to receiving today  Informed about update with COVID-19 vaccine  Informed about new RSV vaccine  Counseled about tetanus booster due, she will receive at pharmacy  Declines Shingrix vaccination series, will respect her wishes  Immunizations otherwise up-to-date         Encounter for immunization    Orders:    influenza vaccine, high-dose, PF 0.5 mL (Fluzone High Dose)    Hyperparathyroidism (HCC)    Orders:    Ambulatory Referral to Endocrinology; Future    Hypercalcemia    Orders:    Ambulatory Referral to Endocrinology; Future    Age-related osteoporosis without current pathological fracture    Orders:    Ambulatory Referral to Endocrinology; Future      Depression Screening and Follow-up Plan: Patient was screened for depression during today's encounter. They screened negative with a PHQ-2 score of 0.      Patient will complete standing blood work orders.  Return to office in 6 months and sooner as needed.    Preventive health issues were discussed with patient, and age appropriate screening tests were ordered as noted in patient's After Visit Summary. Personalized health advice and appropriate referrals for health education or preventive services given if needed, as noted in patient's After Visit Summary.    History of Present Illness     HPI   Patient Care Team:  Abelino Garcia DO as PCP - General (Family Medicine)  ROGELIO Ramires MD Dang Zhang, MD Vaghenag Tarpinian, MD    Review of Systems    As noted in HPI   Medical History Reviewed by provider this encounter:  Tobacco  Allergies  Meds  Problems  Med Hx  Surg Hx  Fam Hx       Annual  "Wellness Visit Questionnaire   Kay is here for her Subsequent Wellness visit.     Health Risk Assessment:   Patient rates overall health as good. Patient feels that their physical health rating is slightly worse. Patient is very satisfied with their life. Eyesight was rated as same. Hearing was rated as same. Patient feels that their emotional and mental health rating is same. Patients states they are never, rarely angry. Patient states they are never, rarely unusually tired/fatigued. Pain experienced in the last 7 days has been none. Patient states that she has experienced weight loss or gain in last 6 months. \"Weight loss due to pain issues related to Lyme disease\"    Depression Screening:   PHQ-2 Score: 0      Fall Risk Screening:   In the past year, patient has experienced: history of falling in past year    Number of falls: 1  Injured during fall?: Yes    Feels unsteady when standing or walking?: No    Worried about falling?: Yes      Urinary Incontinence Screening:   Patient has not leaked urine accidently in the last six months.     Home Safety:  Patient does not have trouble with stairs inside or outside of their home. Patient has working smoke alarms and has no working carbon monoxide detector. Home safety hazards include: none.     Nutrition:   Current diet is Regular. Following high fiber for previous diverticulitis    Medications:   Patient is currently taking over-the-counter supplements. OTC medications include: see medication list. Patient is able to manage medications.     Activities of Daily Living (ADLs)/Instrumental Activities of Daily Living (IADLs):   Walk and transfer into and out of bed and chair?: Yes  Dress and groom yourself?: Yes    Bathe or shower yourself?: Yes    Feed yourself? Yes  Do your laundry/housekeeping?: Yes  Manage your money, pay your bills and track your expenses?: Yes  Make your own meals?: Yes    Do your own shopping?: Yes    Previous Hospitalizations:   Any " hospitalizations or ED visits within the last 12 months?: Yes    How many hospitalizations have you had in the last year?: 1-2    Advance Care Planning:   Living will: Yes    Durable POA for healthcare: Yes    Advanced directive: Yes      Cognitive Screening:   Provider or family/friend/caregiver concerned regarding cognition?: No    PREVENTIVE SCREENINGS      Cardiovascular Screening:    General: History Lipid Disorder and Screening Current      Diabetes Screening:     General: Screening Current      Colorectal Cancer Screening:     General: Screening Not Indicated      Breast Cancer Screening:     General: Patient Declines and Risks and Benefits Discussed      Cervical Cancer Screening:    General: Screening Not Indicated      Osteoporosis Screening:    General: History Osteoporosis and Risks and Benefits Discussed      Abdominal Aortic Aneurysm (AAA) Screening:        General: Screening Not Indicated      Lung Cancer Screening:     General: Screening Not Indicated      Hepatitis C Screening:    General: Risks and Benefits Discussed and Patient Declines      Preventive Screening Comments: Patient defers DXA scan order at this time but she will make follow-up appoint with her endocrinologist to further discuss    Screening, Brief Intervention, and Referral to Treatment (SBIRT)    Screening  Typical number of drinks in a day: 0  Typical number of drinks in a week: 0  Interpretation: Low risk drinking behavior.    AUDIT-C Screenin) How often did you have a drink containing alcohol in the past year? never  2) How many drinks did you have on a typical day when you were drinking in the past year? 0  3) How often did you have 6 or more drinks on one occasion in the past year? never    AUDIT-C Score: 0  Interpretation: Score 0-2 (female): Negative screen for alcohol misuse    Single Item Drug Screening:  How often have you used an illegal drug (including marijuana) or a prescription medication for non-medical reasons  "in the past year? never    Single Item Drug Screen Score: 0  Interpretation: Negative screen for possible drug use disorder    Other Counseling Topics:   Car/seat belt/driving safety and sunscreen.     Social Determinants of Health     Financial Resource Strain: Low Risk  (10/2/2023)    Overall Financial Resource Strain (CARDIA)     Difficulty of Paying Living Expenses: Not hard at all   Food Insecurity: No Food Insecurity (10/3/2024)    Hunger Vital Sign     Worried About Running Out of Food in the Last Year: Never true     Ran Out of Food in the Last Year: Never true   Transportation Needs: No Transportation Needs (10/3/2024)    PRAPARE - Transportation     Lack of Transportation (Medical): No     Lack of Transportation (Non-Medical): No   Housing Stability: Low Risk  (10/3/2024)    Housing Stability Vital Sign     Unable to Pay for Housing in the Last Year: No     Number of Times Moved in the Last Year: 0     Homeless in the Last Year: No   Utilities: Not At Risk (10/3/2024)    [facility] Utilities     Threatened with loss of utilities: No     No results found.    Objective     /80 (BP Location: Left arm, Patient Position: Sitting, Cuff Size: Standard)   Pulse 74   Temp (!) 97.4 °F (36.3 °C) (Temporal)   Ht 5' 1\" (1.549 m)   Wt 51.5 kg (113 lb 9.6 oz)   SpO2 96%   BMI 21.46 kg/m²     Physical Exam  Vitals reviewed.   Constitutional:       General: She is not in acute distress.     Appearance: Normal appearance.   HENT:      Head: Normocephalic and atraumatic.   Eyes:      Conjunctiva/sclera: Conjunctivae normal.   Pulmonary:      Effort: Pulmonary effort is normal.   Neurological:      Mental Status: She is alert and oriented to person, place, and time.   Psychiatric:         Mood and Affect: Mood normal.         Behavior: Behavior normal.         Thought Content: Thought content normal.         "

## 2024-10-03 NOTE — PATIENT INSTRUCTIONS
Medicare Preventive Visit Patient Instructions  Thank you for completing your Welcome to Medicare Visit or Medicare Annual Wellness Visit today. Your next wellness visit will be due in one year (10/4/2025).  The screening/preventive services that you may require over the next 5-10 years are detailed below. Some tests may not apply to you based off risk factors and/or age. Screening tests ordered at today's visit but not completed yet may show as past due. Also, please note that scanned in results may not display below.  Preventive Screenings:  Service Recommendations Previous Testing/Comments   Colorectal Cancer Screening  * Colonoscopy    * Fecal Occult Blood Test (FOBT)/Fecal Immunochemical Test (FIT)  * Fecal DNA/Cologuard Test  * Flexible Sigmoidoscopy Age: 45-75 years old   Colonoscopy: every 10 years (may be performed more frequently if at higher risk)  OR  FOBT/FIT: every 1 year  OR  Cologuard: every 3 years  OR  Sigmoidoscopy: every 5 years  Screening may be recommended earlier than age 45 if at higher risk for colorectal cancer. Also, an individualized decision between you and your healthcare provider will decide whether screening between the ages of 76-85 would be appropriate. Colonoscopy: Not on file  FOBT/FIT: Not on file  Cologuard: Not on file  Sigmoidoscopy: Not on file    Screening Not Indicated     Breast Cancer Screening Age: 40+ years old  Frequency: every 1-2 years  Not required if history of left and right mastectomy Mammogram: 01/18/2019        Cervical Cancer Screening Between the ages of 21-29, pap smear recommended once every 3 years.   Between the ages of 30-65, can perform pap smear with HPV co-testing every 5 years.   Recommendations may differ for women with a history of total hysterectomy, cervical cancer, or abnormal pap smears in past. Pap Smear: Not on file    Screening Not Indicated   Hepatitis C Screening Once for adults born between 1945 and 1965  More frequently in patients at  high risk for Hepatitis C Hep C Antibody: Not on file        Diabetes Screening 1-2 times per year if you're at risk for diabetes or have pre-diabetes Fasting glucose: 120 mg/dL (7/10/2024)  A1C: 5.7 % (7/10/2024)  Screening Current   Cholesterol Screening Once every 5 years if you don't have a lipid disorder. May order more often based on risk factors. Lipid panel: 07/10/2024    Screening Not Indicated  History Lipid Disorder     Other Preventive Screenings Covered by Medicare:  Abdominal Aortic Aneurysm (AAA) Screening: covered once if your at risk. You're considered to be at risk if you have a family history of AAA.  Lung Cancer Screening: covers low dose CT scan once per year if you meet all of the following conditions: (1) Age 55-77; (2) No signs or symptoms of lung cancer; (3) Current smoker or have quit smoking within the last 15 years; (4) You have a tobacco smoking history of at least 20 pack years (packs per day multiplied by number of years you smoked); (5) You get a written order from a healthcare provider.  Glaucoma Screening: covered annually if you're considered high risk: (1) You have diabetes OR (2) Family history of glaucoma OR (3)  aged 50 and older OR (4)  American aged 65 and older  Osteoporosis Screening: covered every 2 years if you meet one of the following conditions: (1) You're estrogen deficient and at risk for osteoporosis based off medical history and other findings; (2) Have a vertebral abnormality; (3) On glucocorticoid therapy for more than 3 months; (4) Have primary hyperparathyroidism; (5) On osteoporosis medications and need to assess response to drug therapy.   Last bone density test (DXA Scan): 10/03/2022.  HIV Screening: covered annually if you're between the age of 15-65. Also covered annually if you are younger than 15 and older than 65 with risk factors for HIV infection. For pregnant patients, it is covered up to 3 times per  pregnancy.    Immunizations:  Immunization Recommendations   Influenza Vaccine Annual influenza vaccination during flu season is recommended for all persons aged >= 6 months who do not have contraindications   Pneumococcal Vaccine   * Pneumococcal conjugate vaccine = PCV13 (Prevnar 13), PCV15 (Vaxneuvance), PCV20 (Prevnar 20)  * Pneumococcal polysaccharide vaccine = PPSV23 (Pneumovax) Adults 19-65 yo with certain risk factors or if 65+ yo  If never received any pneumonia vaccine: recommend Prevnar 20 (PCV20)  Give PCV20 if previously received 1 dose of PCV13 or PPSV23   Hepatitis B Vaccine 3 dose series if at intermediate or high risk (ex: diabetes, end stage renal disease, liver disease)   Respiratory syncytial virus (RSV) Vaccine - COVERED BY MEDICARE PART D  * RSVPreF3 (Arexvy) CDC recommends that adults 60 years of age and older may receive a single dose of RSV vaccine using shared clinical decision-making (SCDM)   Tetanus (Td) Vaccine - COST NOT COVERED BY MEDICARE PART B Following completion of primary series, a booster dose should be given every 10 years to maintain immunity against tetanus. Td may also be given as tetanus wound prophylaxis.   Tdap Vaccine - COST NOT COVERED BY MEDICARE PART B Recommended at least once for all adults. For pregnant patients, recommended with each pregnancy.   Shingles Vaccine (Shingrix) - COST NOT COVERED BY MEDICARE PART B  2 shot series recommended in those 19 years and older who have or will have weakened immune systems or those 50 years and older     Health Maintenance Due:  There are no preventive care reminders to display for this patient.  Immunizations Due:      Topic Date Due   • Influenza Vaccine (1) 09/01/2024   • COVID-19 Vaccine (4 - 2023-24 season) 09/01/2024     Advance Directives   What are advance directives?  Advance directives are legal documents that state your wishes and plans for medical care. These plans are made ahead of time in case you lose your  ability to make decisions for yourself. Advance directives can apply to any medical decision, such as the treatments you want, and if you want to donate organs.   What are the types of advance directives?  There are many types of advance directives, and each state has rules about how to use them. You may choose a combination of any of the following:  Living will:  This is a written record of the treatment you want. You can also choose which treatments you do not want, which to limit, and which to stop at a certain time. This includes surgery, medicine, IV fluid, and tube feedings.   Durable power of  for healthcare (DPAHC):  This is a written record that states who you want to make healthcare choices for you when you are unable to make them for yourself. This person, called a proxy, is usually a family member or a friend. You may choose more than 1 proxy.  Do not resuscitate (DNR) order:  A DNR order is used in case your heart stops beating or you stop breathing. It is a request not to have certain forms of treatment, such as CPR. A DNR order may be included in other types of advance directives.  Medical directive:  This covers the care that you want if you are in a coma, near death, or unable to make decisions for yourself. You can list the treatments you want for each condition. Treatment may include pain medicine, surgery, blood transfusions, dialysis, IV or tube feedings, and a ventilator (breathing machine).  Values history:  This document has questions about your views, beliefs, and how you feel and think about life. This information can help others choose the care that you would choose.  Why are advance directives important?  An advance directive helps you control your care. Although spoken wishes may be used, it is better to have your wishes written down. Spoken wishes can be misunderstood, or not followed. Treatments may be given even if you do not want them. An advance directive may make it easier  for your family to make difficult choices about your care.   Fall Prevention    Fall prevention  includes ways to make your home and other areas safer. It also includes ways you can move more carefully to prevent a fall. Health conditions that cause changes in your blood pressure, vision, or muscle strength and coordination may increase your risk for falls. Medicines may also increase your risk for falls if they make you dizzy, weak, or sleepy.   Fall prevention tips:   Stand or sit up slowly.    Use assistive devices as directed.    Wear shoes that fit well and have soles that .    Wear a personal alarm.    Stay active.    Manage your medical conditions.    Home Safety Tips:  Add items to prevent falls in the bathroom.    Keep paths clear.    Install bright lights in your home.    Keep items you use often on shelves within reach.    Paint or place reflective tape on the edges of your stairs.       © Copyright [employer] 2018 Information is for End User's use only and may not be sold, redistributed or otherwise used for commercial purposes. All illustrations and images included in CareNotes® are the copyrighted property of A.D.A.M., Inc. or [employer]

## 2024-10-03 NOTE — ASSESSMENT & PLAN NOTE
Immunizations reviewed  Counseled about influenza vaccine, agreeable to receiving today  Informed about update with COVID-19 vaccine  Informed about new RSV vaccine  Counseled about tetanus booster due, she will receive at pharmacy  Declines Shingrix vaccination series, will respect her wishes  Immunizations otherwise up-to-date

## 2024-10-07 ENCOUNTER — APPOINTMENT (OUTPATIENT)
Dept: RADIOLOGY | Facility: AMBULARY SURGERY CENTER | Age: 84
End: 2024-10-07
Attending: STUDENT IN AN ORGANIZED HEALTH CARE EDUCATION/TRAINING PROGRAM
Payer: MEDICARE

## 2024-10-07 ENCOUNTER — OFFICE VISIT (OUTPATIENT)
Dept: OBGYN CLINIC | Facility: CLINIC | Age: 84
End: 2024-10-07
Payer: MEDICARE

## 2024-10-07 VITALS — BODY MASS INDEX: 21.45 KG/M2 | WEIGHT: 113.6 LBS | HEIGHT: 61 IN

## 2024-10-07 DIAGNOSIS — Z98.890 STATUS POST HIP SURGERY: ICD-10-CM

## 2024-10-07 PROCEDURE — 99213 OFFICE O/P EST LOW 20 MIN: CPT | Performed by: STUDENT IN AN ORGANIZED HEALTH CARE EDUCATION/TRAINING PROGRAM

## 2024-10-07 PROCEDURE — 73502 X-RAY EXAM HIP UNI 2-3 VIEWS: CPT

## 2024-10-07 NOTE — PROGRESS NOTES
Orthopaedic Surgery - Office Note  Kay Bright (84 y.o. female)   : 1940   MRN: [id number]  Encounter Date: 10/7/2024    No chief complaint on file.    Past Surgical History:   Procedure Laterality Date    CATARACT EXTRACTION      SD OPTX FEM SHFT FX W/INSJ IMED IMPLT W/WO SCREW Left 3/19/2024    Procedure: INSERTION NAIL IM FEMUR ANTEGRADE (TROCHANTERIC) and all associated procedures;  Surgeon: Poncho Duggan DO;  Location: [facility];  Service: Orthopedics    SD XCAPSL CTRC RMVL INSJ IO LENS PROSTH W/O ECP Right 2018    Procedure: EXTRACTION EXTRACAPSULAR CATARACT PHACO INTRAOCULAR LENS (IOL);  Surgeon: Rian Benson MD;  Location: [facility] MAIN OR;  Service: Ophthalmology    SD XCAPSL CTRC RMVL INSJ IO LENS PROSTH W/O ECP Left 7/10/2018    Procedure: EXTRACTION EXTRACAPSULAR CATARACT PHACO INTRAOCULAR LENS (IOL);  Surgeon: Rian Benson MD;  Location: [facility] MAIN OR;  Service: Ophthalmology    TUBAL LIGATION       Assessment / Plan  1.  Status post left nondisplaced intertrochanteric femur fracture  2.  Status post surgical fixation of left intertrochanteric femur fracture with intramedullary nail, date of surgery 3/19/2024    X-rays reviewed and discussed with patient revealing maintained alignment of patient's previous left nondisplaced intertrochanteric femur fracture.  No signs of hardware failure or loosening.  No acute fracture or dislocation noted.  Fracture healed  Pt to be weightbearing as tolerated to left lower extremity  ROM as tolerated to left lower extremity  Pt to has completed physical therapy  Pt to continue at home analgesic regimen with Tylenol   Pt has completed her ASA 325mg for DVT ppx  Pt to follow up PRN    History of Present Illness  Kay Bright is a 84 y.o. female who presents 2 weeks status post surgical fixation of left intertrochanteric femur fracture with intramedullary nail, date of surgery 3/19/2024. She presents today ambulating with a walker. She uses a  cane at home when ambulating. She states overall she is doing well and denies any significant pain in the left lower extremity.  She endorses some soreness and discomfort with overuse but this is well controlled with Tylenol. She continues with PT and is happy with her progression. She denies any numbness or paresthesias.     Interval history 5/13/2024  Patient presents approximately 8 weeks status post surgical fixation of left intertrochanteric femur fracture with intramedullary nail.  She presents today ambulating with a cane. She continues with PT and is happy with her progression.  She states overall she feels well and denies any significant pain in the left lower extremity.  She denies any numbness or paresthesias of the left lower extremity.    Interval history 7/8/24  Pt presents approximately 3 1/2 months s/p  surgical fixation of left intertrochanteric femur fracture with intramedullary nail.  She presents today ambulating with a cane. She continues to work with PT and is happy with her progression overall. She denies significant pain in the left lower extremity. She states she experiences back pain at night to her only complaint.  She has been followed by her primary care physician for her low back pain and was recently seen in the emergency department on 7/6.  CT scan at that time showed previous old compression fractures but no acute fractures dislocations in the thoracic or lumbar spine.  She has no other complaints. Denies numbness or paresthesias of the left lower extremity.    Interval history 10/7/2024  Patient presents proximately 6 and half month status post surgical fixation of left intertrochanteric femur fracture with intramedullary nail.  She presents today ambulating with a cane. She continues to work with PT and is happy with her progression overall.  She states her pain is currently well-controlled on current analgesic regimen.  She offers no additional complaints at this time.  She denies  any numbness or paresthesias of the left lower extremity.    Review of Systems  Pertinent items are noted in HPI.  All other systems were reviewed and are negative.    Physical Exam  There were no vitals taken for this visit.  Cons: Appears well.  No apparent distress.  Psych: Alert. Oriented x3.  Mood and affect normal.  Eyes: PERRLA, EOMI  Resp: Normal effort.  No audible wheezing or stridor.  CV: Palpable pulse.  No discernable arrhythmia.    Lymph:  No palpable cervical, axillary, or inguinal lymphadenopathy.  Skin: Warm.  No palpable masses.  No visible lesions.  Neuro: Normal muscle tone.  Normal and symmetric DTR's.     Left lower extremity  The left lower extremity was exposed and inspected. Surgical incisions clean dry intact without signs of erythema or dehiscence.  Visible skin intact without erythema, ecchymosis, effusion or obvious osseous deformity.  No TTP frida-incisionally proximally.   Pt able to passively range hip from 0-120 degrees of flexion. No pain with internal or external rotation. Sensation intact to superficial peroneal, deep peroneal, sural, saphenous, plantar nerve distributions. Motor intact to extensor hallux longus, tibialis anterior, gastrocnemius muscles, extensor mechanism intact. Limb is well perfused. Brisk capillary refill in all 5 digits. Compartments soft and compressible.     Studies Reviewed  X-rays pelvis reveal: Maintained alignment of patient's previous left nondisplaced intertrochanteric femur fracture.  No signs of hardware failure or loosening.  No acute fracture or dislocation noted.  Fracture healed    Procedures      Medical, Surgical, Family, and Social History  The patient's medical history, family history, and social history, were reviewed and updated as appropriate.    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    Spinal stenosis     Urinary retention 07/12/2024    Varicose veins of legs     Vitamin D deficiency     Wears glasses            Family History   Problem Relation Age of Onset    Other Mother         corneal transplants, Parathyroid disease    Osteoporosis Mother     Stroke Father     Hypertension Father     Heart disease Father         cardiac disorder    Osteoporosis Father     Other Father         Parathyroid disease    Asthma Sister     Hiatal hernia Sister     Other Brother         parathyroid-removed, Parathyroid disease    Heart disease Brother         \" maker\"       Social History     Occupational History    Not on file   Tobacco Use    Smoking status: Never    Smokeless tobacco: Never   Vaping Use    Vaping status: Never Used   Substance and Sexual Activity    Alcohol use: No    Drug use: No    Sexual activity: Not on file       Allergies   Allergen Reactions    Prednisone Swelling     Facial swelling, redness-no dyspnea         Current Outpatient Medications:     Diclofenac Sodium (VOLTAREN) 1 %, Apply 2 g topically 4 (four) times a day, Disp: 50 g, Rfl: 0    Multiple Vitamins-Minerals (CENTRUM SILVER 50+WOMEN PO), Take by mouth every morning, Disp: , Rfl:     polyvinyl alcohol (LIQUIFILM TEARS) 1.4 % ophthalmic solution, Administer 2 drops to the right eye as needed for dry eyes, Disp: , Rfl:     Probiotic Product (PROBIOTIC ADVANCED PO), Take by mouth, Disp: , Rfl:     White Petrolatum-Mineral Oil (artificial tear) 83-15 % ophthalmic ointment, Administer to the right eye daily at bedtime, Disp: , Rfl:       Nolberto " HAY Meza    Scribe Attestation      I,:   am acting as a scribe while in the presence of the attending physician.:       I,:   personally performed the services described in this documentation    as scribed in my presence.:

## 2024-11-02 PROBLEM — Z00.00 MEDICARE ANNUAL WELLNESS VISIT, SUBSEQUENT: Status: RESOLVED | Noted: 2022-09-26 | Resolved: 2024-11-02

## 2025-03-28 ENCOUNTER — RA CDI HCC (OUTPATIENT)
Dept: OTHER | Facility: HOSPITAL | Age: 85
End: 2025-03-28

## 2025-04-07 ENCOUNTER — OFFICE VISIT (OUTPATIENT)
Dept: FAMILY MEDICINE CLINIC | Facility: MEDICAL CENTER | Age: 85
End: 2025-04-07
Payer: MEDICARE

## 2025-04-07 VITALS
HEART RATE: 90 BPM | RESPIRATION RATE: 17 BRPM | HEIGHT: 61 IN | DIASTOLIC BLOOD PRESSURE: 78 MMHG | BODY MASS INDEX: 22.13 KG/M2 | OXYGEN SATURATION: 98 % | TEMPERATURE: 98.1 F | SYSTOLIC BLOOD PRESSURE: 138 MMHG | WEIGHT: 117.2 LBS

## 2025-04-07 DIAGNOSIS — M81.0 AGE-RELATED OSTEOPOROSIS WITHOUT CURRENT PATHOLOGICAL FRACTURE: ICD-10-CM

## 2025-04-07 DIAGNOSIS — E04.2 MULTIPLE THYROID NODULES: ICD-10-CM

## 2025-04-07 DIAGNOSIS — E21.3 HYPERPARATHYROIDISM (HCC): ICD-10-CM

## 2025-04-07 DIAGNOSIS — I35.1 AORTIC VALVE INSUFFICIENCY, ETIOLOGY OF CARDIAC VALVE DISEASE UNSPECIFIED: ICD-10-CM

## 2025-04-07 DIAGNOSIS — Z09 FOLLOW-UP EXAM, 3-6 MONTHS SINCE PREVIOUS EXAM: Primary | ICD-10-CM

## 2025-04-07 PROCEDURE — G2211 COMPLEX E/M VISIT ADD ON: HCPCS | Performed by: STUDENT IN AN ORGANIZED HEALTH CARE EDUCATION/TRAINING PROGRAM

## 2025-04-07 PROCEDURE — 99214 OFFICE O/P EST MOD 30 MIN: CPT | Performed by: STUDENT IN AN ORGANIZED HEALTH CARE EDUCATION/TRAINING PROGRAM

## 2025-04-07 NOTE — ASSESSMENT & PLAN NOTE
Standing order for repeat thyroid ultrasound, advised patient to schedule  Schedule follow-up with endocrinology

## 2025-04-07 NOTE — ASSESSMENT & PLAN NOTE
Overdue for follow-up with cardiology, patient advised to make appointment  Orders:    Ambulatory Referral to Cardiology; Future

## 2025-04-07 NOTE — PROGRESS NOTES
"Name: Kay Bright      : 1940      MRN: [id number]  Encounter Provider: Abelino Garcia DO  Encounter Date: 2025   Encounter department: [facility] [place]  :  Assessment & Plan  Follow-up exam, 3-6 months since previous exam  Return in 6 months for AWV and sooner as needed       Aortic valve insufficiency, etiology of cardiac valve disease unspecified  Overdue for follow-up with cardiology, patient advised to make appointment  Orders:    Ambulatory Referral to Cardiology; Future    Hyperparathyroidism (HCC)  Lost to follow-up with endocrinology, referral replaced  Orders:    Basic metabolic panel; Future    Ambulatory Referral to Endocrinology; Future    Multiple thyroid nodules  Standing order for repeat thyroid ultrasound, advised patient to schedule  Schedule follow-up with endocrinology       Age-related osteoporosis without current pathological fracture  Continue calcium vitamin D supplementation             Depression Screening and Follow-up Plan: Patient was screened for depression during today's encounter. They screened negative with a PHQ-2 score of 0.        History of Present Illness   HPI    Kay Bright is a 84-year-old female who presents for follow-up.  Patient states she feels great.  Patient states that she has been eating prunes daily and taking daily probiotic.  Her bowel movements are regular.    Review of Systems    As noted in HPI     Objective   /78 (BP Location: Left arm, Patient Position: Sitting, Cuff Size: Large)   Pulse 90   Temp 98.1 °F (36.7 °C) (Temporal)   Resp 17   Ht 5' 1\" (1.549 m)   Wt 53.2 kg (117 lb 3.2 oz)   SpO2 98%   BMI 22.14 kg/m²      Physical Exam  Vitals reviewed.   Constitutional:       General: She is not in acute distress.     Appearance: Normal appearance.   HENT:      Head: Normocephalic and atraumatic.   Eyes:      Conjunctiva/sclera: Conjunctivae normal.   Cardiovascular:      Rate and Rhythm: Normal rate and " regular rhythm.      Pulses: Normal pulses.   Pulmonary:      Effort: Pulmonary effort is normal.      Breath sounds: Normal breath sounds.   Abdominal:      General: Abdomen is flat. Bowel sounds are normal.      Palpations: Abdomen is soft.      Tenderness: There is no abdominal tenderness.   Musculoskeletal:      Right lower leg: No edema.      Left lower leg: No edema.   Skin:     General: Skin is warm and dry.   Neurological:      Mental Status: She is alert and oriented to person, place, and time.   Psychiatric:         Mood and Affect: Mood normal.         Behavior: Behavior normal.         Thought Content: Thought content normal.

## 2025-04-07 NOTE — ASSESSMENT & PLAN NOTE
Lost to follow-up with endocrinology, referral replaced  Orders:    Basic metabolic panel; Future    Ambulatory Referral to Endocrinology; Future

## 2025-08-07 ENCOUNTER — APPOINTMENT (OUTPATIENT)
Dept: LAB | Facility: MEDICAL CENTER | Age: 85
End: 2025-08-07
Payer: MEDICARE

## 2025-08-07 ENCOUNTER — CONSULT (OUTPATIENT)
Dept: FAMILY MEDICINE CLINIC | Facility: MEDICAL CENTER | Age: 85
End: 2025-08-07
Payer: MEDICARE

## 2025-08-07 VITALS
TEMPERATURE: 97.3 F | HEART RATE: 70 BPM | OXYGEN SATURATION: 97 % | BODY MASS INDEX: 22.24 KG/M2 | SYSTOLIC BLOOD PRESSURE: 148 MMHG | WEIGHT: 117.8 LBS | HEIGHT: 61 IN | DIASTOLIC BLOOD PRESSURE: 80 MMHG | RESPIRATION RATE: 18 BRPM

## 2025-08-07 DIAGNOSIS — I35.1 AORTIC VALVE INSUFFICIENCY, ETIOLOGY OF CARDIAC VALVE DISEASE UNSPECIFIED: ICD-10-CM

## 2025-08-07 DIAGNOSIS — E22.2 SIADH (SYNDROME OF INAPPROPRIATE ADH PRODUCTION) (HCC): ICD-10-CM

## 2025-08-07 DIAGNOSIS — R73.03 PREDIABETES: ICD-10-CM

## 2025-08-07 DIAGNOSIS — R03.0 WHITE COAT SYNDROME WITHOUT DIAGNOSIS OF HYPERTENSION: ICD-10-CM

## 2025-08-07 DIAGNOSIS — I83.893 VARICOSE VEINS OF BILATERAL LOWER EXTREMITIES WITH OTHER COMPLICATIONS: ICD-10-CM

## 2025-08-07 DIAGNOSIS — M81.0 AGE-RELATED OSTEOPOROSIS WITHOUT CURRENT PATHOLOGICAL FRACTURE: ICD-10-CM

## 2025-08-07 DIAGNOSIS — E78.5 HYPERLIPIDEMIA, UNSPECIFIED HYPERLIPIDEMIA TYPE: ICD-10-CM

## 2025-08-07 DIAGNOSIS — Z01.818 PRE-OP EXAMINATION: Primary | ICD-10-CM

## 2025-08-07 DIAGNOSIS — E04.2 MULTIPLE THYROID NODULES: ICD-10-CM

## 2025-08-07 DIAGNOSIS — E21.3 HYPERPARATHYROIDISM (HCC): ICD-10-CM

## 2025-08-07 PROCEDURE — 99215 OFFICE O/P EST HI 40 MIN: CPT | Performed by: STUDENT IN AN ORGANIZED HEALTH CARE EDUCATION/TRAINING PROGRAM

## 2025-08-08 ENCOUNTER — PATIENT MESSAGE (OUTPATIENT)
Dept: FAMILY MEDICINE CLINIC | Facility: MEDICAL CENTER | Age: 85
End: 2025-08-08

## 2025-08-12 ENCOUNTER — LAB REQUISITION (OUTPATIENT)
Dept: LAB | Facility: HOSPITAL | Age: 85
End: 2025-08-12
Payer: MEDICARE

## (undated) DEVICE — B-H IRRIGATING CAN 19GA FLAT ANGLED 8MM: Brand: OPHTHALMIC CANNULA

## (undated) DEVICE — INTENDED FOR TISSUE SEPARATION, AND OTHER PROCEDURES THAT REQUIRE A SHARP SURGICAL BLADE TO PUNCTURE OR CUT.: Brand: BARD-PARKER ® CARBON RIB-BACK BLADES

## (undated) DEVICE — GLOVE INDICATOR PI UNDERGLOVE SZ 8 BLUE

## (undated) DEVICE — GLOVE SRG BIOGEL 7.5

## (undated) DEVICE — 45° KELMAN®, 0.9 MM TURBOSONICS® MINI-FLARED ABS® TIP: Brand: ALCON, KELMAN, TURBOSONICS, MINI-FLARED ABS

## (undated) DEVICE — STERILE BETHLEHEM ORIF HIP PK: Brand: CARDINAL HEALTH

## (undated) DEVICE — 3.2MM GUIDE WIRE 400MM

## (undated) DEVICE — EYE PACK CUSTOM -FINNEGAN

## (undated) DEVICE — BASIC ULTRASOUND: Brand: ALCON, INFINITI

## (undated) DEVICE — 6617 IOBAN II PATIENT ISOLATION DRAPE 5/BX,4BX/CS: Brand: STERI-DRAPE™ IOBAN™ 2

## (undated) DEVICE — 3M™ DURAPORE™ SURGICAL TAPE 1538-3, 3 INCH X 10 YARD (7,5CM X 9,1M), 4 ROLLS/BOX: Brand: 3M™ DURAPORE™

## (undated) DEVICE — SUT MONOCRYL 2-0 CT-1 27 IN Y339H

## (undated) DEVICE — 0.9MM MICROSMOOTH NULTRA INFUSION SLEEVE KIT: Brand: INFINIT, MICROSMOOTH, ALCON

## (undated) DEVICE — PROXIMATE SKIN STAPLERS (35 WIDE) CONTAINS 35 STAINLESS STEEL STAPLES (FIXED HEAD): Brand: PROXIMATE

## (undated) DEVICE — SUT PDS II 0 CT-1 27 IN Z340H

## (undated) DEVICE — 4.2MM THREE-FLUTED DRILL BIT QC/330MM/100MM CALIB-STERILE

## (undated) DEVICE — DRAPE SHEET X-LG

## (undated) DEVICE — CHLORAPREP HI-LITE 26ML ORANGE

## (undated) DEVICE — CLEARCUT® SLIT KNIFE INTREPID MICRO-COAXIAL SYSTEM 2.4 SB: Brand: CLEARCUT®; INTREPID

## (undated) DEVICE — DRESSING MEPILEX AG BORDER 4 X 4 IN

## (undated) DEVICE — AIR INJECT CANNULA 27GA: Brand: OPHTHALMIC CANNULA

## (undated) DEVICE — ALCOHOL ISOPROPYL BLUE

## (undated) DEVICE — THE MONARCH® "D" CARTRIDGE IS A SINGLE-USE POLYPROPYLENE CARTRIDGE FOR POSTERIOR CHAMBER IOL DELIVERY: Brand: MONARCH® III

## (undated) DEVICE — ARTHROSCOPY FLOOR MAT

## (undated) DEVICE — ACE WRAP 6 IN UNSTERILE

## (undated) DEVICE — GLOVE SRG BIOGEL 8

## (undated) DEVICE — DRAPE C-ARMOUR

## (undated) DEVICE — GLOVE INDICATOR PI UNDERGLOVE SZ 7.5 BLUE

## (undated) DEVICE — PREP PAD BNS: Brand: CONVERTORS

## (undated) DEVICE — DISPOSABLE EQUIPMENT COVER: Brand: SMALL TOWEL DRAPE

## (undated) DEVICE — PAD CAST 4 IN COTTON NON STERILE

## (undated) DEVICE — EYE PADS 1 5/8"X2 5/8": Brand: MCKESSON

## (undated) DEVICE — DISPOSABLE OR TOWEL: Brand: CARDINAL HEALTH